# Patient Record
Sex: FEMALE | Race: OTHER | Employment: OTHER | URBAN - METROPOLITAN AREA
[De-identification: names, ages, dates, MRNs, and addresses within clinical notes are randomized per-mention and may not be internally consistent; named-entity substitution may affect disease eponyms.]

---

## 2017-01-22 ENCOUNTER — APPOINTMENT (OUTPATIENT)
Dept: GENERAL RADIOLOGY | Age: 62
DRG: 638 | End: 2017-01-22
Attending: EMERGENCY MEDICINE
Payer: MEDICARE

## 2017-01-22 ENCOUNTER — HOSPITAL ENCOUNTER (INPATIENT)
Facility: HOSPITAL | Age: 62
LOS: 2 days | Discharge: HOME | DRG: 638 | End: 2017-01-24
Attending: EMERGENCY MEDICINE | Admitting: INTERNAL MEDICINE
Payer: MEDICARE

## 2017-01-22 DIAGNOSIS — E08.10 DIABETIC KETOACIDOSIS WITHOUT COMA ASSOCIATED WITH DIABETES MELLITUS DUE TO UNDERLYING CONDITION (HCC): Primary | ICD-10-CM

## 2017-01-22 DIAGNOSIS — B34.9 VIRAL SYNDROME: ICD-10-CM

## 2017-01-22 PROBLEM — E87.20 METABOLIC ACIDOSIS: Status: ACTIVE | Noted: 2017-01-22

## 2017-01-22 PROBLEM — E87.1 HYPONATREMIA: Status: ACTIVE | Noted: 2017-01-22

## 2017-01-22 PROBLEM — Z91.81 AT RISK FOR FALLING: Status: ACTIVE | Noted: 2017-01-22

## 2017-01-22 PROBLEM — E87.2 METABOLIC ACIDOSIS: Status: ACTIVE | Noted: 2017-01-22

## 2017-01-22 PROBLEM — R73.9 HYPERGLYCEMIA: Status: ACTIVE | Noted: 2017-01-22

## 2017-01-22 LAB
ALBUMIN SERPL-MCNC: 3.4 G/DL (ref 3.5–4.8)
ALP LIVER SERPL-CCNC: 145 U/L (ref 50–130)
ALT SERPL-CCNC: 26 U/L (ref 14–54)
AST SERPL-CCNC: 25 U/L (ref 15–41)
BASOPHILS # BLD AUTO: 0.01 X10(3) UL (ref 0–0.1)
BASOPHILS NFR BLD AUTO: 0.2 %
BILIRUB SERPL-MCNC: 0.3 MG/DL (ref 0.1–2)
BUN BLD-MCNC: 13 MG/DL (ref 8–20)
BUN BLD-MCNC: 15 MG/DL (ref 8–20)
CALCIUM BLD-MCNC: 8.4 MG/DL (ref 8.3–10.3)
CALCIUM BLD-MCNC: 8.6 MG/DL (ref 8.3–10.3)
CHLORIDE: 101 MMOL/L (ref 101–111)
CHLORIDE: 107 MMOL/L (ref 101–111)
CO2: 20 MMOL/L (ref 22–32)
CO2: 27 MMOL/L (ref 22–32)
CREAT BLD-MCNC: 1.28 MG/DL (ref 0.55–1.02)
CREAT BLD-MCNC: 1.43 MG/DL (ref 0.55–1.02)
EOSINOPHIL # BLD AUTO: 0.07 X10(3) UL (ref 0–0.3)
EOSINOPHIL NFR BLD AUTO: 1.5 %
ERYTHROCYTE [DISTWIDTH] IN BLOOD BY AUTOMATED COUNT: 14 % (ref 11.5–16)
GLUCOSE BLD-MCNC: 168 MG/DL (ref 70–99)
GLUCOSE BLD-MCNC: 331 MG/DL (ref 65–99)
GLUCOSE BLD-MCNC: 432 MG/DL (ref 70–99)
GLUCOSE BLD-MCNC: 99 MG/DL (ref 65–99)
HCT VFR BLD AUTO: 35.3 % (ref 34–50)
HGB BLD-MCNC: 11.5 G/DL (ref 12–16)
IMMATURE GRANULOCYTE COUNT: 0.01 X10(3) UL (ref 0–1)
IMMATURE GRANULOCYTE RATIO %: 0.2 %
LYMPHOCYTES # BLD AUTO: 1.58 X10(3) UL (ref 0.9–4)
LYMPHOCYTES NFR BLD AUTO: 34.1 %
M PROTEIN MFR SERPL ELPH: 7.2 G/DL (ref 6.1–8.3)
MCH RBC QN AUTO: 25.4 PG (ref 27–33.2)
MCHC RBC AUTO-ENTMCNC: 32.6 G/DL (ref 31–37)
MCV RBC AUTO: 78.1 FL (ref 81–100)
MONOCYTES # BLD AUTO: 0.38 X10(3) UL (ref 0.1–0.6)
MONOCYTES NFR BLD AUTO: 8.2 %
NEUTROPHIL ABS PRELIM: 2.58 X10 (3) UL (ref 1.3–6.7)
NEUTROPHILS # BLD AUTO: 2.58 X10(3) UL (ref 1.3–6.7)
NEUTROPHILS NFR BLD AUTO: 55.8 %
PLATELET # BLD AUTO: 214 10(3)UL (ref 150–450)
POTASSIUM SERPL-SCNC: 3.9 MMOL/L (ref 3.6–5.1)
POTASSIUM SERPL-SCNC: 4.4 MMOL/L (ref 3.6–5.1)
RBC # BLD AUTO: 4.52 X10(6)UL (ref 3.8–5.1)
RED CELL DISTRIBUTION WIDTH-SD: 39.5 FL (ref 35.1–46.3)
SODIUM SERPL-SCNC: 134 MMOL/L (ref 136–144)
SODIUM SERPL-SCNC: 141 MMOL/L (ref 136–144)
WBC # BLD AUTO: 4.6 X10(3) UL (ref 4–13)

## 2017-01-22 PROCEDURE — 71010 XR CHEST AP PORTABLE  (CPT=71010): CPT

## 2017-01-22 PROCEDURE — 99223 1ST HOSP IP/OBS HIGH 75: CPT | Performed by: INTERNAL MEDICINE

## 2017-01-22 RX ORDER — ONDANSETRON 2 MG/ML
4 INJECTION INTRAMUSCULAR; INTRAVENOUS EVERY 6 HOURS PRN
Status: DISCONTINUED | OUTPATIENT
Start: 2017-01-22 | End: 2017-01-24

## 2017-01-22 RX ORDER — FENOFIBRATE 134 MG/1
134 CAPSULE ORAL
Status: DISCONTINUED | OUTPATIENT
Start: 2017-01-23 | End: 2017-01-24

## 2017-01-22 RX ORDER — ACETAMINOPHEN 325 MG/1
650 TABLET ORAL EVERY 6 HOURS PRN
Status: DISCONTINUED | OUTPATIENT
Start: 2017-01-22 | End: 2017-01-24

## 2017-01-22 RX ORDER — ATORVASTATIN CALCIUM 10 MG/1
10 TABLET, FILM COATED ORAL
Status: DISCONTINUED | OUTPATIENT
Start: 2017-01-23 | End: 2017-01-24

## 2017-01-22 RX ORDER — ZOLPIDEM TARTRATE 5 MG/1
5 TABLET ORAL NIGHTLY PRN
Status: DISCONTINUED | OUTPATIENT
Start: 2017-01-22 | End: 2017-01-24

## 2017-01-22 RX ORDER — RAMIPRIL 2.5 MG/1
5 CAPSULE ORAL
Status: DISCONTINUED | OUTPATIENT
Start: 2017-01-23 | End: 2017-01-24

## 2017-01-22 RX ORDER — SPIRONOLACT/HYDROCHLOROTHIAZID 25 MG-25MG
1 TABLET ORAL DAILY
Status: DISCONTINUED | OUTPATIENT
Start: 2017-01-23 | End: 2017-01-22 | Stop reason: RX

## 2017-01-22 RX ORDER — CODEINE PHOSPHATE AND GUAIFENESIN 10; 100 MG/5ML; MG/5ML
5 SOLUTION ORAL EVERY 4 HOURS PRN
Status: DISCONTINUED | OUTPATIENT
Start: 2017-01-22 | End: 2017-01-23

## 2017-01-22 RX ORDER — SODIUM CHLORIDE 9 MG/ML
INJECTION, SOLUTION INTRAVENOUS CONTINUOUS
Status: DISCONTINUED | OUTPATIENT
Start: 2017-01-22 | End: 2017-01-24

## 2017-01-22 RX ORDER — DEXTROSE MONOHYDRATE 25 G/50ML
50 INJECTION, SOLUTION INTRAVENOUS
Status: DISCONTINUED | OUTPATIENT
Start: 2017-01-22 | End: 2017-01-24

## 2017-01-22 RX ORDER — BUPROPION HYDROCHLORIDE 150 MG/1
150 TABLET, EXTENDED RELEASE ORAL
Status: DISCONTINUED | OUTPATIENT
Start: 2017-01-23 | End: 2017-01-24

## 2017-01-22 RX ORDER — GABAPENTIN 300 MG/1
600 CAPSULE ORAL NIGHTLY PRN
Status: DISCONTINUED | OUTPATIENT
Start: 2017-01-22 | End: 2017-01-24

## 2017-01-22 RX ORDER — POLYETHYLENE GLYCOL 3350 17 G/17G
17 POWDER, FOR SOLUTION ORAL DAILY PRN
Status: DISCONTINUED | OUTPATIENT
Start: 2017-01-22 | End: 2017-01-24

## 2017-01-22 RX ORDER — INSULIN ASPART 100 [IU]/ML
0.2 INJECTION, SOLUTION INTRAVENOUS; SUBCUTANEOUS ONCE
Status: COMPLETED | OUTPATIENT
Start: 2017-01-22 | End: 2017-01-22

## 2017-01-22 RX ORDER — SODIUM CHLORIDE 9 MG/ML
INJECTION, SOLUTION INTRAVENOUS CONTINUOUS
Status: ACTIVE | OUTPATIENT
Start: 2017-01-22 | End: 2017-01-22

## 2017-01-22 RX ORDER — PANTOPRAZOLE SODIUM 40 MG/1
40 TABLET, DELAYED RELEASE ORAL
Status: DISCONTINUED | OUTPATIENT
Start: 2017-01-23 | End: 2017-01-24

## 2017-01-22 RX ORDER — HEPARIN SODIUM 5000 [USP'U]/ML
5000 INJECTION, SOLUTION INTRAVENOUS; SUBCUTANEOUS EVERY 8 HOURS
Status: DISCONTINUED | OUTPATIENT
Start: 2017-01-22 | End: 2017-01-24

## 2017-01-22 NOTE — ED PROVIDER NOTES
Patient Seen in: THE [facility] Emergency Department In [place]    History   Patient presents with:  Cough/URI  Dyspnea AZALIA SOB (respiratory)    Stated Complaint: cough for one week, azalia, chest pain    HPI    22-year-old female presents to the ER complaining TABLET BY MOUTH EVERY DAY   METFORMIN HCL 1000 MG Oral Tab,  TAKE ONE AND ONE-HALF TABLETS BY MOUTH WITH BREAKFAST AND ONE TABLET IN THE AFTERNOON   LANTUS 100 UNIT/ML Subcutaneous Solution,  ADMINISTER 25 UNITS UNDER THE SKIN EVERY EVENING   DICLOFENAC SO Smokeless Status: Never Used                        Alcohol Use: No                Review of Systems    Positive for stated complaint: cough for one week, azalia, chest pain  Other systems are as noted in HPI. Constitutional and vital signs reviewed. Alkaline Phosphatase 145 (*)     Albumin 3.4 (*)     Sodium 134 (*)     CO2 20.0 (*)     All other components within normal limits   CBC W/ DIFFERENTIAL - Abnormal; Notable for the following:     HGB 11.5 (*)     MCV 78.1 (*)     MCH 25.4 (*)     All o to the hospitalist service. She was admitted for further care. She will be transferred by ambulance due to the need for continued IV fluids.         Disposition and Plan     Clinical Impression:  Diabetic ketoacidosis without coma associated with diabetes

## 2017-01-23 LAB
ATRIAL RATE: 65 BPM
BUN BLD-MCNC: 9 MG/DL (ref 8–20)
CALCIUM BLD-MCNC: 8.1 MG/DL (ref 8.3–10.3)
CHLORIDE: 113 MMOL/L (ref 101–111)
CO2: 23 MMOL/L (ref 22–32)
CREAT BLD-MCNC: 1.05 MG/DL (ref 0.55–1.02)
EST. AVERAGE GLUCOSE BLD GHB EST-MCNC: 306 MG/DL (ref 68–126)
GLUCOSE BLD-MCNC: 161 MG/DL (ref 65–99)
GLUCOSE BLD-MCNC: 162 MG/DL (ref 70–99)
GLUCOSE BLD-MCNC: 262 MG/DL (ref 65–99)
GLUCOSE BLD-MCNC: 287 MG/DL (ref 65–99)
GLUCOSE BLD-MCNC: 314 MG/DL (ref 65–99)
HAV IGM SER QL: 1.9 MG/DL (ref 1.7–3)
HBA1C MFR BLD HPLC: 12.3 % (ref ?–5.7)
P AXIS: 41 DEGREES
P-R INTERVAL: 156 MS
POTASSIUM SERPL-SCNC: 4 MMOL/L (ref 3.6–5.1)
Q-T INTERVAL: 496 MS
QRS DURATION: 150 MS
QTC CALCULATION (BEZET): 515 MS
R AXIS: 1 DEGREES
SODIUM SERPL-SCNC: 144 MMOL/L (ref 136–144)
T AXIS: 104 DEGREES
VENTRICULAR RATE: 65 BPM

## 2017-01-23 PROCEDURE — 99232 SBSQ HOSP IP/OBS MODERATE 35: CPT | Performed by: HOSPITALIST

## 2017-01-23 RX ORDER — AZITHROMYCIN 250 MG/1
500 TABLET, FILM COATED ORAL
Status: DISCONTINUED | OUTPATIENT
Start: 2017-01-23 | End: 2017-01-23

## 2017-01-23 RX ORDER — DOXYCYCLINE HYCLATE 100 MG/1
100 CAPSULE ORAL EVERY 12 HOURS SCHEDULED
Status: DISCONTINUED | OUTPATIENT
Start: 2017-01-23 | End: 2017-01-24

## 2017-01-23 RX ORDER — IPRATROPIUM BROMIDE AND ALBUTEROL SULFATE 2.5; .5 MG/3ML; MG/3ML
3 SOLUTION RESPIRATORY (INHALATION)
Status: DISCONTINUED | OUTPATIENT
Start: 2017-01-23 | End: 2017-01-24

## 2017-01-23 NOTE — H&P
[facility]  History and Physical     Gary Delay Patient Status:  Inpatient    1955 MRN [id number]   [facility] 5NW-A Attending Luz Martinez MD   Hosp Day # 0 PCP David Rios MD     Chief Complaint: cough    [facility]       Social History:  reports that she has never smoked. She has never used smokeless tobacco. She reports that she does not drink alcohol or use illicit drugs.     Family History:   Family History   Problem Relation Age of Onset   • Heart Attack 1   Fluticasone Propionate (FLONASE) 50 MCG/ACT Nasal Suspension 1 spray by Nasal route daily. (Patient taking differently: 1 spray by Nasal route daily as needed.) Disp: 16 g Rfl: 2   Coenzyme Q10 (CO Q-10) 300 MG Oral Cap Take 1 capsule by mouth daily.  D lesions. Psychiatric: Appropriate mood and affect.       Diagnostic Data:      Labs:  Recent Labs   Lab  01/22/17   1726   WBC  4.6   HGB  11.5*   MCV  78.1*   PLT  214.0       Recent Labs   Lab  01/22/17   1726  01/22/17   2046   GLU  432*  168*   BUN  1

## 2017-01-23 NOTE — PROGRESS NOTES
NURSING ADMISSION NOTE      Patient admitted via Ambulance  Oriented to room 529  Safety precautions initiated. Bed in low position. Call light in reach. Pt arrived to [facility] from [place] [facility].  Admission database completed with assistance from Sadia Louis

## 2017-01-23 NOTE — PROGRESS NOTES
01/22/17 2207   Provider Notification   Reason for Communication Review case  (Blood sugar, diet order)   Provider Name Dr. Garima Main   Method of Communication Page   Response Waiting for response; Phone call;See orders   Notification Time 2157   Pt blood s

## 2017-01-23 NOTE — PLAN OF CARE
Patient/Family Goals    • Patient/Family Short Term Goal Progressing        SAFETY ADULT - FALL    • Free from fall injury Progressing        Pt A/O x 3, primarily Mosotho speaking, understands english. Pt blood sugar 99 tonight, pt on diabetic diet.  Infor

## 2017-01-23 NOTE — PHYSICAL THERAPY NOTE
PHYSICAL THERAPY QUICK EVALUATION - INPATIENT    Room Number: 529/529-A  Evaluation Date: 1/23/2017  Presenting Problem: diabetic ketoacidosis   Physician Order: PT Eval and Treat    Problem List  Principal Problem:    Diabetic ketoacidosis without coma as with family but they do not help her with ADLs. SUBJECTIVE  \"I need my sugars fixed. \"    OBJECTIVE  Precautions: None  Fall Risk: Standard fall risk    WEIGHT BEARING RESTRICTION  Weight Bearing Restriction: None    PAIN ASSESSMENT  Ratin reported that she \"feels good\" when asked. Patient performed 5x sit to  19.5 seconds. Patient was left sitting up in chair with all needs met and call light in place.      Patient End of Session: Up in chair;Needs met;Call light within reach;RN aw

## 2017-01-23 NOTE — PROGRESS NOTES
01/22/17 2145   Provider Notification   Reason for Communication Review case  (Med rec)   Provider Name Dr. Shane Nevarez   Method of Communication Page   Response Waiting for response   Notification Time 2115   MD paged about med rec needing to be completed.

## 2017-01-23 NOTE — CM/SW NOTE
Patient was screened during rounds and no needs are identified at this time. Pt was evaluated by PT and deemed safe for home discharge. Pt requested written letter indicating current hospitalization as pt reports court date 1/24/17.  Letter completed and g

## 2017-01-23 NOTE — PLAN OF CARE
Diabetes/Glucose Control    • Glucose maintained within prescribed range Progressing        Patient/Family Goals    • Patient/Family Short Term Goal Progressing          PROBLEM: DKA     ASSESS: Pt a&ox4, VSS, afebrile, primarily Uruguayan speaking, maintain acidosis     Diabetic ketoacidosis without coma associated with diabetes mellitus due to underlying condition ([facility] [address]     Viral syndrome     At risk for falling       Active issue(s) requiring resolution prior to discharge: Control blood glucose     Anticipa

## 2017-01-23 NOTE — ED NOTES
[facility] to [facility]. EMS notified for transport, ETA 20-30 minutes.

## 2017-01-23 NOTE — PROGRESS NOTES
[facility]  Progress Note     Julio César Loya Patient Status:  Inpatient    1955 MRN [id number]   [facility] 5NW-A Attending Albertina Arnold MD   Hosp Day # 1 PCP Brenna Clark MD     Chief Complaint: cough chills weakness    S: P ER (SR)  150 mg Oral Daily   • fenofibrate micronized  134 mg Oral Daily with breakfast   • insulin detemir  25 Units Subcutaneous Nightly   • Pantoprazole Sodium  40 mg Oral BID AC   • ramipril  5 mg Oral Daily       ASSESSMENT / PLAN:     1.  Diabetes typ

## 2017-01-24 VITALS
RESPIRATION RATE: 20 BRPM | HEART RATE: 78 BPM | SYSTOLIC BLOOD PRESSURE: 132 MMHG | DIASTOLIC BLOOD PRESSURE: 59 MMHG | WEIGHT: 198.88 LBS | TEMPERATURE: 99 F | BODY MASS INDEX: 32 KG/M2 | OXYGEN SATURATION: 100 %

## 2017-01-24 LAB
GLUCOSE BLD-MCNC: 201 MG/DL (ref 65–99)
GLUCOSE BLD-MCNC: 284 MG/DL (ref 65–99)

## 2017-01-24 PROCEDURE — 99238 HOSP IP/OBS DSCHRG MGMT 30/<: CPT | Performed by: HOSPITALIST

## 2017-01-24 RX ORDER — DOXYCYCLINE HYCLATE 100 MG/1
100 CAPSULE ORAL 2 TIMES DAILY
Qty: 10 CAPSULE | Refills: 0 | Status: SHIPPED | OUTPATIENT
Start: 2017-01-24 | End: 2017-07-10

## 2017-01-24 NOTE — PROGRESS NOTES
SPO2% ON ROOM AIR AT REST 99%    SPO2% AMBULATION ON ROOM AIR 96%    SPO2% AMBULATION ON O2 96% ON 0 LITERS PER MINIUTE

## 2017-01-24 NOTE — PROGRESS NOTES
NURSING DISCHARGE NOTE    Discharged Home via Wheelchair. Accompanied by Family member  Belongings Taken by patient/family. Pt discharged to home. dicharge instruction given to pt,verbalized understanding. heplock discontinued. pts family take her home.

## 2017-01-24 NOTE — PLAN OF CARE
Problem: Patient/Family Goals  Goal: Patient/Family Short Term Goal  Patient’s Short Term Goal: Admission 1/22 PM: For blood sugar to go down  1/23 AM - control cough   1/23 (2100) to get blood sugars under control   1/24-get blood sugar under control  Int

## 2017-01-24 NOTE — DISCHARGE SUMMARY
[facility]  DISCHARGE SUMMARY     Keisha Maddox Patient Status:  Inpatient    1955 MRN [id number]   [facility] 5NW-A Attending Merrill Sanchez MD   Hosp Day # 2 PCP Ger Alcantara MD     Date of Admission: 2017  Date of Disc ONCE DAILY    Quantity:  100 each   Refills:  1       Lancets Misc        Use three times daily    Quantity:  200 each   Refills:  1       TRUETRACK BLOOD GLUCOSE W/DEVICE Kit        Use as directed    Quantity:  1 Kit   Refills:  0         ASK your doctor Refills:  0       Montelukast Sodium 10 MG Tabs   Commonly known as:  SINGULAIR        TAKE 1 TABLET BY MOUTH EVERY DAY    Quantity:  90 tablet   Refills:  0       Pantoprazole Sodium 40 MG Tbec   Last time this was given:  40 mg on 1/24/2017  6:16 AM

## 2017-01-24 NOTE — PROGRESS NOTES
Pt is a 63 y/o female admitted with Bronchitis. alert oriented,denies  SOB. no fever,N/V noted. she is able to do her ADLs. 02 walk done,pt didn't need any 02.sats 96% on room air.

## 2017-01-24 NOTE — PLAN OF CARE
Diabetes/Glucose Control    • Glucose maintained within prescribed range Progressing        Impaired Functional Mobility    • Achieve highest/safest level of mobility/gait Progressing        PAIN - ADULT    • Verbalizes/displays adequate comfort level or p

## 2017-01-24 NOTE — CM/SW NOTE
Met with patient, she is BPCI and willing to accept [facility]. Explained the program and discussed her discharge plan. Plan to return home with daughter when medically stable.

## 2017-01-26 ENCOUNTER — PATIENT OUTREACH (OUTPATIENT)
Dept: CASE MANAGEMENT | Age: 62
End: 2017-01-26

## 2017-01-26 DIAGNOSIS — E86.0 DEHYDRATION: Primary | ICD-10-CM

## 2017-01-26 DIAGNOSIS — E08.10 DIABETIC KETOACIDOSIS WITHOUT COMA ASSOCIATED WITH DIABETES MELLITUS DUE TO UNDERLYING CONDITION (HCC): ICD-10-CM

## 2017-01-26 NOTE — PROGRESS NOTES
Initial Post Discharge Follow Up   Discharge Date: 1/24/17  Contact Date: 1/26/2017    Consent Verification:  Assessment Completed With: Patient  Other: Hitesh Karimi, patient's daughter Permission received per patient?  written  HIPAA Verified?   Yes     Per p Cap Take 1 capsule (100 mg total) by mouth 2 (two) times daily.  Disp: 10 capsule Rfl: 0   BUPROPION HCL ER, XL, 150 MG Oral Tablet 24 Hr TAKE 1 TABLET BY MOUTH DAILY Disp: 30 tablet Rfl: 2   Atorvastatin Calcium 20 MG Oral Tab Take 1 tablet (20 mg total) b differently: 1 spray by Nasal route daily as needed.) Disp: 16 g Rfl: 2   Insulin Pen Needle (NOVOFINE) 32G X 6 MM Does not apply Misc Use twice daily Disp: 100 each Rfl: 12   Coenzyme Q10 (CO Q-10) 300 MG Oral Cap Take 1 capsule by mouth daily.  Disp:  Rfl

## 2017-01-27 NOTE — TELEPHONE ENCOUNTER
Requesting Bupropion  LOV: 5/9/16  RTC: 6 months  Last Labs: n/a  Filled: 12/28/16 #30 with 2 refills    Future Appointments  Date Time Provider Margoth Silveira   2/6/2017 1:30 PM Leslie Krause MD [facility] [address]       Patient has upcoming appointmen

## 2017-01-29 RX ORDER — BUPROPION HYDROCHLORIDE 150 MG/1
TABLET ORAL
Qty: 90 TABLET | Refills: 0 | Status: SHIPPED | OUTPATIENT
Start: 2017-01-29 | End: 2017-02-06

## 2017-02-06 ENCOUNTER — OFFICE VISIT (OUTPATIENT)
Dept: FAMILY MEDICINE CLINIC | Facility: CLINIC | Age: 62
End: 2017-02-06

## 2017-02-06 ENCOUNTER — LAB ENCOUNTER (OUTPATIENT)
Dept: LAB | Age: 62
End: 2017-02-06
Attending: PHYSICIAN ASSISTANT
Payer: MEDICARE

## 2017-02-06 VITALS
SYSTOLIC BLOOD PRESSURE: 120 MMHG | DIASTOLIC BLOOD PRESSURE: 78 MMHG | BODY MASS INDEX: 31.82 KG/M2 | HEIGHT: 66 IN | WEIGHT: 198 LBS | RESPIRATION RATE: 16 BRPM | HEART RATE: 64 BPM

## 2017-02-06 DIAGNOSIS — Z79.4 TYPE 2 DIABETES MELLITUS WITH DIABETIC NEUROPATHY, WITH LONG-TERM CURRENT USE OF INSULIN (HCC): ICD-10-CM

## 2017-02-06 DIAGNOSIS — N30.00 ACUTE CYSTITIS WITHOUT HEMATURIA: ICD-10-CM

## 2017-02-06 DIAGNOSIS — E11.40 TYPE 2 DIABETES MELLITUS WITH DIABETIC NEUROPATHY, WITH LONG-TERM CURRENT USE OF INSULIN (HCC): ICD-10-CM

## 2017-02-06 DIAGNOSIS — E11.40 TYPE 2 DIABETES MELLITUS WITH DIABETIC NEUROPATHY, WITH LONG-TERM CURRENT USE OF INSULIN (HCC): Primary | ICD-10-CM

## 2017-02-06 DIAGNOSIS — Z79.4 TYPE 2 DIABETES MELLITUS WITH DIABETIC NEUROPATHY, WITH LONG-TERM CURRENT USE OF INSULIN (HCC): Primary | ICD-10-CM

## 2017-02-06 PROBLEM — N39.0 UTI (URINARY TRACT INFECTION): Status: ACTIVE | Noted: 2017-02-06

## 2017-02-06 LAB
25-HYDROXYVITAMIN D (TOTAL): 23.3 NG/ML (ref 30–100)
BILIRUB UR QL STRIP.AUTO: NEGATIVE
BUN BLD-MCNC: 19 MG/DL (ref 8–20)
CALCIUM BLD-MCNC: 9.2 MG/DL (ref 8.3–10.3)
CHLORIDE: 106 MMOL/L (ref 101–111)
CLARITY UR REFRACT.AUTO: CLEAR
CO2: 26 MMOL/L (ref 22–32)
COLOR UR AUTO: YELLOW
CREAT BLD-MCNC: 1.19 MG/DL (ref 0.55–1.02)
EST. AVERAGE GLUCOSE BLD GHB EST-MCNC: 298 MG/DL (ref 68–126)
GLUCOSE BLD-MCNC: 226 MG/DL (ref 70–99)
GLUCOSE UR STRIP.AUTO-MCNC: >=500 MG/DL
HAV AB SERPL IA-ACNC: 380 PG/ML (ref 193–986)
HBA1C MFR BLD HPLC: 12 % (ref ?–5.7)
KETONES UR STRIP.AUTO-MCNC: NEGATIVE MG/DL
NITRITE UR QL STRIP.AUTO: NEGATIVE
PH UR STRIP.AUTO: 5 [PH] (ref 4.5–8)
POTASSIUM SERPL-SCNC: 4.3 MMOL/L (ref 3.6–5.1)
PROT UR STRIP.AUTO-MCNC: NEGATIVE MG/DL
RBC UR QL AUTO: NEGATIVE
SODIUM SERPL-SCNC: 139 MMOL/L (ref 136–144)
SP GR UR STRIP.AUTO: 1.01 (ref 1–1.03)
TSI SER-ACNC: 0.98 MIU/ML (ref 0.35–5.5)
UROBILINOGEN UR STRIP.AUTO-MCNC: <2 MG/DL

## 2017-02-06 PROCEDURE — 87147 CULTURE TYPE IMMUNOLOGIC: CPT

## 2017-02-06 PROCEDURE — 82306 VITAMIN D 25 HYDROXY: CPT

## 2017-02-06 PROCEDURE — 84443 ASSAY THYROID STIM HORMONE: CPT

## 2017-02-06 PROCEDURE — 99214 OFFICE O/P EST MOD 30 MIN: CPT | Performed by: INTERNAL MEDICINE

## 2017-02-06 PROCEDURE — 83036 HEMOGLOBIN GLYCOSYLATED A1C: CPT

## 2017-02-06 PROCEDURE — 80048 BASIC METABOLIC PNL TOTAL CA: CPT

## 2017-02-06 PROCEDURE — 87086 URINE CULTURE/COLONY COUNT: CPT

## 2017-02-06 PROCEDURE — 81001 URINALYSIS AUTO W/SCOPE: CPT

## 2017-02-06 PROCEDURE — 36415 COLL VENOUS BLD VENIPUNCTURE: CPT

## 2017-02-06 PROCEDURE — 82607 VITAMIN B-12: CPT

## 2017-02-06 RX ORDER — NITROFURANTOIN 25; 75 MG/1; MG/1
100 CAPSULE ORAL 2 TIMES DAILY
Qty: 20 CAPSULE | Refills: 0 | Status: SHIPPED | OUTPATIENT
Start: 2017-02-06 | End: 2017-07-10

## 2017-02-06 NOTE — PROGRESS NOTES
CC: Patient presents with:  Hospital F/U: high sugar       HPI:   1. DM, sugars in 200's. Taking meds. 2. Urinary freq, past 3 days, with associated burning, severity moderate in nature, drinking water not helping.        Current Outpatient Prescripti minutes before breakfast and before dinner, take on an empty stomach.  Disp: 60 tablet Rfl: 1   Glucose Blood (TRUETRACK TEST) In Vitro Strip TEST TWICE  DAILY Disp: 100 strip Rfl: 5   Lancets Does not apply Misc Use three times daily Disp: 200 each Rfl: 1 styloid tenosynovitis     Ganglion of tendon sheath     [id number] SX/ RLW/ GLOBAL EXP 01-20-15     Fungal toenail infection     Osteoarthrosis, unspecified whether generalized or localized, lower leg     Depression     Encounter for therapeutic drug nithya Macro (MACROBID) 100 MG Oral Cap 20 capsule 0      Sig: Take 1 capsule (100 mg total) by mouth 2 (two) times daily. Empagliflozin (JARDIANCE) 10 MG Oral Tab 30 tablet 5      Sig: Take 10 mg by mouth daily.           None     ASSESSMENT:   Type 2 diabet

## 2017-02-08 RX ORDER — INSULIN GLARGINE 100 [IU]/ML
INJECTION, SOLUTION SUBCUTANEOUS
Qty: 30 ML | Refills: 0 | Status: SHIPPED | OUTPATIENT
Start: 2017-02-08 | End: 2017-05-17

## 2017-02-08 NOTE — TELEPHONE ENCOUNTER
Requesting Lantus  LOV: 2/6/17  RTC: 4 wks  Last Labs: 2/6/17  Filled: 11/17/16 #30ml with 0 refills    Future Appointments  Date Time Provider Margoth Silveira   3/15/2017 5:40 PM Jil Castro MD [facility] [address]     RX approved

## 2017-02-20 RX ORDER — ERGOCALCIFEROL 1.25 MG/1
50000 CAPSULE ORAL WEEKLY
Qty: 4 CAPSULE | Refills: 5 | Status: SHIPPED | OUTPATIENT
Start: 2017-02-20 | End: 2017-06-14

## 2017-02-20 NOTE — PROGRESS NOTES
Quick Note:    Low vit D, start vit D 50,000 U once weekly for 6 months.  Low B12, uncontrolled DM, will discuss at next ov  ______

## 2017-03-15 ENCOUNTER — OFFICE VISIT (OUTPATIENT)
Dept: FAMILY MEDICINE CLINIC | Facility: CLINIC | Age: 62
End: 2017-03-15

## 2017-03-15 VITALS
HEIGHT: 66 IN | BODY MASS INDEX: 31.02 KG/M2 | HEART RATE: 64 BPM | WEIGHT: 193 LBS | DIASTOLIC BLOOD PRESSURE: 78 MMHG | RESPIRATION RATE: 16 BRPM | SYSTOLIC BLOOD PRESSURE: 122 MMHG

## 2017-03-15 DIAGNOSIS — E11.40 TYPE 2 DIABETES MELLITUS WITH DIABETIC NEUROPATHY, WITH LONG-TERM CURRENT USE OF INSULIN (HCC): ICD-10-CM

## 2017-03-15 DIAGNOSIS — Z79.4 TYPE 2 DIABETES MELLITUS WITH DIABETIC NEUROPATHY, WITH LONG-TERM CURRENT USE OF INSULIN (HCC): ICD-10-CM

## 2017-03-15 DIAGNOSIS — E53.8 VITAMIN B 12 DEFICIENCY: ICD-10-CM

## 2017-03-15 PROCEDURE — 96372 THER/PROPH/DIAG INJ SC/IM: CPT | Performed by: INTERNAL MEDICINE

## 2017-03-15 PROCEDURE — 99213 OFFICE O/P EST LOW 20 MIN: CPT | Performed by: INTERNAL MEDICINE

## 2017-03-15 RX ORDER — BUPROPION HYDROCHLORIDE 150 MG/1
TABLET ORAL
Refills: 1 | COMMUNITY
Start: 2017-03-07 | End: 2017-10-23

## 2017-03-15 RX ORDER — EMPAGLIFLOZIN 10 MG/1
TABLET, FILM COATED ORAL
COMMUNITY
Start: 2017-03-05 | End: 2017-07-10

## 2017-03-15 RX ORDER — CYANOCOBALAMIN 1000 UG/ML
1000 INJECTION INTRAMUSCULAR; SUBCUTANEOUS ONCE
Status: COMPLETED | OUTPATIENT
Start: 2017-03-15 | End: 2017-03-15

## 2017-03-15 RX ADMIN — CYANOCOBALAMIN 1000 MCG: 1000 INJECTION INTRAMUSCULAR; SUBCUTANEOUS at 18:38:00

## 2017-03-15 NOTE — TELEPHONE ENCOUNTER
Diabetic Medication Protocol Failed3/15 11:01 AM   Last HgBA1C < 7.5     Asthma & COPD Medication Protocol Failed3/15 11:01 AM   Asthma Action Score greater than or equal to 20    AAP/ACT given in last 12 months     Requesting Metformin and Montelukast   L

## 2017-03-15 NOTE — PROGRESS NOTES
CC: Patient presents with:  Lab Results       HPI:   1. DM2, fasting sugars in 100's, 2 hours after meals 180's, on jardiacne, metfomrin, and lantus.     2. Vitamin B 12 deficiency, some fatigue       Current Outpatient Prescriptions:  JARDIANCE 10 MG O Disp: 100 each Rfl: 5   gabapentin (NEURONTIN) 300 MG Oral Cap TAKE 2 CAPSULES BY MOUTH EVERY NIGHT (Patient taking differently: TAKE 2 CAPSULES BY MOUTH EVERY NIGHT AS NEEDED) Disp: 180 capsule Rfl: 1   Pantoprazole Sodium (PROTONIX) 40 MG Oral Tab EC Og Obesity     Thrombocytopenia ([facility] [address]     Essential hypertension     Hyperlipidemia     DM2 (diabetes mellitus, type 2) (HCC)     Fatigue     Numbness     Low testosterone level in female     Constipation     Abnormal EKG     Allergic rhinitis     Osteopenia daily.          None     ASSESSMENT:   Vitamin B 12 deficiency  Type 2 diabetes mellitus with diabetic neuropathy, with long-term current use of insulin (HCC)     PLAN:  1. DM2, fasting sugars in 100's, 2 hours after meals 180's, will increase jardiacne to

## 2017-03-16 RX ORDER — FENOFIBRATE 160 MG/1
TABLET ORAL
Qty: 90 TABLET | Refills: 0 | OUTPATIENT
Start: 2017-03-16

## 2017-03-22 ENCOUNTER — TELEPHONE (OUTPATIENT)
Dept: FAMILY MEDICINE CLINIC | Facility: CLINIC | Age: 62
End: 2017-03-22

## 2017-03-22 NOTE — TELEPHONE ENCOUNTER
Diabetic Medication Protocol Failed3/22 12:30 PM   Last HgBA1C < 7.5        Requesting Metformin HCL 1000 mg and Diclofenac Sodium (  LOV: 3/15/17  RTC: 3m  Last Labs: 2/6/17  Filled: Metformin 12/12/16 #225 with 0 refills   Diclofenac sodium 11/9/16 #60 w

## 2017-03-22 NOTE — TELEPHONE ENCOUNTER
Requesting Vitamin B 12 injections  LOV: 3/15/17  RTC: 3 months  Last Labs: 2/6/17 - 2.  Vitamin B 12 deficiency, will start monthly B12 is noted at last office visit      Future Appointments  Date Time Provider Margoth Silveira   7/10/2017 3:20 PM Kelsey Linn,

## 2017-03-23 RX ORDER — CALCIUM CARB/VITAMIN D3/VIT K1 500-100-40
TABLET,CHEWABLE ORAL
Qty: 100 EACH | Refills: 3 | Status: SHIPPED | OUTPATIENT
Start: 2017-03-23 | End: 2018-06-04

## 2017-03-23 RX ORDER — MONTELUKAST SODIUM 10 MG/1
TABLET ORAL
Qty: 90 TABLET | Refills: 1 | Status: SHIPPED | OUTPATIENT
Start: 2017-03-23 | End: 2017-10-23

## 2017-03-23 RX ORDER — DICLOFENAC SODIUM 75 MG/1
TABLET, DELAYED RELEASE ORAL
Qty: 60 TABLET | Refills: 0 | OUTPATIENT
Start: 2017-03-23

## 2017-03-23 RX ORDER — DIAPER,BRIEF,ADULT, DISPOSABLE
EACH MISCELLANEOUS
Qty: 100 STRIP | Refills: 3 | Status: SHIPPED | OUTPATIENT
Start: 2017-03-23 | End: 2017-07-25

## 2017-03-30 NOTE — TELEPHONE ENCOUNTER
Can we please f/u on this request; it looks like the metformin was addressed but not the diclofenac. Thank you.

## 2017-03-30 NOTE — TELEPHONE ENCOUNTER
Requesting: diclofenac for arthropathy of multiple sites  LOV: 3/15/17 for lab follow up  RTC: 3 months-June 2017  Last Labs: n/a  Filled: 11/9/16 #60 with 0 refills    Future Appointments  Date Time Provider Margoth Silveira   4/21/2017 3:00 PM [facility] 20 NU

## 2017-03-31 ENCOUNTER — TELEPHONE (OUTPATIENT)
Dept: FAMILY MEDICINE CLINIC | Facility: CLINIC | Age: 62
End: 2017-03-31

## 2017-03-31 RX ORDER — DICLOFENAC SODIUM 75 MG/1
TABLET, DELAYED RELEASE ORAL
Qty: 60 TABLET | Refills: 1 | Status: SHIPPED | OUTPATIENT
Start: 2017-03-31 | End: 2017-03-31

## 2017-03-31 NOTE — TELEPHONE ENCOUNTER
Called Pt. To reschedule B-12 injection appt.4/21/17 Finnish speaking only. Daughter will call back to reschedule.

## 2017-04-03 RX ORDER — DICLOFENAC SODIUM 75 MG/1
TABLET, DELAYED RELEASE ORAL
Qty: 180 TABLET | Refills: 1 | Status: ON HOLD | OUTPATIENT
Start: 2017-04-03 | End: 2017-11-16

## 2017-04-04 NOTE — TELEPHONE ENCOUNTER
Requesting bupropion   LOV: 3/15/17  RTC: 3 months  Last Labs :        Future Appointments  Date Time Provider Margoth Jordana   5/22/2017 3:30 PM [facility] 20 NURSE [facility] [address]   7/10/2017 3:20 PM Berlin Diez MD [facility] [address]

## 2017-04-05 RX ORDER — BUPROPION HYDROCHLORIDE 150 MG/1
TABLET ORAL
Qty: 90 TABLET | Refills: 0 | Status: SHIPPED | OUTPATIENT
Start: 2017-04-05 | End: 2017-07-03

## 2017-04-07 NOTE — TELEPHONE ENCOUNTER
Diabetic Medication Protocol Failed4/7 3:43 PM   Last HgBA1C < 7.5        Requesting Metformin HCL 1000mg  LOV: 3/15/17  RTC: 3 mo  Last Labs: 2/6/17  Filled: 3/23/17 #225 with 1 refills- refill too soon   Future Appointments  Date Time Provider Department

## 2017-04-12 ENCOUNTER — TELEPHONE (OUTPATIENT)
Dept: FAMILY MEDICINE CLINIC | Facility: CLINIC | Age: 62
End: 2017-04-12

## 2017-04-12 NOTE — TELEPHONE ENCOUNTER
Cholesterol Medication Protocol Failed4/12 1:13 PM   Lipid panel within past 6 months     Requesting fenofibrate   LOV: 3/15/16  RTC: 3m   Last Labs: 5/2016  Filled: 9/15/16 #90 with 1 refills    Future Appointments  Date Time Provider Margoth Silveira

## 2017-04-12 NOTE — TELEPHONE ENCOUNTER
Daughter sts the pharmacy told her they did not receive refill  On 3/23/17. Please advise.      METFORMIN HCL 1000 MG Oral Tab [Pharmacy Med Name: METFORMIN 1000MG TABLETS] 225 tablet 0 4/12/2017      Sig:  TAKE ONE AND ONE-HALF TABLETS BY MOUTH WITH BREAK

## 2017-04-12 NOTE — TELEPHONE ENCOUNTER
This was sent on 3/23/17 #225 with 1 refill to [facility] on [address] [facility]. There was a call they never received it even though receipt is confirmed. I spoke with pharmacy and did give verbal to refill.

## 2017-04-12 NOTE — TELEPHONE ENCOUNTER
Diabetic Medication Protocol Failed4/12 1:13 PM   Last HgBA1C < 7.5     Requesting Metformin   LOV: 3/15/17  RTC: 3m   Last Labs: 2/2017  Filled: 3/23/17 #225 with 1 refills    Future Appointments  Date Time Provider Margoth Silveira   4/25/2017 3:00 PM

## 2017-04-16 RX ORDER — FENOFIBRATE 160 MG/1
TABLET ORAL
Qty: 90 TABLET | Refills: 0 | Status: SHIPPED | OUTPATIENT
Start: 2017-04-16 | End: 2017-07-12

## 2017-04-25 ENCOUNTER — NURSE ONLY (OUTPATIENT)
Dept: FAMILY MEDICINE CLINIC | Facility: CLINIC | Age: 62
End: 2017-04-25

## 2017-04-25 PROCEDURE — 96372 THER/PROPH/DIAG INJ SC/IM: CPT | Performed by: INTERNAL MEDICINE

## 2017-04-25 RX ORDER — CYANOCOBALAMIN 1000 UG/ML
1000 INJECTION INTRAMUSCULAR; SUBCUTANEOUS
Status: SHIPPED | OUTPATIENT
Start: 2017-04-25 | End: 2017-09-22

## 2017-04-25 RX ADMIN — CYANOCOBALAMIN 1000 MCG: 1000 INJECTION INTRAMUSCULAR; SUBCUTANEOUS at 15:07:00

## 2017-04-25 NOTE — PROGRESS NOTES
Patient is here for vitamin B12 injection #2. Given IM to right deltoid without incident. She is scheduled for next shot in one month.

## 2017-04-25 NOTE — PROGRESS NOTES
Notes Recorded by Karlene Ríos MD on 2/19/2017 at 11:38 PM  Low vit D, start vit D 50,000 U once weekly for 6 months.  Low B12, uncontrolled DM, will discuss at next ov        Ref Range 2/6/17  2:26 PM       Vitamin B12              Injection #1 given 3/1

## 2017-05-17 NOTE — TELEPHONE ENCOUNTER
Requesting Lantus  LOV: 3/15/17  RTC: 3 months  Last Labs: 2/6/17  HgbA1C <5.7 % 12.0 (H         Micro done 5/2/16    Filled: 2/8/17 #30ml with 0 refills    Future Appointments  Date Time Provider Margoth Silveira   5/22/2017 3:30 PM [facility] 20 NURSE [facility] 20 E

## 2017-05-18 NOTE — TELEPHONE ENCOUNTER
Requesting gabapentin  LOV: 3/15/17   RTC: 3 months  Last Labs: 2/6/17  Filled: 3/7/17     Future Appointments  Date Time Provider Margoth Silveira   5/22/2017 3:30 PM [facility] 20 NURSE [facility] [address]   7/10/2017 3:20 PM Autumn Darling MD [facility] [address]

## 2017-05-21 RX ORDER — INSULIN GLARGINE 100 [IU]/ML
INJECTION, SOLUTION SUBCUTANEOUS
Qty: 30 ML | Refills: 0 | Status: SHIPPED | OUTPATIENT
Start: 2017-05-21 | End: 2017-08-11

## 2017-05-22 ENCOUNTER — NURSE ONLY (OUTPATIENT)
Dept: FAMILY MEDICINE CLINIC | Facility: CLINIC | Age: 62
End: 2017-05-22

## 2017-05-22 DIAGNOSIS — E53.8 VITAMIN B12 DEFICIENCY: Primary | ICD-10-CM

## 2017-05-22 PROCEDURE — 96372 THER/PROPH/DIAG INJ SC/IM: CPT | Performed by: INTERNAL MEDICINE

## 2017-05-22 RX ADMIN — CYANOCOBALAMIN 1000 MCG: 1000 INJECTION INTRAMUSCULAR; SUBCUTANEOUS at 15:32:00

## 2017-05-22 NOTE — PROGRESS NOTES
Notes Recorded by Maximo Kline MD on 2/19/2017 at 11:38 PM  Low vit D, start vit D 50,000 U once weekly for 6 months.  Low B12, uncontrolled DM, will discuss at next ov        Ref Range 2/6/17  2:26 PM       Vitamin B12 193-986 pg/mL 380            B12 in

## 2017-05-22 NOTE — TELEPHONE ENCOUNTER
Per daughter they have not been getting it filled by another provider. She states the patient takes it on and off so the prescription from 2015 has lasted until now.

## 2017-05-22 NOTE — TELEPHONE ENCOUNTER
I have the last time this was filled was 2015, please contact pt and see where they have been getting this medication in the mean time

## 2017-05-25 RX ORDER — GABAPENTIN 300 MG/1
CAPSULE ORAL
Qty: 180 CAPSULE | Refills: 1 | Status: SHIPPED | OUTPATIENT
Start: 2017-05-25 | End: 2017-10-23

## 2017-06-14 NOTE — TELEPHONE ENCOUNTER
Requesting: Atorvastatin 20mg  LOV: 3/15/17  RTC: 3 months  Last Labs: 5/2/16 - LIPIDS  Filled: 12/18/16 #90 with 1 refills    Future Appointments  Date Time Provider Margoth Silveira   6/19/2017 3:30 PM [facility] 20 NURSE [facility] [address]   7/10/2017 3:20 P

## 2017-06-15 RX ORDER — ERGOCALCIFEROL 1.25 MG/1
CAPSULE ORAL
Qty: 12 CAPSULE | Refills: 0 | Status: SHIPPED | OUTPATIENT
Start: 2017-06-15 | End: 2017-10-23 | Stop reason: ALTCHOICE

## 2017-06-15 RX ORDER — ATORVASTATIN CALCIUM 20 MG/1
TABLET, FILM COATED ORAL
Qty: 90 TABLET | Refills: 0 | Status: SHIPPED | OUTPATIENT
Start: 2017-06-15 | End: 2017-09-13

## 2017-06-19 ENCOUNTER — NURSE ONLY (OUTPATIENT)
Dept: FAMILY MEDICINE CLINIC | Facility: CLINIC | Age: 62
End: 2017-06-19

## 2017-06-19 DIAGNOSIS — E53.8 VITAMIN B12 DEFICIENCY: Primary | ICD-10-CM

## 2017-06-19 PROCEDURE — 96372 THER/PROPH/DIAG INJ SC/IM: CPT | Performed by: INTERNAL MEDICINE

## 2017-06-19 RX ADMIN — CYANOCOBALAMIN 1000 MCG: 1000 INJECTION INTRAMUSCULAR; SUBCUTANEOUS at 15:32:00

## 2017-06-19 NOTE — PROGRESS NOTES
Status: Signed : Dionicio Wilcox RN (Registered Nurse)     Expand All Collapse All    Notes Recorded by Jovana Dean MD on 2/19/2017 at 11:38 PM  Low vit D, start vit D 50,000 U once weekly for 6 months.  Low B12, uncontrolled DM, will discuss at

## 2017-07-01 ENCOUNTER — MED REC SCAN ONLY (OUTPATIENT)
Dept: FAMILY MEDICINE CLINIC | Facility: CLINIC | Age: 62
End: 2017-07-01

## 2017-07-04 LAB
ALBUMIN/GLOBULIN RATIO: 1.7 (CALC) (ref 1–2.5)
ALBUMIN: 4.5 G/DL (ref 3.6–5.1)
ALKALINE PHOSPHATASE: 81 U/L (ref 33–130)
ALT: 36 U/L (ref 6–29)
AST: 34 U/L (ref 10–35)
BILIRUBIN, TOTAL: 0.6 MG/DL (ref 0.2–1.2)
BUN/CREATININE RATIO: 13 (CALC) (ref 6–22)
BUN: 16 MG/DL (ref 7–25)
CALCIUM: 10.1 MG/DL (ref 8.6–10.4)
CARBON DIOXIDE: 25 MMOL/L (ref 20–31)
CHLORIDE: 103 MMOL/L (ref 98–110)
CHOL/HDLC RATIO: 4.2 (CALC)
CHOLESTEROL, TOTAL: 126 MG/DL (ref 125–200)
CREATININE, RANDOM URINE: 82 MG/DL (ref 20–320)
CREATININE: 1.21 MG/DL (ref 0.5–0.99)
EGFR IF AFRICN AM: 56 ML/MIN/1.73M2
EGFR IF NONAFRICN AM: 48 ML/MIN/1.73M2
GLOBULIN: 2.6 G/DL (CALC) (ref 1.9–3.7)
GLUCOSE: 196 MG/DL (ref 65–99)
HDL CHOLESTEROL: 30 MG/DL
HEMOGLOBIN A1C: 8.4 % OF TOTAL HGB
LDL-CHOLESTEROL: 41 MG/DL (CALC)
MICROALBUMIN/CREATININE RATIO, RANDOM URINE: 21 MCG/MG CREAT
MICROALBUMIN: 1.7 MG/DL
NON-HDL CHOLESTEROL: 96 MG/DL (CALC)
POTASSIUM: 4.8 MMOL/L (ref 3.5–5.3)
PROTEIN, TOTAL: 7.1 G/DL (ref 6.1–8.1)
SODIUM: 138 MMOL/L (ref 135–146)
TRIGLYCERIDES: 273 MG/DL

## 2017-07-06 RX ORDER — DICLOFENAC SODIUM 75 MG/1
TABLET, DELAYED RELEASE ORAL
Qty: 60 TABLET | Refills: 0 | OUTPATIENT
Start: 2017-07-06

## 2017-07-06 NOTE — TELEPHONE ENCOUNTER
Requesting diclofenac and bupropion   LOV: 3/15/17  RTC: 3 months  Last Labs: n/a  Filled: 4/3/17 #180 with 1 refills diclofenac  Receipt confirmed by pharmacy requesting refill - note sent back as such  Filled: 4/5/17 #90 with 0 refills  Bupropion E

## 2017-07-09 RX ORDER — BUPROPION HYDROCHLORIDE 150 MG/1
TABLET ORAL
Qty: 90 TABLET | Refills: 0 | Status: SHIPPED | OUTPATIENT
Start: 2017-07-09 | End: 2017-07-10

## 2017-07-10 ENCOUNTER — OFFICE VISIT (OUTPATIENT)
Dept: FAMILY MEDICINE CLINIC | Facility: CLINIC | Age: 62
End: 2017-07-10

## 2017-07-10 ENCOUNTER — TELEPHONE (OUTPATIENT)
Dept: FAMILY MEDICINE CLINIC | Facility: CLINIC | Age: 62
End: 2017-07-10

## 2017-07-10 VITALS
SYSTOLIC BLOOD PRESSURE: 122 MMHG | BODY MASS INDEX: 29.09 KG/M2 | DIASTOLIC BLOOD PRESSURE: 68 MMHG | WEIGHT: 181 LBS | HEIGHT: 66 IN | HEART RATE: 78 BPM | RESPIRATION RATE: 16 BRPM

## 2017-07-10 DIAGNOSIS — E53.8 LOW VITAMIN B12 LEVEL: ICD-10-CM

## 2017-07-10 DIAGNOSIS — Z79.4 TYPE 2 DIABETES MELLITUS WITH DIABETIC NEUROPATHY, WITH LONG-TERM CURRENT USE OF INSULIN (HCC): Primary | ICD-10-CM

## 2017-07-10 DIAGNOSIS — E11.40 TYPE 2 DIABETES MELLITUS WITH DIABETIC NEUROPATHY, WITH LONG-TERM CURRENT USE OF INSULIN (HCC): Primary | ICD-10-CM

## 2017-07-10 PROBLEM — R79.89 LOW VITAMIN B12 LEVEL: Status: ACTIVE | Noted: 2017-07-10

## 2017-07-10 PROCEDURE — 99213 OFFICE O/P EST LOW 20 MIN: CPT | Performed by: INTERNAL MEDICINE

## 2017-07-10 PROCEDURE — 96372 THER/PROPH/DIAG INJ SC/IM: CPT | Performed by: INTERNAL MEDICINE

## 2017-07-10 RX ORDER — CYANOCOBALAMIN 1000 UG/ML
1000 INJECTION INTRAMUSCULAR; SUBCUTANEOUS ONCE
Status: COMPLETED | OUTPATIENT
Start: 2017-07-10 | End: 2017-07-10

## 2017-07-10 RX ADMIN — CYANOCOBALAMIN 1000 MCG: 1000 INJECTION INTRAMUSCULAR; SUBCUTANEOUS at 16:55:00

## 2017-07-10 NOTE — PROGRESS NOTES
CC: Patient presents with:  Lab Results       HPI:   1. Type 2 diabetes mellitus with diabetic neuropathy, with long-term current use of insulin ([address] fasting glucose in 120's, 2 hour post meals  In 190's, on metformin, jardiance and lantus 25 U. breakfast and before dinner, take on an empty stomach.  Disp: 60 tablet Rfl: 1   Glucose Blood (TRUETRACK TEST) In Vitro Strip TEST TWICE  DAILY Disp: 100 strip Rfl: 5   Lancets Does not apply Misc Use three times daily Disp: 200 each Rfl: 1   Fluticasone P tenosynovitis     Ganglion of tendon sheath     34754/[id number] SX/ RLW/ GLOBAL EXP 01-20-15     Fungal toenail infection     Osteoarthrosis, unspecified whether generalized or localized, lower leg     Depression     Encounter for therapeutic drug monitoring issues and agrees to the plan. Return in about 3 months (around 10/10/2017).

## 2017-07-12 RX ORDER — FENOFIBRATE 160 MG/1
TABLET ORAL
Qty: 90 TABLET | Refills: 1 | Status: SHIPPED | OUTPATIENT
Start: 2017-07-12 | End: 2017-10-23

## 2017-07-12 NOTE — TELEPHONE ENCOUNTER
Requesting Fenofibrate  LOV: 7/10/17  RTC: 3 months  Last Labs: 7/3/17  Filled: 4/16/17 #90 with 0 refills    Future Appointments  Date Time Provider Margoth Silveira   8/11/2017 3:30 PM [facility] 20 NURSE [facility] 20 [facility] [address]   10/9/2017 4:40 PM Kush Velázquez

## 2017-07-28 RX ORDER — DIAPER,BRIEF,ADULT, DISPOSABLE
EACH MISCELLANEOUS
Qty: 300 STRIP | Refills: 1 | Status: SHIPPED | OUTPATIENT
Start: 2017-07-28 | End: 2018-01-12

## 2017-07-28 NOTE — TELEPHONE ENCOUNTER
Time started: 916    Time ended: 917    Total time spent on chart: 1 min    Refill protocol passed because the patient met the following protocol for    Requesting truetrack test strips   LOV: 7/10/17  RTC: 3 months  Last Labs: 7/3/17  Filled: 3/23/17 # 10

## 2017-08-01 ENCOUNTER — MED REC SCAN ONLY (OUTPATIENT)
Dept: FAMILY MEDICINE CLINIC | Facility: CLINIC | Age: 62
End: 2017-08-01

## 2017-08-04 NOTE — PROGRESS NOTES
Received DM eye exam from Dr. Irene Varela dated 08/03/17. Exam shows evidence Right & Left eye diabetic retinopathy, list both as stable.   Exam has been abstracted and placed in Dr. Shaila lindquist for signature. Diabetic flowsheet updated.

## 2017-08-11 ENCOUNTER — NURSE ONLY (OUTPATIENT)
Dept: FAMILY MEDICINE CLINIC | Facility: CLINIC | Age: 62
End: 2017-08-11

## 2017-08-11 DIAGNOSIS — E53.8 VITAMIN B12 DEFICIENCY: Primary | ICD-10-CM

## 2017-08-11 PROCEDURE — 96372 THER/PROPH/DIAG INJ SC/IM: CPT | Performed by: INTERNAL MEDICINE

## 2017-08-11 RX ADMIN — CYANOCOBALAMIN 1000 MCG: 1000 INJECTION INTRAMUSCULAR; SUBCUTANEOUS at 15:56:00

## 2017-08-11 NOTE — PROGRESS NOTES
Notes Recorded by Leonila Venegas MD on 2/19/2017 at 11:38 PM  Low vit D, start vit D 50,000 U once weekly for 6 months.  Low B12, uncontrolled DM, will discuss at next ov    Ref Range & Units 2/6/17  2:26 PM   Vitamin B12 193 - 986 pg/mL 380           LOV:

## 2017-08-15 RX ORDER — INSULIN GLARGINE 100 [IU]/ML
INJECTION, SOLUTION SUBCUTANEOUS
Qty: 30 ML | Refills: 1 | Status: SHIPPED | OUTPATIENT
Start: 2017-08-15 | End: 2017-10-23

## 2017-08-15 NOTE — TELEPHONE ENCOUNTER
Requesting Lantus  LOV: 7/10/17  RTC: 3 months  Last Labs: 7/3/17 = 8.4%  Filled: 5/21/17 #30ml with 0 refills    Future Appointments  Date Time Provider Margoth Silveira   8/23/2017 10:30 AM SKINNY Sutton [facility] P [facility] [address]   10/23/2017 3

## 2017-08-23 ENCOUNTER — OFFICE VISIT (OUTPATIENT)
Dept: OBGYN CLINIC | Facility: CLINIC | Age: 62
End: 2017-08-23

## 2017-08-23 VITALS
HEIGHT: 64.25 IN | BODY MASS INDEX: 29.53 KG/M2 | SYSTOLIC BLOOD PRESSURE: 112 MMHG | DIASTOLIC BLOOD PRESSURE: 74 MMHG | WEIGHT: 173 LBS | HEART RATE: 76 BPM

## 2017-08-23 DIAGNOSIS — R10.32 ABDOMINAL PAIN, LEFT LOWER QUADRANT: ICD-10-CM

## 2017-08-23 DIAGNOSIS — Z01.419 WELL WOMAN EXAM WITH ROUTINE GYNECOLOGICAL EXAM: Primary | ICD-10-CM

## 2017-08-23 DIAGNOSIS — N81.4 UTERINE PROLAPSE: ICD-10-CM

## 2017-08-23 DIAGNOSIS — Z12.4 CERVICAL CANCER SCREENING: ICD-10-CM

## 2017-08-23 DIAGNOSIS — R30.0 DYSURIA: ICD-10-CM

## 2017-08-23 DIAGNOSIS — Z12.39 BREAST CANCER SCREENING: ICD-10-CM

## 2017-08-23 DIAGNOSIS — Z13.820 ENCOUNTER FOR SCREENING FOR OSTEOPOROSIS: ICD-10-CM

## 2017-08-23 DIAGNOSIS — Z87.39 HISTORY OF OSTEOPENIA: ICD-10-CM

## 2017-08-23 LAB
APPEARANCE: CLEAR
MULTISTIX LOT#: NORMAL NUMERIC
PH, URINE: 5 (ref 4.5–8)
SPECIFIC GRAVITY: 1 (ref 1–1.03)
URINE-COLOR: YELLOW
UROBILINOGEN,SEMI-QN: 0.2 MG/DL (ref 0–1.9)

## 2017-08-23 PROCEDURE — 81002 URINALYSIS NONAUTO W/O SCOPE: CPT | Performed by: NURSE PRACTITIONER

## 2017-08-23 PROCEDURE — 87086 URINE CULTURE/COLONY COUNT: CPT | Performed by: NURSE PRACTITIONER

## 2017-08-23 PROCEDURE — 88175 CYTOPATH C/V AUTO FLUID REDO: CPT | Performed by: NURSE PRACTITIONER

## 2017-08-23 PROCEDURE — 87624 HPV HI-RISK TYP POOLED RSLT: CPT | Performed by: NURSE PRACTITIONER

## 2017-08-23 PROCEDURE — G0101 CA SCREEN;PELVIC/BREAST EXAM: HCPCS | Performed by: NURSE PRACTITIONER

## 2017-08-23 RX ORDER — ASPIRIN 81 MG/1
81 TABLET ORAL DAILY
Status: ON HOLD | COMMUNITY
Start: 2016-07-07 | End: 2020-07-17

## 2017-08-23 NOTE — PROGRESS NOTES
Here for new gynecology visit. 58year old G 3 P 2. No LMP recorded. Patient is postmenopausal.     Here for Annual Gynecologic Exam. Patient is Arabic speaking only and accompanied today by her daughter.  She complains of a bulge vaginally, occasional i Empagliflozin (JARDIANCE) 25 MG Oral Tab Take by mouth.  Disp:  Rfl:    Linagliptin (TRADJENTA) 5 MG Oral Tab Take 1 tab daily Disp: 30 tablet Rfl: 11   ATORVASTATIN 20 MG Oral Tab TAKE 1 TABLET(20 MG) BY MOUTH EVERY DAY Disp: 90 tablet Rfl: 0   ERGOCALCI SYRINGE MICROFINE) 28G X 1/2\" 1 ML Does not apply Misc Use daily Disp: 100 Each Rfl: 12     Current Facility-Administered Medications on File Prior to Visit:  cyanocobalamin (VITAMIN B12) 1000 MCG/ML injection 1,000 mcg 1,000 mcg Intramuscular Q30 Days Ja tinder bilaterally R > L.       IMP:    Well woman Exam  Uterine Prolapse  Abdominal pain/ bloating  Osteopenia. PLAN:    Thin Prep Done with HPV  Pelvic US  Dexa Scan  Discussed Pessary use vs. Surgical intervention.   See in 1 year for routine annual

## 2017-08-25 ENCOUNTER — OFFICE VISIT (OUTPATIENT)
Dept: OBGYN CLINIC | Facility: CLINIC | Age: 62
End: 2017-08-25

## 2017-08-25 ENCOUNTER — APPOINTMENT (OUTPATIENT)
Dept: OBGYN CLINIC | Facility: CLINIC | Age: 62
End: 2017-08-25

## 2017-08-25 DIAGNOSIS — N39.3 SUI (STRESS URINARY INCONTINENCE, FEMALE): ICD-10-CM

## 2017-08-25 DIAGNOSIS — N81.4 UTERINE PROLAPSE: Primary | ICD-10-CM

## 2017-08-25 DIAGNOSIS — N81.10 PROLAPSE OF ANTERIOR VAGINAL WALL: ICD-10-CM

## 2017-08-25 PROCEDURE — 99213 OFFICE O/P EST LOW 20 MIN: CPT | Performed by: OBSTETRICS & GYNECOLOGY

## 2017-08-25 NOTE — PATIENT INSTRUCTIONS
You will be called regarding your appointment for surgery. Please call the office in 2 weeks if you do not receive information about your appointment for surgery.

## 2017-08-25 NOTE — PROGRESS NOTES
[facility]  Obstetrics and Gynecology    CC: Patient presents with c/o pelvic organ prolapse     Subjective:     Bryant Cordon is a 58year old  female presents with c/o pelvic organ prolapse.  She reports worsening prolapse for over 1 ye UTERUS: uterus non-tender.  Limited exam 2/2 body habitus   ADNEXA: normal adnexa in size, nontender and no masses  Ext: non-tender, no edema    Pelvic US 08/25/17  Normal uterus measuring 6 x 5 x 7 cm, anteflexed  Endometrial thickness 1.1 mm with fluid

## 2017-08-26 PROBLEM — R79.89 LOW VITAMIN B12 LEVEL: Status: RESOLVED | Noted: 2017-07-10 | Resolved: 2017-08-26

## 2017-08-26 PROBLEM — N81.10 PROLAPSE OF ANTERIOR VAGINAL WALL: Status: ACTIVE | Noted: 2017-08-26

## 2017-08-26 PROBLEM — Z91.81 AT RISK FOR FALLING: Status: RESOLVED | Noted: 2017-01-22 | Resolved: 2017-08-26

## 2017-08-26 PROBLEM — R93.89 INCREASED ENDOMETRIAL STRIPE THICKNESS: Status: ACTIVE | Noted: 2017-08-26

## 2017-08-26 PROBLEM — B34.9 VIRAL SYNDROME: Status: RESOLVED | Noted: 2017-01-22 | Resolved: 2017-08-26

## 2017-08-26 PROBLEM — N95.0 PMB (POSTMENOPAUSAL BLEEDING): Status: ACTIVE | Noted: 2017-08-26

## 2017-08-26 PROBLEM — N81.4 UTERINE PROLAPSE: Status: ACTIVE | Noted: 2017-08-26

## 2017-08-26 PROBLEM — E87.2 METABOLIC ACIDOSIS: Status: RESOLVED | Noted: 2017-01-22 | Resolved: 2017-08-26

## 2017-08-26 PROBLEM — N39.3 SUI (STRESS URINARY INCONTINENCE, FEMALE): Status: ACTIVE | Noted: 2017-08-26

## 2017-08-26 PROBLEM — E53.8 LOW VITAMIN B12 LEVEL: Status: RESOLVED | Noted: 2017-07-10 | Resolved: 2017-08-26

## 2017-08-26 PROBLEM — N39.0 UTI (URINARY TRACT INFECTION): Status: RESOLVED | Noted: 2017-02-06 | Resolved: 2017-08-26

## 2017-08-26 PROBLEM — E87.20 METABOLIC ACIDOSIS: Status: RESOLVED | Noted: 2017-01-22 | Resolved: 2017-08-26

## 2017-08-26 PROBLEM — E87.1 HYPONATREMIA: Status: RESOLVED | Noted: 2017-01-22 | Resolved: 2017-08-26

## 2017-08-29 LAB
HPV I/H RISK 1 DNA SPEC QL NAA+PROBE: NEGATIVE
PAP HISTORY (OTHER THAN LAST PAP): NORMAL

## 2017-08-30 ENCOUNTER — TELEPHONE (OUTPATIENT)
Dept: OBGYN CLINIC | Facility: CLINIC | Age: 62
End: 2017-08-30

## 2017-08-30 NOTE — TELEPHONE ENCOUNTER
Patient scheduled for 09/25/17 @ 3489. Verbalized understanding. No further questions or concerns. Form faxed; added to calendar. No further questions or concerns.

## 2017-09-02 ENCOUNTER — APPOINTMENT (OUTPATIENT)
Dept: LAB | Facility: HOSPITAL | Age: 62
End: 2017-09-02
Payer: MEDICARE

## 2017-09-02 DIAGNOSIS — Z01.818 PREOP EXAMINATION: ICD-10-CM

## 2017-09-02 LAB
BUN BLD-MCNC: 19 MG/DL (ref 8–20)
CALCIUM BLD-MCNC: 9.4 MG/DL (ref 8.3–10.3)
CHLORIDE: 108 MMOL/L (ref 101–111)
CO2: 25 MMOL/L (ref 22–32)
CREAT BLD-MCNC: 1.06 MG/DL (ref 0.55–1.02)
GLUCOSE BLD-MCNC: 91 MG/DL (ref 70–99)
POTASSIUM SERPL-SCNC: 4.6 MMOL/L (ref 3.6–5.1)
SODIUM SERPL-SCNC: 141 MMOL/L (ref 136–144)

## 2017-09-02 PROCEDURE — 80048 BASIC METABOLIC PNL TOTAL CA: CPT

## 2017-09-02 PROCEDURE — 93005 ELECTROCARDIOGRAM TRACING: CPT

## 2017-09-02 PROCEDURE — 93010 ELECTROCARDIOGRAM REPORT: CPT | Performed by: INTERNAL MEDICINE

## 2017-09-02 PROCEDURE — 36415 COLL VENOUS BLD VENIPUNCTURE: CPT

## 2017-09-05 LAB
ATRIAL RATE: 66 BPM
P AXIS: 33 DEGREES
P-R INTERVAL: 158 MS
Q-T INTERVAL: 444 MS
QRS DURATION: 156 MS
QTC CALCULATION (BEZET): 465 MS
R AXIS: 0 DEGREES
T AXIS: 172 DEGREES
VENTRICULAR RATE: 66 BPM

## 2017-09-07 PROBLEM — Z01.810 PREOP CARDIOVASCULAR EXAM: Status: ACTIVE | Noted: 2017-09-07

## 2017-09-08 ENCOUNTER — OFFICE VISIT (OUTPATIENT)
Dept: UROLOGY | Facility: HOSPITAL | Age: 62
End: 2017-09-08
Attending: OBSTETRICS & GYNECOLOGY
Payer: MEDICARE

## 2017-09-08 VITALS
HEIGHT: 64 IN | WEIGHT: 176 LBS | DIASTOLIC BLOOD PRESSURE: 60 MMHG | SYSTOLIC BLOOD PRESSURE: 102 MMHG | BODY MASS INDEX: 30.05 KG/M2

## 2017-09-08 DIAGNOSIS — N39.3 FEMALE STRESS INCONTINENCE: ICD-10-CM

## 2017-09-08 DIAGNOSIS — N81.84 PELVIC MUSCLE WASTING: ICD-10-CM

## 2017-09-08 DIAGNOSIS — N95.2 POSTMENOPAUSAL ATROPHIC VAGINITIS: ICD-10-CM

## 2017-09-08 DIAGNOSIS — N81.2 UTEROVAGINAL PROLAPSE, INCOMPLETE: Primary | ICD-10-CM

## 2017-09-08 DIAGNOSIS — N90.4 VULVAR DYSTROPHY: ICD-10-CM

## 2017-09-08 LAB
BLOOD URINE: NEGATIVE
CONTROL RUN WITHIN 24 HOURS?: YES
NITRITE URINE: NEGATIVE

## 2017-09-08 PROCEDURE — 99201 HC OUTPT EVAL AND MGNT NEW PT LEVEL 1: CPT

## 2017-09-08 PROCEDURE — 87086 URINE CULTURE/COLONY COUNT: CPT | Performed by: OBSTETRICS & GYNECOLOGY

## 2017-09-08 PROCEDURE — 81002 URINALYSIS NONAUTO W/O SCOPE: CPT

## 2017-09-08 RX ORDER — CLOBETASOL PROPIONATE 0.5 MG/G
CREAM TOPICAL
Qty: 60 G | Refills: 3 | Status: SHIPPED | OUTPATIENT
Start: 2017-09-08 | End: 2019-06-17

## 2017-09-08 NOTE — PROGRESS NOTES
Susannah Hernadez, DO  9/8/2017     Referred by Dr. Idalia Jones    Patient presents with:  Prolapse: pt c/o bulge in vagina, also reports leaking with activity and on the way to the bathroom      HPI:  +JANICE  +UUI  +prolapse - worsening vaginal bulge  Does report Allergies:    Codeine                 Nausea only  Vicodin [Hydrocodon*    Nausea only    Comment:TABS    Medications:    Outpatient Medications Prior to Visit:  METOPROLOL SUCCINATE ER 25 MG Oral Tablet 24 Hr TAKE 1 TABLET BY MOUTH DAILY Disp: 90 tabl SYRINGE MICROFINE) 28G X 1/2\" 1 ML Does not apply Misc Use daily Disp: 100 Each Rfl: 12   Empagliflozin (JARDIANCE) 25 MG Oral Tab Take by mouth.  Disp:  Rfl:    MONTELUKAST SODIUM 10 MG Oral Tab TAKE 1 TABLET BY MOUTH EVERY DAY Disp: 90 tablet Rfl: 1   Pa 2  Post:  2  CST:  positive  UVJ: +hypermobile    Impression/Plan:  (N81.2) Uterovaginal prolapse, incomplete  (primary encounter diagnosis)  Plan: POCT URINALYSIS DIPSTICK, URINE CULTURE,         ROUTINE    (N95.2) Postmenopausal atrophic vaginitis  Plan: understanding of all above discussed information    Return for UDS, follow up.     [address] [facility], [address] [facility], [address] [place] [address]

## 2017-09-11 ENCOUNTER — TELEPHONE (OUTPATIENT)
Dept: OBGYN CLINIC | Facility: CLINIC | Age: 62
End: 2017-09-11

## 2017-09-11 DIAGNOSIS — R93.89 INCREASED ENDOMETRIAL STRIPE THICKNESS: ICD-10-CM

## 2017-09-11 DIAGNOSIS — N93.9 ABNORMAL UTERINE BLEEDING (AUB): Primary | ICD-10-CM

## 2017-09-11 RX ORDER — MISOPROSTOL 100 UG/1
100 TABLET ORAL EVERY 12 HOURS
Qty: 2 TABLET | Refills: 0 | Status: SHIPPED | OUTPATIENT
Start: 2017-09-11 | End: 2017-09-25

## 2017-09-11 NOTE — TELEPHONE ENCOUNTER
Patient informed of surgical date on 09/25/17. Recommendation made for cytotec prior to surgery. Patient and her daughter verbalized understanding and agreeable.      Ariadna Contreras MD   [facility] - OBGYN

## 2017-09-12 ENCOUNTER — TELEPHONE (OUTPATIENT)
Dept: UROLOGY | Facility: HOSPITAL | Age: 62
End: 2017-09-12

## 2017-09-13 RX ORDER — ATORVASTATIN CALCIUM 20 MG/1
TABLET, FILM COATED ORAL
Qty: 90 TABLET | Refills: 0 | Status: SHIPPED | OUTPATIENT
Start: 2017-09-13 | End: 2017-10-23

## 2017-09-13 NOTE — TELEPHONE ENCOUNTER
Cholesterol Medication Protocol Passed  Requesting Atorvastatin 20 mg  LOV: 7/10/17  RTC: 3 mos  Last Labs: 7/3/17  Filled: 6/15/17 #90with 0 refills    Future Appointments  Date Time Provider Margoth Silveira   10/9/2017 10:00 AM PROCEDURE PRADIP Campa

## 2017-09-15 NOTE — TELEPHONE ENCOUNTER
Dr. Renae Stovall, would you like to trial [facility] for this procedure? Please advise.

## 2017-09-18 NOTE — TELEPHONE ENCOUNTER
I would like to request [employer] ([address] [place] [address]) [employer] [place] for this case. The patient has cervical stenosis and prolapse. I would be more comfortable with this system. Thank you.

## 2017-09-22 ENCOUNTER — TELEPHONE (OUTPATIENT)
Dept: OBGYN CLINIC | Facility: CLINIC | Age: 62
End: 2017-09-22

## 2017-09-22 NOTE — TELEPHONE ENCOUNTER
Patient's daughter calling regarding procedure 09/25/17. She is wondering what to do about her mother's insulin. Stated she called her PCP and they advised to call us. Please advise.

## 2017-09-22 NOTE — TELEPHONE ENCOUNTER
Spoke with patient's daughter. Patient takes insulin daily at night. Advised to continue insulin regimen as prescribed. Patient will be NPO after midnight per patient's usual routine.  Daughter advised to avoid administering insulin or other oral agents for

## 2017-09-25 ENCOUNTER — ANESTHESIA (OUTPATIENT)
Dept: SURGERY | Facility: HOSPITAL | Age: 62
End: 2017-09-25
Payer: MEDICARE

## 2017-09-25 ENCOUNTER — ANESTHESIA EVENT (OUTPATIENT)
Dept: SURGERY | Facility: HOSPITAL | Age: 62
End: 2017-09-25
Payer: MEDICARE

## 2017-09-25 ENCOUNTER — HOSPITAL ENCOUNTER (OUTPATIENT)
Facility: HOSPITAL | Age: 62
Setting detail: HOSPITAL OUTPATIENT SURGERY
Discharge: HOME | End: 2017-09-25
Attending: OBSTETRICS & GYNECOLOGY | Admitting: OBSTETRICS & GYNECOLOGY
Payer: MEDICARE

## 2017-09-25 ENCOUNTER — SURGERY (OUTPATIENT)
Age: 62
End: 2017-09-25

## 2017-09-25 VITALS
WEIGHT: 171 LBS | SYSTOLIC BLOOD PRESSURE: 124 MMHG | RESPIRATION RATE: 20 BRPM | BODY MASS INDEX: 28.49 KG/M2 | OXYGEN SATURATION: 100 % | HEIGHT: 65 IN | TEMPERATURE: 97 F | HEART RATE: 71 BPM | DIASTOLIC BLOOD PRESSURE: 66 MMHG

## 2017-09-25 DIAGNOSIS — Z01.818 PREOP EXAMINATION: Primary | ICD-10-CM

## 2017-09-25 LAB
BASOPHILS # BLD AUTO: 0.03 X10(3) UL (ref 0–0.1)
BASOPHILS NFR BLD AUTO: 0.5 %
EOSINOPHIL # BLD AUTO: 0.09 X10(3) UL (ref 0–0.3)
EOSINOPHIL NFR BLD AUTO: 1.4 %
ERYTHROCYTE [DISTWIDTH] IN BLOOD BY AUTOMATED COUNT: 15.1 % (ref 11.5–16)
GLUCOSE BLD-MCNC: 77 MG/DL (ref 65–99)
GLUCOSE BLD-MCNC: 96 MG/DL (ref 65–99)
HCT VFR BLD AUTO: 38.7 % (ref 34–50)
HGB BLD-MCNC: 12.3 G/DL (ref 12–16)
IMMATURE GRANULOCYTE COUNT: 0.03 X10(3) UL (ref 0–1)
IMMATURE GRANULOCYTE RATIO %: 0.5 %
LYMPHOCYTES # BLD AUTO: 1.66 X10(3) UL (ref 0.9–4)
LYMPHOCYTES NFR BLD AUTO: 25.4 %
MCH RBC QN AUTO: 26.1 PG (ref 27–33.2)
MCHC RBC AUTO-ENTMCNC: 31.8 G/DL (ref 31–37)
MCV RBC AUTO: 82.2 FL (ref 81–100)
MONOCYTES # BLD AUTO: 0.63 X10(3) UL (ref 0.1–0.6)
MONOCYTES NFR BLD AUTO: 9.6 %
NEUTROPHIL ABS PRELIM: 4.09 X10 (3) UL (ref 1.3–6.7)
NEUTROPHILS # BLD AUTO: 4.09 X10(3) UL (ref 1.3–6.7)
NEUTROPHILS NFR BLD AUTO: 62.6 %
PLATELET # BLD AUTO: 205 10(3)UL (ref 150–450)
RBC # BLD AUTO: 4.71 X10(6)UL (ref 3.8–5.1)
RED CELL DISTRIBUTION WIDTH-SD: 45.1 FL (ref 35.1–46.3)
WBC # BLD AUTO: 6.5 X10(3) UL (ref 4–13)

## 2017-09-25 PROCEDURE — 0UDB8ZX EXTRACTION OF ENDOMETRIUM, VIA NATURAL OR ARTIFICIAL OPENING ENDOSCOPIC, DIAGNOSTIC: ICD-10-PCS | Performed by: OBSTETRICS & GYNECOLOGY

## 2017-09-25 PROCEDURE — 0UB98ZZ EXCISION OF UTERUS, VIA NATURAL OR ARTIFICIAL OPENING ENDOSCOPIC: ICD-10-PCS | Performed by: OBSTETRICS & GYNECOLOGY

## 2017-09-25 PROCEDURE — 58558 HYSTEROSCOPY BIOPSY: CPT | Performed by: OBSTETRICS & GYNECOLOGY

## 2017-09-25 RX ORDER — SODIUM CHLORIDE, SODIUM LACTATE, POTASSIUM CHLORIDE, CALCIUM CHLORIDE 600; 310; 30; 20 MG/100ML; MG/100ML; MG/100ML; MG/100ML
INJECTION, SOLUTION INTRAVENOUS CONTINUOUS
Status: DISCONTINUED | OUTPATIENT
Start: 2017-09-25 | End: 2017-09-25

## 2017-09-25 RX ORDER — NALOXONE HYDROCHLORIDE 0.4 MG/ML
80 INJECTION, SOLUTION INTRAMUSCULAR; INTRAVENOUS; SUBCUTANEOUS AS NEEDED
Status: DISCONTINUED | OUTPATIENT
Start: 2017-09-25 | End: 2017-09-25

## 2017-09-25 RX ORDER — MIDAZOLAM HYDROCHLORIDE 1 MG/ML
1 INJECTION INTRAMUSCULAR; INTRAVENOUS EVERY 5 MIN PRN
Status: DISCONTINUED | OUTPATIENT
Start: 2017-09-25 | End: 2017-09-25

## 2017-09-25 RX ORDER — IBUPROFEN 600 MG/1
600 TABLET ORAL EVERY 6 HOURS PRN
Qty: 30 TABLET | Refills: 0 | Status: SHIPPED | OUTPATIENT
Start: 2017-09-25 | End: 2017-10-10

## 2017-09-25 RX ORDER — HYDROMORPHONE HYDROCHLORIDE 1 MG/ML
0.4 INJECTION, SOLUTION INTRAMUSCULAR; INTRAVENOUS; SUBCUTANEOUS EVERY 5 MIN PRN
Status: DISCONTINUED | OUTPATIENT
Start: 2017-09-25 | End: 2017-09-25

## 2017-09-25 RX ORDER — DEXTROSE MONOHYDRATE 25 G/50ML
50 INJECTION, SOLUTION INTRAVENOUS
Status: DISCONTINUED | OUTPATIENT
Start: 2017-09-25 | End: 2017-09-25 | Stop reason: HOSPADM

## 2017-09-25 RX ORDER — DIPHENHYDRAMINE HYDROCHLORIDE 50 MG/ML
12.5 INJECTION INTRAMUSCULAR; INTRAVENOUS AS NEEDED
Status: DISCONTINUED | OUTPATIENT
Start: 2017-09-25 | End: 2017-09-25

## 2017-09-25 RX ORDER — TRAMADOL HYDROCHLORIDE 50 MG/1
50 TABLET ORAL ONCE AS NEEDED
Status: COMPLETED | OUTPATIENT
Start: 2017-09-25 | End: 2017-09-25

## 2017-09-25 RX ORDER — METOCLOPRAMIDE HYDROCHLORIDE 5 MG/ML
10 INJECTION INTRAMUSCULAR; INTRAVENOUS AS NEEDED
Status: DISCONTINUED | OUTPATIENT
Start: 2017-09-25 | End: 2017-09-25

## 2017-09-25 RX ORDER — DEXTROSE MONOHYDRATE 25 G/50ML
50 INJECTION, SOLUTION INTRAVENOUS
Status: DISCONTINUED | OUTPATIENT
Start: 2017-09-25 | End: 2017-09-25

## 2017-09-25 RX ORDER — MEPERIDINE HYDROCHLORIDE 25 MG/ML
12.5 INJECTION INTRAMUSCULAR; INTRAVENOUS; SUBCUTANEOUS AS NEEDED
Status: DISCONTINUED | OUTPATIENT
Start: 2017-09-25 | End: 2017-09-25

## 2017-09-25 RX ORDER — ONDANSETRON 2 MG/ML
4 INJECTION INTRAMUSCULAR; INTRAVENOUS AS NEEDED
Status: DISCONTINUED | OUTPATIENT
Start: 2017-09-25 | End: 2017-09-25

## 2017-09-25 NOTE — ANESTHESIA POSTPROCEDURE EVALUATION
[facility] Patient Status:  Hospital Outpatient Surgery   Age/Gender 58year old female MRN [id number]   [facility] SURGERY Attending Shanna Romberg, [address] [facility] Se Day # 0 PCP Phillip Hurtado MD       Anesthesia Post-op Note

## 2017-09-25 NOTE — H&P
[facility]  Obstetrics and Gynecology  History & Physical    Trice Yeung Patient Status:  Hospital Outpatient Surgery    1955 MRN [id number]   Location [address] [facility] Rey Newman, [facility] disease of native coronary artery without angina pectoris    • Constipation    • Essential (primary) hypertension    • Mixed hyperlipidemia    • Neuropathy (HCC)     maria eugenia feet   • Osteoarthrosis, unspecified whether generalized or localized, unspecified sit (JARDIANCE) 25 MG Oral Tab Take by mouth.  Disp:  Rfl:  Not Taking   Linagliptin (TRADJENTA) 5 MG Oral Tab Take 1 tab daily Disp: 30 tablet Rfl: 11 Taking   ERGOCALCIFEROL 56773 units Oral Cap TAKE 1 CAPSULE BY MOUTH 1 TIME A WEEK Disp: 12 capsule Rfl: 0 Ta DAY Disp: 90 tablet Rfl: 0    misoprostol 100 MCG Oral Tab Take 1 tablet (100 mcg total) by mouth Q12H.  Take one tablet in the morning and then one tablet in the evening the day prior to your surgery Disp: 2 tablet Rfl: 0    Clobetasol Propionate 0.05 % Ex testosterone level in female     Constipation     Abnormal EKG     Allergic rhinitis     Osteopenia     Porokeratosis     Ganglion cyst of wrist     Radial styloid tenosynovitis     Ganglion of tendon sheath     67077/14017 SX/ RLW/ GLOBAL EXP 01-20-15

## 2017-09-25 NOTE — ANESTHESIA PREPROCEDURE EVALUATION
PRE-OP EVALUATION    Patient Name: Mark Merida    Pre-op Diagnosis: POSTMENOPAUSAL, ENDOMETRIAL THICKNESS, AUB     Procedure(s):   HYSTEROSCOPY, HYSTEROSCOPIC DIRECTED ENDOMETRIAL SAMPLING     Surgeon(s) and Role:     Tonia Stanley MD - Primary Empagliflozin (JARDIANCE) 25 MG Oral Tab Take by mouth.  Disp:  Rfl:    Linagliptin (TRADJENTA) 5 MG Oral Tab Take 1 tab daily Disp: 30 tablet Rfl: 11   [DISCONTINUED] ATORVASTATIN 20 MG Oral Tab TAKE 1 TABLET(20 MG) BY MOUTH EVERY DAY Disp: 90 tablet Rfl daily Disp: 100 Each Rfl: 12       Allergies: Codeine; Vicodin [Hydrocodone-Acetaminophen]      Anesthesia Evaluation    Patient summary reviewed.     Anesthetic Complications  (-) history of anesthetic complications         GI/Hepatic/Renal distance: 4 - 6 cm  Neck ROM: full Cardiovascular      Rhythm: regular       Dental      Dental appliance(s): upper dentures and partials       Pulmonary      Breath sounds clear to auscultation bilaterally.                Other findings            ASA: 3

## 2017-09-25 NOTE — OPERATIVE REPORT
Operative Report:   DILATATION, HYSTEROSCOPY, HYSTEROSCOPIC DIRECTED ENDOMETRIAL BIOPSY, HYSTEROSCOPIC MYOMECTOMY AND CURETTAGE PROCEDURE NOTE    Pradeep Lomeli  17      INDICATIONS:   58year old  female who presents for dilation, hysterosco sounded to 9cm. Atrophic endometrium noted along fundus and bilateral cornua. Polyploid tissue noted along the lower uterine segment. Cervical fibroid noted in lower cervical canal. Specimens sent to pathology. Good hemostasis.      PROCEDURE: This procedu sharp curette was then gently advanced into the uterine cavity and curettage was performed until a gritty texture was noted. Minimal tissue was collected and sent to pathology as a separate sample.  The single tooth tenaculum was removed and good hemostasis

## 2017-09-26 ENCOUNTER — TELEPHONE (OUTPATIENT)
Dept: OBGYN CLINIC | Facility: CLINIC | Age: 62
End: 2017-09-26

## 2017-09-26 NOTE — TELEPHONE ENCOUNTER
Follow up phone call. Patient is s/p dilation, hysteroscopy, hysteroscopic endometrial sampling, hysteroscopic myomectomy and curettage on 09/25/17. Contacted patient. Spoke with daughter and patient. Patient reports she is doing well.  Pain well cont

## 2017-09-27 ENCOUNTER — TELEPHONE (OUTPATIENT)
Dept: OBGYN CLINIC | Facility: CLINIC | Age: 62
End: 2017-09-27

## 2017-09-27 NOTE — TELEPHONE ENCOUNTER
Spoke with Luzmaria from Dr. Laura Chacon office regarding surgery. Surgery scheduled for 11/16/17 @ 0900. Patient notified via Leila Angeline b/c she is only Marshallese speaking. Verbalized understanding. No further questions or concerns.

## 2017-09-28 NOTE — TELEPHONE ENCOUNTER
Dr. Balwinder Ahn can you please advise if patient is having abdominal or lap hysterectomy? Also, any other procedures to include on form? I completed form yesterday so I did not lose surgery time or day. Please advise.

## 2017-10-02 ENCOUNTER — OFFICE VISIT (OUTPATIENT)
Dept: OBGYN CLINIC | Facility: CLINIC | Age: 62
End: 2017-10-02

## 2017-10-02 VITALS
SYSTOLIC BLOOD PRESSURE: 100 MMHG | BODY MASS INDEX: 28.13 KG/M2 | DIASTOLIC BLOOD PRESSURE: 60 MMHG | HEIGHT: 66 IN | WEIGHT: 175 LBS

## 2017-10-02 DIAGNOSIS — Z09 POSTOPERATIVE EXAMINATION: Primary | ICD-10-CM

## 2017-10-02 DIAGNOSIS — N81.9 FEMALE GENITAL PROLAPSE, UNSPECIFIED TYPE: ICD-10-CM

## 2017-10-02 PROCEDURE — 99213 OFFICE O/P EST LOW 20 MIN: CPT | Performed by: OBSTETRICS & GYNECOLOGY

## 2017-10-02 NOTE — PROGRESS NOTES
[facility]  Obstetrics and Gynecology  Follow Up Progress Note    Subjective:     Helen Choe is a 58year old  female s/p dilation, hysteroscopic myomectomy, hysteroscopic directed endometrial biopsy and curettage on 17.  She rep Problem List:     Disturbance of skin sensation     Other B-complex deficiencies     Iron deficiency anemia     Dysthymic disorder     Insomnia     Vitamin D deficiency     Arthropathy, unspecified, site unspecified     Pain in joint, lower leg     Obesity and plan were discussed with the patient. She notes understanding and agrees with the plan of care. All questions were answered to the best of my ability at this time.     RTC in following surgery or sooner if needed     Loras Severance, MD   [facility] - OBGYN

## 2017-10-02 NOTE — PATIENT INSTRUCTIONS
You are scheduled for surgery on 11/16/17. You will be contacted before your surgery for further pre-operative instructions.

## 2017-10-05 NOTE — TELEPHONE ENCOUNTER
Requesting Metformin and bupropion  LOV: /10/17  RTC: 3 months  Last Labs: 7/3/17 = 8.4%  Filled: 7/9/17 #90 with 0 refills  Bupropion  Filled: 3/23/17 #225 with 1 refill  metformin  Non protocol and fails protocol - pended for approval  Time started: 409

## 2017-10-06 RX ORDER — BUPROPION HYDROCHLORIDE 150 MG/1
TABLET ORAL
Qty: 90 TABLET | Refills: 0 | Status: SHIPPED | OUTPATIENT
Start: 2017-10-06 | End: 2017-10-23

## 2017-10-09 ENCOUNTER — OFFICE VISIT (OUTPATIENT)
Dept: UROLOGY | Facility: HOSPITAL | Age: 62
End: 2017-10-09
Attending: OBSTETRICS & GYNECOLOGY
Payer: MEDICARE

## 2017-10-09 VITALS
BODY MASS INDEX: 28.13 KG/M2 | SYSTOLIC BLOOD PRESSURE: 110 MMHG | HEIGHT: 66 IN | WEIGHT: 175 LBS | DIASTOLIC BLOOD PRESSURE: 70 MMHG

## 2017-10-09 DIAGNOSIS — N81.2 UTEROVAGINAL PROLAPSE, INCOMPLETE: ICD-10-CM

## 2017-10-09 DIAGNOSIS — N39.3 SUI (STRESS URINARY INCONTINENCE, FEMALE): Primary | ICD-10-CM

## 2017-10-09 PROCEDURE — 51784 ANAL/URINARY MUSCLE STUDY: CPT

## 2017-10-09 PROCEDURE — 51729 CYSTOMETROGRAM W/VP&UP: CPT

## 2017-10-09 PROCEDURE — 51741 ELECTRO-UROFLOWMETRY FIRST: CPT

## 2017-10-09 PROCEDURE — 51797 INTRAABDOMINAL PRESSURE TEST: CPT

## 2017-10-09 PROCEDURE — 57160 INSERT PESSARY/OTHER DEVICE: CPT

## 2017-10-09 NOTE — PATIENT INSTRUCTIONS
[facility]  [address] [place] [address] 67, [address]  Office: [phone]      Urodynamic Testing Discharge Instructions: There are NO dietary or activity restrictions. You may resume your normal schedule.       You may hav home.    Where will I go to get the catheter removed? Your catheter will be removed in the office. Please call the office to schedule a voiding trial with the nurse. How will the catheter be removed?   A nurse will disconnect your catheter from the [address] [place] [address] Street have a fever over 100.5 for 4 hours or above 101.0 anytime. · You have nausea and/or vomiting. · Burning/pain with urination. Please feel free to call the nurse at [phone] with any questions 8am-4:30pm Monday-Friday.     If the office is closed, y take the fiber as directed (i.e. mixed with water or juice), you can mix the fiber with your cereal or with applesauce.     Don’t worry if you have to increase the amount---fiber is not addictive and will not cause diarrhea.      _____Milk of Magnesia    Be

## 2017-10-09 NOTE — PROCEDURES
.Patient here for urodynamic testing. Procedure explained and confirmed by patient. Daughter with patient - Patient speaks limited English - Prefers to have daughter be present for exam instead of using language line. See evaluation form for results.   Arian Gordon 5/16\" 1 ML Does not apply Misc USE ONCE DAILY Disp: 100 each Rfl: 3   BuPROPion HCl ER, XL, 150 MG Oral Tablet 24 Hr TK 1 T PO D Disp:  Rfl: 1   Pantoprazole Sodium (PROTONIX) 40 MG Oral Tab EC Take 1 tablet by mouth 2 (two) times daily before meals.  Take tdoc  Abd Catheter:     Fr 7 / tdoc   Infusion:  Water Rate 50 mL/min  Temp:  Room  Position:  [x]  Sit  []  Stand  []  Supine  First sensation:    18  mL  First desire to void:    148 mL  Strong desire to void:   280 mL  Maximum cystometric capacity:   35 Ultrasound/Sonohysterogram  []  Cystoscopy  []  Magnetic Resonance Imaging Scan    BLADDER AND BOWEL THERAPIES:  []  Timed voiding/Bladder re-training  []  Pelvic Floor Exercises  []  Pelvic Floor Physical Therapy  []  Bowel Regime  []  Bladder Diet     []

## 2017-10-10 ENCOUNTER — OFFICE VISIT (OUTPATIENT)
Dept: UROLOGY | Facility: HOSPITAL | Age: 62
End: 2017-10-10
Attending: OBSTETRICS & GYNECOLOGY
Payer: MEDICARE

## 2017-10-10 VITALS
HEIGHT: 66 IN | DIASTOLIC BLOOD PRESSURE: 60 MMHG | SYSTOLIC BLOOD PRESSURE: 102 MMHG | WEIGHT: 175 LBS | BODY MASS INDEX: 28.13 KG/M2

## 2017-10-10 DIAGNOSIS — N81.10 PROLAPSE OF ANTERIOR VAGINAL WALL: ICD-10-CM

## 2017-10-10 PROCEDURE — 99211 OFF/OP EST MAY X REQ PHY/QHP: CPT

## 2017-10-10 NOTE — PATIENT INSTRUCTIONS
[facility]     Post-Operative Guidelines      · AVOID CONSTIPATION - Take Miralax: one capful in water or juice each morning.  You can also take each evening if needed. · No lifting over 10 lbs. (1 gallon of milk is 8 lbs. )

## 2017-10-10 NOTE — PROGRESS NOTES
Pt presents w/ initial c/o bulge (PMB w/u negative)  Urodynamic testing undergone without complication.   Results reviewed with patient  146/20cc & 297+/15cc  No DO  +JANICE with reduction @ 100cc  penitentiary 350cc    Discussed with patient mgmt options for JANICE & POP

## 2017-10-18 NOTE — PROGRESS NOTES
Labs are mostly normal sugars and cholesterol way better than 3 months ago, nice job with dm control.

## 2017-10-23 ENCOUNTER — OFFICE VISIT (OUTPATIENT)
Dept: FAMILY MEDICINE CLINIC | Facility: CLINIC | Age: 62
End: 2017-10-23

## 2017-10-23 VITALS
RESPIRATION RATE: 16 BRPM | BODY MASS INDEX: 28.99 KG/M2 | DIASTOLIC BLOOD PRESSURE: 60 MMHG | SYSTOLIC BLOOD PRESSURE: 124 MMHG | WEIGHT: 174 LBS | HEIGHT: 65 IN | TEMPERATURE: 97 F | HEART RATE: 68 BPM

## 2017-10-23 DIAGNOSIS — H57.11 EYE PAIN, RIGHT: ICD-10-CM

## 2017-10-23 DIAGNOSIS — I10 ESSENTIAL HYPERTENSION: ICD-10-CM

## 2017-10-23 DIAGNOSIS — K86.2 PANCREATIC CYST: ICD-10-CM

## 2017-10-23 DIAGNOSIS — E78.2 MIXED HYPERLIPIDEMIA: ICD-10-CM

## 2017-10-23 DIAGNOSIS — Z79.4 TYPE 2 DIABETES MELLITUS WITH DIABETIC NEUROPATHY, WITH LONG-TERM CURRENT USE OF INSULIN (HCC): ICD-10-CM

## 2017-10-23 DIAGNOSIS — R19.7 DIARRHEA, UNSPECIFIED TYPE: Primary | ICD-10-CM

## 2017-10-23 DIAGNOSIS — R42 DIZZINESS: ICD-10-CM

## 2017-10-23 DIAGNOSIS — E11.40 TYPE 2 DIABETES MELLITUS WITH DIABETIC NEUROPATHY, WITH LONG-TERM CURRENT USE OF INSULIN (HCC): ICD-10-CM

## 2017-10-23 DIAGNOSIS — K59.04 CHRONIC IDIOPATHIC CONSTIPATION: ICD-10-CM

## 2017-10-23 DIAGNOSIS — R14.0 BLOATING: ICD-10-CM

## 2017-10-23 DIAGNOSIS — K44.9 HIATAL HERNIA: ICD-10-CM

## 2017-10-23 PROCEDURE — 99214 OFFICE O/P EST MOD 30 MIN: CPT | Performed by: INTERNAL MEDICINE

## 2017-10-23 RX ORDER — FENOFIBRATE 160 MG/1
TABLET ORAL
Qty: 90 TABLET | Refills: 3 | Status: ON HOLD | OUTPATIENT
Start: 2017-10-23 | End: 2017-11-16

## 2017-10-23 RX ORDER — MONTELUKAST SODIUM 10 MG/1
10 TABLET ORAL
Qty: 90 TABLET | Refills: 1 | Status: SHIPPED | OUTPATIENT
Start: 2017-10-23 | End: 2018-06-04

## 2017-10-23 RX ORDER — ATORVASTATIN CALCIUM 20 MG/1
TABLET, FILM COATED ORAL
Qty: 90 TABLET | Refills: 3 | Status: ON HOLD | OUTPATIENT
Start: 2017-10-23 | End: 2017-11-16

## 2017-10-23 RX ORDER — RAMIPRIL 2.5 MG/1
2.5 CAPSULE ORAL
Qty: 90 CAPSULE | Refills: 3 | Status: SHIPPED | OUTPATIENT
Start: 2017-10-23 | End: 2018-11-29

## 2017-10-23 RX ORDER — GABAPENTIN 300 MG/1
CAPSULE ORAL
Qty: 180 CAPSULE | Refills: 3 | Status: ON HOLD | OUTPATIENT
Start: 2017-10-23 | End: 2017-11-16

## 2017-10-23 RX ORDER — PANTOPRAZOLE SODIUM 40 MG/1
40 TABLET, DELAYED RELEASE ORAL
Qty: 60 TABLET | Refills: 6 | Status: SHIPPED | OUTPATIENT
Start: 2017-10-23 | End: 2017-12-26

## 2017-10-23 RX ORDER — BUPROPION HYDROCHLORIDE 150 MG/1
150 TABLET ORAL
Qty: 90 TABLET | Refills: 3 | Status: SHIPPED | OUTPATIENT
Start: 2017-10-23 | End: 2017-12-26

## 2017-10-23 RX ORDER — METOPROLOL SUCCINATE 25 MG/1
25 TABLET, EXTENDED RELEASE ORAL
Qty: 90 TABLET | Refills: 3 | Status: SHIPPED | OUTPATIENT
Start: 2017-10-23 | End: 2019-01-15

## 2017-10-23 NOTE — PROGRESS NOTES
HPI:    Patient ID: Kadi Pryor is a 58year old female. Patient presents with:  Diabetes  Dizziness  Eye Pain  Nausea  Diarrhea    HPI She presents with multiple complaints. Accompanied by daughter who is translating. C/o dizziness.  Started 3-4 w Number of children: N/A     Occupational History  None on file     Social History Main Topics   Smoking status: Never Smoker    Smokeless tobacco: Never Used    Alcohol use No    Drug use: No    Sexual activity: No     Other Topics Concern    Caffeine Conc Tab EC Take 1 tablet (5 mg total) by mouth 2 (two) times daily.  For constipation Disp: 60 tablet Rfl: 3   Clobetasol Propionate 0.05 % External Cream BID to area x one month, then daily to area x one month, then 2-3 times per week Disp: 60 g Rfl: 3   aspir hepatosplenomegaly. There is tenderness in the left lower quadrant. There is no guarding. Musculoskeletal: Normal range of motion. Lymphadenopathy:     She has no cervical adenopathy.    Neurological: She is alert and oriented to person, place, and time condition. Cpm.     (E78.2) Mixed hyperlipidemia  Plan: chronic stable condition. Cpm.     (E11.40,  Z79.4) Type 2 diabetes mellitus with diabetic neuropathy, with long-term current use of insulin (HCC)  Plan: chronic stable condition.  Most recent A1C 7.1 Pantoprazole Sodium 40 MG Oral Tab EC 60 tablet 6      Sig: Take 1 tablet (40 mg total) by mouth 2 (two) times daily before meals. Take 30-45 minutes before breakfast and before dinner, take on an empty stomach.       Insulin Pen Needle (NOVOFINE) 32G X 6 M

## 2017-10-23 NOTE — PATIENT INSTRUCTIONS
Thank you for choosing Roma Poe MD at [place] [address]  To Do: [facility] AT [facility]  1. Medication refilled to dariana- you said all of them so all of them are filled  2. Constipation- start dulcolax twice a day  3. See GI doc dr Leigha Delaney  4.  For Kayla Huff prescribed to you and be aware of all of the risks of treatment even beyond those discussed today.  All therapies have potential risk of harm or side effects or medication interactions.  It is your duty and for your safety to discuss with the pharmacist an

## 2017-10-25 ENCOUNTER — OFFICE VISIT (OUTPATIENT)
Dept: PHYSICAL THERAPY | Age: 62
End: 2017-10-25
Attending: INTERNAL MEDICINE
Payer: MEDICARE

## 2017-10-25 DIAGNOSIS — R42 DIZZINESS: ICD-10-CM

## 2017-10-25 PROCEDURE — 97530 THERAPEUTIC ACTIVITIES: CPT | Performed by: PHYSICAL THERAPIST

## 2017-10-25 PROCEDURE — 97162 PT EVAL MOD COMPLEX 30 MIN: CPT | Performed by: PHYSICAL THERAPIST

## 2017-10-25 NOTE — PROGRESS NOTES
PHYSICAL THERAPY EVALUATION:   Referring Physician: Dr. Juan Carlos Antonio  Diagnosis: Dizziness      Date of Onset: 3-4 wks ago Date of Service: 10/25/2017     PATIENT SUMMARY   Uriel Dumont is a 58year old y/o female who presents to therapy today with reports of problems        ASSESSMENT:      Saranya Bejarano presents today with c/o dizziness with quick head movements with recent exacerbation 3-4 wks ago. Pt presents with decreased cervical ROM in flex/ ext, headaches, a positive smooth pursuits and negative [employer]. rehabilitation, Vertical VOR exercises for home    Charges: PT Pj moderate complexity x1, there act X 2      Total Timed Treatment: 30 min     Total Treatment Time: 75 min          PLAN OF CARE:    Goals:    · Pt to be able to make quick head turns w/o s under this plan of treatment and while under my care.     X___________________________________________________ Date____________________    Certification From: 78/02/9049  To:1/23/2018

## 2017-10-26 ENCOUNTER — TELEPHONE (OUTPATIENT)
Dept: FAMILY MEDICINE CLINIC | Facility: CLINIC | Age: 62
End: 2017-10-26

## 2017-10-26 NOTE — TELEPHONE ENCOUNTER
Please contact pt to schedule Pre-op appt.        Future Appointments  Date Time Provider Margoth Silveira   11/2/2017 4:45 PM Theresa Fields, PT [facility]   12/5/2017 2:00 PM DO Tommy Grajeda [address]   12/19/2017 3:30

## 2017-11-01 ENCOUNTER — MED REC SCAN ONLY (OUTPATIENT)
Dept: FAMILY MEDICINE CLINIC | Facility: CLINIC | Age: 62
End: 2017-11-01

## 2017-11-02 ENCOUNTER — OFFICE VISIT (OUTPATIENT)
Dept: PHYSICAL THERAPY | Age: 62
End: 2017-11-02
Attending: INTERNAL MEDICINE
Payer: MEDICARE

## 2017-11-02 ENCOUNTER — LAB ENCOUNTER (OUTPATIENT)
Dept: LAB | Age: 62
End: 2017-11-02
Attending: FAMILY MEDICINE
Payer: MEDICARE

## 2017-11-02 ENCOUNTER — HOSPITAL ENCOUNTER (OUTPATIENT)
Dept: GENERAL RADIOLOGY | Age: 62
Discharge: HOME | End: 2017-11-02
Attending: FAMILY MEDICINE
Payer: MEDICARE

## 2017-11-02 ENCOUNTER — OFFICE VISIT (OUTPATIENT)
Dept: FAMILY MEDICINE CLINIC | Facility: CLINIC | Age: 62
End: 2017-11-02

## 2017-11-02 VITALS
WEIGHT: 167 LBS | HEART RATE: 80 BPM | RESPIRATION RATE: 16 BRPM | TEMPERATURE: 98 F | BODY MASS INDEX: 27.82 KG/M2 | HEIGHT: 65 IN | DIASTOLIC BLOOD PRESSURE: 60 MMHG | SYSTOLIC BLOOD PRESSURE: 118 MMHG

## 2017-11-02 DIAGNOSIS — Z01.818 PRE-OP EXAM: ICD-10-CM

## 2017-11-02 DIAGNOSIS — N81.4 UTEROVAGINAL PROLAPSE: Primary | ICD-10-CM

## 2017-11-02 DIAGNOSIS — M79.644 PAIN OF FINGER OF RIGHT HAND: ICD-10-CM

## 2017-11-02 DIAGNOSIS — R32 URINARY INCONTINENCE, UNSPECIFIED TYPE: ICD-10-CM
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
## 2017-11-02 DIAGNOSIS — I25.10 CORONARY ARTERY DISEASE INVOLVING NATIVE HEART WITHOUT ANGINA PECTORIS, UNSPECIFIED VESSEL OR LESION TYPE: ICD-10-CM

## 2017-11-02 PROCEDURE — 97530 THERAPEUTIC ACTIVITIES: CPT | Performed by: PHYSICAL THERAPIST

## 2017-11-02 PROCEDURE — 97112 NEUROMUSCULAR REEDUCATION: CPT | Performed by: PHYSICAL THERAPIST

## 2017-11-02 PROCEDURE — 99214 OFFICE O/P EST MOD 30 MIN: CPT | Performed by: FAMILY MEDICINE

## 2017-11-02 PROCEDURE — 73140 X-RAY EXAM OF FINGER(S): CPT | Performed by: FAMILY MEDICINE

## 2017-11-02 PROCEDURE — 93000 ELECTROCARDIOGRAM COMPLETE: CPT | Performed by: FAMILY MEDICINE

## 2017-11-02 PROCEDURE — 97110 THERAPEUTIC EXERCISES: CPT | Performed by: PHYSICAL THERAPIST

## 2017-11-02 PROCEDURE — 85025 COMPLETE CBC W/AUTO DIFF WBC: CPT

## 2017-11-02 PROCEDURE — 36415 COLL VENOUS BLD VENIPUNCTURE: CPT

## 2017-11-02 RX ORDER — PEN NEEDLE, DIABETIC 31 GX5/16"
NEEDLE, DISPOSABLE MISCELLANEOUS
COMMUNITY
Start: 2017-10-23 | End: 2018-04-28 | Stop reason: ALTCHOICE

## 2017-11-02 RX ORDER — INSULIN GLARGINE 100 [IU]/ML
INJECTION, SOLUTION SUBCUTANEOUS
COMMUNITY
Start: 2017-10-24 | End: 2017-11-02

## 2017-11-02 NOTE — PROGRESS NOTES
Patient presents with:  Pre-Op Exam: hysterectomy and bladder wall repair 11/16/17 Dr Lisa Gomez (Fax form to [phone])      HPI:  Ilah Koyanagi is a 58year old female here today for pre-operative examination.     Preoperative examination–pat ankle 2004 and               right ankle 2006    No date: TOTAL HIP REPLACEMENT      Comment: rt hip    Current Outpatient Prescriptions on File Prior to Visit:  BuPROPion HCl ER, XL, 150 MG Oral Tablet 24 Hr Take 1 tablet (150 mg total) by mouth once francisco 3   aspirin (ASPIR-LOW) 81 MG Oral Tab EC Take 81 mg by mouth daily.  Disp:  Rfl:    TRUETRACK TEST In Vitro Strip TEST BLOOD SUGAR THREE TIMES DAILY Disp: 300 strip Rfl: 1   DICLOFENAC SODIUM 75 MG Oral Tab EC TAKE 1 TABLET BY MOUTH TWICE DAILY AS NEEDED F submandibular, ant/post cervical lymphadenopathy. No thyromegaly or masses. PULMONARY:  Lungs clear to auscultation bilaterally. No wheezes, rales, or rhonchi. Normal respiratory effort. CARDIOVASCULAR:  Regular rate and rhythm.  No murmurs, gallops, or r

## 2017-11-02 NOTE — PROGRESS NOTES
Dx: Dizziness         Authorized # of Visits:           Next MD visit: none scheduled  Fall Risk: standard         Precautions: n/a             Subjective: Pt reports decrease in dizziness symptoms by 85%.  Pt states she has been doing her exercises 3 X a d

## 2017-11-02 NOTE — TELEPHONE ENCOUNTER
Moved from Dr Juan Carlos Antonio pre-op folder to Dr Catrachito Flores pre-op folder. EKG is in there, labs pending.

## 2017-11-08 PROBLEM — S62.616A CLOSED DISPLACED FRACTURE OF PROXIMAL PHALANX OF RIGHT LITTLE FINGER, INITIAL ENCOUNTER: Status: ACTIVE | Noted: 2017-11-08

## 2017-11-13 ENCOUNTER — TELEPHONE (OUTPATIENT)
Dept: FAMILY MEDICINE CLINIC | Facility: CLINIC | Age: 62
End: 2017-11-13

## 2017-11-13 PROBLEM — S62.616D CLOSED DISPLACED FRACTURE OF PROXIMAL PHALANX OF RIGHT LITTLE FINGER WITH ROUTINE HEALING, SUBSEQUENT ENCOUNTER: Status: ACTIVE | Noted: 2017-11-13

## 2017-11-13 NOTE — TELEPHONE ENCOUNTER
Lucero Pineda- Patient is scheduled for surgery on 11/16/2017 for a hysterectomy and she wants to to know if she should take her full dose of Lantus. Please advise.

## 2017-11-13 NOTE — TELEPHONE ENCOUNTER
Time started: 408  Time ended: 409  Total time spent on chart: one min    LM for patient to call back.

## 2017-11-14 NOTE — TELEPHONE ENCOUNTER
Time started: 1264    Time ended: 9916    Total time spent on chart: 5 min    Patient is scheduled for surgery 11/16/17 and will be NPO the night before. She takes her Lantus insulin in the evening before bed.   I  Advised the daughter  that she should hol

## 2017-11-14 NOTE — TELEPHONE ENCOUNTER
[id number] Claudia Garcia here's what we do preop    She will hold her metformin jardiance and linagliptin on day of surgery  The evening before surgery instead of injecting 25 units of insulin she will inject 12 units of her lantus.  She can resume normal insulin dose the night (JARDIANCE) 25 MG Oral Tab, Take 1 tablet by mouth daily. For diabetes, Disp: 30 tablet, Rfl: 6  •  bisacodyl (DULCOLAX) 5 MG Oral Tab EC, Take 1 tablet (5 mg total) by mouth 2 (two) times daily.  For constipation, Disp: 60 tablet, Rfl: 3  •  Clobetasol Pro

## 2017-11-14 NOTE — TELEPHONE ENCOUNTER
Deborah Marcano informed of this and will proceed with MD directions.     Time started: 401    Time ended: 403    Total time spent on chart: 2 min

## 2017-11-15 ENCOUNTER — APPOINTMENT (OUTPATIENT)
Dept: PHYSICAL THERAPY | Age: 62
End: 2017-11-15
Payer: MEDICARE

## 2017-11-16 ENCOUNTER — ANESTHESIA EVENT (OUTPATIENT)
Dept: SURGERY | Facility: HOSPITAL | Age: 62
End: 2017-11-16
Payer: MEDICARE

## 2017-11-16 ENCOUNTER — SURGERY (OUTPATIENT)
Age: 62
End: 2017-11-16

## 2017-11-16 ENCOUNTER — HOSPITAL ENCOUNTER (OUTPATIENT)
Facility: HOSPITAL | Age: 62
Discharge: HOME | End: 2017-11-17
Attending: OBSTETRICS & GYNECOLOGY | Admitting: OBSTETRICS & GYNECOLOGY
Payer: MEDICARE

## 2017-11-16 ENCOUNTER — ANESTHESIA (OUTPATIENT)
Dept: SURGERY | Facility: HOSPITAL | Age: 62
End: 2017-11-16
Payer: MEDICARE

## 2017-11-16 PROBLEM — N81.2 UTEROVAGINAL PROLAPSE, INCOMPLETE: Status: ACTIVE | Noted: 2017-11-16

## 2017-11-16 PROCEDURE — 0TSD0ZZ REPOSITION URETHRA, OPEN APPROACH: ICD-10-PCS | Performed by: OBSTETRICS & GYNECOLOGY

## 2017-11-16 PROCEDURE — 57288 REPAIR BLADDER DEFECT: CPT | Performed by: OBSTETRICS & GYNECOLOGY

## 2017-11-16 PROCEDURE — 0UT97ZZ RESECTION OF UTERUS, VIA NATURAL OR ARTIFICIAL OPENING: ICD-10-PCS | Performed by: OBSTETRICS & GYNECOLOGY

## 2017-11-16 PROCEDURE — 0UQF0ZZ REPAIR CUL-DE-SAC, OPEN APPROACH: ICD-10-PCS | Performed by: OBSTETRICS & GYNECOLOGY

## 2017-11-16 PROCEDURE — 0JQC0ZZ REPAIR PELVIC REGION SUBCUTANEOUS TISSUE AND FASCIA, OPEN APPROACH: ICD-10-PCS | Performed by: OBSTETRICS & GYNECOLOGY

## 2017-11-16 PROCEDURE — 0WQNXZZ REPAIR FEMALE PERINEUM, EXTERNAL APPROACH: ICD-10-PCS | Performed by: OBSTETRICS & GYNECOLOGY

## 2017-11-16 PROCEDURE — 0UT77ZZ RESECTION OF BILATERAL FALLOPIAN TUBES, VIA NATURAL OR ARTIFICIAL OPENING: ICD-10-PCS | Performed by: OBSTETRICS & GYNECOLOGY

## 2017-11-16 PROCEDURE — 58262 VAG HYST INCLUDING T/O: CPT | Performed by: OBSTETRICS & GYNECOLOGY

## 2017-11-16 PROCEDURE — 57260 CMBN ANT PST COLPRHY: CPT | Performed by: OBSTETRICS & GYNECOLOGY

## 2017-11-16 DEVICE — TRANSVAGINAL MID-URETHRAL SLING
Type: IMPLANTABLE DEVICE | Status: FUNCTIONAL
Brand: ADVANTAGE FIT™  SYSTEM

## 2017-11-16 RX ORDER — ONDANSETRON 2 MG/ML
4 INJECTION INTRAMUSCULAR; INTRAVENOUS AS NEEDED
Status: DISCONTINUED | OUTPATIENT
Start: 2017-11-16 | End: 2017-11-16 | Stop reason: HOSPADM

## 2017-11-16 RX ORDER — DEXTROSE MONOHYDRATE 25 G/50ML
50 INJECTION, SOLUTION INTRAVENOUS
Status: DISCONTINUED | OUTPATIENT
Start: 2017-11-16 | End: 2017-11-16 | Stop reason: HOSPADM

## 2017-11-16 RX ORDER — METOPROLOL SUCCINATE 25 MG/1
25 TABLET, EXTENDED RELEASE ORAL
Status: DISCONTINUED | OUTPATIENT
Start: 2017-11-16 | End: 2017-11-17

## 2017-11-16 RX ORDER — HYDROMORPHONE HYDROCHLORIDE 1 MG/ML
0.8 INJECTION, SOLUTION INTRAMUSCULAR; INTRAVENOUS; SUBCUTANEOUS EVERY 2 HOUR PRN
Status: DISCONTINUED | OUTPATIENT
Start: 2017-11-16 | End: 2017-11-17

## 2017-11-16 RX ORDER — MIDAZOLAM HYDROCHLORIDE 1 MG/ML
1 INJECTION INTRAMUSCULAR; INTRAVENOUS EVERY 5 MIN PRN
Status: DISCONTINUED | OUTPATIENT
Start: 2017-11-16 | End: 2017-11-16 | Stop reason: HOSPADM

## 2017-11-16 RX ORDER — HYDROCODONE BITARTRATE AND ACETAMINOPHEN 5; 325 MG/1; MG/1
2 TABLET ORAL EVERY 4 HOURS PRN
Status: DISCONTINUED | OUTPATIENT
Start: 2017-11-16 | End: 2017-11-17

## 2017-11-16 RX ORDER — NALOXONE HYDROCHLORIDE 0.4 MG/ML
80 INJECTION, SOLUTION INTRAMUSCULAR; INTRAVENOUS; SUBCUTANEOUS AS NEEDED
Status: DISCONTINUED | OUTPATIENT
Start: 2017-11-16 | End: 2017-11-16 | Stop reason: HOSPADM

## 2017-11-16 RX ORDER — METOCLOPRAMIDE HYDROCHLORIDE 5 MG/ML
10 INJECTION INTRAMUSCULAR; INTRAVENOUS EVERY 8 HOURS PRN
Status: DISCONTINUED | OUTPATIENT
Start: 2017-11-16 | End: 2017-11-17

## 2017-11-16 RX ORDER — ATORVASTATIN CALCIUM 20 MG/1
20 TABLET, FILM COATED ORAL NIGHTLY
COMMUNITY
End: 2017-12-26

## 2017-11-16 RX ORDER — CEFAZOLIN SODIUM 1 G/3ML
INJECTION, POWDER, FOR SOLUTION INTRAMUSCULAR; INTRAVENOUS
Status: DISCONTINUED | OUTPATIENT
Start: 2017-11-16 | End: 2017-11-16 | Stop reason: HOSPADM

## 2017-11-16 RX ORDER — HYDROMORPHONE HYDROCHLORIDE 1 MG/ML
0.4 INJECTION, SOLUTION INTRAMUSCULAR; INTRAVENOUS; SUBCUTANEOUS EVERY 2 HOUR PRN
Status: DISCONTINUED | OUTPATIENT
Start: 2017-11-16 | End: 2017-11-17

## 2017-11-16 RX ORDER — SODIUM CHLORIDE, SODIUM LACTATE, POTASSIUM CHLORIDE, CALCIUM CHLORIDE 600; 310; 30; 20 MG/100ML; MG/100ML; MG/100ML; MG/100ML
INJECTION, SOLUTION INTRAVENOUS CONTINUOUS
Status: DISCONTINUED | OUTPATIENT
Start: 2017-11-16 | End: 2017-11-16

## 2017-11-16 RX ORDER — DIPHENHYDRAMINE HYDROCHLORIDE 50 MG/ML
12.5 INJECTION INTRAMUSCULAR; INTRAVENOUS EVERY 4 HOURS PRN
Status: DISCONTINUED | OUTPATIENT
Start: 2017-11-16 | End: 2017-11-17

## 2017-11-16 RX ORDER — HYDROMORPHONE HYDROCHLORIDE 1 MG/ML
0.4 INJECTION, SOLUTION INTRAMUSCULAR; INTRAVENOUS; SUBCUTANEOUS EVERY 5 MIN PRN
Status: DISCONTINUED | OUTPATIENT
Start: 2017-11-16 | End: 2017-11-16 | Stop reason: HOSPADM

## 2017-11-16 RX ORDER — FENOFIBRATE 160 MG/1
160 TABLET ORAL DAILY
COMMUNITY
End: 2017-12-26

## 2017-11-16 RX ORDER — DICLOFENAC SODIUM 75 MG/1
75 TABLET, DELAYED RELEASE ORAL 2 TIMES DAILY PRN
COMMUNITY
End: 2018-09-05

## 2017-11-16 RX ORDER — HYDROMORPHONE HYDROCHLORIDE 1 MG/ML
0.2 INJECTION, SOLUTION INTRAMUSCULAR; INTRAVENOUS; SUBCUTANEOUS EVERY 2 HOUR PRN
Status: DISCONTINUED | OUTPATIENT
Start: 2017-11-16 | End: 2017-11-17

## 2017-11-16 RX ORDER — ONDANSETRON 2 MG/ML
4 INJECTION INTRAMUSCULAR; INTRAVENOUS EVERY 8 HOURS PRN
Status: DISCONTINUED | OUTPATIENT
Start: 2017-11-16 | End: 2017-11-17

## 2017-11-16 RX ORDER — BUPROPION HYDROCHLORIDE 150 MG/1
150 TABLET, EXTENDED RELEASE ORAL DAILY
Status: DISCONTINUED | OUTPATIENT
Start: 2017-11-16 | End: 2017-11-17

## 2017-11-16 RX ORDER — HYDROCODONE BITARTRATE AND ACETAMINOPHEN 5; 325 MG/1; MG/1
1 TABLET ORAL EVERY 4 HOURS PRN
Status: DISCONTINUED | OUTPATIENT
Start: 2017-11-16 | End: 2017-11-17

## 2017-11-16 RX ORDER — GABAPENTIN 300 MG/1
600 CAPSULE ORAL NIGHTLY
Status: DISCONTINUED | OUTPATIENT
Start: 2017-11-16 | End: 2017-11-17

## 2017-11-16 RX ORDER — KETOROLAC TROMETHAMINE 15 MG/ML
15 INJECTION, SOLUTION INTRAMUSCULAR; INTRAVENOUS EVERY 6 HOURS
Status: COMPLETED | OUTPATIENT
Start: 2017-11-16 | End: 2017-11-17

## 2017-11-16 RX ORDER — ACETAMINOPHEN 325 MG/1
650 TABLET ORAL EVERY 4 HOURS PRN
Status: DISCONTINUED | OUTPATIENT
Start: 2017-11-16 | End: 2017-11-17

## 2017-11-16 RX ORDER — ATORVASTATIN CALCIUM 20 MG/1
20 TABLET, FILM COATED ORAL NIGHTLY
Status: DISCONTINUED | OUTPATIENT
Start: 2017-11-16 | End: 2017-11-17

## 2017-11-16 RX ORDER — BUPIVACAINE HYDROCHLORIDE AND EPINEPHRINE 2.5; 5 MG/ML; UG/ML
INJECTION, SOLUTION EPIDURAL; INFILTRATION; INTRACAUDAL; PERINEURAL AS NEEDED
Status: DISCONTINUED | OUTPATIENT
Start: 2017-11-16 | End: 2017-11-16 | Stop reason: HOSPADM

## 2017-11-16 RX ORDER — FENOFIBRATE 67 MG/1
67 CAPSULE ORAL
Status: DISCONTINUED | OUTPATIENT
Start: 2017-11-17 | End: 2017-11-17

## 2017-11-16 RX ORDER — SCOLOPAMINE TRANSDERMAL SYSTEM 1 MG/1
1 PATCH, EXTENDED RELEASE TRANSDERMAL ONCE
Status: DISCONTINUED | OUTPATIENT
Start: 2017-11-16 | End: 2017-11-17

## 2017-11-16 RX ORDER — HYDROMORPHONE HYDROCHLORIDE 1 MG/ML
INJECTION, SOLUTION INTRAMUSCULAR; INTRAVENOUS; SUBCUTANEOUS
Status: COMPLETED
Start: 2017-11-16 | End: 2017-11-16

## 2017-11-16 RX ORDER — SODIUM CHLORIDE, SODIUM LACTATE, POTASSIUM CHLORIDE, CALCIUM CHLORIDE 600; 310; 30; 20 MG/100ML; MG/100ML; MG/100ML; MG/100ML
INJECTION, SOLUTION INTRAVENOUS CONTINUOUS
Status: DISCONTINUED | OUTPATIENT
Start: 2017-11-16 | End: 2017-11-17

## 2017-11-16 RX ORDER — ONDANSETRON 4 MG/1
4 TABLET, FILM COATED ORAL EVERY 8 HOURS PRN
Status: DISCONTINUED | OUTPATIENT
Start: 2017-11-16 | End: 2017-11-17

## 2017-11-16 RX ORDER — MONTELUKAST SODIUM 10 MG/1
10 TABLET ORAL NIGHTLY
Status: DISCONTINUED | OUTPATIENT
Start: 2017-11-16 | End: 2017-11-17

## 2017-11-16 RX ORDER — MEPERIDINE HYDROCHLORIDE 25 MG/ML
12.5 INJECTION INTRAMUSCULAR; INTRAVENOUS; SUBCUTANEOUS AS NEEDED
Status: DISCONTINUED | OUTPATIENT
Start: 2017-11-16 | End: 2017-11-16 | Stop reason: HOSPADM

## 2017-11-16 RX ORDER — RAMIPRIL 2.5 MG/1
2.5 CAPSULE ORAL DAILY
Status: DISCONTINUED | OUTPATIENT
Start: 2017-11-16 | End: 2017-11-17

## 2017-11-16 RX ORDER — DIPHENHYDRAMINE HYDROCHLORIDE 50 MG/ML
12.5 INJECTION INTRAMUSCULAR; INTRAVENOUS AS NEEDED
Status: DISCONTINUED | OUTPATIENT
Start: 2017-11-16 | End: 2017-11-16 | Stop reason: HOSPADM

## 2017-11-16 RX ORDER — GABAPENTIN 300 MG/1
600 CAPSULE ORAL NIGHTLY
COMMUNITY
End: 2018-10-27

## 2017-11-16 RX ORDER — TRAMADOL HYDROCHLORIDE 50 MG/1
50 TABLET ORAL EVERY 6 HOURS PRN
Status: DISCONTINUED | OUTPATIENT
Start: 2017-11-16 | End: 2017-11-17

## 2017-11-16 RX ORDER — PANTOPRAZOLE SODIUM 40 MG/1
40 TABLET, DELAYED RELEASE ORAL
Status: DISCONTINUED | OUTPATIENT
Start: 2017-11-16 | End: 2017-11-17

## 2017-11-16 NOTE — ANESTHESIA PREPROCEDURE EVALUATION
PRE-OP EVALUATION    Patient Name: Imelda eLe    Pre-op Diagnosis: UTERINE PROLAPSE, PROLAPSE OF ANTERIOR VAGINAL WALL, STRESS URINARY INCONTINENCE    Procedure(s):  TOTAL VAGINAL HYSTERECTOMY, POSSIBLE  BILATERAL  SALPINGO OOPHORECTOMY   (Willy Camarena)  UT daily Disp: 30 tablet Rfl: 11   Fenofibrate 160 MG Oral Tab TAKE 1 TABLET(160 MG) BY MOUTH EVERY DAY Disp: 90 tablet Rfl: 3   insulin glargine (LANTUS) 100 UNIT/ML Subcutaneous Solution Inject 25 Units into the skin nightly.  Disp: 8 pen Rfl: 3   gabapentin Glucose Monitoring Suppl (TRUETRACK BLOOD GLUCOSE) W/DEVICE Does not apply Kit Use as directed Disp: 1 Kit Rfl: 0       Allergies: Codeine; Vicodin [Hydrocodone-Acetaminophen]      Anesthesia Evaluation    Patient summary reviewed.     Anesthetic Complicati unchanged, left bundle branch on the previous study also.          Past Surgical History:  : ANGIOPLASTY (CORONARY)      Comment: coronary stent placed by Dr. Richard Solorio  8070,8078:   No date: CATARACT Bilateral  2009: ENDOVENOUS LASER VEIN RAMON Manzano

## 2017-11-16 NOTE — OPERATIVE REPORT
PROCEDURE: TRANSVAGINAL HYSTERECTOMY, BILATERAL SALPINGECTOMY     DATE OF PROCEDURE: 11/16/17    PATIENT: Uriel Dumont  MRN: [id number]    PRE-OP DIAGNOSIS:  Uterovaginal prolapse, JANICE    POST-OP DIAGNOSIS:  Same    SURGEON: Gracy Marroquin MD     ASSIST were clamped with Kathrine clamps, incised and ligated with 0 Vicryl suture. The anterior vaginal mucosa was then dissected bluntly and with whitmore scissors resulting in positioning the bladder cephalad. The anterior cul-de-sac was then entered sharply.  The sp

## 2017-11-16 NOTE — H&P
57 y/o female with pelvic organ prolapse and stress urinary incontinece for surgical mgmt  Pre operative clearance with Dr Pelon Ley, pt seen & examined without changes.   Thorough discussion of surgical risks, benefits, and alternatives including, but not li

## 2017-11-16 NOTE — ANESTHESIA POSTPROCEDURE EVALUATION
[facility] Patient Status:  Outpatient in a Bed   Age/Gender 58year old female MRN [id number]   [facility] SURGERY Attending Shanna Romberg, [address] [facility] Se Day # 0 PCP Geo Rosas MD       Anesthesia Post-op Note    Proce

## 2017-11-16 NOTE — OPERATIVE REPORT
PRE-OP DIAGNOSIS:  Uterovaginal prolapse; stress incontinence    POST-OP DIAGNOSIS:  Same    PROCEDURE:  Repair of enterocele; uterosacral ligament suspension; anterior colporrhaphy; posterior colporrhaphy; midurethral sling; cystourethroscopy    SURGEON: the vaginal epithelium suburethrally and a scalpel was used to make the midline incision after infiltration of .25% marcaine with epinepherine.  Sharp dissection with Metzenbaum scissors was performed to dissect the periurethral tissues towards the pubic ra correct.

## 2017-11-16 NOTE — H&P
[facility]  Obstetrics and Gynecology  History & Physical    Ilah Koyanagi Patient Status:  Outpatient in a Bed    1955 MRN [id number]   Location [address] [place] [address] Attending Ty Rain [place] Jermain Day 0 VEE [place] • Atherosclerotic heart disease of native coronary artery without angina pectoris    • Constipation    • Essential (primary) hypertension    • High blood pressure    • High cholesterol    • Mixed hyperlipidemia    • Neuropathy     maria eugenia feet   • Osteoarthros MetFORMIN HCl 1000 MG Oral Tab See Admin Instructions. Takes 1000mg in the morning and 1500mg in the evening.  Disp:  Rfl:  11/15/2017 at 1600   BD PEN NEEDLE SHORT U/F 31G X 8 MM Does not apply Misc  Disp:  Rfl:  Taking   BuPROPion HCl ER, XL, 150 MG Oral Lancets Does not apply Misc Use three times daily Disp: 200 each Rfl: 1 Taking   Fluticasone Propionate (FLONASE) 50 MCG/ACT Nasal Suspension 1 spray by Nasal route daily.  (Patient taking differently: 1 spray by Nasal route daily as needed.) Disp: 16 g Rfl Bibiana Jeffrey is a 58year old  female who presents for total vaginal hysterectomy, possible BSO, anterior/posterior repair, uterosacral ligament suspension, possible mid-uretheral sling and cystoscopy 2/2 pelvic organ prolapse and JANICE.       Conrado - VTE prophylaxis: SCDs   - Anesthesia to evaluate   - Consent obtained and placed in chart       Risks, benefits, alternatives and possible complications have been discussed in detail with the patient.   Pre-admission, admission, and post admission procedu

## 2017-11-16 NOTE — PLAN OF CARE
NURSING ADMISSION NOTE      Patient admitted via PACU  Oriented to room. Safety precautions initiated. Bed in low position. Call light in reach.     Daughter, Lula Carmona, at bedside and is comfortable and willing to help assist with english/Emirati tr

## 2017-11-16 NOTE — BRIEF OP NOTE
Pre-Operative Diagnosis: UTERINE PROLAPSE, PROLAPSE OF ANTERIOR VAGINAL WALL, STRESS URINARY INCONTINENCE     Post-Operative Diagnosis: UTERINE PROLAPSE, PROLAPSE OF ANTERIOR VAGINAL WALL, STRESS URINARY INCONTINENCE     Procedure Performed:   Procedure

## 2017-11-17 VITALS
HEART RATE: 57 BPM | BODY MASS INDEX: 28.65 KG/M2 | TEMPERATURE: 98 F | RESPIRATION RATE: 17 BRPM | SYSTOLIC BLOOD PRESSURE: 131 MMHG | OXYGEN SATURATION: 100 % | HEIGHT: 65 IN | WEIGHT: 171.94 LBS | DIASTOLIC BLOOD PRESSURE: 63 MMHG

## 2017-11-17 RX ORDER — TRAMADOL HYDROCHLORIDE 50 MG/1
50 TABLET ORAL EVERY 6 HOURS PRN
Qty: 30 TABLET | Refills: 0 | Status: SHIPPED | OUTPATIENT
Start: 2017-11-17 | End: 2018-03-29

## 2017-11-17 NOTE — PROGRESS NOTES
4 hour post membreno removal.  Patient has attempted to void but unable. Bladder scan shows 300-400 ml in bladder. Spoke to Dr. Karyna De León and plan to dc patient home with membreno. Patient should follow up in office in 1 week.   Updated patient and patient wan

## 2017-11-17 NOTE — PROGRESS NOTES
[facility]  Obstetrics and Gynecology    Genovevado Dumont Patient Status:  Outpatient in a Bed    1955 MRN [id number]   [facility] 3NW-A Attending Dillon Quiñones 15 Day 1 PCP Marie Kerns MD       Subjective:     Castillo Jung disorder     Insomnia     Vitamin D deficiency     Arthropathy, unspecified, site unspecified     Pain in joint, lower leg     Obesity     Essential hypertension     Mixed hyperlipidemia     DM2 (diabetes mellitus, type 2) (HCC)     Numbness     Low testos office, pt has appointment with Dr. Delia Ferguson on 12/05     Cv: HTN. Continue BP medication. -132/54-57. Endo: Dm. Will hold metformin till 11/18/17, may take Empagliflozin and Januvia. POCT glucose 101-115 on 11/16.   Neuro: initiate oral pain medic

## 2017-11-17 NOTE — PROGRESS NOTES
NURSING DISCHARGE NOTE    Discharged Home via Wheelchair. Accompanied by Support staff  Belongings Taken by patient/family. Instructions and script for tramadol given to patient and daughter.   Both verbalize understanding and all questions and concer

## 2017-11-17 NOTE — PLAN OF CARE
GENITOURINARY - ADULT    • Absence of urinary retention Progressing        PAIN - ADULT    • Verbalizes/displays adequate comfort level or patient's stated pain goal Progressing          Patient resting in bed. Alert and orientated.  Nicaraguan speaking, under

## 2017-11-21 ENCOUNTER — OFFICE VISIT (OUTPATIENT)
Dept: UROLOGY | Facility: HOSPITAL | Age: 62
End: 2017-11-21
Attending: OBSTETRICS & GYNECOLOGY
Payer: MEDICARE

## 2017-11-21 VITALS
HEIGHT: 65 IN | SYSTOLIC BLOOD PRESSURE: 112 MMHG | BODY MASS INDEX: 28.49 KG/M2 | WEIGHT: 171 LBS | TEMPERATURE: 99 F | DIASTOLIC BLOOD PRESSURE: 70 MMHG

## 2017-11-21 DIAGNOSIS — R33.9 RETENTION OF URINE: ICD-10-CM

## 2017-11-21 DIAGNOSIS — Z98.890 POST-OPERATIVE STATE: Primary | ICD-10-CM

## 2017-11-21 PROCEDURE — 99212 OFFICE O/P EST SF 10 MIN: CPT

## 2017-11-21 RX ORDER — ACETAMINOPHEN 500 MG
500 TABLET ORAL EVERY 6 HOURS PRN
COMMUNITY
End: 2018-10-27

## 2017-11-21 NOTE — PATIENT INSTRUCTIONS
[address] MEDICINE    HAVING A URINARY CATHETER AFTER SURGERY    What is a urinary catheter? A catheter is a soft tube used to drain urine from your bladder. Why do I need a catheter?   A urinary catheter is used to help with heali pressure or back pain. · You have a fever over 100.5 for 4 hours or above 101.0 anytime. · You have nausea and/or vomiting. Please feel free to call the nurse at [phone] with any questions 8am-4:30pm Monday-Friday.     If the office is closed, y

## 2017-11-21 NOTE — PROCEDURES
.Patient here for voiding trial.  Dash catheter drained and then using a 60 cc Hilda syringe, 300 ml sterile water was instilled per gravity, balloon deflated using 10 cc syringe, and catheter removed. Pt up to bathroom and voided 100 mL.  Using sterile

## 2017-11-23 NOTE — PROGRESS NOTES
Patient and her daughter Robby Jacobs) notified of pathology results   No evidence for malignancy in uterus, cervix and bilateral fallopian tubes   Patient reports overall doing well but has bilateral hip pain, denies numbness/tingling/weakness along BLE   P

## 2017-11-27 ENCOUNTER — OFFICE VISIT (OUTPATIENT)
Dept: UROLOGY | Facility: HOSPITAL | Age: 62
End: 2017-11-27
Attending: OBSTETRICS & GYNECOLOGY
Payer: MEDICARE

## 2017-11-27 DIAGNOSIS — R33.9 URINARY RETENTION: Primary | ICD-10-CM

## 2017-11-27 PROCEDURE — 99212 OFFICE O/P EST SF 10 MIN: CPT

## 2017-11-27 RX ORDER — IBUPROFEN 600 MG/1
600 TABLET ORAL EVERY 6 HOURS PRN
COMMUNITY
End: 2018-03-29

## 2017-11-27 NOTE — PATIENT INSTRUCTIONS
Voiding Trial Instructions  You have passed your voiding trial at 8:45 AM.  Please make sure you are drinking some water today. You can take your Motrin to help with any swelling from the catheter.   It is important to try and empty your bladder every two

## 2017-11-27 NOTE — PROCEDURES
.Patient here for voiding trial #2- . Dash catheter drained and then using a 60 cc Hilda syringe, 250 ml sterile water was instilled per gravity, balloon deflated using 10 cc syringe, and catheter removed.   Pt up to bathroom and voided 210 mL,  Patient

## 2017-11-28 ENCOUNTER — TELEPHONE (OUTPATIENT)
Dept: UROLOGY | Facility: HOSPITAL | Age: 62
End: 2017-11-28

## 2017-11-28 RX ORDER — SULFAMETHOXAZOLE AND TRIMETHOPRIM 800; 160 MG/1; MG/1
1 TABLET ORAL 2 TIMES DAILY
Qty: 14 TABLET | Refills: 0 | Status: SHIPPED | OUTPATIENT
Start: 2017-11-28 | End: 2017-12-26 | Stop reason: ALTCHOICE

## 2017-11-29 ENCOUNTER — TELEPHONE (OUTPATIENT)
Dept: UROLOGY | Facility: HOSPITAL | Age: 62
End: 2017-11-29

## 2017-11-29 NOTE — TELEPHONE ENCOUNTER
TC from pt's daughter via answering service  Nick Simmons is s/p successful voiding trial  Today she c/o urinary urgency, some dysuria, & suprapubic pressure  Reviewed UTI vs. Incomplete bladder emptying  Empiric bactrim DS bid x7d  Will follow  If unable to void

## 2017-11-29 NOTE — TELEPHONE ENCOUNTER
Called patient this AM to obtain condition update, patient answered, limited English, states is \"doing better\", did not go to the ER, hung up and called Interpretor line, #[phone] Nina attempted to call patient back to obtain more information in [facility]

## 2017-12-05 ENCOUNTER — OFFICE VISIT (OUTPATIENT)
Dept: UROLOGY | Facility: HOSPITAL | Age: 62
End: 2017-12-05
Attending: OBSTETRICS & GYNECOLOGY
Payer: MEDICARE

## 2017-12-05 VITALS
HEIGHT: 65 IN | SYSTOLIC BLOOD PRESSURE: 108 MMHG | WEIGHT: 171 LBS | DIASTOLIC BLOOD PRESSURE: 58 MMHG | BODY MASS INDEX: 28.49 KG/M2

## 2017-12-05 DIAGNOSIS — Z98.890 POST-OPERATIVE STATE: Primary | ICD-10-CM

## 2017-12-05 PROCEDURE — 99211 OFF/OP EST MAY X REQ PHY/QHP: CPT

## 2017-12-05 NOTE — PROGRESS NOTES
She is s/p Post-Op Summary  Procedure Date: 11/16/17  Procedure Name: Vaginal Hysterectomy;Bilateral Salpingectomy;Uterosacral Ligament Suspension; Anterior/Posterior/Enterocele Repair;Prolene Mesh Mid Urethral Sling;Cystoscopy  Post-Op Symptoms: Patient de

## 2017-12-05 NOTE — PATIENT INSTRUCTIONS
[address] [address] PELVIC MEDICINE    BOWEL REGIMEN    Constipation can have detrimental effects on bladder function and can worsen the symptoms of prolapse. It is important to avoid constipation.     The first step for treating constipation is to i

## 2017-12-11 ENCOUNTER — OFFICE VISIT (OUTPATIENT)
Dept: OBGYN CLINIC | Facility: CLINIC | Age: 62
End: 2017-12-11

## 2017-12-11 VITALS
SYSTOLIC BLOOD PRESSURE: 120 MMHG | WEIGHT: 166 LBS | BODY MASS INDEX: 27.66 KG/M2 | DIASTOLIC BLOOD PRESSURE: 72 MMHG | HEIGHT: 65 IN

## 2017-12-11 DIAGNOSIS — Z98.890 POST-OPERATIVE STATE: Primary | ICD-10-CM

## 2017-12-11 PROCEDURE — 99024 POSTOP FOLLOW-UP VISIT: CPT | Performed by: OBSTETRICS & GYNECOLOGY

## 2017-12-11 NOTE — PROGRESS NOTES
[facility]  Obstetrics and Gynecology  Follow Up Progress Note    Subjective:     Uriel Dumont is a 58year old  female who is s/p TVH, BS, USLS, anterior colporrhapy, posterior colporrhaphy and miduretheral sling 2/2 uterovaginal prola Uterus, cervix, bilateral fallopian tubes:   -Inactive endometrium. -Multiple intramural leiomyomata.   -Cervix with no pathologic changes.  -Bilateral unremarkable fallopian tubes.   -No evidence of malignancy.       Reviewed with patient and daughter encounter     Uterovaginal prolapse, incomplete        Plan:     Post operative visit   - s/p TVH, BS, USLS, anterior colporrhapy, posterior colporrhaphy and miduretheral sling 2/2 uterovaginal prolapse and JANICE on 11/16/17  - doing well, no complaints   -

## 2017-12-11 NOTE — PATIENT INSTRUCTIONS
Please keep your follow up appointment with Dr. Kraft Daily in 3 months     Please return in 6 months for your annual gyne visit or sooner if having abnormal vaginal bleeding or severe pelvic pain

## 2017-12-26 ENCOUNTER — OFFICE VISIT (OUTPATIENT)
Dept: FAMILY MEDICINE CLINIC | Facility: CLINIC | Age: 62
End: 2017-12-26

## 2017-12-26 VITALS
HEART RATE: 68 BPM | TEMPERATURE: 98 F | DIASTOLIC BLOOD PRESSURE: 68 MMHG | WEIGHT: 168 LBS | SYSTOLIC BLOOD PRESSURE: 112 MMHG | BODY MASS INDEX: 27.99 KG/M2 | HEIGHT: 65 IN | RESPIRATION RATE: 16 BRPM

## 2017-12-26 DIAGNOSIS — E11.40 TYPE 2 DIABETES MELLITUS WITH DIABETIC NEUROPATHY, WITH LONG-TERM CURRENT USE OF INSULIN (HCC): Primary | ICD-10-CM

## 2017-12-26 DIAGNOSIS — H69.82 DYSFUNCTION OF LEFT EUSTACHIAN TUBE: ICD-10-CM

## 2017-12-26 DIAGNOSIS — Z79.4 TYPE 2 DIABETES MELLITUS WITH DIABETIC NEUROPATHY, WITH LONG-TERM CURRENT USE OF INSULIN (HCC): Primary | ICD-10-CM

## 2017-12-26 DIAGNOSIS — I10 ESSENTIAL HYPERTENSION WITH GOAL BLOOD PRESSURE LESS THAN 140/90: ICD-10-CM

## 2017-12-26 PROCEDURE — 99214 OFFICE O/P EST MOD 30 MIN: CPT | Performed by: FAMILY MEDICINE

## 2017-12-26 RX ORDER — BIOTIN 1 MG
TABLET ORAL
Qty: 1 KIT | Refills: 0 | Status: SHIPPED | OUTPATIENT
Start: 2017-12-26 | End: 2020-04-29 | Stop reason: CLARIF

## 2017-12-26 RX ORDER — PANTOPRAZOLE SODIUM 40 MG/1
40 TABLET, DELAYED RELEASE ORAL
Qty: 90 TABLET | Refills: 3 | Status: SHIPPED | OUTPATIENT
Start: 2017-12-26 | End: 2018-06-27

## 2017-12-26 RX ORDER — FENOFIBRATE 160 MG/1
160 TABLET ORAL DAILY
Qty: 90 TABLET | Refills: 3 | Status: SHIPPED | OUTPATIENT
Start: 2017-12-26 | End: 2018-11-29

## 2017-12-26 RX ORDER — BUPROPION HYDROCHLORIDE 150 MG/1
150 TABLET ORAL
Qty: 90 TABLET | Refills: 3 | Status: SHIPPED | OUTPATIENT
Start: 2017-12-26 | End: 2018-11-29

## 2017-12-26 RX ORDER — ATORVASTATIN CALCIUM 20 MG/1
20 TABLET, FILM COATED ORAL NIGHTLY
Qty: 90 TABLET | Refills: 3 | Status: SHIPPED | OUTPATIENT
Start: 2017-12-26 | End: 2018-11-29

## 2017-12-26 RX ORDER — PANTOPRAZOLE SODIUM 40 MG/1
40 TABLET, DELAYED RELEASE ORAL
Qty: 90 TABLET | Refills: 3 | Status: SHIPPED | OUTPATIENT
Start: 2017-12-26 | End: 2017-12-26

## 2017-12-26 NOTE — PROGRESS NOTES
HPI:    Patient ID: Nikhil Agrawal is a 58year old female. HPI  Ms. Reese Stevens is a pleasant 57 y/o F is 2 of diabetes mellitus, insulin-dependent with diabetic neuropathy, hypertension, dyslipidemia, depression here today to establish care with me.   Keyonna Morales Monitoring Suppl (TRUETRACK BLOOD GLUCOSE) w/Device Does not apply Kit Use as directed Disp: 1 kit Rfl: 0   BuPROPion HCl ER, XL, 150 MG Oral Tablet 24 Hr Take 1 tablet (150 mg total) by mouth once daily.  Disp: 90 tablet Rfl: 3   Fenofibrate 160 MG Oral Ta BID to area x one month, then daily to area x one month, then 2-3 times per week Disp: 60 g Rfl: 3   aspirin (ASPIR-LOW) 81 MG Oral Tab EC Take 81 mg by mouth daily.  Disp:  Rfl:    TRUETRACK TEST In Vitro Strip TEST BLOOD SUGAR THREE TIMES DAILY Disp: 300 monofilament/sensation of both feet is diminished  Pulsation pedal pulse exam of both lower legs/feet is normal as well.          ASSESSMENT/PLAN:   Type 2 diabetes mellitus with diabetic neuropathy, with long-term current use of insulin (hcc)  (primary enc BQ#6625

## 2017-12-26 NOTE — PATIENT INSTRUCTIONS
Thank you for choosing Avery Venegas MD at [place] [address]  To Do: [facility] AT [facility]  1. Please take meds as directed. Take Lantus 20 units at night. Take metformin 1500 mg in the morning and 1000 mg at night.     2. Please have labs done in 3 months treatment even beyond those discussed today.  All therapies have potential risk of harm or side effects or medication interactions.  It is your duty and for your safety to discuss with the pharmacist and our office with questions, and to notify us and stop

## 2018-01-12 ENCOUNTER — TELEPHONE (OUTPATIENT)
Dept: FAMILY MEDICINE CLINIC | Facility: CLINIC | Age: 63
End: 2018-01-12

## 2018-01-12 RX ORDER — DIAPER,BRIEF,ADULT, DISPOSABLE
EACH MISCELLANEOUS
Qty: 300 STRIP | Refills: 1 | Status: SHIPPED | OUTPATIENT
Start: 2018-01-12 | End: 2019-09-18

## 2018-01-12 NOTE — TELEPHONE ENCOUNTER
Requesting Truetrack Test  LOV: 12/26/17  RTC: 3 months  Last Relevant Labs: 10/16/17  Filled: 7/28/17 #300 with 1 refills    Future Appointments  Date Time Provider Margoth Silveira   3/6/2018 9:00 AM Neelima Lowe DO [address]   3/16/201

## 2018-01-12 NOTE — TELEPHONE ENCOUNTER
Received incoming fax from [place] requesting refill    Karin Kunz 1213 TEST In Vitro Strip 300 strip     TEST BLOOD SUGAR [facility], Sowmya Grays - [address] [place] [address] AT [facility], [phone], [phone]

## 2018-02-02 RX ORDER — INSULIN GLARGINE 100 [IU]/ML
INJECTION, SOLUTION SUBCUTANEOUS
Qty: 30 ML | Refills: 0 | OUTPATIENT
Start: 2018-02-02

## 2018-02-02 NOTE — TELEPHONE ENCOUNTER
Requesting Lantus   LOV: 12/26/17  RTC: 3 months   Last Relevant Labs: 10/16/17  Filled: 10/23/17 #8 pen with 3 refills    Future Appointments  Date Time Provider Margoth Silveira   3/6/2018 9:00 AM Sussy Paez DO [address]   3/16/2018 3

## 2018-02-05 ENCOUNTER — MED REC SCAN ONLY (OUTPATIENT)
Dept: FAMILY MEDICINE CLINIC | Facility: CLINIC | Age: 63
End: 2018-02-05

## 2018-02-12 ENCOUNTER — TELEPHONE (OUTPATIENT)
Dept: FAMILY MEDICINE CLINIC | Facility: CLINIC | Age: 63
End: 2018-02-12

## 2018-03-16 PROBLEM — I87.2 VENOUS INSUFFICIENCY OF BOTH LOWER EXTREMITIES: Status: ACTIVE | Noted: 2018-03-16

## 2018-03-16 PROBLEM — R60.0 BILATERAL LEG EDEMA: Status: ACTIVE | Noted: 2018-03-16

## 2018-03-19 ENCOUNTER — TELEPHONE (OUTPATIENT)
Dept: FAMILY MEDICINE CLINIC | Facility: CLINIC | Age: 63
End: 2018-03-19

## 2018-03-19 DIAGNOSIS — E11.40 TYPE 2 DIABETES MELLITUS WITH DIABETIC NEUROPATHY, WITH LONG-TERM CURRENT USE OF INSULIN (HCC): Primary | ICD-10-CM

## 2018-03-19 DIAGNOSIS — Z79.4 TYPE 2 DIABETES MELLITUS WITH DIABETIC NEUROPATHY, WITH LONG-TERM CURRENT USE OF INSULIN (HCC): Primary | ICD-10-CM

## 2018-03-19 NOTE — TELEPHONE ENCOUNTER
Labs from Dr Gaviria Im placed 12/2017,  re-ordered to [facility]. Attempted to reach pt to see if they would like to have lab changed to [facility],

## 2018-03-19 NOTE — TELEPHONE ENCOUNTER
Per pts daughter this is a one time exception and they will continue to use THE [facility] lab in the future

## 2018-03-20 ENCOUNTER — OFFICE VISIT (OUTPATIENT)
Dept: UROLOGY | Facility: HOSPITAL | Age: 63
End: 2018-03-20
Attending: OBSTETRICS & GYNECOLOGY
Payer: MEDICARE

## 2018-03-20 VITALS
SYSTOLIC BLOOD PRESSURE: 120 MMHG | WEIGHT: 164 LBS | BODY MASS INDEX: 27.32 KG/M2 | DIASTOLIC BLOOD PRESSURE: 72 MMHG | HEIGHT: 65 IN

## 2018-03-20 DIAGNOSIS — N95.2 POSTMENOPAUSAL ATROPHIC VAGINITIS: ICD-10-CM

## 2018-03-20 DIAGNOSIS — N81.84 PELVIC MUSCLE WASTING: Primary | ICD-10-CM

## 2018-03-20 DIAGNOSIS — Z98.890 POST-OPERATIVE STATE: ICD-10-CM

## 2018-03-20 PROCEDURE — 99211 OFF/OP EST MAY X REQ PHY/QHP: CPT

## 2018-03-20 RX ORDER — ESTRADIOL 0.1 MG/G
CREAM VAGINAL
Qty: 1 TUBE | Refills: 3 | Status: SHIPPED | OUTPATIENT
Start: 2018-03-20 | End: 2018-12-04

## 2018-03-20 NOTE — PROGRESS NOTES
She is s/p Post-Op Summary  Procedure Date: 11/17/17  Procedure Name: Anterior/Posterior/Enterocele Repair;Cystoscopy;Prolene Mesh Mid Urethral Sling (USVVS)  Post-Op Symptoms: Patient denies pain, JANICE, UUI, prolapse symptoms, nausea/vomitting, fevers/chil

## 2018-03-20 NOTE — PATIENT INSTRUCTIONS
[facility]    PELVIC MUSCLE EXERCISES [facility])    The muscles that surround the vagina help to support the pelvic organs and maintain bladder and bowel control are called the “pelvic floor muscles”.   Exercises for these musc effort in an active squeeze in order to strengthen the muscles. It is better to maintain a strong active squeeze for a short period of time that to flick the muscle on and off for any period of time.   You will need to work up to a full 10-second squeeze g

## 2018-03-23 ENCOUNTER — TELEPHONE (OUTPATIENT)
Dept: UROLOGY | Facility: HOSPITAL | Age: 63
End: 2018-03-23

## 2018-03-27 NOTE — TELEPHONE ENCOUNTER
Requesting: Jardiance 10mg  LOV: 12/26/17  RTC: 3 months  Last Relevant Labs: 10/16/17 - A1C: 7.1 --> labs ordered  Filled: 10/23/17 #30 with 6 refills    Future Appointments  Date Time Provider Margoth Silveira   3/29/2018 2:30 PM Chiqui Marques MD [id number]

## 2018-03-29 PROBLEM — I87.1 COMPRESSION OF VEIN: Status: ACTIVE | Noted: 2018-03-29

## 2018-03-29 PROBLEM — I83.893 VARICOSE VEINS OF LOWER EXTREMITIES WITH COMPLICATIONS, BILATERAL: Status: ACTIVE | Noted: 2018-03-29

## 2018-03-29 PROBLEM — I83.813 VARICOSE VEINS OF BOTH LOWER EXTREMITIES WITH PAIN: Status: ACTIVE | Noted: 2018-03-29

## 2018-03-29 PROBLEM — R60.0 LOCALIZED EDEMA: Status: ACTIVE | Noted: 2018-03-29

## 2018-04-28 PROBLEM — R25.2 MUSCLE CRAMPS: Status: ACTIVE | Noted: 2018-04-28

## 2018-04-28 PROBLEM — R11.2 N&V (NAUSEA AND VOMITING): Status: ACTIVE | Noted: 2018-04-28

## 2018-05-05 PROCEDURE — 82607 VITAMIN B-12: CPT | Performed by: INTERNAL MEDICINE

## 2018-05-05 PROCEDURE — 82043 UR ALBUMIN QUANTITATIVE: CPT | Performed by: INTERNAL MEDICINE

## 2018-05-05 PROCEDURE — 81001 URINALYSIS AUTO W/SCOPE: CPT | Performed by: INTERNAL MEDICINE

## 2018-05-05 PROCEDURE — 82570 ASSAY OF URINE CREATININE: CPT | Performed by: INTERNAL MEDICINE

## 2018-06-08 ENCOUNTER — HOSPITAL ENCOUNTER (OUTPATIENT)
Dept: NUCLEAR MEDICINE | Facility: HOSPITAL | Age: 63
Discharge: HOME | End: 2018-06-08
Attending: INTERNAL MEDICINE
Payer: MEDICARE

## 2018-06-08 DIAGNOSIS — K86.2 PANCREAS CYST: ICD-10-CM

## 2018-06-08 PROCEDURE — 78264 GASTRIC EMPTYING IMG STUDY: CPT | Performed by: INTERNAL MEDICINE

## 2018-06-12 ENCOUNTER — OFFICE VISIT (OUTPATIENT)
Dept: OBGYN CLINIC | Facility: CLINIC | Age: 63
End: 2018-06-12

## 2018-06-12 VITALS
BODY MASS INDEX: 27.31 KG/M2 | SYSTOLIC BLOOD PRESSURE: 102 MMHG | HEIGHT: 64.25 IN | WEIGHT: 160 LBS | DIASTOLIC BLOOD PRESSURE: 66 MMHG

## 2018-06-12 DIAGNOSIS — Z01.419 WELL WOMAN EXAM WITH ROUTINE GYNECOLOGICAL EXAM: Primary | ICD-10-CM

## 2018-06-12 PROBLEM — N95.0 PMB (POSTMENOPAUSAL BLEEDING): Status: RESOLVED | Noted: 2017-08-26 | Resolved: 2018-06-12

## 2018-06-12 PROBLEM — N81.10 PROLAPSE OF ANTERIOR VAGINAL WALL: Status: RESOLVED | Noted: 2017-08-26 | Resolved: 2018-06-12

## 2018-06-12 PROBLEM — N81.4 UTERINE PROLAPSE: Status: RESOLVED | Noted: 2017-08-26 | Resolved: 2018-06-12

## 2018-06-12 PROBLEM — N81.2 UTEROVAGINAL PROLAPSE, INCOMPLETE: Status: RESOLVED | Noted: 2017-11-16 | Resolved: 2018-06-12

## 2018-06-12 PROBLEM — N39.3 SUI (STRESS URINARY INCONTINENCE, FEMALE): Status: RESOLVED | Noted: 2017-08-26 | Resolved: 2018-06-12

## 2018-06-12 PROBLEM — R93.89 INCREASED ENDOMETRIAL STRIPE THICKNESS: Status: RESOLVED | Noted: 2017-08-26 | Resolved: 2018-06-12

## 2018-06-12 PROCEDURE — G0101 CA SCREEN;PELVIC/BREAST EXAM: HCPCS | Performed by: OBSTETRICS & GYNECOLOGY

## 2018-06-12 NOTE — PROGRESS NOTES
[facility]  Obstetrics and Gynecology  History & Physical    CC: Patient is here for annual well woman exam     Subjective:     HPI: Filipe Diaz is a 61year old  female here for annual well women exam.  Patient reports RLQ abdominal p Progress Note - OB/GYN   Lucila Almodovar 28 y o  female MRN: [id number]  Unit/Bed#: -01 Encounter: [id number]    Assessment:  Post partum Day #1 s/p , stable, baby in room    Plan:  1) Continue routine post partum care   Encourage ambulation   Encourage breastfeeding   Anticipate discharge tomorrow     Subjective/Objective   Chief Complaint:     Post delivery  Patient is doing well  Lochia WNL  Pain well controlled  Subjective:     Pain: yes, cramping, improved with meds  Tolerating PO: yes  Voiding: yes  Flatus: yes  BM: no  Ambulating: yes  Breastfeeding:  yes  Chest pain: no  Shortness of breath: no  Leg pain: no  Lochia: minimal    Objective:     Vitals: /53   Pulse 74   Temp 98 1 °F (36 7 °C) (Oral)   Resp 18   Ht 5' 3" (1 6 m)   Wt 76 2 kg (168 lb)   LMP 2017   SpO2 97%   Breastfeeding? Yes   BMI 29 76 kg/m²       Intake/Output Summary (Last 24 hours) at 18 0615  Last data filed at 18 0900   Gross per 24 hour   Intake                0 ml   Output             1100 ml   Net            -1100 ml       Lab Results   Component Value Date    WBC 9 03 01/15/2018    HGB 10 5 (L) 01/15/2018    HCT 31 5 (L) 01/15/2018    MCV 86 01/15/2018     01/15/2018       Physical Exam:     Gen: AAOx3, NAD  CV: RRR  Lungs: CTA b/l  Abd: Soft, non-tender, non-distended, no rebound or guarding  Uterine fundus firm and non-tender, at the umbilicus     Ext: Non tender    Abby Kern MD  2018  6:15 AM Disp: 1 kit Rfl: 0   BuPROPion HCl ER, XL, 150 MG Oral Tablet 24 Hr Take 1 tablet (150 mg total) by mouth once daily. Disp: 90 tablet Rfl: 3   Fenofibrate 160 MG Oral Tab Take 1 tablet (160 mg total) by mouth daily.  Disp: 90 tablet Rfl: 3   Pantoprazole So complication     slow to arouse   • Anxiety    • Arthritis    • Atherosclerosis of coronary artery    • Atherosclerotic heart disease of native coronary artery without angina pectoris    • Back pain    • Bad breath    • Bilateral leg edema 3/16/2018   • Bl TOTAL HIP REPLACEMENT      Comment: rt hip  11/16/2017: VAGINAL HYSTERECTOMY N/A      Comment: Procedure: VAGINAL HYSTERECTOMY;  Surgeon:                Jessica Alicea MD;  Location: [address] [place] [address] MAIN OR    Social History:    Social History  Social History   Josesito Covarrubias c/w atrophy  CERVIX: absent  UTERUS: absent  ADNEXA: normal adnexa in size, nontender and no masses  Ext: non-tender, no edema    Assessment:     Helen Choe is a 61year old  female here for annual well women exam.     Patient Active Problem [employer] Cervical cancer screening  - discussion held with the patient about ASCCP guidelines  - no repeat pap smear indicated   Health maintenance  - encouraged to maintain weight at healthy BMI  - discussed importance of exercise and healthy eating  - PMB pre

## 2018-06-20 RX ORDER — PANTOPRAZOLE SODIUM 40 MG/1
TABLET, DELAYED RELEASE ORAL
Qty: 90 TABLET | Refills: 0 | OUTPATIENT
Start: 2018-06-20

## 2018-06-20 NOTE — TELEPHONE ENCOUNTER
Requesting Pantoprazole    Date Time Provider Margoth Silveira   8/29/2018 4:15 PM Donald Villanueva MD [id number] CARD [facility]   9/5/2018 12:00 PM John Napier MD OB [facility]   6/13/2019 6:00 PM Ty Rain MD [facility] OB[facility]GYN P [facility] [address]       No

## 2018-06-25 ENCOUNTER — TELEPHONE (OUTPATIENT)
Dept: UROLOGY | Facility: HOSPITAL | Age: 63
End: 2018-06-25

## 2018-06-25 RX ORDER — CLOBETASOL PROPIONATE 0.5 MG/G
CREAM TOPICAL
Qty: 1 TUBE | Refills: 3 | Status: SHIPPED | OUTPATIENT
Start: 2018-06-25 | End: 2018-10-27

## 2018-06-27 RX ORDER — PANTOPRAZOLE SODIUM 40 MG/1
TABLET, DELAYED RELEASE ORAL
Qty: 90 TABLET | Refills: 0 | OUTPATIENT
Start: 2018-06-27

## 2018-08-20 RX ORDER — DIAPER,BRIEF,ADULT, DISPOSABLE
EACH MISCELLANEOUS
Qty: 300 STRIP | Refills: 0 | OUTPATIENT
Start: 2018-08-20

## 2018-10-25 ENCOUNTER — PATIENT OUTREACH (OUTPATIENT)
Dept: CASE MANAGEMENT | Age: 63
End: 2018-10-25

## 2018-10-25 NOTE — PROGRESS NOTES
Outreached to patient in regards to CCM [place] enrollement.  [facility] [phone]

## 2018-10-27 ENCOUNTER — OFFICE VISIT (OUTPATIENT)
Dept: INTERNAL MEDICINE CLINIC | Facility: CLINIC | Age: 63
End: 2018-10-27
Payer: MEDICARE

## 2018-10-27 VITALS
DIASTOLIC BLOOD PRESSURE: 66 MMHG | HEART RATE: 66 BPM | RESPIRATION RATE: 16 BRPM | BODY MASS INDEX: 26.29 KG/M2 | TEMPERATURE: 98 F | WEIGHT: 154 LBS | SYSTOLIC BLOOD PRESSURE: 120 MMHG | HEIGHT: 64 IN

## 2018-10-27 DIAGNOSIS — E11.40 TYPE 2 DIABETES MELLITUS WITH DIABETIC NEUROPATHY, WITH LONG-TERM CURRENT USE OF INSULIN (HCC): Primary | ICD-10-CM

## 2018-10-27 DIAGNOSIS — Z79.4 TYPE 2 DIABETES MELLITUS WITH DIABETIC NEUROPATHY, WITH LONG-TERM CURRENT USE OF INSULIN (HCC): Primary | ICD-10-CM

## 2018-10-27 DIAGNOSIS — Z23 NEEDS FLU SHOT: ICD-10-CM

## 2018-10-27 PROCEDURE — 90686 IIV4 VACC NO PRSV 0.5 ML IM: CPT | Performed by: INTERNAL MEDICINE

## 2018-10-27 PROCEDURE — 99213 OFFICE O/P EST LOW 20 MIN: CPT | Performed by: INTERNAL MEDICINE

## 2018-10-27 PROCEDURE — G0008 ADMIN INFLUENZA VIRUS VAC: HCPCS | Performed by: INTERNAL MEDICINE

## 2018-10-27 NOTE — PROGRESS NOTES
Channing Lin  2/9/1955    No chief complaint on file. SUBJECTIVE   Channing Lin is a 61year old female who presents to establish care for diabetes.     The patient has a long-standing history of diabetes mellitus type 2, for which she is complia f/c/chest pain or sob. No cough. No abd pain/n/v/d. No ha or dizziness. No numbness, tingling, or weakness. No other complaints today.       Current Outpatient Medications:  MetFORMIN HCl  MG Oral Tablet 24 Hr Take 1 tablet (750 mg total) by mouth 2 ( daily. Disp: 90 capsule Rfl: 3   Metoprolol Succinate ER 25 MG Oral Tablet 24 Hr Take 1 tablet (25 mg total) by mouth once daily.  Disp: 90 tablet Rfl: 3   Clobetasol Propionate 0.05 % External Cream BID to area x one month, then daily to area x one month, Uncomfortable fullness after meals    • Unspecified essential hypertension    • Venous insufficiency of both lower extremities 3/16/2018   • Visual impairment     glasses   • Vomiting    • Wears glasses    • Weight loss       Patient Active Problem List: Procedure Laterality Date   • ANGIOPLASTY (CORONARY)      coronary stent placed by Dr. Patrick Brothers   • [address] N/A 2017    Performed by Tejal Boo DO at [facility] MAIN OR   •   [id number]   • CATARACT Bilateral    • ENDOVENOU soft.  Musculoskeletal: No edema  Neurological: Bilateral lower extremity sensation intact to sharp versus dull. Skin: No rashes. No lacerations or ulcerations of the bilateral feet. Psychiatric: Normal mood and affect.      ASSESSMENT AND PLAN:   Lorenzo Gee

## 2018-11-17 ENCOUNTER — LAB ENCOUNTER (OUTPATIENT)
Dept: LAB | Age: 63
End: 2018-11-17
Attending: INTERNAL MEDICINE
Payer: MEDICARE

## 2018-11-17 DIAGNOSIS — Z91.89 AT HIGH RISK FOR PNEUMONIA: ICD-10-CM

## 2018-11-17 DIAGNOSIS — E11.40 TYPE 2 DIABETES MELLITUS WITH DIABETIC NEUROPATHY, WITH LONG-TERM CURRENT USE OF INSULIN (HCC): ICD-10-CM

## 2018-11-17 DIAGNOSIS — Z23 NEED FOR 23-POLYVALENT PNEUMOCOCCAL POLYSACCHARIDE VACCINE: ICD-10-CM

## 2018-11-17 DIAGNOSIS — E78.2 MIXED HYPERLIPIDEMIA: ICD-10-CM

## 2018-11-17 DIAGNOSIS — E53.8 LOW VITAMIN B12 LEVEL: ICD-10-CM

## 2018-11-17 DIAGNOSIS — Z79.4 TYPE 2 DIABETES MELLITUS WITH DIABETIC NEUROPATHY, WITH LONG-TERM CURRENT USE OF INSULIN (HCC): ICD-10-CM

## 2018-11-17 PROCEDURE — 82570 ASSAY OF URINE CREATININE: CPT

## 2018-11-17 PROCEDURE — 36415 COLL VENOUS BLD VENIPUNCTURE: CPT

## 2018-11-17 PROCEDURE — 80061 LIPID PANEL: CPT

## 2018-11-17 PROCEDURE — 83036 HEMOGLOBIN GLYCOSYLATED A1C: CPT

## 2018-11-17 PROCEDURE — 82043 UR ALBUMIN QUANTITATIVE: CPT

## 2018-11-17 PROCEDURE — 80053 COMPREHEN METABOLIC PANEL: CPT

## 2018-11-29 ENCOUNTER — OFFICE VISIT (OUTPATIENT)
Dept: INTERNAL MEDICINE CLINIC | Facility: CLINIC | Age: 63
End: 2018-11-29
Payer: MEDICARE

## 2018-11-29 VITALS
TEMPERATURE: 98 F | BODY MASS INDEX: 26.46 KG/M2 | DIASTOLIC BLOOD PRESSURE: 60 MMHG | SYSTOLIC BLOOD PRESSURE: 100 MMHG | WEIGHT: 155 LBS | HEART RATE: 68 BPM | HEIGHT: 64 IN

## 2018-11-29 DIAGNOSIS — E78.5 DYSLIPIDEMIA: ICD-10-CM

## 2018-11-29 DIAGNOSIS — E11.40 TYPE 2 DIABETES MELLITUS WITH DIABETIC NEUROPATHY, WITH LONG-TERM CURRENT USE OF INSULIN (HCC): Primary | ICD-10-CM

## 2018-11-29 DIAGNOSIS — Z79.4 TYPE 2 DIABETES MELLITUS WITH DIABETIC NEUROPATHY, WITH LONG-TERM CURRENT USE OF INSULIN (HCC): Primary | ICD-10-CM

## 2018-11-29 PROCEDURE — 99213 OFFICE O/P EST LOW 20 MIN: CPT | Performed by: INTERNAL MEDICINE

## 2018-11-29 RX ORDER — FENOFIBRATE 160 MG/1
160 TABLET ORAL DAILY
Qty: 90 TABLET | Refills: 3 | Status: SHIPPED | OUTPATIENT
Start: 2018-11-29 | End: 2018-11-29

## 2018-11-29 RX ORDER — RAMIPRIL 2.5 MG/1
2.5 CAPSULE ORAL
Qty: 90 CAPSULE | Refills: 3 | Status: SHIPPED | OUTPATIENT
Start: 2018-11-29 | End: 2019-12-09

## 2018-11-29 RX ORDER — BUPROPION HYDROCHLORIDE 150 MG/1
150 TABLET ORAL
Qty: 90 TABLET | Refills: 3 | Status: SHIPPED | OUTPATIENT
Start: 2018-11-29 | End: 2019-09-18

## 2018-11-29 RX ORDER — ATORVASTATIN CALCIUM 20 MG/1
20 TABLET, FILM COATED ORAL NIGHTLY
Qty: 90 TABLET | Refills: 3 | Status: SHIPPED | OUTPATIENT
Start: 2018-11-29 | End: 2019-07-20

## 2018-11-29 RX ORDER — DIAPER,BRIEF,ADULT, DISPOSABLE
EACH MISCELLANEOUS
Qty: 300 STRIP | Refills: 6 | Status: SHIPPED | OUTPATIENT
Start: 2018-11-29 | End: 2020-03-30

## 2018-11-29 NOTE — PROGRESS NOTES
Bibiana Jeffrey  2/9/1955    Patient presents with:  Test Results: LG. Room 11. Review labs        SUBJECTIVE   Bibiana Jeffrey is a 61year old female who presents as a laboratory follow-up. No evidence of significant proteinuria.  Fasting lipid panel w 3   Blood Glucose Monitoring Suppl (TRUETRACK BLOOD GLUCOSE) w/Device Does not apply Kit Use as directed Disp: 1 kit Rfl: 0   BuPROPion HCl ER, XL, 150 MG Oral Tablet 24 Hr Take 1 tablet (150 mg total) by mouth once daily.  Disp: 90 tablet Rfl: 3   Fenofibr of skin    • Leg swelling    • Loss of appetite    • Mixed hyperlipidemia    • Nausea    • Neuropathy     maria eugenia feet   • Night sweats    • Osteoarthrosis, unspecified whether generalized or localized, unspecified site    • Osteoporosis    • Other and unspeci little finger, initial encounter     Closed displaced fracture of proximal phalanx of right little finger with routine healing, subsequent encounter     Post-operative state     Bilateral leg edema     Venous insufficiency of both lower extremities     Rachid Neck supple. Normal carotid pulses. No masses. Cardiovascular: Normal rate, regular rhythm and intact distal pulses. No murmur, rubs or gallops. Pulmonary/Chest: Effort normal and breath sounds normal. No respiratory distress.   Abdominal: Mild left low Follow-up on file. There are no Patient Instructions on file for this visit. All questions were answered and the patient agrees with the plan.      Thank you,  Dodie Frances MD

## 2019-02-11 ENCOUNTER — TELEPHONE (OUTPATIENT)
Dept: INTERNAL MEDICINE CLINIC | Facility: CLINIC | Age: 64
End: 2019-02-11

## 2019-02-11 NOTE — TELEPHONE ENCOUNTER
Dm eye exam received dated 2/9/19 completed by Lenny Berman MD at [facility].  Mild Dm retinopathy stable noted in report abstracted and placed in AD's bin to be reviewed ph: [phone] fax:[phone]

## 2019-02-21 ENCOUNTER — TELEPHONE (OUTPATIENT)
Dept: INTERNAL MEDICINE CLINIC | Facility: CLINIC | Age: 64
End: 2019-02-21

## 2019-02-21 DIAGNOSIS — E78.5 DYSLIPIDEMIA: ICD-10-CM

## 2019-02-21 DIAGNOSIS — Z13.29 THYROID DISORDER SCREENING: ICD-10-CM

## 2019-02-21 DIAGNOSIS — Z13.0 SCREENING FOR BLOOD DISEASE: ICD-10-CM

## 2019-02-21 DIAGNOSIS — Z13.228 SCREENING FOR METABOLIC DISORDER: ICD-10-CM

## 2019-02-21 DIAGNOSIS — E11.65 UNCONTROLLED TYPE 2 DIABETES MELLITUS WITH HYPERGLYCEMIA (HCC): ICD-10-CM

## 2019-02-21 DIAGNOSIS — Z00.00 LABORATORY EXAMINATION ORDERED AS PART OF A COMPLETE PHYSICAL EXAMINATION: Primary | ICD-10-CM

## 2019-02-21 NOTE — TELEPHONE ENCOUNTER
ADd-patient completed labs CBC-12/7/2018, Lipid, A1c, CMP on 11/17/2018, would you like patient to complete TSH? Please advise.

## 2019-02-21 NOTE — TELEPHONE ENCOUNTER
Patient went to [facility] to get labs done this morning but no orders were placed. Please place orders. Patient will be going back tomorrow morning.   AD

## 2019-02-22 ENCOUNTER — LAB ENCOUNTER (OUTPATIENT)
Dept: LAB | Age: 64
End: 2019-02-22
Attending: INTERNAL MEDICINE
Payer: MEDICARE

## 2019-02-22 DIAGNOSIS — Z13.0 SCREENING FOR BLOOD DISEASE: ICD-10-CM

## 2019-02-22 DIAGNOSIS — Z00.00 LABORATORY EXAMINATION ORDERED AS PART OF A COMPLETE PHYSICAL EXAMINATION: ICD-10-CM

## 2019-02-22 DIAGNOSIS — E11.65 UNCONTROLLED TYPE 2 DIABETES MELLITUS WITH HYPERGLYCEMIA (HCC): ICD-10-CM

## 2019-02-22 DIAGNOSIS — Z13.29 THYROID DISORDER SCREENING: ICD-10-CM

## 2019-02-22 DIAGNOSIS — Z13.228 SCREENING FOR METABOLIC DISORDER: ICD-10-CM

## 2019-02-22 DIAGNOSIS — E78.5 DYSLIPIDEMIA: ICD-10-CM

## 2019-02-22 LAB
ALBUMIN SERPL-MCNC: 3.6 G/DL (ref 3.4–5)
ALBUMIN/GLOB SERPL: 1.2 {RATIO} (ref 1–2)
ALP LIVER SERPL-CCNC: 106 U/L (ref 50–130)
ALT SERPL-CCNC: 18 U/L (ref 13–56)
ANION GAP SERPL CALC-SCNC: 8 MMOL/L (ref 0–18)
AST SERPL-CCNC: 17 U/L (ref 15–37)
BASOPHILS # BLD AUTO: 0.03 X10(3) UL (ref 0–0.2)
BASOPHILS NFR BLD AUTO: 0.6 %
BILIRUB SERPL-MCNC: 0.4 MG/DL (ref 0.1–2)
BUN BLD-MCNC: 18 MG/DL (ref 7–18)
BUN/CREAT SERPL: 22 (ref 10–20)
CALCIUM BLD-MCNC: 9.1 MG/DL (ref 8.5–10.1)
CHLORIDE SERPL-SCNC: 105 MMOL/L (ref 98–107)
CHOLEST SMN-MCNC: 128 MG/DL (ref ?–200)
CO2 SERPL-SCNC: 26 MMOL/L (ref 21–32)
CREAT BLD-MCNC: 0.82 MG/DL (ref 0.55–1.02)
CREAT UR-SCNC: 42.6 MG/DL
DEPRECATED RDW RBC AUTO: 44.9 FL (ref 35.1–46.3)
EOSINOPHIL # BLD AUTO: 0.12 X10(3) UL (ref 0–0.7)
EOSINOPHIL NFR BLD AUTO: 2.2 %
ERYTHROCYTE [DISTWIDTH] IN BLOOD BY AUTOMATED COUNT: 14.1 % (ref 11–15)
EST. AVERAGE GLUCOSE BLD GHB EST-MCNC: 169 MG/DL (ref 68–126)
GLOBULIN PLAS-MCNC: 3 G/DL (ref 2.8–4.4)
GLUCOSE BLD-MCNC: 93 MG/DL (ref 70–99)
HBA1C MFR BLD HPLC: 7.5 % (ref ?–5.7)
HCT VFR BLD AUTO: 38.6 % (ref 35–48)
HDLC SERPL-MCNC: 42 MG/DL (ref 40–59)
HGB BLD-MCNC: 11.9 G/DL (ref 12–16)
IMM GRANULOCYTES # BLD AUTO: 0.01 X10(3) UL (ref 0–1)
IMM GRANULOCYTES NFR BLD: 0.2 %
LDLC SERPL CALC-MCNC: 60 MG/DL (ref ?–100)
LYMPHOCYTES # BLD AUTO: 1.3 X10(3) UL (ref 1–4)
LYMPHOCYTES NFR BLD AUTO: 24.2 %
M PROTEIN MFR SERPL ELPH: 6.6 G/DL (ref 6.4–8.2)
MCH RBC QN AUTO: 26.7 PG (ref 26–34)
MCHC RBC AUTO-ENTMCNC: 30.8 G/DL (ref 31–37)
MCV RBC AUTO: 86.7 FL (ref 80–100)
MICROALBUMIN UR-MCNC: <0.5 MG/DL
MONOCYTES # BLD AUTO: 0.49 X10(3) UL (ref 0.1–1)
MONOCYTES NFR BLD AUTO: 9.1 %
NEUTROPHILS # BLD AUTO: 3.42 X10 (3) UL (ref 1.5–7.7)
NEUTROPHILS # BLD AUTO: 3.42 X10(3) UL (ref 1.5–7.7)
NEUTROPHILS NFR BLD AUTO: 63.7 %
NONHDLC SERPL-MCNC: 86 MG/DL (ref ?–130)
OSMOLALITY SERPL CALC.SUM OF ELEC: 290 MOSM/KG (ref 275–295)
PLATELET # BLD AUTO: 183 10(3)UL (ref 150–450)
POTASSIUM SERPL-SCNC: 4 MMOL/L (ref 3.5–5.1)
RBC # BLD AUTO: 4.45 X10(6)UL (ref 3.8–5.3)
SODIUM SERPL-SCNC: 139 MMOL/L (ref 136–145)
TRIGL SERPL-MCNC: 129 MG/DL (ref 30–149)
TSI SER-ACNC: 2.18 MIU/ML (ref 0.36–3.74)
VLDLC SERPL CALC-MCNC: 26 MG/DL (ref 0–30)
WBC # BLD AUTO: 5.4 X10(3) UL (ref 4–11)

## 2019-02-22 PROCEDURE — 83036 HEMOGLOBIN GLYCOSYLATED A1C: CPT

## 2019-02-22 PROCEDURE — 82043 UR ALBUMIN QUANTITATIVE: CPT

## 2019-02-22 PROCEDURE — 84443 ASSAY THYROID STIM HORMONE: CPT

## 2019-02-22 PROCEDURE — 80053 COMPREHEN METABOLIC PANEL: CPT

## 2019-02-22 PROCEDURE — 80061 LIPID PANEL: CPT

## 2019-02-22 PROCEDURE — 85025 COMPLETE CBC W/AUTO DIFF WBC: CPT

## 2019-02-22 PROCEDURE — 36415 COLL VENOUS BLD VENIPUNCTURE: CPT

## 2019-02-22 PROCEDURE — 82570 ASSAY OF URINE CREATININE: CPT

## 2019-02-25 DIAGNOSIS — D64.9 ANEMIA, UNSPECIFIED TYPE: Primary | ICD-10-CM

## 2019-02-25 DIAGNOSIS — Z13.29 THYROID DISORDER SCREENING: ICD-10-CM

## 2019-02-26 DIAGNOSIS — F32.A DEPRESSION, UNSPECIFIED DEPRESSION TYPE: ICD-10-CM

## 2019-02-26 DIAGNOSIS — D50.9 IRON DEFICIENCY ANEMIA, UNSPECIFIED IRON DEFICIENCY ANEMIA TYPE: Primary | ICD-10-CM

## 2019-02-26 DIAGNOSIS — K59.04 CHRONIC IDIOPATHIC CONSTIPATION: ICD-10-CM

## 2019-02-26 DIAGNOSIS — G47.00 INSOMNIA, UNSPECIFIED TYPE: ICD-10-CM

## 2019-03-14 ENCOUNTER — LAB ENCOUNTER (OUTPATIENT)
Dept: LAB | Age: 64
End: 2019-03-14
Attending: INTERNAL MEDICINE
Payer: MEDICARE

## 2019-03-14 DIAGNOSIS — D64.9 ANEMIA, UNSPECIFIED TYPE: ICD-10-CM

## 2019-03-14 DIAGNOSIS — G47.00 INSOMNIA, UNSPECIFIED TYPE: ICD-10-CM

## 2019-03-14 DIAGNOSIS — Z13.29 THYROID DISORDER SCREENING: ICD-10-CM

## 2019-03-14 DIAGNOSIS — D50.9 IRON DEFICIENCY ANEMIA, UNSPECIFIED IRON DEFICIENCY ANEMIA TYPE: ICD-10-CM

## 2019-03-14 DIAGNOSIS — F32.A DEPRESSION, UNSPECIFIED DEPRESSION TYPE: ICD-10-CM

## 2019-03-14 DIAGNOSIS — K59.04 CHRONIC IDIOPATHIC CONSTIPATION: ICD-10-CM

## 2019-03-14 LAB
BASOPHILS # BLD AUTO: 0.04 X10(3) UL (ref 0–0.2)
BASOPHILS NFR BLD AUTO: 0.6 %
DEPRECATED RDW RBC AUTO: 42.9 FL (ref 35.1–46.3)
EOSINOPHIL # BLD AUTO: 0.15 X10(3) UL (ref 0–0.7)
EOSINOPHIL NFR BLD AUTO: 2.1 %
ERYTHROCYTE [DISTWIDTH] IN BLOOD BY AUTOMATED COUNT: 14.1 % (ref 11–15)
HCT VFR BLD AUTO: 39.7 % (ref 35–48)
HGB BLD-MCNC: 12.7 G/DL (ref 12–16)
IMM GRANULOCYTES # BLD AUTO: 0.03 X10(3) UL (ref 0–1)
IMM GRANULOCYTES NFR BLD: 0.4 %
LYMPHOCYTES # BLD AUTO: 1.67 X10(3) UL (ref 1–4)
LYMPHOCYTES NFR BLD AUTO: 23.7 %
MCH RBC QN AUTO: 26.8 PG (ref 26–34)
MCHC RBC AUTO-ENTMCNC: 32 G/DL (ref 31–37)
MCV RBC AUTO: 83.8 FL (ref 80–100)
MONOCYTES # BLD AUTO: 0.64 X10(3) UL (ref 0.1–1)
MONOCYTES NFR BLD AUTO: 9.1 %
NEUTROPHILS # BLD AUTO: 4.52 X10 (3) UL (ref 1.5–7.7)
NEUTROPHILS # BLD AUTO: 4.52 X10(3) UL (ref 1.5–7.7)
NEUTROPHILS NFR BLD AUTO: 64.1 %
PLATELET # BLD AUTO: 186 10(3)UL (ref 150–450)
RBC # BLD AUTO: 4.74 X10(6)UL (ref 3.8–5.3)
T4 FREE SERPL-MCNC: 1.1 NG/DL (ref 0.8–1.7)
TSI SER-ACNC: 1.57 MIU/ML (ref 0.36–3.74)
WBC # BLD AUTO: 7.1 X10(3) UL (ref 4–11)

## 2019-03-14 PROCEDURE — 36415 COLL VENOUS BLD VENIPUNCTURE: CPT

## 2019-03-14 PROCEDURE — 85025 COMPLETE CBC W/AUTO DIFF WBC: CPT

## 2019-03-14 PROCEDURE — 84439 ASSAY OF FREE THYROXINE: CPT

## 2019-03-14 PROCEDURE — 84443 ASSAY THYROID STIM HORMONE: CPT

## 2019-03-23 ENCOUNTER — OFFICE VISIT (OUTPATIENT)
Dept: INTERNAL MEDICINE CLINIC | Facility: CLINIC | Age: 64
End: 2019-03-23
Payer: MEDICARE

## 2019-03-23 VITALS
SYSTOLIC BLOOD PRESSURE: 114 MMHG | DIASTOLIC BLOOD PRESSURE: 62 MMHG | HEART RATE: 64 BPM | HEIGHT: 64 IN | WEIGHT: 154.81 LBS | RESPIRATION RATE: 16 BRPM | BODY MASS INDEX: 26.43 KG/M2

## 2019-03-23 DIAGNOSIS — I25.10 CORONARY ARTERY DISEASE INVOLVING NATIVE CORONARY ARTERY OF NATIVE HEART WITHOUT ANGINA PECTORIS: ICD-10-CM

## 2019-03-23 DIAGNOSIS — I10 BENIGN ESSENTIAL HYPERTENSION: ICD-10-CM

## 2019-03-23 DIAGNOSIS — E78.5 DYSLIPIDEMIA: ICD-10-CM

## 2019-03-23 DIAGNOSIS — I44.7 LBBB (LEFT BUNDLE BRANCH BLOCK): ICD-10-CM

## 2019-03-23 DIAGNOSIS — E11.49 DIABETES MELLITUS TYPE 2 WITH NEUROLOGICAL MANIFESTATIONS (HCC): Primary | ICD-10-CM

## 2019-03-23 PROCEDURE — 99214 OFFICE O/P EST MOD 30 MIN: CPT | Performed by: INTERNAL MEDICINE

## 2019-03-23 RX ORDER — CYCLOBENZAPRINE HCL 10 MG
10 TABLET ORAL 2 TIMES DAILY PRN
Qty: 28 TABLET | Refills: 0 | Status: SHIPPED | OUTPATIENT
Start: 2019-03-23 | End: 2019-04-06

## 2019-03-23 NOTE — PROGRESS NOTES
Jonah Gastelum  2/9/1955    Patient presents with: Follow - Up: cn room 8: patient is here for a 3 month follow up       Ronaltagraciadonny Suzette3 is a 59year old female who presents as a follow-up.     The patient notes a 4-week history of muscle cra HCl ER, XL, 150 MG Oral Tablet 24 Hr Take 1 tablet (150 mg total) by mouth once daily. Disp: 90 tablet Rfl: 3   ramipril 2.5 MG Oral Cap Take 1 capsule (2.5 mg total) by mouth once daily.  Disp: 90 capsule Rfl: 3   Glucose Blood (TRUETRACK TEST) In [facility] breath    • Bilateral leg edema 3/16/2018   • Bloating    • Change in hair    • Constipation    • Diabetes mellitus (HCC)    • Diarrhea, unspecified    • Easy bruising    • Essential (primary) hypertension    • Fatigue    • Flatulence/gas pain/belching cholangitis or cholecystitis     Type 2 diabetes mellitus with diabetic neuropathy (HCC)     Left ankle tendonitis     Pancreatic cyst     Coronary artery disease involving native coronary artery of native heart without angina pectoris     S/P coronary art Used    Alcohol use: No    Drug use: No        OBJECTIVE:   /62 (BP Location: Left arm, Patient Position: Sitting, Cuff Size: adult)   Pulse 64   Resp 16   Ht 64\"   Wt 154 lb 12.8 oz   LMP 09/01/2000   BMI 26.57 kg/m²   Constitutional: Oriented to p 25 units in the morning, and p.o.  Tradjenta 5 mg daily.    p.o. ramipril 2.5 mg daily, p.o. aspirin 81 mg daily, and p.o. atorvastatin 20 mg nightly.       Influenza immunization: 10/2018  PPSV23: 9/2018     Monofilament examination today within normal sanchez

## 2019-04-05 RX ORDER — BLOOD SUGAR DIAGNOSTIC
1 STRIP MISCELLANEOUS 3 TIMES DAILY
Qty: 300 STRIP | Refills: 3 | Status: SHIPPED | OUTPATIENT
Start: 2019-04-05 | End: 2019-07-20 | Stop reason: ALTCHOICE

## 2019-04-15 ENCOUNTER — TELEPHONE (OUTPATIENT)
Dept: INTERNAL MEDICINE CLINIC | Facility: CLINIC | Age: 64
End: 2019-04-15

## 2019-04-17 NOTE — TELEPHONE ENCOUNTER
DM order form was signed by AD and faxed to [facility] pharmacy, confirmation was received.

## 2019-04-24 ENCOUNTER — TELEPHONE (OUTPATIENT)
Dept: INTERNAL MEDICINE CLINIC | Facility: CLINIC | Age: 64
End: 2019-04-24

## 2019-04-24 DIAGNOSIS — I10 BENIGN ESSENTIAL HYPERTENSION: ICD-10-CM

## 2019-04-24 DIAGNOSIS — Z00.00 ROUTINE GENERAL MEDICAL EXAMINATION AT A HEALTH CARE FACILITY: ICD-10-CM

## 2019-04-24 DIAGNOSIS — I25.10 CORONARY ARTERY DISEASE INVOLVING NATIVE CORONARY ARTERY OF NATIVE HEART WITHOUT ANGINA PECTORIS: ICD-10-CM

## 2019-04-24 DIAGNOSIS — E11.49 DIABETES MELLITUS TYPE 2 WITH NEUROLOGICAL MANIFESTATIONS (HCC): ICD-10-CM

## 2019-04-24 DIAGNOSIS — E78.5 DYSLIPIDEMIA: Primary | ICD-10-CM

## 2019-06-17 ENCOUNTER — OFFICE VISIT (OUTPATIENT)
Dept: OBGYN CLINIC | Facility: CLINIC | Age: 64
End: 2019-06-17
Payer: MEDICARE

## 2019-06-17 VITALS
BODY MASS INDEX: 27.72 KG/M2 | SYSTOLIC BLOOD PRESSURE: 108 MMHG | WEIGHT: 162.38 LBS | HEART RATE: 65 BPM | DIASTOLIC BLOOD PRESSURE: 60 MMHG | HEIGHT: 64.25 IN

## 2019-06-17 DIAGNOSIS — Z01.419 WELL WOMAN EXAM WITH ROUTINE GYNECOLOGICAL EXAM: Primary | ICD-10-CM

## 2019-06-17 DIAGNOSIS — Z12.39 BREAST CANCER SCREENING: ICD-10-CM

## 2019-06-17 PROCEDURE — G0101 CA SCREEN;PELVIC/BREAST EXAM: HCPCS | Performed by: OBSTETRICS & GYNECOLOGY

## 2019-06-17 NOTE — PROGRESS NOTES
[facility]  Obstetrics and Gynecology  History & Physical    CC: Patient is here for annual well woman exam     Subjective:     HPI: Junie Rogers is a 59year old  female here for annual well women exam.  Patient reports left buttock cy total) by mouth 2 (two) times daily. Disp: 180 tablet Rfl: 3   Insulin Syringe 31G X 5/16\" 1 ML Does not apply Misc USE ONCE DAILY Disp: 100 each Rfl: 3   Montelukast Sodium 10 MG Oral Tab Take 1 tablet (10 mg total) by mouth once daily.  (Patient taking d disturbance    • Stented coronary artery    • Type II or unspecified type diabetes mellitus without mention of complication, not stated as uncontrolled    • Uncomfortable fullness after meals    • Unspecified essential hypertension    • Venous insufficienc strain: Not on file      Food insecurity:        Worry: Not on file        Inability: Not on file      Transportation needs:        Medical: Not on file        Non-medical: Not on file    Tobacco Use      Smoking status: Never Smoker      Smokeless tobacco 05/2018  Colonoscopy (age 48 and q10yr): 4 years ago     Review of Systems:  General:  denies fevers, chills, fatigue and malaise  Breast:  denies rashes, skin changes, pain, lumps or discharge   Respiratory:  denies SOB, dyspnea, cough or wheezing  Cardio Radial styloid tenosynovitis     Ganglion of tendon sheath     72178/60624 SX/ RLW/ GLOBAL EXP 01-20-15     Osteoarthrosis, unspecified whether generalized or localized, lower leg     Depression     LBBB (left bundle branch block)     Calculus of bile du

## 2019-06-18 ENCOUNTER — TELEPHONE (OUTPATIENT)
Dept: OBGYN CLINIC | Facility: CLINIC | Age: 64
End: 2019-06-18

## 2019-06-18 DIAGNOSIS — Z12.31 OTHER SCREENING MAMMOGRAM: Primary | ICD-10-CM

## 2019-06-18 NOTE — TELEPHONE ENCOUNTER
Carly Wiggins from scheduling  Pt's mammogram does not pass medicare coding. DX code needs to be changed to a covered code. Can just reorder in system.

## 2019-06-18 NOTE — TELEPHONE ENCOUNTER
Order placed again with Dx Z12.31    Spoke with Brooks Chavira in central scheduling. Notified her of new order.

## 2019-07-03 NOTE — TELEPHONE ENCOUNTER
Patient recently had routine labs completed, would you like patient to repeat any labs prior to OV?  Please advise

## 2019-07-05 NOTE — TELEPHONE ENCOUNTER
Is her scheduled encounter a Medicare wellness visit? Or a follow-up? It appears to be scheduled as both.

## 2019-07-18 ENCOUNTER — LAB ENCOUNTER (OUTPATIENT)
Dept: LAB | Age: 64
End: 2019-07-18
Attending: INTERNAL MEDICINE
Payer: MEDICARE

## 2019-07-18 DIAGNOSIS — I25.10 CORONARY ATHEROSCLEROSIS OF NATIVE CORONARY ARTERY: Primary | ICD-10-CM

## 2019-07-18 DIAGNOSIS — Z13.29 SCREENING FOR THYROID DISORDER: ICD-10-CM

## 2019-07-18 DIAGNOSIS — Z00.00 ROUTINE GENERAL MEDICAL EXAMINATION AT A HEALTH CARE FACILITY: ICD-10-CM

## 2019-07-18 DIAGNOSIS — F32.A PRESENILE DEMENTIA WITH DEPRESSIVE FEATURES (HCC): ICD-10-CM

## 2019-07-18 DIAGNOSIS — I25.10 CORONARY ARTERY DISEASE INVOLVING NATIVE CORONARY ARTERY OF NATIVE HEART WITHOUT ANGINA PECTORIS: ICD-10-CM

## 2019-07-18 DIAGNOSIS — G47.00 PERSISTENT DISORDER OF INITIATING OR MAINTAINING SLEEP: ICD-10-CM

## 2019-07-18 DIAGNOSIS — R71.8 LOW MEAN CORPUSCULAR VOLUME (MCV): ICD-10-CM

## 2019-07-18 DIAGNOSIS — K59.04 CHRONIC IDIOPATHIC CONSTIPATION: ICD-10-CM

## 2019-07-18 DIAGNOSIS — E78.5 DYSLIPIDEMIA: ICD-10-CM

## 2019-07-18 DIAGNOSIS — I10 BENIGN ESSENTIAL HYPERTENSION: ICD-10-CM

## 2019-07-18 DIAGNOSIS — E11.49 DIABETES MELLITUS TYPE 2 WITH NEUROLOGICAL MANIFESTATIONS (HCC): ICD-10-CM

## 2019-07-18 DIAGNOSIS — F03.90 PRESENILE DEMENTIA WITH DEPRESSIVE FEATURES (HCC): ICD-10-CM

## 2019-07-18 DIAGNOSIS — R71.8 LOW MEAN CORPUSCULAR VOLUME (MCV): Primary | ICD-10-CM

## 2019-07-18 LAB
ALBUMIN SERPL-MCNC: 3.4 G/DL (ref 3.4–5)
ALBUMIN/GLOB SERPL: 0.8 {RATIO} (ref 1–2)
ALP LIVER SERPL-CCNC: 123 U/L (ref 50–130)
ALT SERPL-CCNC: 20 U/L (ref 13–56)
ANION GAP SERPL CALC-SCNC: 6 MMOL/L (ref 0–18)
AST SERPL-CCNC: 11 U/L (ref 15–37)
BASOPHILS # BLD AUTO: 0.04 X10(3) UL (ref 0–0.2)
BASOPHILS NFR BLD AUTO: 0.7 %
BILIRUB SERPL-MCNC: 0.4 MG/DL (ref 0.1–2)
BUN BLD-MCNC: 16 MG/DL (ref 7–18)
BUN/CREAT SERPL: 16.8 (ref 10–20)
CALCIUM BLD-MCNC: 9.4 MG/DL (ref 8.5–10.1)
CHLORIDE SERPL-SCNC: 106 MMOL/L (ref 98–112)
CHOLEST SMN-MCNC: 107 MG/DL (ref ?–200)
CO2 SERPL-SCNC: 26 MMOL/L (ref 21–32)
CREAT BLD-MCNC: 0.95 MG/DL (ref 0.55–1.02)
DEPRECATED HBV CORE AB SER IA-ACNC: 9.4 NG/ML (ref 18–340)
DEPRECATED RDW RBC AUTO: 45.7 FL (ref 35.1–46.3)
EOSINOPHIL # BLD AUTO: 0.17 X10(3) UL (ref 0–0.7)
EOSINOPHIL NFR BLD AUTO: 3.2 %
ERYTHROCYTE [DISTWIDTH] IN BLOOD BY AUTOMATED COUNT: 16.6 % (ref 11–15)
EST. AVERAGE GLUCOSE BLD GHB EST-MCNC: 212 MG/DL (ref 68–126)
GLOBULIN PLAS-MCNC: 4.2 G/DL (ref 2.8–4.4)
GLUCOSE BLD-MCNC: 170 MG/DL (ref 70–99)
HBA1C MFR BLD HPLC: 9 % (ref ?–5.7)
HCT VFR BLD AUTO: 40.3 % (ref 35–48)
HDLC SERPL-MCNC: 33 MG/DL (ref 40–59)
HGB BLD-MCNC: 12.3 G/DL (ref 12–16)
IMM GRANULOCYTES # BLD AUTO: 0.03 X10(3) UL (ref 0–1)
IMM GRANULOCYTES NFR BLD: 0.6 %
IRON SATURATION: 8 % (ref 15–50)
IRON SERPL-MCNC: 34 UG/DL (ref 50–170)
LDLC SERPL CALC-MCNC: 29 MG/DL (ref ?–100)
LYMPHOCYTES # BLD AUTO: 1.19 X10(3) UL (ref 1–4)
LYMPHOCYTES NFR BLD AUTO: 22.1 %
M PROTEIN MFR SERPL ELPH: 7.6 G/DL (ref 6.4–8.2)
MCH RBC QN AUTO: 23.7 PG (ref 26–34)
MCHC RBC AUTO-ENTMCNC: 30.5 G/DL (ref 31–37)
MCV RBC AUTO: 77.8 FL (ref 80–100)
MONOCYTES # BLD AUTO: 0.6 X10(3) UL (ref 0.1–1)
MONOCYTES NFR BLD AUTO: 11.1 %
NEUTROPHILS # BLD AUTO: 3.36 X10 (3) UL (ref 1.5–7.7)
NEUTROPHILS # BLD AUTO: 3.36 X10(3) UL (ref 1.5–7.7)
NEUTROPHILS NFR BLD AUTO: 62.3 %
NONHDLC SERPL-MCNC: 74 MG/DL (ref ?–130)
OSMOLALITY SERPL CALC.SUM OF ELEC: 291 MOSM/KG (ref 275–295)
PLATELET # BLD AUTO: 183 10(3)UL (ref 150–450)
POTASSIUM SERPL-SCNC: 5 MMOL/L (ref 3.5–5.1)
RBC # BLD AUTO: 5.18 X10(6)UL (ref 3.8–5.3)
SODIUM SERPL-SCNC: 138 MMOL/L (ref 136–145)
TOTAL IRON BINDING CAPACITY: 416 UG/DL (ref 240–450)
TRANSFERRIN SERPL-MCNC: 279 MG/DL (ref 200–360)
TRIGL SERPL-MCNC: 225 MG/DL (ref 30–149)
TSI SER-ACNC: 1.93 MIU/ML (ref 0.36–3.74)
VLDLC SERPL CALC-MCNC: 45 MG/DL (ref 0–30)
WBC # BLD AUTO: 5.4 X10(3) UL (ref 4–11)

## 2019-07-18 PROCEDURE — 36415 COLL VENOUS BLD VENIPUNCTURE: CPT

## 2019-07-18 PROCEDURE — 83036 HEMOGLOBIN GLYCOSYLATED A1C: CPT

## 2019-07-18 PROCEDURE — 85025 COMPLETE CBC W/AUTO DIFF WBC: CPT

## 2019-07-18 PROCEDURE — 84443 ASSAY THYROID STIM HORMONE: CPT

## 2019-07-18 PROCEDURE — 80061 LIPID PANEL: CPT

## 2019-07-18 PROCEDURE — 82728 ASSAY OF FERRITIN: CPT

## 2019-07-18 PROCEDURE — 80053 COMPREHEN METABOLIC PANEL: CPT

## 2019-07-18 PROCEDURE — 83540 ASSAY OF IRON: CPT

## 2019-07-18 PROCEDURE — 83550 IRON BINDING TEST: CPT

## 2019-07-20 ENCOUNTER — OFFICE VISIT (OUTPATIENT)
Dept: INTERNAL MEDICINE CLINIC | Facility: CLINIC | Age: 64
End: 2019-07-20
Payer: MEDICARE

## 2019-07-20 VITALS
HEART RATE: 65 BPM | SYSTOLIC BLOOD PRESSURE: 122 MMHG | RESPIRATION RATE: 16 BRPM | DIASTOLIC BLOOD PRESSURE: 68 MMHG | HEIGHT: 65.75 IN | WEIGHT: 159 LBS | BODY MASS INDEX: 25.86 KG/M2 | TEMPERATURE: 98 F

## 2019-07-20 DIAGNOSIS — I10 BENIGN ESSENTIAL HYPERTENSION: ICD-10-CM

## 2019-07-20 DIAGNOSIS — E11.40 TYPE 2 DIABETES MELLITUS WITH DIABETIC NEUROPATHY, WITH LONG-TERM CURRENT USE OF INSULIN (HCC): ICD-10-CM

## 2019-07-20 DIAGNOSIS — E11.49 DIABETES MELLITUS TYPE 2 WITH NEUROLOGICAL MANIFESTATIONS (HCC): ICD-10-CM

## 2019-07-20 DIAGNOSIS — E78.5 DYSLIPIDEMIA: ICD-10-CM

## 2019-07-20 DIAGNOSIS — Z79.4 TYPE 2 DIABETES MELLITUS WITH DIABETIC NEUROPATHY, WITH LONG-TERM CURRENT USE OF INSULIN (HCC): ICD-10-CM

## 2019-07-20 DIAGNOSIS — F41.9 ANXIETY: ICD-10-CM

## 2019-07-20 DIAGNOSIS — D50.9 IRON DEFICIENCY ANEMIA, UNSPECIFIED IRON DEFICIENCY ANEMIA TYPE: ICD-10-CM

## 2019-07-20 DIAGNOSIS — Z00.00 ENCOUNTER FOR ANNUAL HEALTH EXAMINATION: Primary | ICD-10-CM

## 2019-07-20 DIAGNOSIS — I25.10 CORONARY ARTERY DISEASE INVOLVING NATIVE CORONARY ARTERY OF NATIVE HEART WITHOUT ANGINA PECTORIS: ICD-10-CM

## 2019-07-20 DIAGNOSIS — I44.7 LBBB (LEFT BUNDLE BRANCH BLOCK): ICD-10-CM

## 2019-07-20 DIAGNOSIS — R45.89 DEPRESSED MOOD: ICD-10-CM

## 2019-07-20 PROCEDURE — G0438 PPPS, INITIAL VISIT: HCPCS | Performed by: INTERNAL MEDICINE

## 2019-07-20 RX ORDER — FERROUS SULFATE 325(65) MG
325 TABLET ORAL
Qty: 90 TABLET | Refills: 0 | Status: SHIPPED | OUTPATIENT
Start: 2019-07-20 | End: 2019-09-18

## 2019-07-20 RX ORDER — SERTRALINE HYDROCHLORIDE 25 MG/1
TABLET, FILM COATED ORAL
Qty: 49 TABLET | Refills: 0 | Status: SHIPPED | OUTPATIENT
Start: 2019-07-20 | End: 2019-08-14

## 2019-07-20 RX ORDER — ALPRAZOLAM 0.5 MG/1
0.5 TABLET ORAL DAILY PRN
Qty: 30 TABLET | Refills: 0 | Status: SHIPPED | OUTPATIENT
Start: 2019-07-20 | End: 2019-09-18

## 2019-07-20 RX ORDER — METFORMIN HYDROCHLORIDE 500 MG/1
1000 TABLET, EXTENDED RELEASE ORAL 2 TIMES DAILY WITH MEALS
Qty: 360 TABLET | Refills: 3 | Status: SHIPPED | OUTPATIENT
Start: 2019-07-20 | End: 2019-10-18

## 2019-07-20 RX ORDER — ATORVASTATIN CALCIUM 10 MG/1
10 TABLET, FILM COATED ORAL NIGHTLY
Qty: 90 TABLET | Refills: 3 | Status: SHIPPED | OUTPATIENT
Start: 2019-07-20 | End: 2020-07-06

## 2019-07-20 NOTE — PATIENT INSTRUCTIONS
Kaylin Bledsoe's SCREENING SCHEDULE   Tests on this list are recommended by your physician but may not be covered, or covered at this frequency, by your insurer. Please check with your insurance carrier before scheduling to verify coverage.    PREVENTATI Electrocardiogram date12/04/2018 Routine EKG is not a screening covered service except at the Welcome to Medicare Visit    Abdominal aortic aneurysm screening (once between ages 73-68) IPPE only No results found for this or any previous visit.  Limited to p high risk No results found for: CHLAMYDIA No flowsheet data found.     Screening Mammogram      Mammogram    Recommend Annually to at least age 76, and as needed after 76 Mammogram due on 05/19/2019 Please get this Mammogram regularly   Immunizations      I Directives. It also has the State forms available on it's website for anyone to review and print using their home computer and printer. (the forms are also available in [address])  [email or web address]  This link also has information from the [employer]

## 2019-07-20 NOTE — PROGRESS NOTES
HPI:   Chaz Romero is a 59year old female who presents for a Medicare Initial Annual Wellness visit (Once after 12 month Medicare anniversary) . The patient is accompanied by her daughter.   The patient describes a long-standing history of depress multiple medications?: 1-Yes  Does pain affect your day to day activities?: 1-Yes  Have you had any memory issues?: 1-Yes  Fall/Risk Scorin  Scoring Interpretation: 4+ At Risk      Cognitive Assessment   She had a completely normal cognitive assessment Feeling bad about yourself - or that you are a failure or have let yourself or your family down: More than half the days  7. Trouble concentrating on things, such as reading the newspaper or watching television: Nearly every day  8.  Moving or speaking so s testosterone level in female     Constipation     Abnormal EKG     Allergic rhinitis     Osteopenia     Porokeratosis     Ganglion cyst of wrist     Radial styloid tenosynovitis     Ganglion of tendon sheath     [id number] SX/ RLW/ GLOBAL EXP 01-20-15 .0 07/18/2019        ALLERGIES:   She is allergic to codeine and vicodin [hydrocodone-acetaminophen].     CURRENT MEDICATIONS:     Outpatient Medications Marked as Taking for the 7/20/19 encounter (Office Visit) with Ryder Moura MD:  metFORMIN H Lancets Does not apply Misc Use three times daily      MEDICAL INFORMATION:   She  has a past medical history of Abdominal hernia, Abdominal pain, Anemia, Anesthesia complication, Anxiety, Arthritis, Atherosclerosis of coronary artery, Atherosclerotic he Constitutional: negative  Eyes: negative  Ears, nose, mouth, throat, and face: negative  Respiratory: negative  Cardiovascular: positive for intermittent chest discomfort  Gastrointestinal: negative  Genitourinary:negative  Integument/breast: negative  H FLULAVAL 6 months & older 0.5 ml Prefilled syringe (67663) 10/27/2018   • Influenza 10/21/2013   • Kenalog Per 10mg Inj 05/13/2015   • Pneumovax 23 09/05/2018   • Vitamin B-12 Up To 1000mcg, IM/SC Inj 06/04/2018, 06/18/2018, 09/05/2018        ASSESSMENT AN other sign of blood loss  Patient has a gastroenterologist.  The last colonoscopy was approximately 3 years ago with normal findings and 10-year interval screening recommended.   However, in light of the iron deficiency with no known etiology, I have advise 07/18/2019    No flowsheet data found.     Fasting Blood Sugar (FSB)Annually Glucose (mg/dL)   Date Value   07/18/2019 170 (H)   08/31/2018 128 (H)     GLUCOSE (mg/dL)   Date Value   10/16/2017 115 (H)          Cardiovascular Disease Screening     LDL Annua get once after your 65th birthday    Hepatitis B for Moderate/High Risk No vaccine history found Medium/high risk factors:   End-stage renal disease   Hemophiliacs who received Factor VIII or IX concentrates   Clients of institutions for the mentally retar 07/18/2019 29     LDL-CHOLESTEROL (mg/dL (calc))   Date Value   10/16/2017 70     Calculated LDL (mg/dL)   Date Value   05/05/2018 59    No flowsheet data found.      Dilated Eye exam  Annually Data entered on: 2/9/2019   Last Dilated Eye Exam 2/9/2019

## 2019-07-22 RX ORDER — SERTRALINE HYDROCHLORIDE 25 MG/1
TABLET, FILM COATED ORAL
Qty: 157 TABLET | Refills: 0 | OUTPATIENT
Start: 2019-07-22

## 2019-07-22 NOTE — TELEPHONE ENCOUNTER
Last Ov: 7/20/19, AD, physical  Upcoming appt: 11/2/19  Last labs: CBC, CMP, Lipid, TSH w Ref T4, Ferritin, Iron & TIBC, A1c 7/18/19  Last Rx: sertraline 25mg, #49, 0R    Per Protocol, not on protocol. Rx pending for refill, please sign if appropriate.

## 2019-08-14 RX ORDER — SERTRALINE HYDROCHLORIDE 25 MG/1
TABLET, FILM COATED ORAL
Qty: 49 TABLET | Refills: 0 | OUTPATIENT
Start: 2019-08-14

## 2019-08-14 NOTE — TELEPHONE ENCOUNTER
Last Ov: 7/20/19, AD, physical  Upcoming appt: 11/2/19  Last labs: CBC, CMP, Lipid, TSH w Ref T4, Ferritin, Iron & TIBC, A1c 7/18/19  Last Rx: sertraline 25mg, #49, 0R 7/20/19    Per Protocol, not on protocol.  Rx pending

## 2019-08-27 ENCOUNTER — TELEPHONE (OUTPATIENT)
Dept: INTERNAL MEDICINE CLINIC | Facility: CLINIC | Age: 64
End: 2019-08-27

## 2019-08-27 NOTE — TELEPHONE ENCOUNTER
[facility]   Future Appointments   Date Time Provider Margoth Cani   11/2/2019 10:00 AM Sil Valentine MD [facility] [address] [facility]     Orders to [facility]  aware must fast no call back required

## 2019-10-05 ENCOUNTER — HOSPITAL ENCOUNTER (OUTPATIENT)
Dept: MAMMOGRAPHY | Age: 64
Discharge: HOME | End: 2019-10-05
Attending: OBSTETRICS & GYNECOLOGY
Payer: MEDICARE

## 2019-10-05 DIAGNOSIS — Z12.31 OTHER SCREENING MAMMOGRAM: ICD-10-CM

## 2019-10-05 PROCEDURE — 77063 BREAST TOMOSYNTHESIS BI: CPT | Performed by: OBSTETRICS & GYNECOLOGY

## 2019-10-05 PROCEDURE — 77067 SCR MAMMO BI INCL CAD: CPT | Performed by: OBSTETRICS & GYNECOLOGY

## 2019-10-11 PROBLEM — K21.9 GASTROESOPHAGEAL REFLUX DISEASE WITHOUT ESOPHAGITIS: Status: ACTIVE | Noted: 2019-10-11

## 2019-10-11 PROBLEM — D50.9 IRON DEFICIENCY ANEMIA, UNSPECIFIED: Status: ACTIVE | Noted: 2019-10-11

## 2019-10-11 PROBLEM — D12.2 BENIGN NEOPLASM OF ASCENDING COLON: Status: ACTIVE | Noted: 2019-10-11

## 2019-10-11 PROBLEM — D12.3 BENIGN NEOPLASM OF TRANSVERSE COLON: Status: ACTIVE | Noted: 2019-10-11

## 2019-10-11 PROBLEM — R13.10 DYSPHAGIA: Status: ACTIVE | Noted: 2019-10-11

## 2019-10-11 PROBLEM — R10.9 UNSPECIFIED ABDOMINAL PAIN: Status: ACTIVE | Noted: 2019-10-11

## 2019-10-26 ENCOUNTER — APPOINTMENT (OUTPATIENT)
Dept: LAB | Age: 64
End: 2019-10-26
Attending: INTERNAL MEDICINE
Payer: MEDICARE

## 2019-10-26 DIAGNOSIS — E11.49 DIABETES MELLITUS TYPE 2 WITH NEUROLOGICAL MANIFESTATIONS (HCC): ICD-10-CM

## 2019-10-26 DIAGNOSIS — E78.5 DYSLIPIDEMIA: ICD-10-CM

## 2019-10-26 DIAGNOSIS — Z00.00 ENCOUNTER FOR ANNUAL HEALTH EXAMINATION: ICD-10-CM

## 2019-10-26 PROCEDURE — 80061 LIPID PANEL: CPT

## 2019-10-26 PROCEDURE — 83036 HEMOGLOBIN GLYCOSYLATED A1C: CPT

## 2019-10-26 PROCEDURE — 36415 COLL VENOUS BLD VENIPUNCTURE: CPT

## 2019-11-02 ENCOUNTER — TELEPHONE (OUTPATIENT)
Dept: ENDOCRINOLOGY CLINIC | Facility: CLINIC | Age: 64
End: 2019-11-02

## 2019-11-02 ENCOUNTER — OFFICE VISIT (OUTPATIENT)
Dept: INTERNAL MEDICINE CLINIC | Facility: CLINIC | Age: 64
End: 2019-11-02
Payer: MEDICARE

## 2019-11-02 VITALS
DIASTOLIC BLOOD PRESSURE: 66 MMHG | RESPIRATION RATE: 16 BRPM | HEART RATE: 60 BPM | TEMPERATURE: 98 F | WEIGHT: 159.19 LBS | BODY MASS INDEX: 27.18 KG/M2 | HEIGHT: 64.25 IN | SYSTOLIC BLOOD PRESSURE: 120 MMHG

## 2019-11-02 DIAGNOSIS — Z79.4 CONTROLLED TYPE 2 DIABETES MELLITUS WITH DIABETIC NEUROPATHIC ARTHROPATHY, WITH LONG-TERM CURRENT USE OF INSULIN (HCC): Primary | ICD-10-CM

## 2019-11-02 DIAGNOSIS — E11.610 CONTROLLED TYPE 2 DIABETES MELLITUS WITH DIABETIC NEUROPATHIC ARTHROPATHY, WITH LONG-TERM CURRENT USE OF INSULIN (HCC): Primary | ICD-10-CM

## 2019-11-02 DIAGNOSIS — R45.89 DEPRESSED MOOD: ICD-10-CM

## 2019-11-02 DIAGNOSIS — I10 BENIGN ESSENTIAL HYPERTENSION: ICD-10-CM

## 2019-11-02 DIAGNOSIS — F41.9 ANXIETY: ICD-10-CM

## 2019-11-02 DIAGNOSIS — E78.5 DYSLIPIDEMIA: ICD-10-CM

## 2019-11-02 DIAGNOSIS — Z23 NEED FOR INFLUENZA VACCINATION: ICD-10-CM

## 2019-11-02 DIAGNOSIS — E11.40 TYPE 2 DIABETES MELLITUS WITH DIABETIC NEUROPATHY, WITH LONG-TERM CURRENT USE OF INSULIN (HCC): Primary | ICD-10-CM

## 2019-11-02 DIAGNOSIS — Z79.4 TYPE 2 DIABETES MELLITUS WITH DIABETIC NEUROPATHY, WITH LONG-TERM CURRENT USE OF INSULIN (HCC): Primary | ICD-10-CM

## 2019-11-02 PROCEDURE — 90686 IIV4 VACC NO PRSV 0.5 ML IM: CPT | Performed by: INTERNAL MEDICINE

## 2019-11-02 PROCEDURE — 99214 OFFICE O/P EST MOD 30 MIN: CPT | Performed by: INTERNAL MEDICINE

## 2019-11-02 PROCEDURE — G0008 ADMIN INFLUENZA VIRUS VAC: HCPCS | Performed by: INTERNAL MEDICINE

## 2019-11-02 RX ORDER — PEN NEEDLE, DIABETIC 31 GX5/16"
NEEDLE, DISPOSABLE MISCELLANEOUS
Qty: 360 EACH | Refills: 5 | Status: SHIPPED | OUTPATIENT
Start: 2019-11-02 | End: 2020-04-29 | Stop reason: CLARIF

## 2019-11-02 NOTE — PROGRESS NOTES
Joseph Muir  2/9/1955    Patient presents with: Follow - Up: cn room 6: 3 month follow up      Annie 1923 is a 59year old female who presents as a follow-up.     The patient has been in her usual state of health and denies any acute Oral Cap, Take 1 capsule (2.5 mg total) by mouth once daily. , Disp: 90 capsule, Rfl: 3  Glucose Blood (TRUETRACK TEST) In Vitro Strip, TEST BLOOD SUGAR THREE TIMES DAILY, Disp: 300 strip, Rfl: 6  Insulin Syringe 31G X 5/16\" 1 ML Does not apply Misc, USE O Osteoporosis    • Other and unspecified hyperlipidemia    • Pain in joints    • Peripheral neuropathy    • Problems with swallowing    • Sleep disturbance    • Stented coronary artery    • Stress    • Type II or unspecified type diabetes mellitus without m encounter     Post-operative state     Bilateral leg edema     Venous insufficiency of both lower extremities     Varicose veins of both lower extremities with pain     Varicose veins of lower extremities with complications, bilateral     Localized edema Constitutional: Oriented to person, place, and time. No distress. HEENT:  Normocephalic and atraumatic. Cardiovascular: Normal rate, regular rhythm and intact distal pulses. No murmur, rubs or gallops.    Pulmonary/Chest: Effort normal and breath soun Oral Tab 90 tablet 0     Sig: Take 1 tablet (50 mg total) by mouth daily. • insulin glargine (LANTUS SOLOSTAR) 100 UNIT/ML Subcutaneous Solution Pen-injector 9 pen 3     Sig: Inject 28 Units into the skin every morning.        Imaging & Consults:  KARRIE

## 2019-11-12 ENCOUNTER — NURSE ONLY (OUTPATIENT)
Dept: ENDOCRINOLOGY CLINIC | Facility: CLINIC | Age: 64
End: 2019-11-12
Payer: MEDICARE

## 2019-11-12 VITALS — BODY MASS INDEX: 25.55 KG/M2 | HEIGHT: 66 IN | WEIGHT: 159 LBS

## 2019-11-12 DIAGNOSIS — E11.610 CONTROLLED TYPE 2 DIABETES MELLITUS WITH DIABETIC NEUROPATHIC ARTHROPATHY, WITH LONG-TERM CURRENT USE OF INSULIN (HCC): Primary | ICD-10-CM

## 2019-11-12 DIAGNOSIS — Z79.4 CONTROLLED TYPE 2 DIABETES MELLITUS WITH DIABETIC NEUROPATHIC ARTHROPATHY, WITH LONG-TERM CURRENT USE OF INSULIN (HCC): Primary | ICD-10-CM

## 2019-11-12 PROCEDURE — G0108 DIAB MANAGE TRN  PER INDIV: HCPCS

## 2019-11-12 NOTE — PROGRESS NOTES
Jonah Gastelum  : 1955 attended Step 1 Diabetic Education:    Date: 2019  Referring Provider: Wendy Martin  Start time: 4pm End time: 5pm    Ht 66\"   Wt 159 lb (72.1 kg)   LMP 2017 (LMP Unknown)   BMI 25.66 kg/m²     HEMOGLOBIN A1c (% of tota

## 2019-11-27 RX ORDER — ATORVASTATIN CALCIUM 20 MG/1
TABLET, FILM COATED ORAL
Qty: 90 TABLET | Refills: 0 | Status: SHIPPED | OUTPATIENT
Start: 2019-11-27 | End: 2020-02-28

## 2019-12-09 RX ORDER — RAMIPRIL 2.5 MG/1
CAPSULE ORAL
Qty: 90 CAPSULE | Refills: 0 | Status: SHIPPED | OUTPATIENT
Start: 2019-12-09 | End: 2020-03-10

## 2019-12-12 ENCOUNTER — OFFICE VISIT (OUTPATIENT)
Dept: OBGYN CLINIC | Facility: CLINIC | Age: 64
End: 2019-12-12
Payer: MEDICARE

## 2019-12-12 VITALS
WEIGHT: 164.81 LBS | SYSTOLIC BLOOD PRESSURE: 118 MMHG | HEART RATE: 62 BPM | BODY MASS INDEX: 26.49 KG/M2 | HEIGHT: 66 IN | DIASTOLIC BLOOD PRESSURE: 64 MMHG

## 2019-12-12 DIAGNOSIS — L72.9 CYST OF BUTTOCKS: Primary | ICD-10-CM

## 2019-12-12 PROCEDURE — 88304 TISSUE EXAM BY PATHOLOGIST: CPT | Performed by: OBSTETRICS & GYNECOLOGY

## 2019-12-12 PROCEDURE — 11422 EXC H-F-NK-SP B9+MARG 1.1-2: CPT | Performed by: OBSTETRICS & GYNECOLOGY

## 2019-12-12 PROCEDURE — 88305 TISSUE EXAM BY PATHOLOGIST: CPT | Performed by: OBSTETRICS & GYNECOLOGY

## 2019-12-12 NOTE — PATIENT INSTRUCTIONS
POST BIOPSY INSTRUCTIONS    Biopsy with a stitch:  Keep the site of the biopsy clean and dry. Steristrips and sutures will be removed at follow-up appointment.     Biopsy without stitch:  Keep the site of the biopsy covered and dry during the first 24 h

## 2019-12-13 NOTE — PROCEDURES
Biopsy Procedure Note    Indications: 59year old y/o female with cyst on left buttock x 1 year. She reports cyst can increase then decrease in size. It is a/w pruritis at time. She reports irregular episode of irritation and inflammation.      Pre-procedur

## 2019-12-14 PROBLEM — L72.9 CYST OF BUTTOCKS: Status: ACTIVE | Noted: 2019-12-14

## 2019-12-19 NOTE — TELEPHONE ENCOUNTER
Last Office Visit: 11-2-19 with AD for follow up  Last Rx Filled: 10-8-19 90 tabs with no refills   Last Labs: 10-26-19 lipid/hga1c  Future Appointment: 2-8-20    Per protocol to provider

## 2019-12-21 ENCOUNTER — HOSPITAL ENCOUNTER (EMERGENCY)
Age: 64
Discharge: HOME | End: 2019-12-21
Attending: EMERGENCY MEDICINE
Payer: MEDICARE

## 2019-12-21 VITALS
RESPIRATION RATE: 18 BRPM | HEART RATE: 64 BPM | TEMPERATURE: 97 F | DIASTOLIC BLOOD PRESSURE: 58 MMHG | SYSTOLIC BLOOD PRESSURE: 120 MMHG | HEIGHT: 66 IN | WEIGHT: 162 LBS | BODY MASS INDEX: 26.03 KG/M2 | OXYGEN SATURATION: 100 %

## 2019-12-21 DIAGNOSIS — L72.3 SEBACEOUS CYST: Primary | ICD-10-CM

## 2019-12-21 PROCEDURE — 99283 EMERGENCY DEPT VISIT LOW MDM: CPT

## 2019-12-21 RX ORDER — CEPHALEXIN 500 MG/1
500 CAPSULE ORAL 3 TIMES DAILY
Qty: 30 CAPSULE | Refills: 0 | Status: SHIPPED | OUTPATIENT
Start: 2019-12-21 | End: 2019-12-31

## 2019-12-22 NOTE — ED PROVIDER NOTES
Patient Seen in: THE [facility] Emergency Department In [place]      History   Patient presents with:  Cellulitis    Stated Complaint: surgical site infection.  diabetic    HPI    Very pleasant 51-year-old primary Setswana-speaking patient presents to the emerg unspecified whether generalized or localized, unspecified site    • Osteoporosis    • Other and unspecified hyperlipidemia    • Pain in joints    • Peripheral neuropathy    • Problems with swallowing    • Sleep disturbance    • Stented coronary artery    • HPI.  Constitutional and vital signs reviewed. All other systems reviewed and negative except as noted above.     Physical Exam     ED Triage Vitals [12/21/19 2120]   /58   Pulse 64   Resp 18   Temp 97.4 °F (36.3 °C)   Temp src Temporal   SpO2 10 as there is an open wound there but I do not think that at this time, there is an active infection.         MDM                   Disposition and Plan     Clinical Impression:  Sebaceous cyst  (primary encounter diagnosis)    Disposition:  Discharge  12/21/

## 2019-12-22 NOTE — ED INITIAL ASSESSMENT (HPI)
Post-op complication: suspicious for infection at left buttocks where she had a cyst removed on December 12th. Daughter sts a foul smell with \"slimy\" discharge from site where the stitches appeared to have come undone. *Pt is diabetic.

## 2019-12-23 ENCOUNTER — OFFICE VISIT (OUTPATIENT)
Dept: OBGYN CLINIC | Facility: CLINIC | Age: 64
End: 2019-12-23
Payer: MEDICARE

## 2019-12-23 VITALS
SYSTOLIC BLOOD PRESSURE: 118 MMHG | WEIGHT: 169.63 LBS | HEART RATE: 74 BPM | DIASTOLIC BLOOD PRESSURE: 64 MMHG | BODY MASS INDEX: 27 KG/M2

## 2019-12-23 DIAGNOSIS — L72.9 CYST OF BUTTOCKS: Primary | ICD-10-CM

## 2019-12-23 PROCEDURE — 99213 OFFICE O/P EST LOW 20 MIN: CPT | Performed by: OBSTETRICS & GYNECOLOGY

## 2019-12-23 NOTE — PROGRESS NOTES
[facility]  Obstetrics and Gynecology  Follow Up Progress Note    Subjective:     Imelda Lee is a 59year old  female who was last seen in office 19 and presents for follow up after biopsy and removal of left buttock cyst. The p disorder     Insomnia     Vitamin D deficiency     Arthropathy, unspecified, site unspecified     Pain in joint, lower leg     Obesity     Benign essential hypertension     Mixed hyperlipidemia     Diabetes mellitus type 2 with neurological manifestations attempted excisional procedure   - pathology noted for benign findings with cyst wall  - d/w patient her pathology findings   - biopsy site examined and noted for open wound, healing by secondary intention   - wet to dry dressings BID advised, instructions

## 2019-12-23 NOTE — PATIENT INSTRUCTIONS
Please return in 06/2020 for your annual gyne visit or sooner if having abnormal vaginal bleeding or severe pelvic pain

## 2020-01-08 ENCOUNTER — OFFICE VISIT (OUTPATIENT)
Dept: OBGYN CLINIC | Facility: CLINIC | Age: 65
End: 2020-01-08
Payer: MEDICARE

## 2020-01-08 VITALS
HEART RATE: 72 BPM | HEIGHT: 66 IN | BODY MASS INDEX: 27.1 KG/M2 | SYSTOLIC BLOOD PRESSURE: 132 MMHG | WEIGHT: 168.63 LBS | DIASTOLIC BLOOD PRESSURE: 66 MMHG

## 2020-01-08 DIAGNOSIS — L72.9 CYST OF BUTTOCKS: Primary | ICD-10-CM

## 2020-01-08 PROCEDURE — 99213 OFFICE O/P EST LOW 20 MIN: CPT | Performed by: OBSTETRICS & GYNECOLOGY

## 2020-01-08 NOTE — PROGRESS NOTES
[facility]  Obstetrics and Gynecology  Follow Up Progress Note    Subjective:     Arron Sanz is a 59year old  female who was last seen in office 2020 and presents for follow up after biopsy and removal of left buttock cyst. The tenosynovitis     Ganglion of tendon sheath     [id number] SX/ RLW/ GLOBAL EXP 01-20-15     Osteoarthrosis, unspecified whether generalized or localized, lower leg     Depression     LBBB (left bundle branch block)     Calculus of bile duct with obstructi plan of care. All questions were answered to the best of my ability at this time.     RTC in 5 months for well woman exam or sooner if needed     Eve Stovall MD   [facility] - OBGYN

## 2020-01-23 ENCOUNTER — LAB ENCOUNTER (OUTPATIENT)
Dept: LAB | Age: 65
End: 2020-01-23
Attending: INTERNAL MEDICINE
Payer: MEDICARE

## 2020-01-23 DIAGNOSIS — E78.2 MIXED HYPERLIPIDEMIA: ICD-10-CM

## 2020-01-23 DIAGNOSIS — R06.02 SOB (SHORTNESS OF BREATH): ICD-10-CM

## 2020-01-23 DIAGNOSIS — I25.10 CORONARY ARTERY DISEASE INVOLVING NATIVE CORONARY ARTERY OF NATIVE HEART WITHOUT ANGINA PECTORIS: ICD-10-CM

## 2020-01-23 DIAGNOSIS — R07.2 PRECORDIAL PAIN: ICD-10-CM

## 2020-01-23 DIAGNOSIS — I10 ESSENTIAL HYPERTENSION: ICD-10-CM

## 2020-01-23 DIAGNOSIS — I44.7 LBBB (LEFT BUNDLE BRANCH BLOCK): ICD-10-CM

## 2020-01-23 LAB
ALBUMIN SERPL-MCNC: 3.8 G/DL (ref 3.4–5)
ALBUMIN/GLOB SERPL: 1 {RATIO} (ref 1–2)
ALP LIVER SERPL-CCNC: 139 U/L (ref 50–130)
ALT SERPL-CCNC: 22 U/L (ref 13–56)
ANION GAP SERPL CALC-SCNC: 7 MMOL/L (ref 0–18)
AST SERPL-CCNC: 13 U/L (ref 15–37)
BILIRUB SERPL-MCNC: 0.4 MG/DL (ref 0.1–2)
BUN BLD-MCNC: 20 MG/DL (ref 7–18)
BUN/CREAT SERPL: 22.2 (ref 10–20)
CALCIUM BLD-MCNC: 9.3 MG/DL (ref 8.5–10.1)
CHLORIDE SERPL-SCNC: 111 MMOL/L (ref 98–112)
CHOLEST SMN-MCNC: 151 MG/DL (ref ?–200)
CO2 SERPL-SCNC: 23 MMOL/L (ref 21–32)
CREAT BLD-MCNC: 0.9 MG/DL (ref 0.55–1.02)
GLOBULIN PLAS-MCNC: 3.9 G/DL (ref 2.8–4.4)
GLUCOSE BLD-MCNC: 136 MG/DL (ref 70–99)
HDLC SERPL-MCNC: 55 MG/DL (ref 40–59)
LDLC SERPL CALC-MCNC: 73 MG/DL (ref ?–100)
M PROTEIN MFR SERPL ELPH: 7.7 G/DL (ref 6.4–8.2)
NONHDLC SERPL-MCNC: 96 MG/DL (ref ?–130)
OSMOLALITY SERPL CALC.SUM OF ELEC: 297 MOSM/KG (ref 275–295)
PATIENT FASTING Y/N/NP: YES
PATIENT FASTING Y/N/NP: YES
POTASSIUM SERPL-SCNC: 4.3 MMOL/L (ref 3.5–5.1)
SODIUM SERPL-SCNC: 141 MMOL/L (ref 136–145)
TRIGL SERPL-MCNC: 114 MG/DL (ref 30–149)
VLDLC SERPL CALC-MCNC: 23 MG/DL (ref 0–30)

## 2020-01-23 PROCEDURE — 80053 COMPREHEN METABOLIC PANEL: CPT

## 2020-01-23 PROCEDURE — 36415 COLL VENOUS BLD VENIPUNCTURE: CPT

## 2020-01-23 PROCEDURE — 80061 LIPID PANEL: CPT

## 2020-01-29 ENCOUNTER — TELEPHONE (OUTPATIENT)
Dept: INTERNAL MEDICINE CLINIC | Facility: CLINIC | Age: 65
End: 2020-01-29

## 2020-01-29 DIAGNOSIS — E11.49 DIABETES MELLITUS TYPE 2 WITH NEUROLOGICAL MANIFESTATIONS (HCC): Primary | ICD-10-CM

## 2020-01-29 NOTE — TELEPHONE ENCOUNTER
Patient's daughter calling about upcoming appointment  .     Caroline Francois (daughter) is on hipaa would like to know if Dr Allison Arzate wanted labs done prior to appointment  Future Appointments   Date Time Provider Margoth Silveira   2/8/2020 10:00 AM Kiel Azevedo,

## 2020-01-30 NOTE — TELEPHONE ENCOUNTER
Called and spoke with pt's daughter, Tala Willson [facility] per HIPAA), to inform non-fasting DM labs ordered to Edw. Confirmed OV on 2/8. No further questions or concerns. Daughter verbalized understanding and agreed with POC.

## 2020-02-01 ENCOUNTER — LAB ENCOUNTER (OUTPATIENT)
Dept: LAB | Age: 65
End: 2020-02-01
Attending: INTERNAL MEDICINE
Payer: MEDICARE

## 2020-02-01 DIAGNOSIS — E11.49 DIABETES MELLITUS TYPE 2 WITH NEUROLOGICAL MANIFESTATIONS (HCC): ICD-10-CM

## 2020-02-01 LAB
BASOPHILS # BLD AUTO: 0.05 X10(3) UL (ref 0–0.2)
BASOPHILS NFR BLD AUTO: 0.8 %
CREAT UR-SCNC: 75.2 MG/DL
DEPRECATED RDW RBC AUTO: 46.5 FL (ref 35.1–46.3)
EOSINOPHIL # BLD AUTO: 0.16 X10(3) UL (ref 0–0.7)
EOSINOPHIL NFR BLD AUTO: 2.5 %
ERYTHROCYTE [DISTWIDTH] IN BLOOD BY AUTOMATED COUNT: 15.7 % (ref 11–15)
EST. AVERAGE GLUCOSE BLD GHB EST-MCNC: 177 MG/DL (ref 68–126)
HBA1C MFR BLD HPLC: 7.8 % (ref ?–5.7)
HCT VFR BLD AUTO: 41.2 % (ref 35–48)
HGB BLD-MCNC: 12.7 G/DL (ref 12–16)
IMM GRANULOCYTES # BLD AUTO: 0.02 X10(3) UL (ref 0–1)
IMM GRANULOCYTES NFR BLD: 0.3 %
LYMPHOCYTES # BLD AUTO: 1.31 X10(3) UL (ref 1–4)
LYMPHOCYTES NFR BLD AUTO: 20.8 %
MCH RBC QN AUTO: 25.4 PG (ref 26–34)
MCHC RBC AUTO-ENTMCNC: 30.8 G/DL (ref 31–37)
MCV RBC AUTO: 82.4 FL (ref 80–100)
MICROALBUMIN UR-MCNC: 0.68 MG/DL
MICROALBUMIN/CREAT 24H UR-RTO: 9 UG/MG (ref ?–30)
MONOCYTES # BLD AUTO: 0.57 X10(3) UL (ref 0.1–1)
MONOCYTES NFR BLD AUTO: 9 %
NEUTROPHILS # BLD AUTO: 4.2 X10 (3) UL (ref 1.5–7.7)
NEUTROPHILS # BLD AUTO: 4.2 X10(3) UL (ref 1.5–7.7)
NEUTROPHILS NFR BLD AUTO: 66.6 %
PLATELET # BLD AUTO: 167 10(3)UL (ref 150–450)
RBC # BLD AUTO: 5 X10(6)UL (ref 3.8–5.3)
WBC # BLD AUTO: 6.3 X10(3) UL (ref 4–11)

## 2020-02-01 PROCEDURE — 36415 COLL VENOUS BLD VENIPUNCTURE: CPT

## 2020-02-01 PROCEDURE — 85025 COMPLETE CBC W/AUTO DIFF WBC: CPT

## 2020-02-01 PROCEDURE — 82043 UR ALBUMIN QUANTITATIVE: CPT

## 2020-02-01 PROCEDURE — 83036 HEMOGLOBIN GLYCOSYLATED A1C: CPT

## 2020-02-01 PROCEDURE — 82570 ASSAY OF URINE CREATININE: CPT

## 2020-02-04 NOTE — TELEPHONE ENCOUNTER
Last Ov: 11/2/19, AD, DM f/u  Upcoming appt: 2/8/20, AD  Last labs: CBC, Microalb/creat, A1c 2/1/20  Last Rx: sertraline 50mg, #90, 0R 11/2/19    Per Protocol, not on protocol. Rx pending. Epic prompted to contact pt due to abnormal CSSR screening.  Per

## 2020-02-08 ENCOUNTER — OFFICE VISIT (OUTPATIENT)
Dept: INTERNAL MEDICINE CLINIC | Facility: CLINIC | Age: 65
End: 2020-02-08
Payer: MEDICARE

## 2020-02-08 VITALS
SYSTOLIC BLOOD PRESSURE: 112 MMHG | HEART RATE: 68 BPM | HEIGHT: 66 IN | DIASTOLIC BLOOD PRESSURE: 50 MMHG | RESPIRATION RATE: 16 BRPM | WEIGHT: 167 LBS | TEMPERATURE: 99 F | BODY MASS INDEX: 26.84 KG/M2

## 2020-02-08 DIAGNOSIS — M25.562 CHRONIC PAIN OF LEFT KNEE: Primary | ICD-10-CM

## 2020-02-08 DIAGNOSIS — I83.892 VARICOSE VEINS OF LEFT LOWER EXTREMITY WITH EDEMA: ICD-10-CM

## 2020-02-08 DIAGNOSIS — M17.11 PRIMARY OSTEOARTHRITIS OF RIGHT KNEE: ICD-10-CM

## 2020-02-08 DIAGNOSIS — E11.49 DIABETES MELLITUS TYPE 2 WITH NEUROLOGICAL MANIFESTATIONS (HCC): ICD-10-CM

## 2020-02-08 DIAGNOSIS — G89.29 CHRONIC PAIN OF LEFT KNEE: Primary | ICD-10-CM

## 2020-02-08 PROCEDURE — 99214 OFFICE O/P EST MOD 30 MIN: CPT | Performed by: INTERNAL MEDICINE

## 2020-02-08 NOTE — PROGRESS NOTES
Keith Gruber  2/9/1955    Patient presents with:  Medication Follow-Up  Joint Pain: x 2-3wks, multiple joints, using OTC ibuprofen w/ relief      SUBJECTIVE   Keith Gruber is a 59year old female who presents as a follow-up.     The patient has a  Pen-injector Inject 28 Units into the skin every morning.  9 pen 3   • Glucose Blood (TRUETRACK TEST) In Vitro Strip TEST BLOOD SUGAR THREE TIMES DAILY 300 strip 6   • Insulin Syringe 31G X 5/16\" 1 ML Does not apply Misc USE ONCE DAILY 100 each 3   • Rickey neuropathy    • Problems with swallowing    • Sleep disturbance    • Stented coronary artery    • Stress    • Type II or unspecified type diabetes mellitus without mention of complication, not stated as uncontrolled    • Uncomfortable fullness after meals extremities     Varicose veins of both lower extremities with pain     Varicose veins of lower extremities with complications, bilateral     Localized edema     Compression of vein     N&V (nausea and vomiting)     Muscle cramps     Dyslipidemia     Gastro Oriented to person, place, and time. No distress. HEENT:  Normocephalic and atraumatic. Cardiovascular: Normal rate, regular rhythm and intact distal pulses. No murmur, rubs or gallops. Left varicose veins that are nontender.    Pulmonary/Chest: Effort Refills:  Requested Prescriptions      No prescriptions requested or ordered in this encounter       Imaging & Consults:  XR KNEE (1 OR 2 VIEWS), LEFT (CPT=73560)  US VENOUS INSUFFICIENCY (REFLUX) LEFT LOWER EXT SH(CPT=93971)    No follow-ups on file.   The

## 2020-02-10 ENCOUNTER — HOSPITAL ENCOUNTER (OUTPATIENT)
Dept: GENERAL RADIOLOGY | Age: 65
Discharge: HOME | End: 2020-02-10
Attending: INTERNAL MEDICINE
Payer: MEDICARE

## 2020-02-10 DIAGNOSIS — G89.29 CHRONIC PAIN OF LEFT KNEE: ICD-10-CM

## 2020-02-10 DIAGNOSIS — M25.562 CHRONIC PAIN OF LEFT KNEE: ICD-10-CM

## 2020-02-10 PROCEDURE — 73560 X-RAY EXAM OF KNEE 1 OR 2: CPT | Performed by: INTERNAL MEDICINE

## 2020-02-11 RX ORDER — ALPRAZOLAM 0.5 MG/1
TABLET ORAL
Qty: 30 TABLET | Refills: 0 | Status: SHIPPED | OUTPATIENT
Start: 2020-02-11 | End: 2020-05-23

## 2020-02-11 NOTE — TELEPHONE ENCOUNTER
Last Ov: 2/8/20, AD, med F/U  Upcoming appt: 5/23/20, AD  Last labs: CBC, Microalb/creat, A1c 2/1/20  Last Rx: alprazolam 0.5mg, #30, 0R 7/20/19    Per Protocol, not on protocol. Rx pending.

## 2020-02-14 ENCOUNTER — TELEPHONE (OUTPATIENT)
Dept: INTERNAL MEDICINE CLINIC | Facility: CLINIC | Age: 65
End: 2020-02-14

## 2020-02-26 RX ORDER — INSULIN GLARGINE 100 [IU]/ML
INJECTION, SOLUTION SUBCUTANEOUS
Qty: 9 ML | Refills: 5 | Status: ON HOLD | OUTPATIENT
Start: 2020-02-26 | End: 2020-04-25

## 2020-02-26 NOTE — TELEPHONE ENCOUNTER
Last Ov: 2/8/20, AD, F/U  Upcoming appt: 5/23/20, AD  Last labs: CBC, Microalb/creat, A1c 2/1/20  Last Rx: Lantus Solostar 100iu/mL, 9 pen, 3R 11/2/19    Per Protocol, not on protocol. Rx pending.

## 2020-02-28 RX ORDER — ATORVASTATIN CALCIUM 20 MG/1
TABLET, FILM COATED ORAL
Qty: 90 TABLET | Refills: 1 | Status: ON HOLD | OUTPATIENT
Start: 2020-02-28 | End: 2020-04-25

## 2020-03-02 ENCOUNTER — TELEPHONE (OUTPATIENT)
Dept: INTERNAL MEDICINE CLINIC | Facility: CLINIC | Age: 65
End: 2020-03-02

## 2020-03-10 RX ORDER — RAMIPRIL 2.5 MG/1
CAPSULE ORAL
Qty: 90 CAPSULE | Refills: 1 | Status: ON HOLD | OUTPATIENT
Start: 2020-03-10 | End: 2020-07-17

## 2020-03-19 RX ORDER — EMPAGLIFLOZIN 25 MG/1
TABLET, FILM COATED ORAL
Qty: 90 TABLET | Refills: 0 | Status: SHIPPED | OUTPATIENT
Start: 2020-03-19 | End: 2020-06-16

## 2020-03-19 NOTE — TELEPHONE ENCOUNTER
Last VISIT 2.8.20 w/ AD    Last REFILL 12.19.19 Jardiance 25mg 90T 0R    Last LABS 2.1.20 CBC, Microalb, HgA1c    Future Appointments   Date Time Provider [facility]   5/23/2020 10:00 AM Kenyon Solorio MD [facility] [address] [facility]   6/9/2020 12:00 PM Neel Sanabria

## 2020-03-20 ENCOUNTER — TELEPHONE (OUTPATIENT)
Dept: INTERNAL MEDICINE CLINIC | Facility: CLINIC | Age: 65
End: 2020-03-20

## 2020-03-20 DIAGNOSIS — J30.2 SEASONAL ALLERGIC RHINITIS, UNSPECIFIED TRIGGER: Primary | ICD-10-CM

## 2020-03-20 DIAGNOSIS — R09.82 POSTNASAL DRIP: ICD-10-CM

## 2020-03-20 PROCEDURE — G2012 BRIEF CHECK IN BY MD/QHP: HCPCS | Performed by: INTERNAL MEDICINE

## 2020-03-20 RX ORDER — AMOXICILLIN AND CLAVULANATE POTASSIUM 875; 125 MG/1; MG/1
1 TABLET, FILM COATED ORAL 2 TIMES DAILY
Qty: 20 TABLET | Refills: 0 | Status: SHIPPED | OUTPATIENT
Start: 2020-03-20 | End: 2020-03-30

## 2020-03-20 RX ORDER — FLUTICASONE PROPIONATE 50 MCG
2 SPRAY, SUSPENSION (ML) NASAL DAILY
Qty: 1 BOTTLE | Refills: 1 | Status: SHIPPED | OUTPATIENT
Start: 2020-03-20 | End: 2020-03-20

## 2020-03-20 RX ORDER — FLUTICASONE PROPIONATE 50 MCG
SPRAY, SUSPENSION (ML) NASAL
Qty: 48 G | Refills: 3 | Status: SHIPPED | OUTPATIENT
Start: 2020-03-20 | End: 2020-05-19

## 2020-03-20 NOTE — TELEPHONE ENCOUNTER
Pts daughter called and stated that the pt has a sore throat and cough     Please advise     No travel. Pt doesn't speak english, she will need daughter to translate.  Please call daughter around 3pm

## 2020-03-20 NOTE — TELEPHONE ENCOUNTER
Virtual/Telephone Check-In    [employer] verbally consents to a [employer] on 03/20/20. Patient understands and accepts financial responsibility for any deductible, co-insurance and/or co-pays associated with this service.

## 2020-03-20 NOTE — TELEPHONE ENCOUNTER
Pharmacy  Is requesting 90 days     Please advise,    LOV:11/2/19 AD  FOV:5/23/20  LAST RX:3/20/20 50 mcg 2 sprays daily 1 bottle 1 refill  LAST LABS: 2/1/20a1c,microalb,cbc  PER PROTOCOL: to provider

## 2020-03-30 RX ORDER — BLOOD SUGAR DIAGNOSTIC
STRIP MISCELLANEOUS
Qty: 300 STRIP | Refills: 6 | Status: SHIPPED | OUTPATIENT
Start: 2020-03-30

## 2020-04-21 ENCOUNTER — APPOINTMENT (OUTPATIENT)
Dept: GENERAL RADIOLOGY | Age: 65
DRG: 872 | End: 2020-04-21
Attending: EMERGENCY MEDICINE
Payer: MEDICARE

## 2020-04-21 ENCOUNTER — APPOINTMENT (OUTPATIENT)
Dept: CT IMAGING | Age: 65
DRG: 872 | End: 2020-04-21
Attending: EMERGENCY MEDICINE
Payer: MEDICARE

## 2020-04-21 ENCOUNTER — HOSPITAL ENCOUNTER (INPATIENT)
Facility: HOSPITAL | Age: 65
LOS: 3 days | Discharge: HOME | DRG: 872 | End: 2020-04-25
Attending: EMERGENCY MEDICINE | Admitting: HOSPITALIST
Payer: MEDICARE

## 2020-04-21 ENCOUNTER — VIRTUAL PHONE E/M (OUTPATIENT)
Dept: INTERNAL MEDICINE CLINIC | Facility: CLINIC | Age: 65
End: 2020-04-21
Payer: MEDICARE

## 2020-04-21 DIAGNOSIS — N12 PYELONEPHRITIS: Primary | ICD-10-CM

## 2020-04-21 DIAGNOSIS — R11.14 BILIOUS VOMITING WITH NAUSEA: ICD-10-CM

## 2020-04-21 DIAGNOSIS — R10.9 LEFT FLANK PAIN: ICD-10-CM

## 2020-04-21 DIAGNOSIS — R50.9 FEVER AND CHILLS: Primary | ICD-10-CM

## 2020-04-21 DIAGNOSIS — R73.9 HYPERGLYCEMIA: ICD-10-CM

## 2020-04-21 DIAGNOSIS — R10.32 LLQ ABDOMINAL PAIN: ICD-10-CM

## 2020-04-21 PROBLEM — D69.6 THROMBOCYTOPENIA (HCC): Status: ACTIVE | Noted: 2020-04-21

## 2020-04-21 PROBLEM — N17.9 ACUTE KIDNEY INJURY (HCC): Status: ACTIVE | Noted: 2020-04-21

## 2020-04-21 PROBLEM — D72.829 LEUKOCYTOSIS: Status: ACTIVE | Noted: 2020-04-21

## 2020-04-21 PROBLEM — E87.1 HYPONATREMIA: Status: ACTIVE | Noted: 2020-04-21

## 2020-04-21 PROBLEM — N17.9 ACUTE KIDNEY INJURY: Status: ACTIVE | Noted: 2020-04-21

## 2020-04-21 PROBLEM — D69.6 THROMBOCYTOPENIA: Status: ACTIVE | Noted: 2020-04-21

## 2020-04-21 PROCEDURE — 74176 CT ABD & PELVIS W/O CONTRAST: CPT | Performed by: EMERGENCY MEDICINE

## 2020-04-21 PROCEDURE — 99223 1ST HOSP IP/OBS HIGH 75: CPT | Performed by: HOSPITALIST

## 2020-04-21 PROCEDURE — 71045 X-RAY EXAM CHEST 1 VIEW: CPT | Performed by: EMERGENCY MEDICINE

## 2020-04-21 PROCEDURE — G2012 BRIEF CHECK IN BY MD/QHP: HCPCS | Performed by: INTERNAL MEDICINE

## 2020-04-21 RX ORDER — METOCLOPRAMIDE HYDROCHLORIDE 5 MG/ML
5 INJECTION INTRAMUSCULAR; INTRAVENOUS EVERY 8 HOURS PRN
Status: DISCONTINUED | OUTPATIENT
Start: 2020-04-21 | End: 2020-04-25

## 2020-04-21 RX ORDER — IBUPROFEN 400 MG/1
400 TABLET ORAL EVERY 4 HOURS PRN
Status: DISCONTINUED | OUTPATIENT
Start: 2020-04-21 | End: 2020-04-25

## 2020-04-21 RX ORDER — IBUPROFEN 200 MG
200 TABLET ORAL EVERY 4 HOURS PRN
Status: DISCONTINUED | OUTPATIENT
Start: 2020-04-21 | End: 2020-04-25

## 2020-04-21 RX ORDER — ENOXAPARIN SODIUM 100 MG/ML
40 INJECTION SUBCUTANEOUS DAILY
Status: DISCONTINUED | OUTPATIENT
Start: 2020-04-22 | End: 2020-04-25

## 2020-04-21 RX ORDER — ATORVASTATIN CALCIUM 20 MG/1
20 TABLET, FILM COATED ORAL NIGHTLY
Status: DISCONTINUED | OUTPATIENT
Start: 2020-04-21 | End: 2020-04-21

## 2020-04-21 RX ORDER — ACETAMINOPHEN 325 MG/1
650 TABLET ORAL EVERY 6 HOURS PRN
Status: DISCONTINUED | OUTPATIENT
Start: 2020-04-21 | End: 2020-04-25

## 2020-04-21 RX ORDER — LEVOFLOXACIN 5 MG/ML
500 INJECTION, SOLUTION INTRAVENOUS ONCE
Status: COMPLETED | OUTPATIENT
Start: 2020-04-21 | End: 2020-04-21

## 2020-04-21 RX ORDER — METOCLOPRAMIDE HYDROCHLORIDE 5 MG/ML
10 INJECTION INTRAMUSCULAR; INTRAVENOUS EVERY 8 HOURS PRN
Status: DISCONTINUED | OUTPATIENT
Start: 2020-04-21 | End: 2020-04-21

## 2020-04-21 RX ORDER — ALPRAZOLAM 0.5 MG/1
0.5 TABLET ORAL NIGHTLY PRN
Status: DISCONTINUED | OUTPATIENT
Start: 2020-04-21 | End: 2020-04-25

## 2020-04-21 RX ORDER — ASPIRIN 81 MG/1
81 TABLET ORAL DAILY
Status: DISCONTINUED | OUTPATIENT
Start: 2020-04-22 | End: 2020-04-25

## 2020-04-21 RX ORDER — ONDANSETRON 2 MG/ML
4 INJECTION INTRAMUSCULAR; INTRAVENOUS EVERY 6 HOURS PRN
Status: DISCONTINUED | OUTPATIENT
Start: 2020-04-21 | End: 2020-04-25

## 2020-04-21 RX ORDER — IBUPROFEN 600 MG/1
600 TABLET ORAL EVERY 4 HOURS PRN
Status: DISCONTINUED | OUTPATIENT
Start: 2020-04-21 | End: 2020-04-25

## 2020-04-21 RX ORDER — SODIUM CHLORIDE 9 MG/ML
INJECTION, SOLUTION INTRAVENOUS CONTINUOUS
Status: DISCONTINUED | OUTPATIENT
Start: 2020-04-21 | End: 2020-04-25

## 2020-04-21 RX ORDER — DEXTROSE MONOHYDRATE 25 G/50ML
50 INJECTION, SOLUTION INTRAVENOUS
Status: DISCONTINUED | OUTPATIENT
Start: 2020-04-21 | End: 2020-04-25

## 2020-04-21 RX ORDER — INSULIN ASPART 100 [IU]/ML
0.15 INJECTION, SOLUTION INTRAVENOUS; SUBCUTANEOUS ONCE
Status: COMPLETED | OUTPATIENT
Start: 2020-04-21 | End: 2020-04-21

## 2020-04-21 NOTE — PLAN OF CARE
Patient A&O x4  Dx: Pylonephritis with UTI   Sepsis protocal  Carb controlled 1800 diet. NSR on tele. VSS. Accucheck 3 times daily with meals. IV Zosyn   Levaquin for UTI  Will continue to monitor.        Problem: Diabetes/Glucose Control  Goal: Glucose

## 2020-04-21 NOTE — H&P
[facility]  History and Physical     Stone Dodson Patient Status:  Observation    1955 MRN [id number]   [facility] 3NE-A Attending Constantin Logan MD   Hosp Day # 0 PCP Erma Stuart MD     Chief Complaint: Left flank pain Problems with swallowing    • Sleep disturbance    • Stented coronary artery    • Stress    • Type II or unspecified type diabetes mellitus without mention of complication, not stated as uncontrolled    • Uncomfortable fullness after meals    • Unspecified prior to encounter.    Glucose Blood (ONETOUCH VERIO) In Vitro Strip, TEST THREE TIMES DAILY, Disp: 300 strip, Rfl: 6  FLUTICASONE PROPIONATE 50 MCG/ACT Nasal Suspension, SHAKE LIQUID AND USE 2 SPRAYS IN EACH NOSTRIL DAILY, Disp: 48 g, Rfl: 3  JARDIANCE 25 Unknown)   SpO2 97%   BMI 26.15 kg/m²   General: No acute distress. Alert and oriented x 3. HEENT: Normocephalic atraumatic. Moist mucous membranes. Respiratory: Clear to auscultation bilaterally  Cardiovascular: S1, S2. Regular rate and rhythm.  No murm

## 2020-04-21 NOTE — ED PROVIDER NOTES
Patient Seen in: [facility] In [place]      History   Patient presents with:  Abdominal Pain  Fever    Stated Complaint: sent from PCP office. abd pain x 5 days, fever and chills, diarrhea.     HPI    Yessy Fagan is a 71-year-old female pres Flatulence/gas pain/belching    • Food intolerance    • Frequent use of laxatives    • Hearing loss    • High blood pressure    • High cholesterol    • History of depression    • History of eating disorder    • Indigestion    • Irregular bowel habits    • Left 10/22/2014    Performed by Shey Funk MD at [facility] MAIN OR                    Social History    Tobacco Use      Smoking status: Never Smoker      Smokeless tobacco: Never Used    Alcohol use: No      Frequency: Never      Binge frequency: Never (*)     Chloride 97 (*)     Creatinine 1.46 (*)     GFR, Non- 38 (*)     GFR, -American 43 (*)     Albumin 3.3 (*)     A/G Ratio 0.9 (*)     All other components within normal limits   URINALYSIS WITH CULTURE REFLEX - Abnormal; Notab Please view results for these tests on the individual orders.    SCAN SLIDE   LACTIC ACID 3 HR POST POSITIVE   RAINBOW DRAW BLUE   RAINBOW DRAW LAVENDER   RAINBOW DRAW LIGHT GREEN   RAINBOW DRAW GOLD   BLOOD CULTURE   BLOOD CULTURE   URINE CULTURE, ROUT pain and left-sided back pain. Fever and diarrhea. TECHNIQUE:  Unenhanced multislice CT scanning from above the kidneys to below the urinary bladder.   2D rendering are generated on the CT scanner workstation to localize potential stones in the cranio-cau (cpt=71045)    Result Date: 4/21/2020  PROCEDURE:  XR CHEST AP PORTABLE  (CPT=71045)  TECHNIQUE:  AP chest radiograph was obtained. COMPARISON:  [facility], XR, XR CHEST AP PORTABLE  (CPT=71010), 1/22/2017, 5:25 PM.  INDICATIONS:  sent from PCP office.  ab

## 2020-04-21 NOTE — PROGRESS NOTES
Keith Grubre  2/9/1955    No chief complaint on file. Rachele Rodriguez is a 72year old female who presents with multiple symptoms.     The patient was in her usual state of health until five days ago when she began experiencing the sudd Does not apply Misc Use four times daily 360 each 5   • Insulin Syringe 31G X 5/16\" 1 ML Does not apply Misc USE ONCE DAILY 100 each 3   • Montelukast Sodium 10 MG Oral Tab Take 1 tablet (10 mg total) by mouth once daily. (Patient taking differently:  Take or unspecified type diabetes mellitus without mention of complication, not stated as uncontrolled    • Uncomfortable fullness after meals    • Unspecified essential hypertension    • Venous insufficiency of both lower extremities 3/16/2018   • Visual impai complications, bilateral     Localized edema     Compression of vein     N&V (nausea and vomiting)     Muscle cramps     Dyslipidemia     Gastroesophageal reflux disease without esophagitis     Dysphagia     Unspecified abdominal pain     Iron deficiency a directed the patient to the ED for prompt evaluation and management. Differential includes pyelonephritis vs diverticulitis vs nephrolithiasis. The patient and daughter are agreeable. A sign out was given to the [facility].     The patient indicates unde

## 2020-04-21 NOTE — ED INITIAL ASSESSMENT (HPI)
C/o 5 day of intermittent l flank pain with radiation to l side abd--also c/o vomiting - no diarrhea-- foul odor with slight burning with urination last week-- frequency last week

## 2020-04-21 NOTE — CONSULTS
[facility] Pharmacy Note:  Renal Dose Adjustment for Metoclopramide (REGLAN)    Bibiana Jeffrey has been prescribed Metoclopramide (REGLAN) 10 mg every 8 hours as needed for nausea    Estimated Creatinine Clearance: 36 mL/min (A) (based on SCr of 1.46 mg/dL (H)).

## 2020-04-22 PROCEDURE — 99233 SBSQ HOSP IP/OBS HIGH 50: CPT | Performed by: INTERNAL MEDICINE

## 2020-04-22 NOTE — PLAN OF CARE
Pt A/O X4  Pt mostly Divehi speaking,  needed at times  Up voiding to bathroom  IVF 0.9 NS infusion changed to 100 ml/hour  Afebrile  Left flank pain, tylenol given with relief  Dr. Elton Gunn notified of positive blood cultures and ID consult

## 2020-04-22 NOTE — CONSULTS
INFECTIOUS DISEASE CONSULT NOTE    Keith Grubre Patient Status:  Observation    1955 MRN [id number]   [facility] 3NE-A Attending Bryant Wade MD   Hosp Day # 0 VEE Dyson Do Osteoporosis    • Other and unspecified hyperlipidemia    • Pain in joints    • Peripheral neuropathy    • Problems with swallowing    • Sleep disturbance    • Stented coronary artery    • Stress    • Type II or unspecified type diabetes mellitus without m Facility-Administered Medications:   •  Piperacillin Sod-Tazobactam So (ZOSYN) 3.375 g in dextrose 5 % 100 mL ADD-vantage, 3.375 g, Intravenous, Q8H  •  aspirin EC tab 81 mg, 81 mg, Oral, Daily  •  ALPRAZolam (XANAX) tab 0.5 mg, 0.5 mg, Oral, Nightly PRN pruritus. Hematologic/lymphatic: Negative for easy bruising, bleeding  Musculoskeletal: Negative for arthritis.   Neurological: Negative for dizziness, focal neuro deficits  Behavioral/Psych: Negative for anxiety or depression    Physical Exam:    General: Hospital Encounter on 04/21/20   1.  BLOOD CULTURE     Status: Abnormal (Preliminary result)    Collection Time: 04/21/20  1:00 PM   Result Value Ref Range    Blood Culture Result Pending (A) N/A    Blood Culture Smear Gram Negative Rods (A) N/A   2. URIN or atrophy. SPLEEN:  No enlargement or focal lesion. AORTA/VASCULAR:  No aneurysm. RETROPERITONEUM:  No mass or adenopathy. BOWEL/MESENTERY:  No visible mass, obstruction, or bowel wall thickening. ABDOMINAL WALL:  No mass or hernia.   BONES:  No bony able  · Recheck Bcx to doc clearance  · Follow wbc to doc resolution of leukocytosis, recheck in am  follow temps, keep in mind that is not uncommon for pts with pyelo to cont to have fevers for up to 72 hr after appropriate abx therapy has been instituted

## 2020-04-22 NOTE — PLAN OF CARE
PT A/O, MOSTLY Moroccan SPEAKING, 97% ON RA, SR/BBB, UP VOIDING IN BATHROOM, IVF INFUSING, AFEBRILE, C/O OF HEADACHE AND LT FLANK PAIN, GIVEN TYLENOL WITH RELIEF, INSTRUCTED PT ON POC, LABS IN AM  0715- NOTIFIED DR. FERREIRA OF POSITIVE BLOOD CULTURES.

## 2020-04-22 NOTE — PROGRESS NOTES
[facility]  Progress Note     Elio Cruz Patient Status:  Observation    1955 MRN [id number]   [facility] 3NE-A Attending Iesha Diez MD   Hosp Day # 0 PCP Ravi Garcia MD     Chief Complaint:  Left flank pain   S: antihypertensives  3. Follow-up cultures  4. LA lower < 2.0   5. WBC  18.3   No fevers   3. Hyponatremia  Na 144   Better   4. Acute kidney injury, suspect pre-renal  1. IVF  Reduce   2. Cr lower   3. Monitor UOP, creatinine and electrolytes  5.  CAD sp PCI

## 2020-04-23 PROCEDURE — 99232 SBSQ HOSP IP/OBS MODERATE 35: CPT | Performed by: INTERNAL MEDICINE

## 2020-04-23 NOTE — PROGRESS NOTES
[facility]  Progress Note     Junie Rogers Patient Status:  Observation    1955 MRN [id number]   [facility] 3NE-A Attending Hammad Christianson MD   Hosp Day # 1 PCP William Arenas MD     Chief Complaint:  Left flank pain   S: 6. WBC lower     2. Leukocytosis  1. Zosyn dc'ed and changed to merrem  per ID  2. Hold antihypertensives  3. Follow-up cultures  4. LA lower < 2.0   5. WBC  18.3   No fevers   3. Hyponatremia  Na 144   Better   4.  Acute kidney injury, suspect pre-renal

## 2020-04-23 NOTE — PROGRESS NOTES
INFECTIOUS DISEASE PROGRESS NOTE    Chaz Romero Patient Status:  Observation    1955 MRN [id number]   [facility] 3NE-A Attending Phoebe Aleman MD   Hosp Day # 1 VEE SHAVER and negatives above    Physical Exam:    General: No acute distress. Alert and oriented x 3. Vital signs: Blood pressure 136/56, pulse 62, temperature 97.9 °F (36.6 °C), temperature source Oral, resp.  rate 13, height 5' 6\" (1.676 m), weight 162 lb (73.5 04/22/20  7:27 AM   Result Value Ref Range    Blood Culture Result No Growth 1 Day N/A   2. URINE CULTURE, ROUTINE     Status: Abnormal    Collection Time: 04/21/20 12:09 PM   Result Value Ref Range    Urine Culture 50,000-99,000 CFU/ML Escherichia coli  E includes the Dose Index Registry. PATIENT STATED HISTORY: (As transcribed by Technologist)  Patient has pain to left back and lower abdomen, fever and diarrhea. FINDINGS:  KIDNEYS:  No evidence of renal stones or hydronephrosis.   There is mild nonspeci stents. FINDINGS:  The lungs are clear. Cardiomediastinal silhouette and vascularity are unremarkable. No significant osseous abnormalities. CONCLUSION:  Exam is within normal limits. Dictated by:  Ludmila Cabral DO on 4/21/2020 at 12:34 PM     Mohinder Harris

## 2020-04-23 NOTE — CM/SW NOTE
IM verbally given to patient by UR. Patient does not wish a copy, notification sent to HIM for scanning.

## 2020-04-23 NOTE — PLAN OF CARE
Pt A&Ox4. Surinamese speaking, but able to communicate some in [address] NSR. RA. IV zosyn switched to IV merrem. Urine culture from 4/21 showing +ESBL. Contact isolation initiated.  Updated daughter via facetime this am. Per daughter pt is telling her that she

## 2020-04-23 NOTE — CM/SW NOTE
MSW completed assessment with Language line # [id number]. Pt is home with spouse and dtr. She has no DME, no HHC. She can do all her ADL's except drive. No identified needs at this time, MSW will remain available should needs change.

## 2020-04-23 NOTE — PLAN OF CARE
Assumed care at [facility]. Pt is A&O x4, Comoran speaking but understands english. Used  at times. On Ra, . NSR on tele. VSS. No fevers noted throughout the night. Pt c/o flank pain, tylenol given Prn per MAR.  C/o minor burning with urinati

## 2020-04-24 ENCOUNTER — PATIENT OUTREACH (OUTPATIENT)
Dept: CASE MANAGEMENT | Age: 65
End: 2020-04-24

## 2020-04-24 PROCEDURE — 99232 SBSQ HOSP IP/OBS MODERATE 35: CPT | Performed by: INTERNAL MEDICINE

## 2020-04-24 RX ORDER — CIPROFLOXACIN 500 MG/1
500 TABLET, FILM COATED ORAL 2 TIMES DAILY
Qty: 24 TABLET | Refills: 0 | Status: SHIPPED | OUTPATIENT
Start: 2020-04-24 | End: 2020-05-06

## 2020-04-24 NOTE — PLAN OF CARE
Assumed care at 0730. Pt a/ox4, VSS, on RA, NSR with BBB on telemetry. Pt with pain to left flank, PRN medication per MAR. No c/o n/v/d, SOB, dizziness/lightheadedness. No difficulty/burning with urination at this time. Contact isolation maintained.  IV darryl

## 2020-04-24 NOTE — PROGRESS NOTES
INFECTIOUS DISEASE PROGRESS NOTE    Joseph Muir Patient Status:  Observation    1955 MRN [id number]   [facility] 3NE-A Attending Padilla Wilkerson MD   Hosp Day # 2 PCP Kiel SHAVER Intravenous, Q8H PRN    Review of Systems:  Completed. See pertinent positives and negatives above. Physical Exam:  General: No acute distress. Alert and oriented x 3.   Vital signs: Blood pressure 126/45, pulse 70, temperature 98.1 °F (36.7 °C), tempera on 04/21/20   1.  BLOOD CULTURE     Status: None (Preliminary result)    Collection Time: 04/22/20  7:27 AM   Result Value Ref Range    Blood Culture Result No Growth 2 Days N/A   2. URINE CULTURE, ROUTINE     Status: Abnormal    Collection Time: 04/21/20 1 leukocytosis--> will repeat CBC in am  · Continue to monitor right ab/flank pain  · Follow temps--> afebrile for >24hrs; however, it is not uncommon for pts with pyelo to cont to have fevers for up to 72 hr after appropriate abx therapy has been instituted

## 2020-04-24 NOTE — PROGRESS NOTES
[facility]  Progress Note     Eric Pierce Patient Status:  Observation    1955 MRN [id number]   [facility] 3NE-A Attending Merlene Conroy MD   Hosp Day # 2 PCP Jorge Alberto Stephens MD     Chief Complaint:  Left flank pain   S: dc'ed and changed to merrem  per ID  2. Hold antihypertensives  3. Follow-up cultures  4. LA lower < 2.0   5. WBC  18.3   No fevers   3. Hyponatremia  Na 144   Better   4. Acute kidney injury, suspect pre-renal  1. IVF  Reduce   5. CAD sp PCI  6.  Essential

## 2020-04-24 NOTE — PLAN OF CARE
A&Ox4. Room air. Georgian speaking. Understands some English. Up ad tila. Complains of mild left flank pain. PRN tylenol given with relief. QID accu checks. Insulin coverage per MAR. IV merrem Q8. PIV- IVF. Contact isolation for ESBL. No further needs.  Staff

## 2020-04-24 NOTE — PROGRESS NOTES
1st attempt [facility] apt request    [place]   [phone]. [address], [address]   ( parking available, M – F, 8 am – 6 pm)*    Apt made Wed. April 29th @8am

## 2020-04-25 VITALS
RESPIRATION RATE: 16 BRPM | HEART RATE: 67 BPM | TEMPERATURE: 98 F | BODY MASS INDEX: 26.03 KG/M2 | OXYGEN SATURATION: 97 % | SYSTOLIC BLOOD PRESSURE: 125 MMHG | HEIGHT: 66 IN | WEIGHT: 162 LBS | DIASTOLIC BLOOD PRESSURE: 55 MMHG

## 2020-04-25 PROCEDURE — 99239 HOSP IP/OBS DSCHRG MGMT >30: CPT | Performed by: INTERNAL MEDICINE

## 2020-04-25 NOTE — PLAN OF CARE
Assumed patient care at 1900  Patient A&O x 4  Kinyarwanda speaking   services needed at times  Mild complaints of (L) flank pain but declined pain medication  VSS  Tele-SR with BBB  Afebrile  Lovenox Sub Q  No complaints of pain/burning with urinat

## 2020-04-25 NOTE — PROGRESS NOTES
[facility]  Progress Note     Audrey Schneider Patient Status:  Observation    1955 MRN [id number]   [facility] 3NE-A Attending Merlene Ward MD   Hosp Day # 3 PCP Javon Irby MD     Chief Complaint:  Left flank pain     S bacteremia-  1. E coli in urine/ blood   2. E coli by PCR  Blood   3. Repeat cx  NGTD   4. ID following   5. continue merrem, transition to PO CIPRO on DC  6. WBC lower     2. Leukocytosis  1. Improved on abx  3. Hyponatremia  4.  Acute kidney injury, suspe

## 2020-04-25 NOTE — DISCHARGE SUMMARY
[facility]  DISCHARGE SUMMARY     [place] Patient Status:  Inpatient    1955 MRN [id number]   [facility] 3NE-A Attending Vinicius Avilze MD   Hosp Day # 3 PCP Negro Thayer MD     Date of Admission: 2020  Date of D as: LEVEMIR  Replaces:  Lantus SoloStar 100 UNIT/ML Sopn      Inject 32 Units into the skin daily.    Quantity:  1 vial  Refills:  1        CONTINUE taking these medications      Instructions Prescription details   ALPRAZolam 0.5 MG Tabs  Commonly known as [address], [phone], [address], [address] [id number]    Phone:  [phone]   · insulin detemir 100 UNIT/ML Sopn     Please  your prescriptions at the location directed by your doctor or nurse    Bring a paper p

## 2020-04-26 NOTE — PROGRESS NOTES
NURSING DISCHARGE NOTE    Discharged Home via [place]. Accompanied by Support staff  Belongings Taken by patient/family. Discharge paperwork and prescription provided and explained via . All questions and concerns addressed.  Pt escorted by

## 2020-04-27 ENCOUNTER — PATIENT OUTREACH (OUTPATIENT)
Dept: CASE MANAGEMENT | Age: 65
End: 2020-04-27

## 2020-04-27 DIAGNOSIS — Z79.4 TYPE 2 DIABETES MELLITUS WITH DIABETIC NEUROPATHY, WITH LONG-TERM CURRENT USE OF INSULIN (HCC): ICD-10-CM

## 2020-04-27 DIAGNOSIS — A41.9 SEPTICEMIA (HCC): ICD-10-CM

## 2020-04-27 DIAGNOSIS — I10 BENIGN ESSENTIAL HYPERTENSION: Primary | ICD-10-CM

## 2020-04-27 DIAGNOSIS — E11.40 TYPE 2 DIABETES MELLITUS WITH DIABETIC NEUROPATHY, WITH LONG-TERM CURRENT USE OF INSULIN (HCC): ICD-10-CM

## 2020-04-27 DIAGNOSIS — Z02.9 ENCOUNTERS FOR UNSPECIFIED ADMINISTRATIVE PURPOSE: ICD-10-CM

## 2020-04-27 DIAGNOSIS — E11.49 DIABETES MELLITUS TYPE 2 WITH NEUROLOGICAL MANIFESTATIONS (HCC): ICD-10-CM

## 2020-04-27 PROCEDURE — 1111F DSCHRG MED/CURRENT MED MERGE: CPT

## 2020-04-27 NOTE — PROGRESS NOTES
Initial Post Discharge Follow Up   Discharge Date: 4/25/20  Contact Date: 4/27/2020    Consent Verification:  Assessment Completed With: Patient; along with daughter, Jt Larsen Verified?   Yes    Discharge Dx:     Sepsis d/t acute pyelonephritis/ extreme drowsiness. The patient did also admit to slight dizziness since discharge but denies any light headedness, trouble with balance/ coordination, or an abnormal gait.  NCM stressed the importance to continue to monitor symptoms and to contact the radha In Vitro Strip TEST THREE TIMES DAILY 300 strip 6   • FLUTICASONE PROPIONATE 50 MCG/ACT Nasal Suspension SHAKE LIQUID AND USE 2 SPRAYS IN EACH NOSTRIL DAILY 48 g 3   • JARDIANCE 25 MG Oral Tab TAKE 1 TABLET BY MOUTH DAILY 90 tablet 0   • RAMIPRIL 2.5 MG Or above, have you scheduled these other services?    no       DME ordered at D/C? No        Needs post D/C:   Now that you are home, are there any needs or concerns you need addressed before your next visit with your PCP?  (DME, meds, disease concerns, Etc): Overall Rating:    How would you rate the care you received while in the hospital?  good     Interventions by NCM:  Reviewed all discharge instructions with the patient and the patient's daughter with a focus on fall precautions and DM/UTI/HTN managemen

## 2020-04-29 ENCOUNTER — VIRTUAL PHONE E/M (OUTPATIENT)
Dept: INTERNAL MEDICINE CLINIC | Facility: CLINIC | Age: 65
End: 2020-04-29
Payer: MEDICARE

## 2020-04-29 DIAGNOSIS — E11.40 TYPE 2 DIABETES MELLITUS WITH DIABETIC NEUROPATHY, WITH LONG-TERM CURRENT USE OF INSULIN (HCC): ICD-10-CM

## 2020-04-29 DIAGNOSIS — Z79.4 TYPE 2 DIABETES MELLITUS WITH DIABETIC NEUROPATHY, WITH LONG-TERM CURRENT USE OF INSULIN (HCC): ICD-10-CM

## 2020-04-29 DIAGNOSIS — K59.09 OTHER CONSTIPATION: ICD-10-CM

## 2020-04-29 DIAGNOSIS — N12 PYELONEPHRITIS: Primary | ICD-10-CM

## 2020-04-29 DIAGNOSIS — I25.10 CORONARY ARTERY DISEASE INVOLVING NATIVE CORONARY ARTERY OF NATIVE HEART WITHOUT ANGINA PECTORIS: ICD-10-CM

## 2020-04-29 DIAGNOSIS — I10 BENIGN ESSENTIAL HYPERTENSION: ICD-10-CM

## 2020-04-29 DIAGNOSIS — N17.9 ACUTE KIDNEY INJURY (HCC): ICD-10-CM

## 2020-04-29 DIAGNOSIS — E78.2 MIXED HYPERLIPIDEMIA: ICD-10-CM

## 2020-04-29 DIAGNOSIS — A41.9 SEPTICEMIA (HCC): ICD-10-CM

## 2020-04-29 DIAGNOSIS — D69.6 THROMBOCYTOPENIA (HCC): ICD-10-CM

## 2020-04-29 PROCEDURE — 99443 PHONE E/M BY PHYS 21-30 MIN: CPT | Performed by: CLINICAL NURSE SPECIALIST

## 2020-04-29 RX ORDER — MEDICAL SUPPLY, MISCELLANEOUS
EACH MISCELLANEOUS
Qty: 1 EACH | Refills: 0 | Status: SHIPPED | OUTPATIENT
Start: 2020-04-29 | End: 2021-08-17 | Stop reason: CLARIF

## 2020-04-29 RX ORDER — BISACODYL 10 MG
10 SUPPOSITORY, RECTAL RECTAL ONCE
Qty: 1 SUPPOSITORY | Refills: 0 | Status: SHIPPED | OUTPATIENT
Start: 2020-04-29 | End: 2020-04-29

## 2020-04-29 RX ORDER — MONTELUKAST SODIUM 10 MG/1
10 TABLET ORAL DAILY PRN
COMMUNITY
End: 2020-10-17

## 2020-04-29 NOTE — PROGRESS NOTES
Virtual/Telephone Check-In    [employer] verbally consents to a [employer] on 04/29/20. Patient understands and accepts financial responsibility for any deductible, co-insurance and/or co-pays associated with this service. your provider. Please be available at your phone so that your physician can contact you, and be prepared with any questions or concerns. You may be billed a copay at a later time, depending upon your insurance. As always, your health is our priority.

## 2020-04-29 NOTE — PROGRESS NOTES
TRANSITIONAL CARE CLINIC PHARMACIST MEDICATION RECONCILIATION        Kadi Pryor MRN [id number]    1955 PCP Kwadwo Flores MD       Comments: Medication history completed by the  [address] [place] [address] Pharmacist with the patient's daughter Kevon Renteria directed. Denies any upset stomach, but is complaining of constipation since starting the medication. Educated the patient to avoid any dairy or calcium products within 2 hours of the antibiotic. Patient reports only taking the medication with water.   A

## 2020-04-30 NOTE — TELEPHONE ENCOUNTER
Last Ov: 2/8/20, AD, med F/U  Upcoming appt: 5/4/20, AD  Last labs: BMP, CBC 4/25/20  Last Rx: sertraline 50mg, #90, 0R 2/4/20    Per Protocol, not on protocol. Rx pending.

## 2020-05-04 ENCOUNTER — VIRTUAL PHONE E/M (OUTPATIENT)
Dept: INTERNAL MEDICINE CLINIC | Facility: CLINIC | Age: 65
End: 2020-05-04
Payer: MEDICARE

## 2020-05-04 DIAGNOSIS — A41.9 SEPTICEMIA (HCC): ICD-10-CM

## 2020-05-04 DIAGNOSIS — M85.88 OSTEOPENIA OF LUMBAR SPINE: ICD-10-CM

## 2020-05-04 DIAGNOSIS — I25.10 CORONARY ARTERY DISEASE INVOLVING NATIVE CORONARY ARTERY OF NATIVE HEART WITHOUT ANGINA PECTORIS: ICD-10-CM

## 2020-05-04 DIAGNOSIS — N12 PYELONEPHRITIS: Primary | ICD-10-CM

## 2020-05-04 DIAGNOSIS — E11.40 TYPE 2 DIABETES MELLITUS WITH DIABETIC NEUROPATHY, WITH LONG-TERM CURRENT USE OF INSULIN (HCC): ICD-10-CM

## 2020-05-04 DIAGNOSIS — Z79.4 TYPE 2 DIABETES MELLITUS WITH DIABETIC NEUROPATHY, WITH LONG-TERM CURRENT USE OF INSULIN (HCC): ICD-10-CM

## 2020-05-04 DIAGNOSIS — N17.9 ACUTE KIDNEY INJURY (HCC): ICD-10-CM

## 2020-05-04 DIAGNOSIS — Z78.0 POSTMENOPAUSAL: ICD-10-CM

## 2020-05-04 PROCEDURE — 99442 PHONE E/M BY PHYS 11-20 MIN: CPT | Performed by: INTERNAL MEDICINE

## 2020-05-04 NOTE — PROGRESS NOTES
[facility]  2/9/1955    No chief complaint on file. Adilson Pierce is a 72year old female who presents as a follow-up. The patient was recently hospitalized for pyelonephritis and septicemia, and SANDRA.  She improved clinically and 500 MG Oral Tab Take 1 tablet (500 mg total) by mouth 2 (two) times daily for 12 days. 24 tablet 0   • metFORMIN HCl 500 MG Oral Tab Take 1,000 mg by mouth 2 (two) times daily with meals.      • Glucose Blood (ONETOUCH VERIO) In Vitro Strip TEST THREE TIMES unspecified whether generalized or localized, unspecified site    • Osteoporosis    • Other and unspecified hyperlipidemia    • Pain in joints    • Peripheral neuropathy    • Problems with swallowing    • Sleep disturbance    • Stented coronary artery    • phalanx of right little finger with routine healing, subsequent encounter     Post-operative state     Bilateral leg edema     Venous insufficiency of both lower extremities     Varicose veins of both lower extremities with pain     Varicose veins of lower use: No      Frequency: Never      Binge frequency: Never    Drug use: No        OBJECTIVE:   Vital sign and physical evaluation were not completed during this virtual encounter.      ASSESSMENT AND PLAN:   Kaya Petersen is a 72year old female who presen

## 2020-05-19 RX ORDER — FLUTICASONE PROPIONATE 50 MCG
SPRAY, SUSPENSION (ML) NASAL
Qty: 48 G | Refills: 0 | Status: SHIPPED | OUTPATIENT
Start: 2020-05-19 | End: 2020-06-26

## 2020-05-19 RX ORDER — FLUTICASONE PROPIONATE 50 MCG
SPRAY, SUSPENSION (ML) NASAL
Qty: 16 G | Refills: 0 | Status: SHIPPED | OUTPATIENT
Start: 2020-05-19 | End: 2020-05-19

## 2020-05-23 ENCOUNTER — VIRTUAL PHONE E/M (OUTPATIENT)
Dept: INTERNAL MEDICINE CLINIC | Facility: CLINIC | Age: 65
End: 2020-05-23

## 2020-05-23 DIAGNOSIS — E11.40 TYPE 2 DIABETES MELLITUS WITH DIABETIC NEUROPATHY, WITH LONG-TERM CURRENT USE OF INSULIN (HCC): Primary | ICD-10-CM

## 2020-05-23 DIAGNOSIS — F41.9 ANXIETY: ICD-10-CM

## 2020-05-23 DIAGNOSIS — Z79.4 TYPE 2 DIABETES MELLITUS WITH DIABETIC NEUROPATHY, WITH LONG-TERM CURRENT USE OF INSULIN (HCC): Primary | ICD-10-CM

## 2020-05-23 DIAGNOSIS — R45.89 DEPRESSED MOOD: ICD-10-CM

## 2020-05-23 DIAGNOSIS — I10 BENIGN ESSENTIAL HYPERTENSION: ICD-10-CM

## 2020-05-23 PROCEDURE — 99442 PHONE E/M BY PHYS 11-20 MIN: CPT | Performed by: INTERNAL MEDICINE

## 2020-05-23 NOTE — PROGRESS NOTES
Eric Pierce  2/9/1955    No chief complaint on file. Brionna Oliveros is a 72year old female who presents as a follow-up.     During the patient's prior recent encounter she was advised increasing Levemir taken every morning around 9 A mg by mouth daily. • metFORMIN HCl 500 MG Oral Tab Take 1,000 mg by mouth 2 (two) times daily with meals.      • Glucose Blood (ONETOUCH VERIO) In Vitro Strip TEST THREE TIMES DAILY 300 strip 6   • JARDIANCE 25 MG Oral Tab TAKE 1 TABLET BY MOUTH DAILY 9 diabetes mellitus without mention of complication, not stated as uncontrolled    • Uncomfortable fullness after meals    • Unspecified essential hypertension    • Venous insufficiency of both lower extremities 3/16/2018   • Visual impairment     glasses Localized edema     Compression of vein     N&V (nausea and vomiting)     Muscle cramps     Dyslipidemia     Gastroesophageal reflux disease without esophagitis     Dysphagia     Unspecified abdominal pain     Iron deficiency anemia, unspecified     Benig Uncontrolled  Worsening control attributed to changes in lifestyle management during time of public health crisis and subsequent 6 pound weight gain  Lifestyle management discussed in length  Further increase Levemir from 36 units to 40 units into unit i

## 2020-06-15 NOTE — TELEPHONE ENCOUNTER
Last Ov: 5/23/20, AD, Virtual visit  Last labs: BMP, CBC 4/25/20  Last Rx: Jardiance 25mg, #90, 0R 3/19/20    Future Appointments   Date Time Provider Margoth Silveira   8/25/2020 10:30 AM [facility]ady Rodrigues [id number]       Per Protocol -

## 2020-06-16 RX ORDER — EMPAGLIFLOZIN 25 MG/1
TABLET, FILM COATED ORAL
Qty: 90 TABLET | Refills: 0 | Status: ON HOLD | OUTPATIENT
Start: 2020-06-16 | End: 2020-07-17

## 2020-06-26 RX ORDER — FLUTICASONE PROPIONATE 50 MCG
SPRAY, SUSPENSION (ML) NASAL
Qty: 48 G | Refills: 0 | OUTPATIENT
Start: 2020-06-26

## 2020-06-26 RX ORDER — FLUTICASONE PROPIONATE 50 MCG
SPRAY, SUSPENSION (ML) NASAL
Qty: 16 G | Refills: 0 | Status: SHIPPED | OUTPATIENT
Start: 2020-06-26 | End: 2021-05-20

## 2020-06-29 ENCOUNTER — TELEPHONE (OUTPATIENT)
Dept: INTERNAL MEDICINE CLINIC | Facility: CLINIC | Age: 65
End: 2020-06-29

## 2020-06-29 NOTE — TELEPHONE ENCOUNTER
Received results from [facility] in regards to DM retinal exam.  Abstracted, MD reviewed.

## 2020-07-06 ENCOUNTER — TELEPHONE (OUTPATIENT)
Dept: INTERNAL MEDICINE CLINIC | Facility: CLINIC | Age: 65
End: 2020-07-06

## 2020-07-06 RX ORDER — METFORMIN HYDROCHLORIDE 500 MG/1
TABLET, EXTENDED RELEASE ORAL
Qty: 360 TABLET | Refills: 1 | OUTPATIENT
Start: 2020-07-06

## 2020-07-06 RX ORDER — ATORVASTATIN CALCIUM 10 MG/1
TABLET, FILM COATED ORAL
Qty: 90 TABLET | Refills: 1 | Status: SHIPPED | OUTPATIENT
Start: 2020-07-06 | End: 2020-07-10 | Stop reason: ALTCHOICE

## 2020-07-06 NOTE — TELEPHONE ENCOUNTER
Received fax from [place] with diabetic detailed order from, requires AD signature, placed in AD bin for review.

## 2020-07-06 NOTE — TELEPHONE ENCOUNTER
Med list reflects pt taking Metformin 500mg tablet, 1000mg BID  Rx denied, pt not taking Metformin ER    Other pass, refill sent.

## 2020-07-07 ENCOUNTER — TELEPHONE (OUTPATIENT)
Dept: INTERNAL MEDICINE CLINIC | Facility: CLINIC | Age: 65
End: 2020-07-07

## 2020-07-07 NOTE — TELEPHONE ENCOUNTER
Future Appointments   Date Time Provider Margoth Silveira   7/14/2020  5:00 PM Kiel Azevedo MD [facility]     Pt sched for pre op only 3 days prior to surgery-Dr. Aravind Larsen request pt have CBC/PT/PTT and CMP-if AD wants to add to this please enter

## 2020-07-07 NOTE — TELEPHONE ENCOUNTER
Future Appointments   Date Time Provider Margoth Silveira   7/14/2020  5:00 PM Ivonne Chin MD [facility]     Pt sched for surgery on 7/17 w/Dr. Anson Brock our form and put in blue folder

## 2020-07-08 ENCOUNTER — MED REC SCAN ONLY (OUTPATIENT)
Dept: INTERNAL MEDICINE CLINIC | Facility: CLINIC | Age: 65
End: 2020-07-08

## 2020-07-08 NOTE — TELEPHONE ENCOUNTER
Spoke with pt's daughter Toro Crespo (per HIPAA OK), notified of below message. She expressed understanding.

## 2020-07-09 NOTE — TELEPHONE ENCOUNTER
Prescription Refill Request - Patient advised can take 48-72 hours. Name of Medication (strength, dose, qty requested:    metFORMIN HCl 500 MG Oral Tab        Sig - Route:  Take 1,000 mg by mouth 2 (two) times daily with meals. - Oral          Which phar

## 2020-07-10 NOTE — TELEPHONE ENCOUNTER
Protocol failed due to Last HgBA1C < 7.5    Please advise,    LOV:2/8/20 AD  FOV:7/14/20  LAST RX:7/20/19 500 mg take 2 tabs by mouth twice a day 360 tabs 3 refills   LAST LABS:4/25/20 poct glucose,cbc,bmp  PER PROTOCOL: to provider

## 2020-07-10 NOTE — H&P
[address]    PATIENT'S NAME: Sonam Bain   ATTENDING PHYSICIAN: Chelsie Velez M.D.    PATIENT ACCOUNT#:   [de-identified]    LOCATION:    MEDICAL RECORD #:   [id number]       YOB: 1955  ADMISSION DATE:       07/17/2020    HISTORY AND P testosterone, constipation, abnormal EKG, allergic rhinitis, osteopenia, porokeratosis, wrist ganglion cyst, radial styloid tenosynovitis, ganglion tendon sheath, depression, left bundle branch block, gallstones, left ankle tendinitis, pancreatic cyst, cor hypertension. Paternal grandfather is . Sister has diabetes. REVIEW OF SYSTEMS:  Negative. PHYSICAL EXAMINATION:    GENERAL:  The patient stands 5 feet 6 inches and weighs 167 pounds for a BMI of 26.15. VITAL SIGNS:  Stable.   Temperature

## 2020-07-11 ENCOUNTER — LABORATORY ENCOUNTER (OUTPATIENT)
Dept: LAB | Age: 65
End: 2020-07-11
Payer: MEDICARE

## 2020-07-11 ENCOUNTER — LAB ENCOUNTER (OUTPATIENT)
Dept: LAB | Age: 65
End: 2020-07-11

## 2020-07-11 DIAGNOSIS — M17.11 PRIMARY OSTEOARTHRITIS OF RIGHT KNEE: ICD-10-CM

## 2020-07-11 DIAGNOSIS — E11.49 DIABETES MELLITUS TYPE 2 WITH NEUROLOGICAL MANIFESTATIONS (HCC): ICD-10-CM

## 2020-07-11 LAB
ALBUMIN SERPL-MCNC: 3.9 G/DL (ref 3.4–5)
ALBUMIN/GLOB SERPL: 1 {RATIO} (ref 1–2)
ALP LIVER SERPL-CCNC: 117 U/L (ref 50–130)
ALT SERPL-CCNC: 27 U/L (ref 13–56)
ANION GAP SERPL CALC-SCNC: 3 MMOL/L (ref 0–18)
ANTIBODY SCREEN: NEGATIVE
AST SERPL-CCNC: 23 U/L (ref 15–37)
BASOPHILS # BLD AUTO: 0.03 X10(3) UL (ref 0–0.2)
BASOPHILS NFR BLD AUTO: 0.5 %
BILIRUB SERPL-MCNC: 0.5 MG/DL (ref 0.1–2)
BUN BLD-MCNC: 17 MG/DL (ref 7–18)
BUN/CREAT SERPL: 18.7 (ref 10–20)
CALCIUM BLD-MCNC: 9.3 MG/DL (ref 8.5–10.1)
CHLORIDE SERPL-SCNC: 106 MMOL/L (ref 98–112)
CHOLEST SMN-MCNC: 248 MG/DL (ref ?–200)
CO2 SERPL-SCNC: 28 MMOL/L (ref 21–32)
CREAT BLD-MCNC: 0.91 MG/DL (ref 0.55–1.02)
DEPRECATED RDW RBC AUTO: 47.5 FL (ref 35.1–46.3)
EOSINOPHIL # BLD AUTO: 0.15 X10(3) UL (ref 0–0.7)
EOSINOPHIL NFR BLD AUTO: 2.7 %
ERYTHROCYTE [DISTWIDTH] IN BLOOD BY AUTOMATED COUNT: 15.2 % (ref 11–15)
EST. AVERAGE GLUCOSE BLD GHB EST-MCNC: 171 MG/DL (ref 68–126)
GLOBULIN PLAS-MCNC: 3.9 G/DL (ref 2.8–4.4)
GLUCOSE BLD-MCNC: 154 MG/DL (ref 70–99)
HBA1C MFR BLD HPLC: 7.6 % (ref ?–5.7)
HCT VFR BLD AUTO: 46.7 % (ref 35–48)
HDLC SERPL-MCNC: 48 MG/DL (ref 40–59)
HGB BLD-MCNC: 14.4 G/DL (ref 12–16)
IMM GRANULOCYTES # BLD AUTO: 0.02 X10(3) UL (ref 0–1)
IMM GRANULOCYTES NFR BLD: 0.4 %
LDLC SERPL CALC-MCNC: 150 MG/DL (ref ?–100)
LYMPHOCYTES # BLD AUTO: 1.08 X10(3) UL (ref 1–4)
LYMPHOCYTES NFR BLD AUTO: 19.6 %
M PROTEIN MFR SERPL ELPH: 7.8 G/DL (ref 6.4–8.2)
MCH RBC QN AUTO: 26.5 PG (ref 26–34)
MCHC RBC AUTO-ENTMCNC: 30.8 G/DL (ref 31–37)
MCV RBC AUTO: 86 FL (ref 80–100)
MONOCYTES # BLD AUTO: 0.5 X10(3) UL (ref 0.1–1)
MONOCYTES NFR BLD AUTO: 9.1 %
NEUTROPHILS # BLD AUTO: 3.73 X10 (3) UL (ref 1.5–7.7)
NEUTROPHILS # BLD AUTO: 3.73 X10(3) UL (ref 1.5–7.7)
NEUTROPHILS NFR BLD AUTO: 67.7 %
NONHDLC SERPL-MCNC: 200 MG/DL (ref ?–130)
OSMOLALITY SERPL CALC.SUM OF ELEC: 289 MOSM/KG (ref 275–295)
PATIENT FASTING Y/N/NP: YES
PATIENT FASTING Y/N/NP: YES
PLATELET # BLD AUTO: 172 10(3)UL (ref 150–450)
POTASSIUM SERPL-SCNC: 4.7 MMOL/L (ref 3.5–5.1)
RBC # BLD AUTO: 5.43 X10(6)UL (ref 3.8–5.3)
RH BLOOD TYPE: POSITIVE
SODIUM SERPL-SCNC: 137 MMOL/L (ref 136–145)
TRIGL SERPL-MCNC: 252 MG/DL (ref 30–149)
VLDLC SERPL CALC-MCNC: 50 MG/DL (ref 0–30)
WBC # BLD AUTO: 5.5 X10(3) UL (ref 4–11)

## 2020-07-11 PROCEDURE — 87081 CULTURE SCREEN ONLY: CPT

## 2020-07-11 PROCEDURE — 86850 RBC ANTIBODY SCREEN: CPT

## 2020-07-11 PROCEDURE — 83036 HEMOGLOBIN GLYCOSYLATED A1C: CPT

## 2020-07-11 PROCEDURE — 80061 LIPID PANEL: CPT

## 2020-07-11 PROCEDURE — 85025 COMPLETE CBC W/AUTO DIFF WBC: CPT

## 2020-07-11 PROCEDURE — 80053 COMPREHEN METABOLIC PANEL: CPT

## 2020-07-11 PROCEDURE — 86900 BLOOD TYPING SEROLOGIC ABO: CPT

## 2020-07-11 PROCEDURE — 36415 COLL VENOUS BLD VENIPUNCTURE: CPT

## 2020-07-11 PROCEDURE — 86901 BLOOD TYPING SEROLOGIC RH(D): CPT

## 2020-07-14 ENCOUNTER — OFFICE VISIT (OUTPATIENT)
Dept: INTERNAL MEDICINE CLINIC | Facility: CLINIC | Age: 65
End: 2020-07-14
Payer: MEDICARE

## 2020-07-14 VITALS
TEMPERATURE: 99 F | WEIGHT: 174.38 LBS | BODY MASS INDEX: 28.03 KG/M2 | DIASTOLIC BLOOD PRESSURE: 62 MMHG | SYSTOLIC BLOOD PRESSURE: 116 MMHG | HEIGHT: 66 IN | HEART RATE: 68 BPM

## 2020-07-14 DIAGNOSIS — Z01.818 PREOP EXAMINATION: Primary | ICD-10-CM

## 2020-07-14 DIAGNOSIS — I25.10 CORONARY ARTERY DISEASE INVOLVING NATIVE CORONARY ARTERY OF NATIVE HEART WITHOUT ANGINA PECTORIS: ICD-10-CM

## 2020-07-14 DIAGNOSIS — M17.12 PRIMARY OSTEOARTHRITIS OF LEFT KNEE: ICD-10-CM

## 2020-07-14 DIAGNOSIS — Z79.4 TYPE 2 DIABETES MELLITUS WITH DIABETIC NEUROPATHY, WITH LONG-TERM CURRENT USE OF INSULIN (HCC): ICD-10-CM

## 2020-07-14 DIAGNOSIS — E11.40 TYPE 2 DIABETES MELLITUS WITH DIABETIC NEUROPATHY, WITH LONG-TERM CURRENT USE OF INSULIN (HCC): ICD-10-CM

## 2020-07-14 DIAGNOSIS — I10 BENIGN ESSENTIAL HYPERTENSION: ICD-10-CM

## 2020-07-14 DIAGNOSIS — E78.2 MIXED HYPERLIPIDEMIA: ICD-10-CM

## 2020-07-14 PROCEDURE — 99214 OFFICE O/P EST MOD 30 MIN: CPT | Performed by: INTERNAL MEDICINE

## 2020-07-14 NOTE — PROGRESS NOTES
[facility]  PRE OP RISK ASSESSMENT    REASON FOR CONSULT: Pre-op risk assessment for surgical procedure: total left TKA planned for 7/17/2020 with general anesthesia.     REQUESTING PHYSICIAN: Mirna Cramer MD    CHIEF COMPLAINT: Patient presents with: Sleep disturbance    • Stented coronary artery    • Stress    • Type II or unspecified type diabetes mellitus without mention of complication, not stated as uncontrolled    • Uncomfortable fullness after meals    • Venous insufficiency of both lower extrem total) by mouth daily as needed for Anxiety or Sleep. 30 tablet 0   • SERTRALINE HCL 50 MG Oral Tab TAKE 1 TABLET(50 MG) BY MOUTH DAILY 90 tablet 0   • Montelukast Sodium 10 MG Oral Tab Take 10 mg by mouth daily as needed.        • Blood Pressure Monitoring file      Stress: Not on file    Relationships      Social connections:        Talks on phone: Not on file        Gets together: Not on file        Attends Islam service: Not on file        Active member of club or organization: Not on file        Atte OR DENTAL APPLIANCES: No  URI, COUGH, CP, FEVER: No  CERVICAL SPINE RESTRICTION: No known.  No history of RA.     PHYSICAL EXAM:  /62 (BP Location: Right arm, Patient Position: Sitting, Cuff Size: adult)   Pulse 68   Temp 98.5 °F (36.9 °C) (Oral)   Ht regimen    4. Benign essential hypertension  Stable/controlled  Continue current management    5. Mixed hyperlipidemia  Stable/controlled  Continue current management    6.  Coronary artery disease involving native coronary artery of native heart without an

## 2020-07-15 ENCOUNTER — LAB ENCOUNTER (OUTPATIENT)
Dept: LAB | Facility: HOSPITAL | Age: 65
DRG: 470 | End: 2020-07-15
Attending: ORTHOPAEDIC SURGERY
Payer: MEDICARE

## 2020-07-15 DIAGNOSIS — M17.11 PRIMARY OSTEOARTHRITIS OF RIGHT KNEE: ICD-10-CM

## 2020-07-16 LAB — SARS-COV-2 RNA RESP QL NAA+PROBE: NOT DETECTED

## 2020-07-17 ENCOUNTER — ANESTHESIA (OUTPATIENT)
Dept: SURGERY | Facility: HOSPITAL | Age: 65
DRG: 470 | End: 2020-07-17
Payer: MEDICARE

## 2020-07-17 ENCOUNTER — APPOINTMENT (OUTPATIENT)
Dept: GENERAL RADIOLOGY | Facility: HOSPITAL | Age: 65
DRG: 470 | End: 2020-07-17
Attending: ORTHOPAEDIC SURGERY
Payer: MEDICARE

## 2020-07-17 ENCOUNTER — ANESTHESIA EVENT (OUTPATIENT)
Dept: SURGERY | Facility: HOSPITAL | Age: 65
DRG: 470 | End: 2020-07-17
Payer: MEDICARE

## 2020-07-17 ENCOUNTER — HOSPITAL ENCOUNTER (INPATIENT)
Facility: HOSPITAL | Age: 65
LOS: 3 days | Discharge: HOME HEALTH | DRG: 470 | End: 2020-07-20
Attending: ORTHOPAEDIC SURGERY | Admitting: ORTHOPAEDIC SURGERY
Payer: MEDICARE

## 2020-07-17 DIAGNOSIS — M17.12 PRIMARY OSTEOARTHRITIS OF LEFT KNEE: ICD-10-CM

## 2020-07-17 DIAGNOSIS — M17.11 PRIMARY OSTEOARTHRITIS OF RIGHT KNEE: Primary | ICD-10-CM

## 2020-07-17 LAB
DEPRECATED RDW RBC AUTO: 44.3 FL (ref 35.1–46.3)
ERYTHROCYTE [DISTWIDTH] IN BLOOD BY AUTOMATED COUNT: 14.5 % (ref 11–15)
GLUCOSE BLD-MCNC: 112 MG/DL (ref 70–99)
GLUCOSE BLD-MCNC: 112 MG/DL (ref 70–99)
GLUCOSE BLD-MCNC: 167 MG/DL (ref 70–99)
GLUCOSE BLD-MCNC: 278 MG/DL (ref 70–99)
GLUCOSE BLD-MCNC: 308 MG/DL (ref 70–99)
HCT VFR BLD AUTO: 39.8 % (ref 35–48)
HGB BLD-MCNC: 12.7 G/DL (ref 12–16)
MCH RBC QN AUTO: 26.9 PG (ref 26–34)
MCHC RBC AUTO-ENTMCNC: 31.9 G/DL (ref 31–37)
MCV RBC AUTO: 84.3 FL (ref 80–100)
PLATELET # BLD AUTO: 164 10(3)UL (ref 150–450)
RBC # BLD AUTO: 4.72 X10(6)UL (ref 3.8–5.3)
WBC # BLD AUTO: 8.5 X10(3) UL (ref 4–11)

## 2020-07-17 PROCEDURE — 76942 ECHO GUIDE FOR BIOPSY: CPT | Performed by: ANESTHESIOLOGY

## 2020-07-17 PROCEDURE — 73560 X-RAY EXAM OF KNEE 1 OR 2: CPT | Performed by: ORTHOPAEDIC SURGERY

## 2020-07-17 PROCEDURE — 3E0T3BZ INTRODUCTION OF ANESTHETIC AGENT INTO PERIPHERAL NERVES AND PLEXI, PERCUTANEOUS APPROACH: ICD-10-PCS | Performed by: ANESTHESIOLOGY

## 2020-07-17 PROCEDURE — 99222 1ST HOSP IP/OBS MODERATE 55: CPT | Performed by: HOSPITALIST

## 2020-07-17 PROCEDURE — 0SRD0J9 REPLACEMENT OF LEFT KNEE JOINT WITH SYNTHETIC SUBSTITUTE, CEMENTED, OPEN APPROACH: ICD-10-PCS | Performed by: ORTHOPAEDIC SURGERY

## 2020-07-17 DEVICE — PSN ALL POLY PAT PLY 38MM: Type: IMPLANTABLE DEVICE | Site: KNEE | Status: FUNCTIONAL

## 2020-07-17 DEVICE — PSN TIB STM 5 DEG SZ F L: Type: IMPLANTABLE DEVICE | Site: KNEE | Status: FUNCTIONAL

## 2020-07-17 DEVICE — PERSONA CEM FEM/CEM TIB/STD: Type: IMPLANTABLE DEVICE | Status: FUNCTIONAL

## 2020-07-17 DEVICE — PSN FEM PS CMT CCR NRW SZ9 L: Type: IMPLANTABLE DEVICE | Site: KNEE | Status: FUNCTIONAL

## 2020-07-17 DEVICE — BIOMET BC R 1X40 US: Type: IMPLANTABLE DEVICE | Site: KNEE | Status: FUNCTIONAL

## 2020-07-17 DEVICE — PSN ASF PS 10MM PLY L 6-9EF: Type: IMPLANTABLE DEVICE | Site: KNEE | Status: FUNCTIONAL

## 2020-07-17 RX ORDER — METOCLOPRAMIDE HYDROCHLORIDE 5 MG/ML
10 INJECTION INTRAMUSCULAR; INTRAVENOUS EVERY 6 HOURS PRN
Status: ACTIVE | OUTPATIENT
Start: 2020-07-17 | End: 2020-07-19

## 2020-07-17 RX ORDER — BISACODYL 10 MG
10 SUPPOSITORY, RECTAL RECTAL
Status: DISCONTINUED | OUTPATIENT
Start: 2020-07-17 | End: 2020-07-17

## 2020-07-17 RX ORDER — HYDROMORPHONE HYDROCHLORIDE 1 MG/ML
0.2 INJECTION, SOLUTION INTRAMUSCULAR; INTRAVENOUS; SUBCUTANEOUS EVERY 2 HOUR PRN
Status: DISCONTINUED | OUTPATIENT
Start: 2020-07-17 | End: 2020-07-19

## 2020-07-17 RX ORDER — SODIUM CHLORIDE, SODIUM LACTATE, POTASSIUM CHLORIDE, CALCIUM CHLORIDE 600; 310; 30; 20 MG/100ML; MG/100ML; MG/100ML; MG/100ML
INJECTION, SOLUTION INTRAVENOUS CONTINUOUS
Status: DISCONTINUED | OUTPATIENT
Start: 2020-07-17 | End: 2020-07-20

## 2020-07-17 RX ORDER — DEXTROSE MONOHYDRATE 25 G/50ML
50 INJECTION, SOLUTION INTRAVENOUS
Status: DISCONTINUED | OUTPATIENT
Start: 2020-07-17 | End: 2020-07-17 | Stop reason: HOSPADM

## 2020-07-17 RX ORDER — DOCUSATE SODIUM 100 MG/1
100 CAPSULE, LIQUID FILLED ORAL 2 TIMES DAILY
Status: DISCONTINUED | OUTPATIENT
Start: 2020-07-17 | End: 2020-07-17

## 2020-07-17 RX ORDER — OXYCODONE HYDROCHLORIDE 15 MG/1
15 TABLET ORAL EVERY 4 HOURS PRN
Status: ACTIVE | OUTPATIENT
Start: 2020-07-17 | End: 2020-07-19

## 2020-07-17 RX ORDER — DEXTROSE MONOHYDRATE 25 G/50ML
50 INJECTION, SOLUTION INTRAVENOUS
Status: DISCONTINUED | OUTPATIENT
Start: 2020-07-17 | End: 2020-07-20

## 2020-07-17 RX ORDER — BISACODYL 10 MG
10 SUPPOSITORY, RECTAL RECTAL
Status: DISCONTINUED | OUTPATIENT
Start: 2020-07-17 | End: 2020-07-20

## 2020-07-17 RX ORDER — MELATONIN
325
Status: DISCONTINUED | OUTPATIENT
Start: 2020-07-18 | End: 2020-07-20

## 2020-07-17 RX ORDER — OXYCODONE HYDROCHLORIDE 10 MG/1
10 TABLET ORAL EVERY 4 HOURS PRN
Status: DISPENSED | OUTPATIENT
Start: 2020-07-17 | End: 2020-07-19

## 2020-07-17 RX ORDER — CEFAZOLIN SODIUM/WATER 2 G/20 ML
2 SYRINGE (ML) INTRAVENOUS EVERY 8 HOURS
Status: COMPLETED | OUTPATIENT
Start: 2020-07-17 | End: 2020-07-18

## 2020-07-17 RX ORDER — ALPRAZOLAM 0.5 MG/1
0.5 TABLET ORAL DAILY PRN
Status: DISCONTINUED | OUTPATIENT
Start: 2020-07-17 | End: 2020-07-19

## 2020-07-17 RX ORDER — METOCLOPRAMIDE HYDROCHLORIDE 5 MG/ML
10 INJECTION INTRAMUSCULAR; INTRAVENOUS EVERY 8 HOURS PRN
Status: DISCONTINUED | OUTPATIENT
Start: 2020-07-17 | End: 2020-07-17

## 2020-07-17 RX ORDER — ACETAMINOPHEN 325 MG/1
TABLET ORAL
Status: COMPLETED
Start: 2020-07-17 | End: 2020-07-17

## 2020-07-17 RX ORDER — SODIUM PHOSPHATE, DIBASIC AND SODIUM PHOSPHATE, MONOBASIC 7; 19 G/133ML; G/133ML
1 ENEMA RECTAL ONCE AS NEEDED
Status: DISCONTINUED | OUTPATIENT
Start: 2020-07-17 | End: 2020-07-20

## 2020-07-17 RX ORDER — ACETAMINOPHEN 500 MG
1000 TABLET ORAL ONCE
Status: DISCONTINUED | OUTPATIENT
Start: 2020-07-17 | End: 2020-07-17 | Stop reason: HOSPADM

## 2020-07-17 RX ORDER — HYDROMORPHONE HYDROCHLORIDE 1 MG/ML
0.4 INJECTION, SOLUTION INTRAMUSCULAR; INTRAVENOUS; SUBCUTANEOUS EVERY 5 MIN PRN
Status: DISCONTINUED | OUTPATIENT
Start: 2020-07-17 | End: 2020-07-17 | Stop reason: HOSPADM

## 2020-07-17 RX ORDER — ONDANSETRON 2 MG/ML
INJECTION INTRAMUSCULAR; INTRAVENOUS
Status: COMPLETED
Start: 2020-07-17 | End: 2020-07-17

## 2020-07-17 RX ORDER — PROCHLORPERAZINE EDISYLATE 5 MG/ML
10 INJECTION INTRAMUSCULAR; INTRAVENOUS EVERY 6 HOURS PRN
Status: ACTIVE | OUTPATIENT
Start: 2020-07-17 | End: 2020-07-19

## 2020-07-17 RX ORDER — ONDANSETRON 2 MG/ML
4 INJECTION INTRAMUSCULAR; INTRAVENOUS EVERY 6 HOURS PRN
Status: DISCONTINUED | OUTPATIENT
Start: 2020-07-17 | End: 2020-07-17

## 2020-07-17 RX ORDER — ONDANSETRON 2 MG/ML
4 INJECTION INTRAMUSCULAR; INTRAVENOUS EVERY 4 HOURS PRN
Status: DISPENSED | OUTPATIENT
Start: 2020-07-17 | End: 2020-07-19

## 2020-07-17 RX ORDER — DIPHENHYDRAMINE HCL 25 MG
25 CAPSULE ORAL EVERY 4 HOURS PRN
Status: DISCONTINUED | OUTPATIENT
Start: 2020-07-17 | End: 2020-07-20

## 2020-07-17 RX ORDER — NALOXONE HYDROCHLORIDE 0.4 MG/ML
80 INJECTION, SOLUTION INTRAMUSCULAR; INTRAVENOUS; SUBCUTANEOUS AS NEEDED
Status: DISCONTINUED | OUTPATIENT
Start: 2020-07-17 | End: 2020-07-17 | Stop reason: HOSPADM

## 2020-07-17 RX ORDER — TRAZODONE HYDROCHLORIDE 50 MG/1
50 TABLET ORAL NIGHTLY PRN
Status: DISCONTINUED | OUTPATIENT
Start: 2020-07-17 | End: 2020-07-20

## 2020-07-17 RX ORDER — ENOXAPARIN SODIUM 100 MG/ML
40 INJECTION SUBCUTANEOUS DAILY
Status: DISCONTINUED | OUTPATIENT
Start: 2020-07-17 | End: 2020-07-17

## 2020-07-17 RX ORDER — SENNOSIDES 8.6 MG
17.2 TABLET ORAL NIGHTLY
Status: DISCONTINUED | OUTPATIENT
Start: 2020-07-17 | End: 2020-07-20

## 2020-07-17 RX ORDER — HYDROMORPHONE HYDROCHLORIDE 1 MG/ML
0.4 INJECTION, SOLUTION INTRAMUSCULAR; INTRAVENOUS; SUBCUTANEOUS EVERY 2 HOUR PRN
Status: DISCONTINUED | OUTPATIENT
Start: 2020-07-17 | End: 2020-07-19

## 2020-07-17 RX ORDER — SODIUM PHOSPHATE, DIBASIC AND SODIUM PHOSPHATE, MONOBASIC 7; 19 G/133ML; G/133ML
1 ENEMA RECTAL ONCE AS NEEDED
Status: DISCONTINUED | OUTPATIENT
Start: 2020-07-17 | End: 2020-07-17

## 2020-07-17 RX ORDER — METOPROLOL SUCCINATE 25 MG/1
25 TABLET, EXTENDED RELEASE ORAL
Status: DISCONTINUED | OUTPATIENT
Start: 2020-07-17 | End: 2020-07-19

## 2020-07-17 RX ORDER — DIPHENHYDRAMINE HYDROCHLORIDE 50 MG/ML
12.5 INJECTION INTRAMUSCULAR; INTRAVENOUS EVERY 4 HOURS PRN
Status: DISCONTINUED | OUTPATIENT
Start: 2020-07-17 | End: 2020-07-20

## 2020-07-17 RX ORDER — ONDANSETRON 2 MG/ML
4 INJECTION INTRAMUSCULAR; INTRAVENOUS AS NEEDED
Status: DISCONTINUED | OUTPATIENT
Start: 2020-07-17 | End: 2020-07-17 | Stop reason: HOSPADM

## 2020-07-17 RX ORDER — METOCLOPRAMIDE HYDROCHLORIDE 5 MG/ML
INJECTION INTRAMUSCULAR; INTRAVENOUS
Status: DISCONTINUED
Start: 2020-07-17 | End: 2020-07-17 | Stop reason: WASHOUT

## 2020-07-17 RX ORDER — MIDAZOLAM HYDROCHLORIDE 1 MG/ML
INJECTION INTRAMUSCULAR; INTRAVENOUS AS NEEDED
Status: DISCONTINUED | OUTPATIENT
Start: 2020-07-17 | End: 2020-07-17 | Stop reason: SURG

## 2020-07-17 RX ORDER — METOCLOPRAMIDE HYDROCHLORIDE 5 MG/ML
10 INJECTION INTRAMUSCULAR; INTRAVENOUS AS NEEDED
Status: DISCONTINUED | OUTPATIENT
Start: 2020-07-17 | End: 2020-07-17 | Stop reason: HOSPADM

## 2020-07-17 RX ORDER — OXYCODONE HYDROCHLORIDE 5 MG/1
5 TABLET ORAL EVERY 4 HOURS PRN
Status: DISPENSED | OUTPATIENT
Start: 2020-07-17 | End: 2020-07-19

## 2020-07-17 RX ORDER — DIPHENHYDRAMINE HYDROCHLORIDE 50 MG/ML
25 INJECTION INTRAMUSCULAR; INTRAVENOUS ONCE AS NEEDED
Status: ACTIVE | OUTPATIENT
Start: 2020-07-17 | End: 2020-07-17

## 2020-07-17 RX ORDER — METOPROLOL SUCCINATE 25 MG/1
25 TABLET, EXTENDED RELEASE ORAL DAILY
Status: ON HOLD | COMMUNITY
End: 2020-07-20

## 2020-07-17 RX ORDER — HYDROCODONE BITARTRATE AND ACETAMINOPHEN 10; 325 MG/1; MG/1
1-2 TABLET ORAL EVERY 4 HOURS PRN
Qty: 60 TABLET | Refills: 0 | Status: SHIPPED | OUTPATIENT
Start: 2020-07-17 | End: 2020-08-25

## 2020-07-17 RX ORDER — TIZANIDINE 2 MG/1
2 TABLET ORAL 3 TIMES DAILY PRN
Status: DISCONTINUED | OUTPATIENT
Start: 2020-07-17 | End: 2020-07-19

## 2020-07-17 RX ORDER — HYDROCODONE BITARTRATE AND ACETAMINOPHEN 10; 325 MG/1; MG/1
1 TABLET ORAL AS NEEDED
Status: DISCONTINUED | OUTPATIENT
Start: 2020-07-17 | End: 2020-07-17 | Stop reason: HOSPADM

## 2020-07-17 RX ORDER — CEFAZOLIN SODIUM/WATER 2 G/20 ML
2 SYRINGE (ML) INTRAVENOUS ONCE
Status: COMPLETED | OUTPATIENT
Start: 2020-07-17 | End: 2020-07-17

## 2020-07-17 RX ORDER — POLYETHYLENE GLYCOL 3350 17 G/17G
17 POWDER, FOR SOLUTION ORAL DAILY PRN
Status: DISCONTINUED | OUTPATIENT
Start: 2020-07-17 | End: 2020-07-17

## 2020-07-17 RX ORDER — PHENYLEPHRINE HCL 10 MG/ML
VIAL (ML) INJECTION AS NEEDED
Status: DISCONTINUED | OUTPATIENT
Start: 2020-07-17 | End: 2020-07-17 | Stop reason: SURG

## 2020-07-17 RX ORDER — SCOLOPAMINE TRANSDERMAL SYSTEM 1 MG/1
1 PATCH, EXTENDED RELEASE TRANSDERMAL ONCE
Status: COMPLETED | OUTPATIENT
Start: 2020-07-17 | End: 2020-07-20

## 2020-07-17 RX ORDER — ACETAMINOPHEN 325 MG/1
650 TABLET ORAL EVERY 6 HOURS PRN
Status: DISCONTINUED | OUTPATIENT
Start: 2020-07-17 | End: 2020-07-17 | Stop reason: HOSPADM

## 2020-07-17 RX ORDER — POLYETHYLENE GLYCOL 3350 17 G/17G
17 POWDER, FOR SOLUTION ORAL DAILY PRN
Status: DISCONTINUED | OUTPATIENT
Start: 2020-07-17 | End: 2020-07-20

## 2020-07-17 RX ORDER — DEXAMETHASONE SODIUM PHOSPHATE 10 MG/ML
8 INJECTION, SOLUTION INTRAMUSCULAR; INTRAVENOUS ONCE
Status: COMPLETED | OUTPATIENT
Start: 2020-07-18 | End: 2020-07-18

## 2020-07-17 RX ORDER — KETOROLAC TROMETHAMINE 30 MG/ML
30 INJECTION, SOLUTION INTRAMUSCULAR; INTRAVENOUS EVERY 6 HOURS
Status: COMPLETED | OUTPATIENT
Start: 2020-07-17 | End: 2020-07-18

## 2020-07-17 RX ORDER — SODIUM CHLORIDE, SODIUM LACTATE, POTASSIUM CHLORIDE, CALCIUM CHLORIDE 600; 310; 30; 20 MG/100ML; MG/100ML; MG/100ML; MG/100ML
INJECTION, SOLUTION INTRAVENOUS CONTINUOUS
Status: DISCONTINUED | OUTPATIENT
Start: 2020-07-17 | End: 2020-07-17 | Stop reason: HOSPADM

## 2020-07-17 RX ORDER — DOCUSATE SODIUM 100 MG/1
100 CAPSULE, LIQUID FILLED ORAL 2 TIMES DAILY
Status: DISCONTINUED | OUTPATIENT
Start: 2020-07-17 | End: 2020-07-20

## 2020-07-17 RX ORDER — BUPIVACAINE HYDROCHLORIDE 7.5 MG/ML
INJECTION, SOLUTION INTRASPINAL AS NEEDED
Status: DISCONTINUED | OUTPATIENT
Start: 2020-07-17 | End: 2020-07-17 | Stop reason: SURG

## 2020-07-17 RX ORDER — HYDROCODONE BITARTRATE AND ACETAMINOPHEN 10; 325 MG/1; MG/1
2 TABLET ORAL AS NEEDED
Status: DISCONTINUED | OUTPATIENT
Start: 2020-07-17 | End: 2020-07-17 | Stop reason: HOSPADM

## 2020-07-17 RX ORDER — ZOLPIDEM TARTRATE 5 MG/1
5 TABLET ORAL NIGHTLY PRN
Status: DISCONTINUED | OUTPATIENT
Start: 2020-07-17 | End: 2020-07-19

## 2020-07-17 RX ORDER — HYDROMORPHONE HYDROCHLORIDE 1 MG/ML
0.8 INJECTION, SOLUTION INTRAMUSCULAR; INTRAVENOUS; SUBCUTANEOUS EVERY 2 HOUR PRN
Status: DISCONTINUED | OUTPATIENT
Start: 2020-07-17 | End: 2020-07-19

## 2020-07-17 RX ORDER — ACETAMINOPHEN 500 MG
1000 TABLET ORAL 4 TIMES DAILY
Status: DISPENSED | OUTPATIENT
Start: 2020-07-17 | End: 2020-07-19

## 2020-07-17 RX ORDER — RAMIPRIL 2.5 MG/1
2.5 CAPSULE ORAL DAILY
Status: ON HOLD | COMMUNITY
End: 2020-07-19

## 2020-07-17 RX ADMIN — MIDAZOLAM HYDROCHLORIDE 2 MG: 1 INJECTION INTRAMUSCULAR; INTRAVENOUS at 11:10:00

## 2020-07-17 RX ADMIN — PHENYLEPHRINE HCL 100 MCG: 10 MG/ML VIAL (ML) INJECTION at 11:42:00

## 2020-07-17 RX ADMIN — SODIUM CHLORIDE, SODIUM LACTATE, POTASSIUM CHLORIDE, CALCIUM CHLORIDE: 600; 310; 30; 20 INJECTION, SOLUTION INTRAVENOUS at 12:36:00

## 2020-07-17 RX ADMIN — BUPIVACAINE HYDROCHLORIDE 1.8 ML: 7.5 INJECTION, SOLUTION INTRASPINAL at 11:13:00

## 2020-07-17 RX ADMIN — PHENYLEPHRINE HCL 100 MCG: 10 MG/ML VIAL (ML) INJECTION at 11:34:00

## 2020-07-17 RX ADMIN — CEFAZOLIN SODIUM/WATER 2 G: 2 G/20 ML SYRINGE (ML) INTRAVENOUS at 11:30:00

## 2020-07-17 NOTE — INTERVAL H&P NOTE
Pre-op Diagnosis: Primary osteoarthritis of left knee [M17.12]    The above referenced H&P was reviewed by JESSICA Monzon on 7/17/2020, the patient was examined and no significant changes have occurred in the patient's condition since the H&P was perform

## 2020-07-17 NOTE — ANESTHESIA PROCEDURE NOTES
Regional Block  Performed by: Heydi Kidd MD  Authorized by: Heydi Kidd MD       General Information and Staff    Start Time:  7/17/2020 11:20 AM  End Time:  7/17/2020 11:22 AM  Anesthesiologist:  Heydi Kidd MD  Performed by:   Anesth

## 2020-07-17 NOTE — ANESTHESIA PROCEDURE NOTES
Regional Block  Performed by: Junie Wong MD  Authorized by: Junie Wong MD       General Information and Staff    Start Time:  7/17/2020 11:22 AM  End Time:  7/17/2020 11:24 AM  Anesthesiologist:  Junie Wong MD  Performed by:   Anesth

## 2020-07-17 NOTE — PROGRESS NOTES
NURSING ADMISSION NOTE      Patient admitted via bed from PACU s/p left TKR. Oriented to room. Safety precautions initiated. Bed in low position. Call light in reach. Denies pain. Dtr at bedside with pt.

## 2020-07-17 NOTE — PROGRESS NOTES
Guidebook in [address] given to patient. Daughter at bedside and patient requests she translate. Reviewed indications, side effects of pain medication/narcotics and constipation prevention.  Stressed importance of increased fluids/roughage in diet, continued

## 2020-07-17 NOTE — ANESTHESIA PROCEDURE NOTES
Spinal Block  Performed by: Mesha Garcia MD  Authorized by: Mesha Garcia MD       General Information and Staff    Start Time:  7/17/2020 11:10 AM  End Time:  7/17/2020 11:15 AM  Anesthesiologist:  Mesha Garcia MD  Performed by:   Anesthes

## 2020-07-17 NOTE — ANESTHESIA POSTPROCEDURE EVALUATION
[facility] Patient Status:  Inpatient   Age/Gender 72year old female MRN [id number]   [facility] SURGERY Attending Ivania Pollock MD   Hosp Day # 0 PCP Austin Jain MD       Anesthesia Post-op Note    Procedure(s):  L

## 2020-07-17 NOTE — BRIEF OP NOTE
Pre-Operative Diagnosis: Primary osteoarthritis of left knee [M17.12]     Post-Operative Diagnosis: Primary osteoarthritis of left knee [M17.12]      Procedure Performed:   Procedure(s):  LEFT TOTAL KNEE ARTHROPLASTY    Surgeon(s) and Role:     * Taylor Navas

## 2020-07-17 NOTE — ANESTHESIA PREPROCEDURE EVALUATION
PRE-OP EVALUATION    Patient Name: Valentin Zaman    Pre-op Diagnosis: Primary osteoarthritis of left knee [M17.12]    Procedure(s):  LEFT TOTAL KNEE ARTHROPLASTY    Surgeon(s) and Role:     Nelli Cantu MD - Primary    Pre-op vitals reviewed. EACH NOSTRIL DAILY, Disp: 16 g, Rfl: 0  ALPRAZolam 0.5 MG Oral Tab, Take 1 tablet (0.5 mg total) by mouth daily as needed for Anxiety or Sleep., Disp: 30 tablet, Rfl: 0  Montelukast Sodium 10 MG Oral Tab, Take 10 mg by mouth daily as needed.   , Disp: , Rfl by Kalpana Dowd MD at [facility] MAIN OR     Social History    Tobacco Use      Smoking status: Never Smoker      Smokeless tobacco: Never Used    Alcohol use: No      Frequency: Never      Binge frequency: Never      Drug use: No     Available pre-op labs rev

## 2020-07-18 LAB
ANION GAP SERPL CALC-SCNC: <0 MMOL/L (ref 0–18)
BUN BLD-MCNC: 22 MG/DL (ref 7–18)
BUN/CREAT SERPL: 20.6 (ref 10–20)
CALCIUM BLD-MCNC: 8.6 MG/DL (ref 8.5–10.1)
CHLORIDE SERPL-SCNC: 108 MMOL/L (ref 98–112)
CO2 SERPL-SCNC: 32 MMOL/L (ref 21–32)
CREAT BLD-MCNC: 1.07 MG/DL (ref 0.55–1.02)
DEPRECATED RDW RBC AUTO: 44.3 FL (ref 35.1–46.3)
ERYTHROCYTE [DISTWIDTH] IN BLOOD BY AUTOMATED COUNT: 14.5 % (ref 11–15)
GLUCOSE BLD-MCNC: 136 MG/DL (ref 70–99)
GLUCOSE BLD-MCNC: 141 MG/DL (ref 70–99)
GLUCOSE BLD-MCNC: 317 MG/DL (ref 70–99)
GLUCOSE BLD-MCNC: 324 MG/DL (ref 70–99)
HCT VFR BLD AUTO: 34.6 % (ref 35–48)
HGB BLD-MCNC: 11.1 G/DL (ref 12–16)
MCH RBC QN AUTO: 27.1 PG (ref 26–34)
MCHC RBC AUTO-ENTMCNC: 32.1 G/DL (ref 31–37)
MCV RBC AUTO: 84.6 FL (ref 80–100)
OSMOLALITY SERPL CALC.SUM OF ELEC: 293 MOSM/KG (ref 275–295)
PLATELET # BLD AUTO: 144 10(3)UL (ref 150–450)
POTASSIUM SERPL-SCNC: 5 MMOL/L (ref 3.5–5.1)
RBC # BLD AUTO: 4.09 X10(6)UL (ref 3.8–5.3)
SODIUM SERPL-SCNC: 139 MMOL/L (ref 136–145)
WBC # BLD AUTO: 6.9 X10(3) UL (ref 4–11)

## 2020-07-18 PROCEDURE — 99232 SBSQ HOSP IP/OBS MODERATE 35: CPT | Performed by: INTERNAL MEDICINE

## 2020-07-18 RX ORDER — HYDROCODONE BITARTRATE AND ACETAMINOPHEN 10; 325 MG/1; MG/1
1 TABLET ORAL EVERY 4 HOURS PRN
Status: DISCONTINUED | OUTPATIENT
Start: 2020-07-19 | End: 2020-07-20

## 2020-07-18 RX ORDER — HYDROCODONE BITARTRATE AND ACETAMINOPHEN 10; 325 MG/1; MG/1
2 TABLET ORAL EVERY 4 HOURS PRN
Status: DISCONTINUED | OUTPATIENT
Start: 2020-07-19 | End: 2020-07-20

## 2020-07-18 NOTE — CM/SW NOTE
SW order placed to assist with discharge planning. Chart reviewed and it was noted that pt had arranged [facility]  P:[phone]  F:[phone] prior to admission. PT/OT notes are pending.  SW to follow to assist with discharge plann

## 2020-07-18 NOTE — OPERATIVE REPORT
[address]    PATIENT'S NAME: Virgene Hashimoto   ATTENDING PHYSICIAN: Kristin Morrison M.D. OPERATING PHYSICIAN: Kristin Morrison M.D.    PATIENT ACCOUNT#:   [de-identified]    LOCATION:  [address] [place] [address]  MEDICAL RECORD #:   [id number]       DATE OF BIRTH:  [birth year] encountered in the anterior compartment. The medial and lateral menisci were removed. The suprapatellar synovium was removed. A drill hole was created in the center of the intercondylar groove just anterior to the intercondylar notch.   The varus/valgus intramedullary device and rotated externally to match the natural external rotation of the tibial tubercle and the tibial crest.  Depth gauge was used to measure the amount of bone to be resected which was 10 mm off the more prolific lateral tibial plateau A large drill was used to drill for a central stem of the tibial tray. Patient did not require a stem extension as her BMI was only 27. The bone was prepared with pulsatile lavage.   Biomet cement was mixed on the back table, and the final components wer

## 2020-07-18 NOTE — PROGRESS NOTES
[facility]  Progress Note    Joseph Muir Patient Status:  Inpatient    1955 MRN [id number]   [facility] 3SW-A Attending Enedina Romero MD   Hosp Day # 1 PCP Elena Infante MD     SUBJECTIVE:  INTERVAL HISTORY: S/P  1  Procedure( WBC 6.9   .0*      No results for input(s): PTP, INR in the last 168 hours. ASSESSMENT/PLAN:  1. Continue pain management  2. Continue DVT prophylaxis   3. Continue PT/OT  4.  Discharge planning: likely [address] pending PT recomme

## 2020-07-18 NOTE — CONSULTS
[address] Patient Status:  Inpatient    1955 MRN [id number]   [facility] [place] 3SW-A Attending Ivania Pollock MD   Hosp Day # 0 PCP Austin Jain MD     Reason for consult: Medical management    Request disturbance    • Stented coronary artery    • Stress    • Type II or unspecified type diabetes mellitus without mention of complication, not stated as uncontrolled    • Uncomfortable fullness after meals    • Venous insufficiency of both lower extremities EXTRA STRENGTH) 500 MG Oral Cap, Take 1,000 mg by mouth one time. , Disp: , Rfl:   Empagliflozin (JARDIANCE) 25 MG Oral Tab, Take 25 mg by mouth daily. , Disp: , Rfl:   Sertraline HCl 50 MG Oral Tab, Take 50 mg by mouth daily. , Disp: , Rfl:   ramipril 2.5 MG PERRLA. Anicteric. Neck: No lymphadenopathy. No JVD. No carotid bruits. Respiratory: Clear to auscultation bilaterally. No wheezes. No rhonchi. Cardiovascular: S1, S2. Regular rate and rhythm. No murmurs, rubs or gallops. Equal pulses.    Chest and Back: documentation in H+P. Based on patients current state of illness, I anticipate that, after discharge, patient will require TBD.

## 2020-07-18 NOTE — PROGRESS NOTES
[facility]  Progress Note     Maya Chris Patient Status:  Inpatient    1955 MRN [id number]   [facility] 3SW-A Attending Schuyler Ramos MD   Hosp Day # 1 PCP Dianna Bloch, MD     Chief Complaint: Medical managment    S: Mica Turcios last 168 hours. Imaging: Imaging data reviewed in Epic.     Medications:   • scopolamine  1 patch Transdermal Once   • Senna  17.2 mg Oral Nightly   • docusate sodium  100 mg Oral BID   • ferrous sulfate  325 mg Oral Daily with breakfast   • apixaba

## 2020-07-18 NOTE — PLAN OF CARE
Pt AOx4. R.A. C/o moderate pain to left knee, relieved by PO pain meds. Ace wrap dressing to LLE, CDI. Gel ice in place. VS remain stable, voiding freely. SCDs bilaterally, ankle pumps encouraged. IS encouraged.   Plan for home with residential home heal

## 2020-07-18 NOTE — HOME CARE LIAISON
Met with patient at the bedside. Patient had arranged [facility] prior to admission. Brochure provided.  used to answer all questions.

## 2020-07-18 NOTE — PLAN OF CARE
Alert and oriented,V/S stable,lt.knee dressing dry/intact,able to lift foot and do dorsiflexion and plantarflexion. Medicated for pain with moderate relief,tolerating diet,voiding well. IS reinforced,safety precautions reinforced,call light kept within reach

## 2020-07-18 NOTE — PHYSICAL THERAPY NOTE
PHYSICAL THERAPY KNEE EVALUATION - INPATIENT     Room Number: 368/368-A  Evaluation Date: 7/18/2020  Type of Evaluation: Initial  Physician Order: PT Eval and Treat    Presenting Problem: s/p L TKR  Reason for Therapy: Mobility Dysfunction and Discharge Pl Vomiting    • Weight loss        Past Surgical History  Past Surgical History:   Procedure Laterality Date   • ANGIOPLASTY (CORONARY)  2002    coronary stent placed by Dr. Yulisa Sanchez   • [address] N/A 11/16/2017    Performed by Mary Kay Johnson, knee pain  Management Techniques:  Activity promotion;Relaxation;Repositioning    COGNITION  · Overall Cognitive Status:  WFL - within functional limits    RANGE OF MOTION AND STRENGTH ASSESSMENT  Upper extremity ROM and strength are within functional limit PT session. PPE donned to include gown, gloves, and mask. Pt lying in bed and agreed to PT. Video  line used in [facility]. Pt educated in [facility]. Pt with CGA for bed mobility. Pt able to perform SLR in supine.  Pt instructed in proper hand placement Based on this evaluation, patient's clinical presentation is stable and overall the evaluation complexity is considered low.   These impairments and comorbidities manifest themselves as functional limitations in independent bed mobility, transfers, and gai

## 2020-07-18 NOTE — OCCUPATIONAL THERAPY NOTE
OCCUPATIONAL THERAPY EVALUATION - INPATIENT     Room Number: 368/368-A  Evaluation Date: 7/18/2020  Type of Evaluation: Initial  Presenting Problem: L TKR    Physician Order: IP Consult to Occupational Therapy  Reason for Therapy: ADL/IADL Dysfunction and • Venous insufficiency of both lower extremities 3/16/2018   • Visual impairment     glasses   • Vomiting    • Weight loss        Past Surgical History  Past Surgical History:   Procedure Laterality Date   • ANGIOPLASTY (CORONARY)  2002    coronary stent Cognitive Status:  WFL - within functional limits    VISION  Current Vision: no visual deficits    PERCEPTION  Overall Perception Status:   WFL - within functional limits    SENSATION  Light touch:  intact    Communication: Speaks limited [place].  Ofelia Randall transfer CGA. Hygiene care independent, clothing management CGA. Sink side hand hygiene, SBA. Pt is planning to purchase raised toilet seat for home. Discussed plan of care. Used video  toward the end of the session. Chair alarm on.   PPE worn Decision Making LOW - Analysis of occupational profile, problem-focused assessments, limited treatment options    Overall Complexity LOW     OT Discharge Recommendations: Home with home health PT/OT  OT Device Recommendations: Reacher;Raised toilet seat

## 2020-07-19 LAB
DEPRECATED RDW RBC AUTO: 44.3 FL (ref 35.1–46.3)
ERYTHROCYTE [DISTWIDTH] IN BLOOD BY AUTOMATED COUNT: 14.5 % (ref 11–15)
GLUCOSE BLD-MCNC: 171 MG/DL (ref 70–99)
GLUCOSE BLD-MCNC: 177 MG/DL (ref 70–99)
GLUCOSE BLD-MCNC: 216 MG/DL (ref 70–99)
GLUCOSE BLD-MCNC: 275 MG/DL (ref 70–99)
HCT VFR BLD AUTO: 33.3 % (ref 35–48)
HGB BLD-MCNC: 10.6 G/DL (ref 12–16)
MCH RBC QN AUTO: 26.8 PG (ref 26–34)
MCHC RBC AUTO-ENTMCNC: 31.8 G/DL (ref 31–37)
MCV RBC AUTO: 84.3 FL (ref 80–100)
PLATELET # BLD AUTO: 148 10(3)UL (ref 150–450)
RBC # BLD AUTO: 3.95 X10(6)UL (ref 3.8–5.3)
WBC # BLD AUTO: 8.6 X10(3) UL (ref 4–11)

## 2020-07-19 PROCEDURE — 99232 SBSQ HOSP IP/OBS MODERATE 35: CPT | Performed by: INTERNAL MEDICINE

## 2020-07-19 RX ORDER — ONDANSETRON 4 MG/1
4 TABLET, FILM COATED ORAL EVERY 8 HOURS PRN
Qty: 20 TABLET | Refills: 2 | Status: SHIPPED | OUTPATIENT
Start: 2020-07-19 | End: 2020-08-03

## 2020-07-19 RX ORDER — RAMIPRIL 2.5 MG/1
2.5 CAPSULE ORAL DAILY
Refills: 0 | Status: SHIPPED | COMMUNITY
Start: 2020-07-21 | End: 2020-07-20

## 2020-07-19 RX ORDER — MAGNESIUM OXIDE 400 MG (241.3 MG MAGNESIUM) TABLET
3 TABLET NIGHTLY
Status: DISCONTINUED | OUTPATIENT
Start: 2020-07-19 | End: 2020-07-20

## 2020-07-19 RX ORDER — SODIUM CHLORIDE 9 MG/ML
INJECTION, SOLUTION INTRAVENOUS CONTINUOUS
Status: DISCONTINUED | OUTPATIENT
Start: 2020-07-19 | End: 2020-07-20

## 2020-07-19 NOTE — PLAN OF CARE
Pod #2 L knee replacement by Dr Debra Aguilar. Vincentian speaking. A&Ox4. On room air. IS and ankle pumps encouraged. Incision to L knee with Aquacel drsg C/D/I. Gel ice packs on for comfort. SCDs to BLE. Contact isolation precautions maintained.  Pt tolerating diabe

## 2020-07-19 NOTE — PROGRESS NOTES
When attempting to work with therapy pt sitting /52. Standing BP 76/57 w/dizziness. Pt laid flat and 500cc bolus started. BP shortly after 116/53. IM made aware. Per MD, give 1L bolus and then begin maintainance fluids at 125ml/hr.      Addendum: b

## 2020-07-19 NOTE — PROGRESS NOTES
[facility]  Progress Note     Desmond Mays Patient Status:  Inpatient    1955 MRN [id number]   [facility] 3SW-A Attending Todd Logan MD   Hosp Day # 2 PCP Angy Stoddard MD     Chief Complaint: Medical managment    S: Roldan Musa 17.2 mg Oral Nightly   • docusate sodium  100 mg Oral BID   • ferrous sulfate  325 mg Oral Daily with breakfast   • apixaban  2.5 mg [email or web address]   • insulin detemir  20 Units Subcutaneous Daily   • Metoprolol Succinate ER  25 mg Oral Daily Beta Bloc

## 2020-07-19 NOTE — PHYSICAL THERAPY NOTE
PHYSICAL THERAPY KNEE TREATMENT NOTE - INPATIENT     Room Number: 368/368-A     Session: 1   Number of Visits to Meet Established Goals: 4    Presenting Problem: s/p L TKR    Problem List  Active Problems:    Primary osteoarthritis of right knee    Pain by Dr. Patrick Brothers   • [address] N/A 2017    Performed by Tejal Boo DO at [facility] MAIN OR   •   [id number]   • CATARACT Bilateral    • CATH DRUG ELUTING STENT     • COLONOSCOPY     • ENDOVENOUS LASER VEIN ADDON Left     Left G sitting on the side of the bed?: A Little   How much help from another person does the patient currently need. ..   -   Moving to and from a bed to a chair (including a wheelchair)?: A Little   -   Need to walk in hospital room?: [address] 3-5 gait today due to orthostatic hypotension. Anticipate pt to progress to home at mod I/supervision levels with further therapies once hypotension is resolved as pt is motivated and has family support.        DISCHARGE RECOMMENDATIONS  PT Discharge Recommend

## 2020-07-19 NOTE — PLAN OF CARE
Pt AOx4. R.A. C/o mild pain to left knee, relieved by PO pain meds. Aquacel dressing to LLE, CDI. Gel ice in place. VS remain stable, voiding freely. BM 7/16 milk of mag given. SCDs bilaterally, ankle pumps encouraged. IS encouraged.   Pt up standby with

## 2020-07-19 NOTE — OCCUPATIONAL THERAPY NOTE
OCCUPATIONAL THERAPY TREATMENT NOTE - INPATIENT     Room Number: 368/368-A  Session: 1/3  Number of Visits to Meet Established Goals: 3    Presenting Problem: L TKR    History related to current admission:    Pt was admitted from home on 7/17 for elective Vomiting    • Weight loss        Past Surgical History  Past Surgical History:   Procedure Laterality Date   • ANGIOPLASTY (CORONARY)  2002    coronary stent placed by Dr. Particia Fothergill   • [address] N/A 11/16/2017    Performed by Lopez Rosas or urinal? : A Little  -   Putting on and taking off regular upper body clothing?: None  -   Taking care of personal grooming such as brushing teeth?: None  -   Eating meals?: None    AM-PAC Score:  Score: 21  Approx Degree of Impairment: 32.79%  Standardi supervision MET 7/19  Patient will transfer to toilet:  with supervision     UE Exercise Program Goal  Patient will be independent with bilateral AROM HEP (home exercise program).

## 2020-07-19 NOTE — PROGRESS NOTES
[facility]  Progress Note    Arron Sanz Patient Status:  Inpatient    1955 MRN [id number]   [facility] 3SW-A Attending Raymond Lechuga MD   Hosp Day # 2 PCP Laurie Wells MD     SUBJECTIVE:  INTERVAL HISTORY: S/P  2  Procedure(

## 2020-07-20 VITALS
DIASTOLIC BLOOD PRESSURE: 40 MMHG | SYSTOLIC BLOOD PRESSURE: 122 MMHG | BODY MASS INDEX: 27.63 KG/M2 | OXYGEN SATURATION: 97 % | HEIGHT: 66 IN | RESPIRATION RATE: 18 BRPM | HEART RATE: 66 BPM | TEMPERATURE: 98 F | WEIGHT: 171.94 LBS

## 2020-07-20 PROBLEM — Z47.89 ORTHOPEDIC AFTERCARE: Status: ACTIVE | Noted: 2020-07-20

## 2020-07-20 LAB
ANION GAP SERPL CALC-SCNC: 1 MMOL/L (ref 0–18)
BASOPHILS # BLD AUTO: 0.02 X10(3) UL (ref 0–0.2)
BASOPHILS NFR BLD AUTO: 0.3 %
BUN BLD-MCNC: 22 MG/DL (ref 7–18)
BUN/CREAT SERPL: 24.4 (ref 10–20)
CALCIUM BLD-MCNC: 8.5 MG/DL (ref 8.5–10.1)
CHLORIDE SERPL-SCNC: 110 MMOL/L (ref 98–112)
CO2 SERPL-SCNC: 28 MMOL/L (ref 21–32)
CREAT BLD-MCNC: 0.9 MG/DL (ref 0.55–1.02)
DEPRECATED RDW RBC AUTO: 46.7 FL (ref 35.1–46.3)
EOSINOPHIL # BLD AUTO: 0.26 X10(3) UL (ref 0–0.7)
EOSINOPHIL NFR BLD AUTO: 3.8 %
ERYTHROCYTE [DISTWIDTH] IN BLOOD BY AUTOMATED COUNT: 14.7 % (ref 11–15)
GLUCOSE BLD-MCNC: 174 MG/DL (ref 70–99)
GLUCOSE BLD-MCNC: 205 MG/DL (ref 70–99)
GLUCOSE BLD-MCNC: 281 MG/DL (ref 70–99)
HCT VFR BLD AUTO: 32.8 % (ref 35–48)
HGB BLD-MCNC: 10.1 G/DL (ref 12–16)
IMM GRANULOCYTES # BLD AUTO: 0.02 X10(3) UL (ref 0–1)
IMM GRANULOCYTES NFR BLD: 0.3 %
LYMPHOCYTES # BLD AUTO: 1.09 X10(3) UL (ref 1–4)
LYMPHOCYTES NFR BLD AUTO: 15.8 %
MCH RBC QN AUTO: 26.7 PG (ref 26–34)
MCHC RBC AUTO-ENTMCNC: 30.8 G/DL (ref 31–37)
MCV RBC AUTO: 86.8 FL (ref 80–100)
MONOCYTES # BLD AUTO: 0.68 X10(3) UL (ref 0.1–1)
MONOCYTES NFR BLD AUTO: 9.8 %
NEUTROPHILS # BLD AUTO: 4.85 X10 (3) UL (ref 1.5–7.7)
NEUTROPHILS # BLD AUTO: 4.85 X10(3) UL (ref 1.5–7.7)
NEUTROPHILS NFR BLD AUTO: 70 %
OSMOLALITY SERPL CALC.SUM OF ELEC: 297 MOSM/KG (ref 275–295)
PLATELET # BLD AUTO: 148 10(3)UL (ref 150–450)
POTASSIUM SERPL-SCNC: 5.3 MMOL/L (ref 3.5–5.1)
RBC # BLD AUTO: 3.78 X10(6)UL (ref 3.8–5.3)
SODIUM SERPL-SCNC: 139 MMOL/L (ref 136–145)
WBC # BLD AUTO: 6.9 X10(3) UL (ref 4–11)

## 2020-07-20 PROCEDURE — 99232 SBSQ HOSP IP/OBS MODERATE 35: CPT | Performed by: INTERNAL MEDICINE

## 2020-07-20 RX ORDER — RAMIPRIL 2.5 MG/1
2.5 CAPSULE ORAL DAILY
Refills: 0 | Status: SHIPPED | COMMUNITY
Start: 2020-07-27 | End: 2020-08-25

## 2020-07-20 RX ORDER — MELATONIN
325
Qty: 30 TABLET | Refills: 0 | Status: SHIPPED | OUTPATIENT
Start: 2020-07-21 | End: 2020-08-25

## 2020-07-20 RX ORDER — METOPROLOL SUCCINATE 25 MG/1
25 TABLET, EXTENDED RELEASE ORAL DAILY
Refills: 0 | Status: SHIPPED | COMMUNITY
Start: 2020-07-27 | End: 2021-01-18

## 2020-07-20 NOTE — OCCUPATIONAL THERAPY NOTE
OCCUPATIONAL THERAPY TREATMENT NOTE - INPATIENT     Room Number: 368/368-A  Session:      Number of Visits to Meet Established Goals: 3    Presenting Problem: L TKR    History related to current admission:      Pt was admitted from home on 7/17 for electiv impairment     glasses   • Vomiting    • Weight loss        Past Surgical History  Past Surgical History:   Procedure Laterality Date   • ANGIOPLASTY (CORONARY)  2002    coronary stent placed by Dr. Yeimy Red   • [address] N/A 11/16/2017    Perf toilet, bedpan or urinal? : None  -   Putting on and taking off regular upper body clothing?: None  -   Taking care of personal grooming such as brushing teeth?: None  -   Eating meals?: None    AM-PAC Score:  Score: 22  Approx Degree of Impairment: 25.8% Recommendations: Home with home health PT/OT; Intermittent Supervision(assist during recovery)  OT Device Recommendations: Reacher;Raised toilet seat    PLAN  OT Treatment Plan: Balance activities; Energy conservation/work simplification techniques;ADL train

## 2020-07-20 NOTE — PLAN OF CARE
Pt AOX4. Primarily Danish speaking. Ipad  was used. Aquacel dressing C/D/I. Pain management plan explained. IVF kept infusing. No c/o dizziness/light headedness. Tolerated CPM last night. Plan is possible home today. Ankle pumps, IS encouraged.

## 2020-07-20 NOTE — PROGRESS NOTES
[facility] Patient Status:  Inpatient    1955 MRN [id number]   [facility] 3SW-A Attending Margot Calloway MD   Hosp Day # 3 PCP Negro Thayer MD     Nicol Perkinsr is a 72year old female patient.     Patient Acti little finger with routine healing, subsequent encounter         Date Noted: 11/13/2017      Closed displaced fracture of proximal phalanx of right little finger, initial encounter         Date Noted: 11/08/2017      Preop cardiovascular exam         Date Class: Chronic         Date Noted: 06/29/2012      Insomnia         Class: Chronic         Date Noted: 06/29/2012      Vitamin D deficiency         Class: Chronic         Date Noted: 06/29/2012      Arthropathy, unspecified, site unspecified         Clas Type II or unspecified type diabetes mellitus without mention of complication, not stated as uncontrolled    • Uncomfortable fullness after meals    • Venous insufficiency of both lower extremities 3/16/2018   • Visual impairment     glasses   • Vomiting

## 2020-07-20 NOTE — CM/SW NOTE
PT recommending HH. Paged [facility] care with referral.  Liaison will meet with the patient/familyto provide choice, explanation of services, and financial disclosure.        HOME SITUATION  Type of Home: House   Home Layout: One level  Stairs

## 2020-07-20 NOTE — PROGRESS NOTES
Patient said she already watched d/c video. DCI reviewed with pt and dtr. All questions answered. IV removed. Norco and Eliquis Rx gave to patient. Cleared for discharge home with Dmitry Kilgore

## 2020-07-20 NOTE — PHYSICAL THERAPY NOTE
PHYSICAL THERAPY KNEE TREATMENT NOTE - INPATIENT     Room Number: 368/368-A     Session: 2  Number of Visits to Meet Established Goals: 4    Presenting Problem: s/p L TKR    Problem List  Active Problems:    Primary osteoarthritis of right knee    Pain     coronary stent placed by Dr. Yan Solo   • [address] N/A 2017    Performed by Jostin Almonte DO at [address]   •   [id number]   • CATARACT Bilateral    • CATH DRUG ELUTING STENT     • COLONOSCOPY     • ENDOVENOUS LASER bedside commode, etc.): None   -   Moving from lying on back to sitting on the side of the bed?: A Little   How much help from another person does the patient currently need. ..   -   Moving to and from a bed to a chair (including a wheelchair)?: A Little toward functional goals and demonstrates functional mobility at supervision level. Pt will continue to benefit from skilled therapy to maximize functional independence.  The AM-PAC '6-Clicks' Inpatient Basic Mobility Short Form was completed and this patien

## 2020-07-21 ENCOUNTER — PATIENT OUTREACH (OUTPATIENT)
Dept: CASE MANAGEMENT | Age: 65
End: 2020-07-21

## 2020-07-21 ENCOUNTER — TELEPHONE (OUTPATIENT)
Dept: INTERNAL MEDICINE CLINIC | Facility: CLINIC | Age: 65
End: 2020-07-21

## 2020-07-21 DIAGNOSIS — IMO0002 OSTEOARTHROSIS INVOLVING LOWER LEG: ICD-10-CM

## 2020-07-21 DIAGNOSIS — Z02.9 ENCOUNTERS FOR UNSPECIFIED ADMINISTRATIVE PURPOSE: ICD-10-CM

## 2020-07-21 PROCEDURE — 1111F DSCHRG MED/CURRENT MED MERGE: CPT

## 2020-07-21 NOTE — DISCHARGE SUMMARY
Discharge Summary  Patient ID:  Mark Merida  [id number]  72year old  2/9/1955    Admit date: 7/17/2020    Discharge date and time: 7/20/20    Attending Physician: Cristina Finch.  Radha Back MD    Reason for admission: Primary osteoarthritis of left knee [M17.12]      D

## 2020-07-21 NOTE — PROGRESS NOTES
Spoke with daughter Lula Carmona to introduce CCM services. Lula Carmona relates she will talk to patient prior to making decision and will call me back at the end of this week.

## 2020-07-21 NOTE — CM/SW NOTE
Patient discharged on 7/20 as previously planned.        07/20/20 0900   Discharge disposition   Expected discharge disposition Home-Health   Name of Facillity/Home Care/Hospice Residential   Home services after discharge Skilled home care   Discharge trans

## 2020-07-21 NOTE — PROGRESS NOTES
Initial Post Discharge Follow Up   Discharge Date: 7/20/20  Contact Date: 7/21/2020    Consent Verification:  Assessment Completed With: Patient  HIPAA Verified?   Yes    Discharge Dx:     Status post left total knee arthroplasty   Diabetes type 2  Hyper hospital?   o On a scale of 1-5   1- Very Poor and 5- Very well   o Very well  • When you were leaving the hospital were your discharge instructions reviewed with you? yes  • How well were your discharge instructions explained to you?   o On a scale of 1-5 insulin glargine 100 UNIT/ML Subcutaneous Solution Inject 27 Units into the skin every morning. 35 mL 0   • metFORMIN HCl 500 MG Oral Tab Take 2 tablets (1,000 mg total) by mouth 2 (two) times daily with meals.  360 tablet 0   • FLUTICASONE PROPIONATE 50 MC Yes    If Yes: With Whom: [facility]   Except for [address] mentioned above, have you scheduled these other services?    no        DME ordered at D/C? Yes   What? Incentive Spirometer, Walker   Have you received your (DME)?    yes; [place] [facility] ([address], [place])        Aug 25, 2020 10:30 AM CDT Follow Up Visit with Daphne Hopkins MD [facility] CARDIOLOGY AT [facility] ([address])        Oct 17, 2020 10:00 AM CDT Follow Up Visit with Ivonne Chin use. The patient verbalized understanding and will contact the office with any further questions or concerns.        CCM referral placed:  No    BOOK BY DATE: 8/3/2020    [x]  Discharge Summary, Discharge medications reviewed/discussed/and reconciled agains

## 2020-07-22 NOTE — TELEPHONE ENCOUNTER
Last VISIT 07/14/20    Last REFILL Date not on file    Last LABS 07/20/20 CBC, BMP done     Future Appointments   Date Time Provider Margoth Silveira   7/27/2020  3:40 PM Jeannie Smith MD [facility]         Per PROTOCOL?  Not on protocol      Ple

## 2020-07-27 ENCOUNTER — PATIENT OUTREACH (OUTPATIENT)
Dept: CASE MANAGEMENT | Age: 65
End: 2020-07-27

## 2020-07-27 ENCOUNTER — OFFICE VISIT (OUTPATIENT)
Dept: INTERNAL MEDICINE CLINIC | Facility: CLINIC | Age: 65
End: 2020-07-27
Payer: MEDICARE

## 2020-07-27 VITALS
HEART RATE: 92 BPM | BODY MASS INDEX: 28.9 KG/M2 | WEIGHT: 179.81 LBS | TEMPERATURE: 98 F | DIASTOLIC BLOOD PRESSURE: 82 MMHG | HEIGHT: 66 IN | SYSTOLIC BLOOD PRESSURE: 126 MMHG | RESPIRATION RATE: 18 BRPM

## 2020-07-27 DIAGNOSIS — I10 BENIGN ESSENTIAL HYPERTENSION: ICD-10-CM

## 2020-07-27 DIAGNOSIS — E87.1 HYPONATREMIA: ICD-10-CM

## 2020-07-27 DIAGNOSIS — Z96.651 S/P TOTAL KNEE REPLACEMENT, RIGHT: ICD-10-CM

## 2020-07-27 DIAGNOSIS — M17.12 PRIMARY OSTEOARTHRITIS OF LEFT KNEE: Primary | ICD-10-CM

## 2020-07-27 PROCEDURE — 99495 TRANSJ CARE MGMT MOD F2F 14D: CPT | Performed by: INTERNAL MEDICINE

## 2020-07-27 PROCEDURE — 1111F DSCHRG MED/CURRENT MED MERGE: CPT | Performed by: INTERNAL MEDICINE

## 2020-07-27 RX ORDER — ASPIRIN 81 MG
100 TABLET, DELAYED RELEASE (ENTERIC COATED) ORAL 2 TIMES DAILY
Qty: 28 TABLET | Refills: 0 | Status: SHIPPED | OUTPATIENT
Start: 2020-07-27 | End: 2020-08-10

## 2020-07-27 NOTE — PROGRESS NOTES
Attempted contacting patients daughter on hipaa for follow up on intro to CCM services with no answer. Will continue contacting to discuss.

## 2020-07-27 NOTE — PROGRESS NOTES
HPI:    Chaz Romero is a 72year old female here today for hospital follow up.    She was discharged from Inpatient hospital, [facility] to Home   Admission Date: 7/17/20   Discharge Date: 7/20/20  Hospital Discharge Diagnoses (since 6/27/2020) secondary to constipation only taking norco intermittently. She has been using ice for swelling. No shortness of breath, cough, fevers, chills, dysuria, or diarrhea. Allergies:  She is allergic to codeine and vicodin [hydrocodone-acetaminophen]. reconciled and reviewed with patient  She  has a past medical history of Abdominal hernia, Abdominal pain, Anemia, Anesthesia complication, Anxiety, Arthritis, Atherosclerosis of coronary artery, Atherosclerotic heart disease of native coronary artery with energy stable, no sweating  SKIN: denies any unusual skin lesions  EYES: denies blurred vision or double vision  HEENT: denies nasal congestion, sinus pain or ST  LUNGS: denies shortness of breath with exertion  CARDIOVASCULAR: denies chest pain on exertio and intact; no sign of infection  No sign of other post-op infection  Encouraged norco use when needed; docusate prescribed  Continue daily dressing changes  Continue twice weekly home nurse evaluation and PT evaluation  Follow-up with ortho surgery servic

## 2020-07-30 ENCOUNTER — TELEPHONE (OUTPATIENT)
Dept: INTERNAL MEDICINE CLINIC | Facility: CLINIC | Age: 65
End: 2020-07-30

## 2020-07-30 NOTE — TELEPHONE ENCOUNTER
{ts daughter states that the pt needs a letter for immigration     She needs a letter in regards to her Diabetes.      The letter needs to state when she was diagnosed with diabetes    What medications is she taking for her DM     How often does she need to

## 2020-07-31 NOTE — TELEPHONE ENCOUNTER
Daughter states last 27 years has been diabetic. Letter needs to be printed and signed by AD. Patients daughter will pick letter up at  when ready.     AD please advise

## 2020-08-25 ENCOUNTER — HOSPITAL ENCOUNTER (OUTPATIENT)
Dept: MRI IMAGING | Age: 65
Discharge: HOME | End: 2020-08-25
Attending: INTERNAL MEDICINE
Payer: MEDICARE

## 2020-08-25 ENCOUNTER — HOSPITAL ENCOUNTER (OUTPATIENT)
Dept: BONE DENSITY | Age: 65
Discharge: HOME | End: 2020-08-25
Attending: INTERNAL MEDICINE
Payer: MEDICARE

## 2020-08-25 DIAGNOSIS — Z78.0 POSTMENOPAUSAL: ICD-10-CM

## 2020-08-25 DIAGNOSIS — K86.2 PANCREAS CYST: ICD-10-CM

## 2020-08-25 DIAGNOSIS — M85.88 OSTEOPENIA OF LUMBAR SPINE: ICD-10-CM

## 2020-08-25 LAB — CREAT BLD-MCNC: 0.7 MG/DL (ref 0.55–1.02)

## 2020-08-25 PROCEDURE — 77080 DXA BONE DENSITY AXIAL: CPT | Performed by: INTERNAL MEDICINE

## 2020-08-25 PROCEDURE — 74183 MRI ABD W/O CNTR FLWD CNTR: CPT | Performed by: INTERNAL MEDICINE

## 2020-08-25 PROCEDURE — 82565 ASSAY OF CREATININE: CPT

## 2020-08-25 PROCEDURE — A9575 INJ GADOTERATE MEGLUMI 0.1ML: HCPCS | Performed by: INTERNAL MEDICINE

## 2020-08-25 PROCEDURE — 76376 3D RENDER W/INTRP POSTPROCES: CPT | Performed by: INTERNAL MEDICINE

## 2020-08-27 NOTE — PROGRESS NOTES
Date: 2020    To: Nicki Hayes  : 1955    I hope this letter finds you doing well. I am writing to inform you of the following:        The results of your recent MRI: No significant change of small cystic foci involving the pancreatic tail w

## 2020-09-11 NOTE — TELEPHONE ENCOUNTER
Last OV 7.27.20 w/ AD (hospital f/up)   Last PE 7.20.19-Overdue   Last REFILL Jardiance 25mg (listed as external/pt reported)   Last LABS 7.11.20 HgA1c, CMP, Lipid    Future Appointments   Date Time Provider Margoth Silveira   9/17/2020  3:30 PM Cali Moss

## 2020-10-06 ENCOUNTER — HOSPITAL ENCOUNTER (OUTPATIENT)
Dept: ULTRASOUND IMAGING | Age: 65
Discharge: HOME | End: 2020-10-06
Attending: INTERNAL MEDICINE
Payer: MEDICARE

## 2020-10-06 DIAGNOSIS — I83.892 VARICOSE VEINS OF LEFT LOWER EXTREMITY WITH EDEMA: ICD-10-CM

## 2020-10-06 PROCEDURE — 93971 EXTREMITY STUDY: CPT | Performed by: INTERNAL MEDICINE

## 2020-10-06 NOTE — TELEPHONE ENCOUNTER
Last VISIT 07/27/20    Last REFILL 07/10/20 qty 360 w/0 refills    Last LABS 07/20/20 CBC, BMP done    Future Appointments   Date Time Provider Margoth Silveira   10/10/2020  7:00 AM [facility]   10/17/2020 10:00 AM Elysa Cooks, MD

## 2020-10-08 ENCOUNTER — TELEPHONE (OUTPATIENT)
Dept: INTERNAL MEDICINE CLINIC | Facility: CLINIC | Age: 65
End: 2020-10-08

## 2020-10-08 RX ORDER — METFORMIN HYDROCHLORIDE 500 MG/1
1000 TABLET, EXTENDED RELEASE ORAL 2 TIMES DAILY WITH MEALS
Qty: 360 TABLET | Refills: 1 | Status: SHIPPED | OUTPATIENT
Start: 2020-10-08 | End: 2021-04-09

## 2020-10-08 NOTE — TELEPHONE ENCOUNTER
Fwd to AD    Metformin HCl 500mg sent in for pt on 10/06/20, Pt states would prefer Metformin ER be sent instead.  Please review and approve or deny request.

## 2020-10-08 NOTE — TELEPHONE ENCOUNTER
Prescription Refill Request - Patient advised can take 48-72 hours.     Name of Medication (strength, dose, qty requested:   Metformin 500 ER    Which pharmacy[facility]    Have we prescribed before:Yes    (If yes, please ma

## 2020-10-13 ENCOUNTER — LAB ENCOUNTER (OUTPATIENT)
Dept: LAB | Age: 65
End: 2020-10-13
Attending: INTERNAL MEDICINE
Payer: MEDICARE

## 2020-10-13 DIAGNOSIS — I10 BENIGN ESSENTIAL HYPERTENSION: ICD-10-CM

## 2020-10-13 DIAGNOSIS — E78.2 MIXED HYPERLIPIDEMIA: ICD-10-CM

## 2020-10-13 DIAGNOSIS — E11.40 TYPE 2 DIABETES MELLITUS WITH DIABETIC NEUROPATHY, WITH LONG-TERM CURRENT USE OF INSULIN (HCC): ICD-10-CM

## 2020-10-13 DIAGNOSIS — Z79.4 TYPE 2 DIABETES MELLITUS WITH DIABETIC NEUROPATHY, WITH LONG-TERM CURRENT USE OF INSULIN (HCC): ICD-10-CM

## 2020-10-13 PROCEDURE — 83036 HEMOGLOBIN GLYCOSYLATED A1C: CPT

## 2020-10-13 PROCEDURE — 36415 COLL VENOUS BLD VENIPUNCTURE: CPT

## 2020-10-13 PROCEDURE — 80061 LIPID PANEL: CPT

## 2020-10-13 PROCEDURE — 80053 COMPREHEN METABOLIC PANEL: CPT

## 2020-10-17 ENCOUNTER — OFFICE VISIT (OUTPATIENT)
Dept: INTERNAL MEDICINE CLINIC | Facility: CLINIC | Age: 65
End: 2020-10-17
Payer: MEDICARE

## 2020-10-17 ENCOUNTER — TELEPHONE (OUTPATIENT)
Dept: INTERNAL MEDICINE CLINIC | Facility: CLINIC | Age: 65
End: 2020-10-17

## 2020-10-17 VITALS
HEART RATE: 68 BPM | TEMPERATURE: 97 F | BODY MASS INDEX: 27.97 KG/M2 | OXYGEN SATURATION: 97 % | SYSTOLIC BLOOD PRESSURE: 122 MMHG | HEIGHT: 66 IN | RESPIRATION RATE: 16 BRPM | DIASTOLIC BLOOD PRESSURE: 60 MMHG | WEIGHT: 174 LBS

## 2020-10-17 DIAGNOSIS — F41.9 ANXIETY: ICD-10-CM

## 2020-10-17 DIAGNOSIS — Z12.31 ENCOUNTER FOR SCREENING MAMMOGRAM FOR MALIGNANT NEOPLASM OF BREAST: Primary | ICD-10-CM

## 2020-10-17 DIAGNOSIS — D69.6 THROMBOCYTOPENIA (HCC): ICD-10-CM

## 2020-10-17 DIAGNOSIS — M17.12 PRIMARY OSTEOARTHRITIS OF LEFT KNEE: ICD-10-CM

## 2020-10-17 DIAGNOSIS — Z79.4 TYPE 2 DIABETES MELLITUS WITH DIABETIC NEUROPATHY, WITH LONG-TERM CURRENT USE OF INSULIN (HCC): ICD-10-CM

## 2020-10-17 DIAGNOSIS — I10 BENIGN ESSENTIAL HYPERTENSION: ICD-10-CM

## 2020-10-17 DIAGNOSIS — E11.40 TYPE 2 DIABETES MELLITUS WITH DIABETIC NEUROPATHY, WITH LONG-TERM CURRENT USE OF INSULIN (HCC): ICD-10-CM

## 2020-10-17 DIAGNOSIS — Z96.651 S/P TOTAL KNEE REPLACEMENT, RIGHT: ICD-10-CM

## 2020-10-17 DIAGNOSIS — D50.0 NORMOCYTIC ANEMIA DUE TO BLOOD LOSS: ICD-10-CM

## 2020-10-17 DIAGNOSIS — E78.2 MIXED HYPERLIPIDEMIA: ICD-10-CM

## 2020-10-17 PROCEDURE — 99214 OFFICE O/P EST MOD 30 MIN: CPT | Performed by: INTERNAL MEDICINE

## 2020-10-17 PROCEDURE — G0008 ADMIN INFLUENZA VIRUS VAC: HCPCS | Performed by: INTERNAL MEDICINE

## 2020-10-17 PROCEDURE — 90662 IIV NO PRSV INCREASED AG IM: CPT | Performed by: INTERNAL MEDICINE

## 2020-10-17 RX ORDER — MONTELUKAST SODIUM 10 MG/1
10 TABLET ORAL DAILY PRN
Qty: 90 TABLET | Refills: 0 | Status: SHIPPED | OUTPATIENT
Start: 2020-10-17 | End: 2021-01-13

## 2020-10-17 RX ORDER — ALPRAZOLAM 0.5 MG/1
0.5 TABLET ORAL DAILY PRN
Qty: 14 TABLET | Refills: 0 | Status: SHIPPED | OUTPATIENT
Start: 2020-10-17 | End: 2021-05-20

## 2020-10-17 RX ORDER — PEN NEEDLE, DIABETIC 31 GX5/16"
1 NEEDLE, DISPOSABLE MISCELLANEOUS ONCE
Qty: 100 EACH | Refills: 1 | Status: SHIPPED | OUTPATIENT
Start: 2020-10-17 | End: 2022-01-22

## 2020-10-17 NOTE — TELEPHONE ENCOUNTER
Wellness   Future Appointments   Date Time Provider Margoth Jordana   12/26/2020 10:00 AM Prabhu Chavez MD [facility]      Orders to Thien Pt just had labs done in Oct Please call her if she needs to do any labs prior to appt.

## 2020-10-17 NOTE — PROGRESS NOTES
Rachelle Collado  2/9/1955    Patient presents with: Follow - Up: RG rm 6  f/u 3 months      Ronaltagraciadonny 1923 is a 72year old female who presents as a follow-up. The patient has been monitoring her blood glucose levels regularly.   Recent he into the skin every morning. (Patient taking differently: Inject 27 Units into the skin every morning.  28 u q AM ) 35 mL 0   • FLUTICASONE PROPIONATE 50 MCG/ACT Nasal Suspension SHAKE LIQUID AND USE 2 SPRAYS IN EACH NOSTRIL DAILY 16 g 0   • ALPRAZolam 0.5 Loss of appetite    • Nausea    • Neuropathy     maria eugenia feet   • Night sweats    • Osteoarthrosis, unspecified whether generalized or localized, unspecified site    • Osteoporosis    • Other and unspecified hyperlipidemia    • Pain in joints    • Peripheral n Closed displaced fracture of proximal phalanx of right little finger with routine healing, subsequent encounter     Post-operative state     Bilateral leg edema     Venous insufficiency of both lower extremities     Varicose veins of both lower extremities surgery, Left ankle 2004 and right ankle 2006     • TOTAL HIP REPLACEMENT      rt hip   • VAGINAL HYSTERECTOMY N/A 11/16/2017    Performed by Javid Fierro MD at [address]   • [address] [place] [address]. [address] Left 10/22/2014    Performed by Jasmine Chatterjee reducing short-acting benzodiazepine dose; current use once weekly    Dyslipidemia:  Hypertriglyceridemia with LDL level at goal  Continue current medical management with recently resumed atorvastatin 10 mg daily  Dietary management discussed    Chronic le

## 2020-10-22 RX ORDER — INSULIN GLARGINE 100 [IU]/ML
28 INJECTION, SOLUTION SUBCUTANEOUS EVERY MORNING
Qty: 9 ML | Refills: 1 | Status: SHIPPED | OUTPATIENT
Start: 2020-10-22 | End: 2020-12-16

## 2020-10-22 NOTE — TELEPHONE ENCOUNTER
Last VISIT 10/17/20     Last REFILL 02/26/20 qty 9 ml W/5refills    Last LABS 10/13/20 CMP, Lipid, A1c done    Future Appointments   Date Time Provider Margoth Silveira   10/29/2020  4:00 PM Charlet Gowers, MD [facility] MEDICAL [facility]   12/26/2020 10:00 AM Dos

## 2020-11-19 ENCOUNTER — LAB ENCOUNTER (OUTPATIENT)
Dept: LAB | Age: 65
End: 2020-11-19
Attending: INTERNAL MEDICINE
Payer: MEDICARE

## 2020-11-19 ENCOUNTER — HOSPITAL ENCOUNTER (OUTPATIENT)
Dept: MAMMOGRAPHY | Age: 65
Discharge: HOME | End: 2020-11-19
Attending: INTERNAL MEDICINE
Payer: MEDICARE

## 2020-11-19 DIAGNOSIS — D69.6 THROMBOCYTOPENIA (HCC): ICD-10-CM

## 2020-11-19 DIAGNOSIS — D50.0 NORMOCYTIC ANEMIA DUE TO BLOOD LOSS: ICD-10-CM

## 2020-11-19 DIAGNOSIS — Z12.31 ENCOUNTER FOR SCREENING MAMMOGRAM FOR MALIGNANT NEOPLASM OF BREAST: ICD-10-CM

## 2020-11-19 PROCEDURE — 77067 SCR MAMMO BI INCL CAD: CPT | Performed by: INTERNAL MEDICINE

## 2020-11-19 PROCEDURE — 36415 COLL VENOUS BLD VENIPUNCTURE: CPT

## 2020-11-19 PROCEDURE — 85025 COMPLETE CBC W/AUTO DIFF WBC: CPT

## 2020-11-19 PROCEDURE — 77063 BREAST TOMOSYNTHESIS BI: CPT | Performed by: INTERNAL MEDICINE

## 2020-11-24 ENCOUNTER — LAB ENCOUNTER (OUTPATIENT)
Dept: LAB | Age: 65
End: 2020-11-24
Attending: INTERNAL MEDICINE
Payer: MEDICARE

## 2020-11-24 ENCOUNTER — TELEMEDICINE (OUTPATIENT)
Dept: INTERNAL MEDICINE CLINIC | Facility: CLINIC | Age: 65
End: 2020-11-24
Payer: MEDICARE

## 2020-11-24 DIAGNOSIS — M79.10 MYALGIA: ICD-10-CM

## 2020-11-24 DIAGNOSIS — J02.9 SORE THROAT: Primary | ICD-10-CM

## 2020-11-24 DIAGNOSIS — R09.81 NASAL SINUS CONGESTION: ICD-10-CM

## 2020-11-24 DIAGNOSIS — J02.9 SORE THROAT: ICD-10-CM

## 2020-11-24 PROCEDURE — 99213 OFFICE O/P EST LOW 20 MIN: CPT | Performed by: INTERNAL MEDICINE

## 2020-11-24 RX ORDER — AZITHROMYCIN 250 MG/1
TABLET, FILM COATED ORAL
Qty: 6 TABLET | Refills: 0 | Status: SHIPPED | OUTPATIENT
Start: 2020-11-24 | End: 2020-11-29

## 2020-11-24 NOTE — PROGRESS NOTES
Fam Pedro  2/9/1955    No chief complaint on file. Video encounter    SUBJECTIVE   Fam Pedro is a 72year old female who presents with cough and congestion.     The patient was in her usual state of health until 48 hours ago when she began expe 500 MG Oral Cap Take 1,000 mg by mouth one time. • insulin glargine 100 UNIT/ML Subcutaneous Solution Inject 27 Units into the skin every morning. (Patient taking differently: Inject 27 Units into the skin every morning.  28 u q AM ) 35 mL 0   • FLUTICA • Stress    • Type II or unspecified type diabetes mellitus without mention of complication, not stated as uncontrolled    • Uncomfortable fullness after meals    • Venous insufficiency of both lower extremities 3/16/2018   • Visual impairment     glass edema     Compression of vein     N&V (nausea and vomiting)     Muscle cramps     Dyslipidemia     Gastroesophageal reflux disease without esophagitis     Dysphagia     Unspecified abdominal pain     Iron deficiency anemia, unspecified     Benign neoplasm Smokeless tobacco: Never Used    Alcohol use: No      Frequency: Never      Binge frequency: Never    Drug use: No        OBJECTIVE:   Vital sign and physical evaluation not completed during this virtual encounter.      ASSESSMENT AND PLAN:   Eze Patel

## 2020-12-08 RX ORDER — EMPAGLIFLOZIN 25 MG/1
TABLET, FILM COATED ORAL
Qty: 90 TABLET | Refills: 0 | Status: SHIPPED | OUTPATIENT
Start: 2020-12-08 | End: 2021-03-09

## 2020-12-08 NOTE — TELEPHONE ENCOUNTER
Last VISIT 10/17/20    Last REFILL 09/11/20 qty 90 w/0 refills    Last LABS 11/24/20 Covid test done    Future Appointments   Date Time Provider Margoth Cani   12/26/2020 10:00 AM Lester Murillo MD [facility]     Per PROTOCOL?  Failed    Pleas

## 2020-12-15 ENCOUNTER — HOSPITAL ENCOUNTER (OUTPATIENT)
Dept: MAMMOGRAPHY | Age: 65
Discharge: HOME | End: 2020-12-15
Attending: INTERNAL MEDICINE
Payer: MEDICARE

## 2020-12-15 DIAGNOSIS — R92.2 INCONCLUSIVE MAMMOGRAM: ICD-10-CM

## 2020-12-15 PROCEDURE — 77065 DX MAMMO INCL CAD UNI: CPT | Performed by: INTERNAL MEDICINE

## 2020-12-15 PROCEDURE — 77061 BREAST TOMOSYNTHESIS UNI: CPT | Performed by: INTERNAL MEDICINE

## 2020-12-15 RX ORDER — LINAGLIPTIN 5 MG/1
5 TABLET, FILM COATED ORAL DAILY
Qty: 90 TABLET | Refills: 0 | Status: SHIPPED | OUTPATIENT
Start: 2020-12-15 | End: 2021-03-16

## 2020-12-15 NOTE — TELEPHONE ENCOUNTER
Last Ov:11/24/20 castillo phone  Upcoming appt:12/26/20  Last labs: 11/19/20 cbc  Last Rx:patient reported.     Per Protocol sent for review

## 2020-12-16 RX ORDER — INSULIN GLARGINE 100 [IU]/ML
28 INJECTION, SOLUTION SUBCUTANEOUS EVERY MORNING
Qty: 9 ML | Refills: 1 | Status: SHIPPED | OUTPATIENT
Start: 2020-12-16 | End: 2021-02-11

## 2020-12-16 NOTE — TELEPHONE ENCOUNTER
Last VISIT 10/17/20    Last REFILL 10/22/20 qty 9 mL w/1 refill    Last LABS 11/24/20 Covid test done    Future Appointments   Date Time Provider Margoth Silveira   12/26/2020 10:00 AM Deepthi Wade MD [facility]         Per PROTOCOL?  Not on pro

## 2020-12-26 ENCOUNTER — OFFICE VISIT (OUTPATIENT)
Dept: INTERNAL MEDICINE CLINIC | Facility: CLINIC | Age: 65
End: 2020-12-26
Payer: MEDICARE

## 2020-12-26 VITALS
HEIGHT: 65.75 IN | HEART RATE: 78 BPM | TEMPERATURE: 98 F | RESPIRATION RATE: 16 BRPM | OXYGEN SATURATION: 98 % | BODY MASS INDEX: 28.95 KG/M2 | WEIGHT: 178 LBS | SYSTOLIC BLOOD PRESSURE: 126 MMHG | DIASTOLIC BLOOD PRESSURE: 62 MMHG

## 2020-12-26 DIAGNOSIS — E11.40 TYPE 2 DIABETES MELLITUS WITH DIABETIC NEUROPATHY, WITH LONG-TERM CURRENT USE OF INSULIN (HCC): ICD-10-CM

## 2020-12-26 DIAGNOSIS — I10 ESSENTIAL HYPERTENSION: ICD-10-CM

## 2020-12-26 DIAGNOSIS — E78.2 MIXED HYPERLIPIDEMIA: ICD-10-CM

## 2020-12-26 DIAGNOSIS — F34.1 DYSTHYMIC DISORDER: ICD-10-CM

## 2020-12-26 DIAGNOSIS — Z79.4 TYPE 2 DIABETES MELLITUS WITH DIABETIC NEUROPATHY, WITH LONG-TERM CURRENT USE OF INSULIN (HCC): ICD-10-CM

## 2020-12-26 DIAGNOSIS — Z00.00 ENCOUNTER FOR ANNUAL HEALTH EXAMINATION: Primary | ICD-10-CM

## 2020-12-26 DIAGNOSIS — F41.9 ANXIETY: ICD-10-CM

## 2020-12-26 DIAGNOSIS — I25.10 CORONARY ARTERY DISEASE INVOLVING NATIVE CORONARY ARTERY OF NATIVE HEART WITHOUT ANGINA PECTORIS: ICD-10-CM

## 2020-12-26 DIAGNOSIS — I10 BENIGN ESSENTIAL HYPERTENSION: ICD-10-CM

## 2020-12-26 PROCEDURE — G0439 PPPS, SUBSEQ VISIT: HCPCS | Performed by: INTERNAL MEDICINE

## 2020-12-26 NOTE — PROGRESS NOTES
HPI:   Channing Lin is a 72year old female who presents for a Medicare Subsequent Annual Wellness visit (Pt already had Initial Annual Wellness). The patient has been in her usual state of health and denies any acute complaints.     The patient sym weeks)?: Not at all  PHQ-2 SCORE: 0      Advanced Directive:  She does NOT have a Living Will on file in [address] Not Discussed     She does NOT have a Power of  for [employer] on file in [facility] [address]    Not Discussed       She has never smoked tobacco.  Junaid Cesar and without cholangitis or cholecystitis     Type 2 diabetes mellitus with diabetic neuropathy (HCC)     Left ankle tendonitis     Pancreatic cyst     Coronary artery disease involving native coronary artery of native heart without angina pectoris     S/P ALB 3.6 10/13/2020    TSH 1.930 07/18/2019    CREATSERUM 0.86 10/13/2020     (H) 10/13/2020        CBC  (most recent labs)   Lab Results   Component Value Date    WBC 7.0 11/19/2020    HGB 13.5 11/19/2020    .0 11/19/2020        ALLERGIES: History of depression, History of eating disorder, Indigestion, Irregular bowel habits, Itch of skin, Leg swelling, Loss of appetite, Nausea, Neuropathy, Night sweats, Osteoarthrosis, unspecified whether generalized or localized, unspecified site, Osteopor BMI 28.95 kg/m²  Estimated body mass index is 28.95 kg/m² as calculated from the following:    Height as of this encounter: 5' 5.75\" (1.67 m). Weight as of this encounter: 178 lb (80.7 kg).     Medicare Hearing Assessment  (Required for AWV/SWV)    Drew FLUZONE 6 months and older PFS 0.5 ml (66952) 10/27/2018, 11/02/2019   • Influenza 10/21/2013   • Kenalog Per 10mg Inj 05/13/2015   • Pneumovax 23 09/05/2018   • Vitamin B-12 Up To 1000mcg, IM/SC Inj 06/04/2018, 06/18/2018, 09/05/2018   Deferred Date(s) Sahra Osorio exercise. Return in 3 months (on 3/26/2021).      Yakov Bowens MD, 12/26/2020     General Health     In the past six months, have you lost more than 10 pounds without trying?: 2 - No  Has your appetite been poor?: No  How does the patient maintain a good Pap and Pelvic      Pap: Every 3 yrs age 21-65 or Pap+HPV every 5 yrs age 33-67, age 72 and older at high risk Pap Smear,3 Years due on 08/23/2020 Update Health Maintenance if applicable    Chlamydia  Annually if high risk No results found for: CHLAMYDIA 10/16/2017 1.09 (H)     Creatinine (mg/dL)   Date Value   10/13/2020 0.86   08/31/2018 1.26 (H)    No flowsheet data found. Drug Serum Conc  Annually No results found for: DIGOXIN, DIG, VALP No flowsheet data found.        Diabetes      HgbA1C  Annuall

## 2020-12-26 NOTE — PATIENT INSTRUCTIONS
Kaylin Bledsoe's SCREENING SCHEDULE   Tests on this list are recommended by your physician but may not be covered, or covered at this frequency, by your insurer. Please check with your insurance carrier before scheduling to verify coverage.    PREVENTATI Electrocardiogram date04/21/2020 Routine EKG is not a screening covered service except at the Welcome to Medicare Visit    Abdominal aortic aneurysm screening (once between ages 73-68) IPPE only No results found for this or any previous visit.  Limited to p high risk No results found for: CHLAMYDIA No flowsheet data found.     Screening Mammogram      Mammogram    Recommend Annually to at least age 76, and as needed after 76 Mammogram due on 12/15/2021 Please get this Mammogram regularly   Immunizations      I http://www. [place]ph.state. [place]/public/books/advin.htm  A link to the [employer]. This site has a lot of good information including definitions of the different types of Advance Directives.  It also has the State forms available on it's webs

## 2020-12-31 ENCOUNTER — LAB ENCOUNTER (OUTPATIENT)
Dept: LAB | Age: 65
End: 2020-12-31
Attending: INTERNAL MEDICINE
Payer: MEDICARE

## 2020-12-31 DIAGNOSIS — Z00.00 ENCOUNTER FOR ANNUAL HEALTH EXAMINATION: ICD-10-CM

## 2020-12-31 DIAGNOSIS — E78.2 MIXED HYPERLIPIDEMIA: ICD-10-CM

## 2020-12-31 DIAGNOSIS — Z79.4 TYPE 2 DIABETES MELLITUS WITH DIABETIC NEUROPATHY, WITH LONG-TERM CURRENT USE OF INSULIN (HCC): ICD-10-CM

## 2020-12-31 DIAGNOSIS — I10 ESSENTIAL HYPERTENSION: ICD-10-CM

## 2020-12-31 DIAGNOSIS — E11.40 TYPE 2 DIABETES MELLITUS WITH DIABETIC NEUROPATHY, WITH LONG-TERM CURRENT USE OF INSULIN (HCC): ICD-10-CM

## 2020-12-31 LAB
ALBUMIN SERPL-MCNC: 3.8 G/DL (ref 3.4–5)
ALBUMIN/GLOB SERPL: 1.1 {RATIO} (ref 1–2)
ALP LIVER SERPL-CCNC: 131 U/L
ALT SERPL-CCNC: 18 U/L
ANION GAP SERPL CALC-SCNC: 4 MMOL/L (ref 0–18)
AST SERPL-CCNC: 12 U/L (ref 15–37)
BILIRUB SERPL-MCNC: 0.4 MG/DL (ref 0.1–2)
BUN BLD-MCNC: 17 MG/DL (ref 7–18)
BUN/CREAT SERPL: 14.5 (ref 10–20)
CALCIUM BLD-MCNC: 9.3 MG/DL (ref 8.5–10.1)
CHLORIDE SERPL-SCNC: 105 MMOL/L (ref 98–112)
CHOLEST SMN-MCNC: 173 MG/DL (ref ?–200)
CO2 SERPL-SCNC: 28 MMOL/L (ref 21–32)
CREAT BLD-MCNC: 1.17 MG/DL
CREAT UR-SCNC: 50.9 MG/DL
EST. AVERAGE GLUCOSE BLD GHB EST-MCNC: 160 MG/DL (ref 68–126)
GLOBULIN PLAS-MCNC: 3.6 G/DL (ref 2.8–4.4)
GLUCOSE BLD-MCNC: 144 MG/DL (ref 70–99)
HBA1C MFR BLD HPLC: 7.2 % (ref ?–5.7)
HDLC SERPL-MCNC: 47 MG/DL (ref 40–59)
LDLC SERPL CALC-MCNC: 86 MG/DL (ref ?–100)
M PROTEIN MFR SERPL ELPH: 7.4 G/DL (ref 6.4–8.2)
MICROALBUMIN UR-MCNC: 0.53 MG/DL
MICROALBUMIN/CREAT 24H UR-RTO: 10.4 UG/MG (ref ?–30)
NONHDLC SERPL-MCNC: 126 MG/DL (ref ?–130)
OSMOLALITY SERPL CALC.SUM OF ELEC: 288 MOSM/KG (ref 275–295)
PATIENT FASTING Y/N/NP: YES
PATIENT FASTING Y/N/NP: YES
POTASSIUM SERPL-SCNC: 4.4 MMOL/L (ref 3.5–5.1)
SODIUM SERPL-SCNC: 137 MMOL/L (ref 136–145)
TRIGL SERPL-MCNC: 200 MG/DL (ref 30–149)
TSI SER-ACNC: 2.01 MIU/ML (ref 0.36–3.74)
VLDLC SERPL CALC-MCNC: 40 MG/DL (ref 0–30)

## 2020-12-31 PROCEDURE — 80061 LIPID PANEL: CPT

## 2020-12-31 PROCEDURE — 82043 UR ALBUMIN QUANTITATIVE: CPT

## 2020-12-31 PROCEDURE — 36415 COLL VENOUS BLD VENIPUNCTURE: CPT

## 2020-12-31 PROCEDURE — 82570 ASSAY OF URINE CREATININE: CPT

## 2020-12-31 PROCEDURE — 80053 COMPREHEN METABOLIC PANEL: CPT

## 2020-12-31 PROCEDURE — 83036 HEMOGLOBIN GLYCOSYLATED A1C: CPT

## 2020-12-31 PROCEDURE — 84443 ASSAY THYROID STIM HORMONE: CPT

## 2021-01-01 DIAGNOSIS — R79.89 ELEVATED SERUM CREATININE: Primary | ICD-10-CM

## 2021-01-04 DIAGNOSIS — R79.89 ELEVATED SERUM CREATININE: Primary | ICD-10-CM

## 2021-01-13 RX ORDER — MONTELUKAST SODIUM 10 MG/1
TABLET ORAL
Qty: 90 TABLET | Refills: 0 | Status: SHIPPED | OUTPATIENT
Start: 2021-01-13 | End: 2021-04-09

## 2021-01-13 NOTE — TELEPHONE ENCOUNTER
Last VISIT 12/26/20     Last REFILL 10/17/20 both filled qty 90 w/0 refills    Last LABS 12/31/20 Multiple labs done    Future Appointments   Date Time Provider Margoth Silveira                 3/6/2021 10:00 AM Lev Escoto MD [facility]

## 2021-02-01 DIAGNOSIS — Z23 NEED FOR VACCINATION: ICD-10-CM

## 2021-02-06 ENCOUNTER — IMMUNIZATION (OUTPATIENT)
Dept: LAB | Age: 66
End: 2021-02-06
Attending: HOSPITALIST
Payer: MEDICARE

## 2021-02-06 DIAGNOSIS — Z23 NEED FOR VACCINATION: Primary | ICD-10-CM

## 2021-02-06 PROCEDURE — 0001A SARSCOV2 VAC 30MCG/0.3ML IM: CPT

## 2021-02-11 RX ORDER — INSULIN GLARGINE 100 [IU]/ML
28 INJECTION, SOLUTION SUBCUTANEOUS EVERY MORNING
Qty: 9 ML | Refills: 1 | Status: SHIPPED | OUTPATIENT
Start: 2021-02-11 | End: 2021-03-06

## 2021-02-11 NOTE — TELEPHONE ENCOUNTER
Last VISIT 12/26/20    Last REFILL 12/16/20 qty 9 mL w/1 refill    Last LABS 12/31/20 CBC, CMP, Lipid, TSH, A1c, Micralb done    Future Appointments   Date Time Provider Margoth Silveira                 3/6/2021 10:00 AM Kiel Azevedo MD [facility]

## 2021-02-27 ENCOUNTER — IMMUNIZATION (OUTPATIENT)
Dept: LAB | Age: 66
End: 2021-02-27
Attending: HOSPITALIST
Payer: MEDICARE

## 2021-02-27 DIAGNOSIS — Z23 NEED FOR VACCINATION: Primary | ICD-10-CM

## 2021-02-27 PROCEDURE — 0002A SARSCOV2 VAC 30MCG/0.3ML IM: CPT

## 2021-03-06 ENCOUNTER — OFFICE VISIT (OUTPATIENT)
Dept: INTERNAL MEDICINE CLINIC | Facility: CLINIC | Age: 66
End: 2021-03-06
Payer: MEDICARE

## 2021-03-06 VITALS
TEMPERATURE: 97 F | DIASTOLIC BLOOD PRESSURE: 64 MMHG | HEIGHT: 66.5 IN | HEART RATE: 74 BPM | RESPIRATION RATE: 16 BRPM | SYSTOLIC BLOOD PRESSURE: 118 MMHG | WEIGHT: 178 LBS | OXYGEN SATURATION: 97 % | BODY MASS INDEX: 28.27 KG/M2

## 2021-03-06 DIAGNOSIS — E11.40 TYPE 2 DIABETES MELLITUS WITH DIABETIC NEUROPATHY, WITH LONG-TERM CURRENT USE OF INSULIN (HCC): Primary | ICD-10-CM

## 2021-03-06 DIAGNOSIS — F41.8 DEPRESSION WITH ANXIETY: ICD-10-CM

## 2021-03-06 DIAGNOSIS — Z79.4 TYPE 2 DIABETES MELLITUS WITH DIABETIC NEUROPATHY, WITH LONG-TERM CURRENT USE OF INSULIN (HCC): Primary | ICD-10-CM

## 2021-03-06 DIAGNOSIS — I10 ESSENTIAL HYPERTENSION: ICD-10-CM

## 2021-03-06 PROCEDURE — 99214 OFFICE O/P EST MOD 30 MIN: CPT | Performed by: INTERNAL MEDICINE

## 2021-03-06 RX ORDER — INSULIN GLARGINE 100 [IU]/ML
30 INJECTION, SOLUTION SUBCUTANEOUS EVERY MORNING
Qty: 36 ML | Refills: 1 | Status: SHIPPED | OUTPATIENT
Start: 2021-03-06 | End: 2021-06-01

## 2021-03-06 NOTE — PROGRESS NOTES
Mark Merida  2/9/1955    Patient presents with: Follow - Up: RG rm 6 3 month f/u diabetes      SUBJECTIVE   Mark Merida is a 77year old female who presents as a follow-up.     The patient notes reduced mood and increased anxiety since last office total) by mouth daily. 90 capsule 3   • atorvastatin 10 MG Oral Tab Take 1 tablet (10 mg total) by mouth daily.  90 tablet 3   • FLUTICASONE PROPIONATE 50 MCG/ACT Nasal Suspension SHAKE LIQUID AND USE 2 SPRAYS IN EACH NOSTRIL DAILY 16 g 0   • Blood Pressure diabetes mellitus without mention of complication, not stated as uncontrolled    • Uncomfortable fullness after meals    • Venous insufficiency of both lower extremities 3/16/2018   • Visual impairment     glasses   • Vomiting    • Weight loss       Patien and vomiting)     Muscle cramps     Dyslipidemia     Gastroesophageal reflux disease without esophagitis     Dysphagia     Unspecified abdominal pain     Iron deficiency anemia, unspecified     Benign neoplasm of ascending colon     Benign neoplasm of persaud Vaping Use: Never used    Alcohol use: No    Drug use: No        OBJECTIVE:   /64   Pulse 74   Temp 97.3 °F (36.3 °C)   Resp 16   Ht 5' 6.5\" (1.689 m)   Wt 178 lb (80.7 kg)   LMP 09/01/2017 (LMP Unknown)   SpO2 97%   BMI 28.30 kg/m²   Constitutional encounter. Meds & Refills:  Requested Prescriptions      No prescriptions requested or ordered in this encounter       Imaging & Consults:  None    No follow-ups on file. There are no Patient Instructions on file for this visit.     All questions were

## 2021-03-09 RX ORDER — EMPAGLIFLOZIN 25 MG/1
TABLET, FILM COATED ORAL
Qty: 90 TABLET | Refills: 0 | Status: SHIPPED | OUTPATIENT
Start: 2021-03-09 | End: 2021-06-07

## 2021-03-16 RX ORDER — LINAGLIPTIN 5 MG/1
5 TABLET, FILM COATED ORAL DAILY
Qty: 90 TABLET | Refills: 0 | Status: SHIPPED | OUTPATIENT
Start: 2021-03-16 | End: 2021-05-20

## 2021-04-09 RX ORDER — METFORMIN HYDROCHLORIDE 500 MG/1
TABLET, EXTENDED RELEASE ORAL
Qty: 360 TABLET | Refills: 0 | Status: SHIPPED | OUTPATIENT
Start: 2021-04-09 | End: 2021-06-01

## 2021-04-09 RX ORDER — MONTELUKAST SODIUM 10 MG/1
10 TABLET ORAL
Qty: 90 TABLET | Refills: 3 | Status: SHIPPED | OUTPATIENT
Start: 2021-04-09 | End: 2022-04-06

## 2021-04-09 NOTE — TELEPHONE ENCOUNTER
Last VISIT 03/06/21    Last REFILL 01/13/21 qty 90 w/0 refills    Last LABS 12/31/20 CMP, Lipid, A1c, Microalb, TSH done    Future Appointments   Date Time Provider Margoth Silveira   6/1/2021  5:20 PM Vianca Cui MD [facility]         Per PROTOCOL? Not on protocol        Please Approve or Deny.

## 2021-04-09 NOTE — TELEPHONE ENCOUNTER
Last VISIT 03/06/21    Last REFILL 01/13/21 qty 90 w/0 refills    Last LABS 12/31/20 CMP, Lipid, A1c, Microalb, TSH done    Future Appointments   Date Time Provider Margoth Silveira   6/1/2021  5:20 PM Doris Ross MD [facility]         Per PROTOCOL? Failed    Please Approve or Deny.

## 2021-04-14 ENCOUNTER — TELEPHONE (OUTPATIENT)
Dept: INTERNAL MEDICINE CLINIC | Facility: CLINIC | Age: 66
End: 2021-04-14

## 2021-05-20 RX ORDER — LINAGLIPTIN 5 MG/1
5 TABLET, FILM COATED ORAL DAILY
Qty: 90 TABLET | Refills: 0 | Status: SHIPPED | OUTPATIENT
Start: 2021-05-20 | End: 2021-06-01

## 2021-05-20 RX ORDER — FLUTICASONE PROPIONATE 50 MCG
SPRAY, SUSPENSION (ML) NASAL
Qty: 48 G | Refills: 0 | Status: SHIPPED | OUTPATIENT
Start: 2021-05-20 | End: 2021-08-24

## 2021-05-20 RX ORDER — RAMIPRIL 2.5 MG/1
CAPSULE ORAL
Qty: 90 CAPSULE | Refills: 0 | Status: SHIPPED | OUTPATIENT
Start: 2021-05-20 | End: 2021-08-10 | Stop reason: ALTCHOICE

## 2021-05-20 RX ORDER — FLUTICASONE PROPIONATE 50 MCG
2 SPRAY, SUSPENSION (ML) NASAL DAILY
Qty: 16 G | Refills: 0 | Status: SHIPPED | OUTPATIENT
Start: 2021-05-20 | End: 2021-05-20

## 2021-05-20 RX ORDER — ALPRAZOLAM 0.5 MG/1
TABLET ORAL
Qty: 30 TABLET | Refills: 0 | Status: SHIPPED | OUTPATIENT
Start: 2021-05-20

## 2021-05-20 NOTE — TELEPHONE ENCOUNTER
Last VISIT 03/06/21    Last REFILL 04/09/21  Sertraline HCl 50 MG Oral Tab 90 tablet 3     10/17/20   ALPRAZolam 0.5 MG Oral Tab 14 tablet 0     03/16/21  linagliptin (TRADJENTA) 5 mg Oral Tab 90 tablet 0     06/26/20  FLUTICASONE PROPIONATE 50 MCG/ACT [place]

## 2021-05-29 ENCOUNTER — LAB ENCOUNTER (OUTPATIENT)
Dept: LAB | Age: 66
End: 2021-05-29
Attending: INTERNAL MEDICINE
Payer: MEDICARE

## 2021-05-29 DIAGNOSIS — E11.40 TYPE 2 DIABETES MELLITUS WITH DIABETIC NEUROPATHY, WITH LONG-TERM CURRENT USE OF INSULIN (HCC): ICD-10-CM

## 2021-05-29 DIAGNOSIS — Z79.4 TYPE 2 DIABETES MELLITUS WITH DIABETIC NEUROPATHY, WITH LONG-TERM CURRENT USE OF INSULIN (HCC): ICD-10-CM

## 2021-05-29 DIAGNOSIS — R79.89 ELEVATED SERUM CREATININE: ICD-10-CM

## 2021-05-29 PROCEDURE — 82570 ASSAY OF URINE CREATININE: CPT

## 2021-05-29 PROCEDURE — 80061 LIPID PANEL: CPT

## 2021-05-29 PROCEDURE — 80053 COMPREHEN METABOLIC PANEL: CPT

## 2021-05-29 PROCEDURE — 83036 HEMOGLOBIN GLYCOSYLATED A1C: CPT

## 2021-05-29 PROCEDURE — 82043 UR ALBUMIN QUANTITATIVE: CPT

## 2021-05-29 PROCEDURE — 36415 COLL VENOUS BLD VENIPUNCTURE: CPT

## 2021-06-01 ENCOUNTER — OFFICE VISIT (OUTPATIENT)
Dept: INTERNAL MEDICINE CLINIC | Facility: CLINIC | Age: 66
End: 2021-06-01
Payer: MEDICARE

## 2021-06-01 VITALS
HEART RATE: 70 BPM | WEIGHT: 182 LBS | DIASTOLIC BLOOD PRESSURE: 56 MMHG | SYSTOLIC BLOOD PRESSURE: 98 MMHG | HEIGHT: 66.5 IN | BODY MASS INDEX: 28.9 KG/M2 | TEMPERATURE: 97 F

## 2021-06-01 DIAGNOSIS — R80.9 MICROALBUMINURIA DUE TO TYPE 2 DIABETES MELLITUS (HCC): ICD-10-CM

## 2021-06-01 DIAGNOSIS — M79.672 LEFT FOOT PAIN: ICD-10-CM

## 2021-06-01 DIAGNOSIS — N30.01 ACUTE CYSTITIS WITH HEMATURIA: ICD-10-CM

## 2021-06-01 DIAGNOSIS — E11.40 TYPE 2 DIABETES MELLITUS WITH DIABETIC NEUROPATHY, WITH LONG-TERM CURRENT USE OF INSULIN (HCC): ICD-10-CM

## 2021-06-01 DIAGNOSIS — R30.0 DYSURIA: ICD-10-CM

## 2021-06-01 DIAGNOSIS — E11.29 MICROALBUMINURIA DUE TO TYPE 2 DIABETES MELLITUS (HCC): ICD-10-CM

## 2021-06-01 DIAGNOSIS — M25.572 ACUTE LEFT ANKLE PAIN: Primary | ICD-10-CM

## 2021-06-01 DIAGNOSIS — Z79.4 TYPE 2 DIABETES MELLITUS WITH DIABETIC NEUROPATHY, WITH LONG-TERM CURRENT USE OF INSULIN (HCC): ICD-10-CM

## 2021-06-01 PROCEDURE — 87077 CULTURE AEROBIC IDENTIFY: CPT | Performed by: INTERNAL MEDICINE

## 2021-06-01 PROCEDURE — 87086 URINE CULTURE/COLONY COUNT: CPT | Performed by: INTERNAL MEDICINE

## 2021-06-01 PROCEDURE — 99215 OFFICE O/P EST HI 40 MIN: CPT | Performed by: INTERNAL MEDICINE

## 2021-06-01 PROCEDURE — 87186 SC STD MICRODIL/AGAR DIL: CPT | Performed by: INTERNAL MEDICINE

## 2021-06-01 PROCEDURE — 81003 URINALYSIS AUTO W/O SCOPE: CPT | Performed by: INTERNAL MEDICINE

## 2021-06-01 RX ORDER — NITROFURANTOIN 25; 75 MG/1; MG/1
100 CAPSULE ORAL 2 TIMES DAILY
Qty: 14 CAPSULE | Refills: 0 | Status: SHIPPED | OUTPATIENT
Start: 2021-06-01 | End: 2021-06-08

## 2021-06-01 RX ORDER — LINAGLIPTIN 5 MG/1
5 TABLET, FILM COATED ORAL DAILY
Qty: 90 TABLET | Refills: 1 | Status: SHIPPED | OUTPATIENT
Start: 2021-06-01

## 2021-06-01 RX ORDER — METFORMIN HYDROCHLORIDE 500 MG/1
1000 TABLET, EXTENDED RELEASE ORAL 2 TIMES DAILY WITH MEALS
Qty: 360 TABLET | Refills: 3 | Status: SHIPPED | OUTPATIENT
Start: 2021-06-01 | End: 2021-08-30

## 2021-06-01 RX ORDER — GLIPIZIDE 5 MG/1
5 TABLET, FILM COATED, EXTENDED RELEASE ORAL DAILY
Qty: 30 TABLET | Refills: 0 | Status: SHIPPED | OUTPATIENT
Start: 2021-06-01 | End: 2021-06-02

## 2021-06-01 RX ORDER — INSULIN GLARGINE 100 [IU]/ML
30 INJECTION, SOLUTION SUBCUTANEOUS EVERY MORNING
Qty: 40 ML | Refills: 1 | Status: SHIPPED | OUTPATIENT
Start: 2021-06-01 | End: 2022-01-31

## 2021-06-01 NOTE — PROGRESS NOTES
Helen Choe  2/9/1955    Patient presents with: Follow - Up: AJ rm 6 f/u 3 month      SUBJECTIVE   Helen Choe is a 77year old female who presents as a follow-up.     The patient's HbA1c has increased from 7.2% to 8.9% since December of 2020, and Suspension SHAKE LIQUID AND USE 2 SPRAYS IN EACH NOSTRIL DAILY 48 g 0   • METFORMIN HCL  MG Oral Tablet 24 Hr TAKE 2 TABLETS(1000 MG) BY MOUTH TWICE DAILY WITH MEALS 360 tablet 0   • Montelukast Sodium 10 MG Oral Tab Take 1 tablet (10 mg total) by mo • Leg swelling    • Loss of appetite    • Nausea    • Neuropathy     maria eugenia feet   • Night sweats    • Osteoarthrosis, unspecified whether generalized or localized, unspecified site    • Osteoporosis    • Other and unspecified hyperlipidemia    • Pain in mateo initial encounter     Closed displaced fracture of proximal phalanx of right little finger with routine healing, subsequent encounter     Post-operative state     Bilateral leg edema     Venous insufficiency of both lower extremities     Varicose veins of use: No    Drug use: No        OBJECTIVE:   BP 98/56 (BP Location: Right arm, Patient Position: Sitting, Cuff Size: adult)   Pulse 70   Temp 97.1 °F (36.2 °C) (Temporal)   Ht 5' 6.5\" (1.689 m)   Wt 182 lb (82.6 kg)   LMP 09/01/2017 (LMP Unknown)   BMI 28. sent    The patient indicates understanding of these issues and agrees to the plan. TODAY'S ORDERS     No orders of the defined types were placed in this encounter.       Meds & Refills:  Requested Prescriptions      No prescriptions requested or ordered

## 2021-06-02 RX ORDER — GLIPIZIDE 5 MG/1
TABLET, FILM COATED, EXTENDED RELEASE ORAL
Qty: 90 TABLET | Refills: 0 | Status: SHIPPED | OUTPATIENT
Start: 2021-06-02 | End: 2021-08-30

## 2021-06-04 ENCOUNTER — HOSPITAL ENCOUNTER (OUTPATIENT)
Dept: GENERAL RADIOLOGY | Age: 66
Discharge: HOME | End: 2021-06-04
Attending: INTERNAL MEDICINE
Payer: MEDICARE

## 2021-06-04 DIAGNOSIS — M79.672 LEFT FOOT PAIN: ICD-10-CM

## 2021-06-04 DIAGNOSIS — M25.572 ACUTE LEFT ANKLE PAIN: ICD-10-CM

## 2021-06-04 PROCEDURE — 73610 X-RAY EXAM OF ANKLE: CPT | Performed by: INTERNAL MEDICINE

## 2021-06-07 RX ORDER — EMPAGLIFLOZIN 25 MG/1
TABLET, FILM COATED ORAL
Qty: 90 TABLET | Refills: 0 | Status: SHIPPED | OUTPATIENT
Start: 2021-06-07 | End: 2021-09-03

## 2021-06-07 NOTE — TELEPHONE ENCOUNTER
Last VISIT 06/01/21    Last REFILL 03/09/21 qty 90 w/0 refills    Last LABS 06/01/21 UA/C done    Future Appointments   Date Time Provider Margoth Silveira   9/14/2021  5:20 PM Elysa Cooks, MD [facility] [facility]         Per PROTOCOL?   Failed    Please

## 2021-07-06 ENCOUNTER — APPOINTMENT (OUTPATIENT)
Dept: GENERAL RADIOLOGY | Facility: HOSPITAL | Age: 66
DRG: 481 | End: 2021-07-06
Attending: EMERGENCY MEDICINE
Payer: MEDICARE

## 2021-07-06 ENCOUNTER — ANESTHESIA EVENT (OUTPATIENT)
Dept: EMERGENCY DEPT | Facility: HOSPITAL | Age: 66
DRG: 481 | End: 2021-07-06
Payer: MEDICARE

## 2021-07-06 ENCOUNTER — HOSPITAL ENCOUNTER (INPATIENT)
Facility: HOSPITAL | Age: 66
LOS: 4 days | Discharge: NURSING, REHAB OR LONG-TERM CARE | DRG: 481 | End: 2021-07-10
Attending: EMERGENCY MEDICINE | Admitting: STUDENT IN AN ORGANIZED HEALTH CARE EDUCATION/TRAINING PROGRAM
Payer: MEDICARE

## 2021-07-06 ENCOUNTER — TELEPHONE (OUTPATIENT)
Dept: INTERNAL MEDICINE CLINIC | Facility: CLINIC | Age: 66
End: 2021-07-06

## 2021-07-06 ENCOUNTER — ANESTHESIA (OUTPATIENT)
Dept: EMERGENCY DEPT | Facility: HOSPITAL | Age: 66
DRG: 481 | End: 2021-07-06
Payer: MEDICARE

## 2021-07-06 DIAGNOSIS — S72.22XS CLOSED DISPLACED SUBTROCHANTERIC FRACTURE OF LEFT FEMUR, SEQUELA: Primary | ICD-10-CM

## 2021-07-06 PROBLEM — S72.22XA CLOSED DISPLACED SUBTROCHANTERIC FRACTURE OF LEFT FEMUR (HCC): Status: ACTIVE | Noted: 2021-07-06

## 2021-07-06 LAB
ANION GAP SERPL CALC-SCNC: 7 MMOL/L (ref 0–18)
BASOPHILS # BLD AUTO: 0.03 X10(3) UL (ref 0–0.2)
BASOPHILS NFR BLD AUTO: 0.3 %
BUN BLD-MCNC: 19 MG/DL (ref 7–18)
BUN/CREAT SERPL: 18.8 (ref 10–20)
CALCIUM BLD-MCNC: 9 MG/DL (ref 8.5–10.1)
CHLORIDE SERPL-SCNC: 104 MMOL/L (ref 98–112)
CO2 SERPL-SCNC: 23 MMOL/L (ref 21–32)
CREAT BLD-MCNC: 1.01 MG/DL
DEPRECATED RDW RBC AUTO: 43.7 FL (ref 35.1–46.3)
EOSINOPHIL # BLD AUTO: 0.12 X10(3) UL (ref 0–0.7)
EOSINOPHIL NFR BLD AUTO: 1.3 %
ERYTHROCYTE [DISTWIDTH] IN BLOOD BY AUTOMATED COUNT: 14.5 % (ref 11–15)
GLUCOSE BLD-MCNC: 291 MG/DL (ref 70–99)
HCT VFR BLD AUTO: 36 %
HGB BLD-MCNC: 11.9 G/DL
IMM GRANULOCYTES # BLD AUTO: 0.05 X10(3) UL (ref 0–1)
IMM GRANULOCYTES NFR BLD: 0.5 %
LYMPHOCYTES # BLD AUTO: 0.98 X10(3) UL (ref 1–4)
LYMPHOCYTES NFR BLD AUTO: 10.6 %
MCH RBC QN AUTO: 28 PG (ref 26–34)
MCHC RBC AUTO-ENTMCNC: 33.1 G/DL (ref 31–37)
MCV RBC AUTO: 84.7 FL
MONOCYTES # BLD AUTO: 0.84 X10(3) UL (ref 0.1–1)
MONOCYTES NFR BLD AUTO: 9.1 %
MRSA NASAL: NEGATIVE
NEUTROPHILS # BLD AUTO: 7.2 X10 (3) UL (ref 1.5–7.7)
NEUTROPHILS # BLD AUTO: 7.2 X10(3) UL (ref 1.5–7.7)
NEUTROPHILS NFR BLD AUTO: 78.2 %
OSMOLALITY SERPL CALC.SUM OF ELEC: 291 MOSM/KG (ref 275–295)
PLATELET # BLD AUTO: 174 10(3)UL (ref 150–450)
POTASSIUM SERPL-SCNC: 4.3 MMOL/L (ref 3.5–5.1)
RBC # BLD AUTO: 4.25 X10(6)UL
SARS-COV-2 RNA RESP QL NAA+PROBE: NOT DETECTED
SODIUM SERPL-SCNC: 134 MMOL/L (ref 136–145)
STAPH A BY PCR: NEGATIVE
WBC # BLD AUTO: 9.2 X10(3) UL (ref 4–11)

## 2021-07-06 PROCEDURE — 73552 X-RAY EXAM OF FEMUR 2/>: CPT | Performed by: EMERGENCY MEDICINE

## 2021-07-06 PROCEDURE — 99223 1ST HOSP IP/OBS HIGH 75: CPT | Performed by: STUDENT IN AN ORGANIZED HEALTH CARE EDUCATION/TRAINING PROGRAM

## 2021-07-06 PROCEDURE — 73502 X-RAY EXAM HIP UNI 2-3 VIEWS: CPT | Performed by: EMERGENCY MEDICINE

## 2021-07-06 PROCEDURE — 73590 X-RAY EXAM OF LOWER LEG: CPT | Performed by: EMERGENCY MEDICINE

## 2021-07-06 PROCEDURE — 3E0T3BZ INTRODUCTION OF ANESTHETIC AGENT INTO PERIPHERAL NERVES AND PLEXI, PERCUTANEOUS APPROACH: ICD-10-PCS | Performed by: ANESTHESIOLOGY

## 2021-07-06 RX ORDER — CEFAZOLIN SODIUM/WATER 2 G/20 ML
2 SYRINGE (ML) INTRAVENOUS ONCE
Status: COMPLETED | OUTPATIENT
Start: 2021-07-07 | End: 2021-07-07

## 2021-07-06 RX ORDER — KETOROLAC TROMETHAMINE 30 MG/ML
15 INJECTION, SOLUTION INTRAMUSCULAR; INTRAVENOUS ONCE
Status: COMPLETED | OUTPATIENT
Start: 2021-07-06 | End: 2021-07-06

## 2021-07-06 RX ORDER — SODIUM CHLORIDE 9 MG/ML
10 INJECTION INTRAVENOUS AS NEEDED
Status: DISCONTINUED | OUTPATIENT
Start: 2021-07-06 | End: 2021-07-10

## 2021-07-06 RX ORDER — MORPHINE SULFATE 4 MG/ML
4 INJECTION, SOLUTION INTRAMUSCULAR; INTRAVENOUS ONCE
Status: COMPLETED | OUTPATIENT
Start: 2021-07-06 | End: 2021-07-06

## 2021-07-06 RX ORDER — METOPROLOL SUCCINATE 25 MG/1
25 TABLET, EXTENDED RELEASE ORAL
Status: DISCONTINUED | OUTPATIENT
Start: 2021-07-06 | End: 2021-07-10

## 2021-07-06 RX ORDER — ENOXAPARIN SODIUM 100 MG/ML
40 INJECTION SUBCUTANEOUS DAILY
Status: DISCONTINUED | OUTPATIENT
Start: 2021-07-06 | End: 2021-07-06

## 2021-07-06 RX ORDER — ONDANSETRON 2 MG/ML
4 INJECTION INTRAMUSCULAR; INTRAVENOUS EVERY 6 HOURS PRN
Status: DISCONTINUED | OUTPATIENT
Start: 2021-07-06 | End: 2021-07-10

## 2021-07-06 RX ORDER — DEXTROSE MONOHYDRATE 25 G/50ML
50 INJECTION, SOLUTION INTRAVENOUS
Status: DISCONTINUED | OUTPATIENT
Start: 2021-07-06 | End: 2021-07-10

## 2021-07-06 RX ORDER — ALPRAZOLAM 0.5 MG/1
0.5 TABLET ORAL DAILY PRN
Status: DISCONTINUED | OUTPATIENT
Start: 2021-07-06 | End: 2021-07-10

## 2021-07-06 RX ORDER — LIDOCAINE HYDROCHLORIDE 10 MG/ML
2 INJECTION, SOLUTION EPIDURAL; INFILTRATION; INTRACAUDAL; PERINEURAL AS NEEDED
Status: DISCONTINUED | OUTPATIENT
Start: 2021-07-06 | End: 2021-07-10

## 2021-07-06 RX ORDER — MORPHINE SULFATE 2 MG/ML
1 INJECTION, SOLUTION INTRAMUSCULAR; INTRAVENOUS EVERY 2 HOUR PRN
Status: DISCONTINUED | OUTPATIENT
Start: 2021-07-06 | End: 2021-07-10

## 2021-07-06 RX ORDER — ROPIVACAINE HYDROCHLORIDE 5 MG/ML
30 INJECTION, SOLUTION EPIDURAL; INFILTRATION; PERINEURAL AS NEEDED
Status: DISCONTINUED | OUTPATIENT
Start: 2021-07-06 | End: 2021-07-10

## 2021-07-06 RX ORDER — METOCLOPRAMIDE HYDROCHLORIDE 5 MG/ML
10 INJECTION INTRAMUSCULAR; INTRAVENOUS EVERY 8 HOURS PRN
Status: DISCONTINUED | OUTPATIENT
Start: 2021-07-06 | End: 2021-07-10

## 2021-07-06 RX ORDER — MORPHINE SULFATE 2 MG/ML
0.5 INJECTION, SOLUTION INTRAMUSCULAR; INTRAVENOUS EVERY 2 HOUR PRN
Status: DISCONTINUED | OUTPATIENT
Start: 2021-07-06 | End: 2021-07-10

## 2021-07-06 RX ORDER — ACETAMINOPHEN 325 MG/1
650 TABLET ORAL EVERY 6 HOURS PRN
Status: DISCONTINUED | OUTPATIENT
Start: 2021-07-06 | End: 2021-07-10

## 2021-07-06 RX ORDER — CELECOXIB 200 MG/1
200 CAPSULE ORAL 2 TIMES DAILY
COMMUNITY
End: 2021-08-17 | Stop reason: CLARIF

## 2021-07-06 RX ORDER — FLUTICASONE PROPIONATE 50 MCG
2 SPRAY, SUSPENSION (ML) NASAL DAILY
Status: DISCONTINUED | OUTPATIENT
Start: 2021-07-06 | End: 2021-07-10

## 2021-07-06 RX ORDER — DEXTROSE AND SODIUM CHLORIDE 5; .45 G/100ML; G/100ML
INJECTION, SOLUTION INTRAVENOUS CONTINUOUS
Status: ACTIVE | OUTPATIENT
Start: 2021-07-06 | End: 2021-07-07

## 2021-07-06 RX ORDER — ONDANSETRON 2 MG/ML
4 INJECTION INTRAMUSCULAR; INTRAVENOUS ONCE
Status: COMPLETED | OUTPATIENT
Start: 2021-07-06 | End: 2021-07-06

## 2021-07-06 RX ORDER — MORPHINE SULFATE 2 MG/ML
2 INJECTION, SOLUTION INTRAMUSCULAR; INTRAVENOUS EVERY 2 HOUR PRN
Status: DISCONTINUED | OUTPATIENT
Start: 2021-07-06 | End: 2021-07-10

## 2021-07-06 RX ORDER — DEXTROSE AND SODIUM CHLORIDE 5; .45 G/100ML; G/100ML
INJECTION, SOLUTION INTRAVENOUS CONTINUOUS
Status: DISCONTINUED | OUTPATIENT
Start: 2021-07-07 | End: 2021-07-10

## 2021-07-06 NOTE — ED PROVIDER NOTES
Patient Seen in: [facility] Emergency Department      History   Patient presents with:  Leg or Foot Injury    Stated Complaint: broken leg in [place], flew home today and came directly to ED    HPI/Subjective:   HPI      PCP: Dr. Lorna Chadwick Loss of appetite    • Nausea    • Neuropathy     maria eugenia feet   • Night sweats    • Osteoarthrosis, unspecified whether generalized or localized, unspecified site    • Osteoporosis    • Other and unspecified hyperlipidemia    • Pain in joints    • Peripheral n 96 %   O2 Device None (Room air)       Current:/69   Pulse 92   Temp 97.7 °F (36.5 °C) (Temporal)   Resp 19   Ht 165.1 cm (5' 5\")   Wt 80.7 kg   LMP 09/01/2017 (LMP Unknown)   SpO2 96%   BMI 29.62 kg/m²         Physical Exam    GENERAL: Awake, alert RAINBOW DRAW LAVENDER   RAINBOW DRAW LIGHT GREEN   RAINBOW DRAW GOLD   MRSA/SA SCRN BY PCR:[facility] ORTHO SURG ONLY     XR FEMUR MIN 2 VIEWS LEFT (CPT=73552)    Result Date: 7/6/2021  PROCEDURE:  XR FEMUR MIN 2 VIEWS LEFT (CPT=73552)  TECHNIQUE:  AP and lat is partially imaged. Knee arthroplasty changes. CONCLUSION:  1. Distal portion of an external fixation system. Two screws are seen in the proximal tibia, both passing through the distal cortex.  2. Partial imaging of a displaced distal femur f surgery. Last dose of Xarelto was 9 AM on 7/5/2021. She is to be n.p.o. after midnight. Case discussed with Dr. Chichi Rodriguez hospitalist.  Will admit. Did contact anesthesia who states they can do a femoral nerve block on the floor.   Patient will go

## 2021-07-06 NOTE — TELEPHONE ENCOUNTER
Fall reported 7/2/2021 with broken femur with procedure 7/3/2021 for \"chaya placement\". Daughter states patient is on flight coming back to [facility] [facility] today. Daughter will be picking up patient today at airport. Daughter unsure of pain status, fever status, symptom update. Daughter states paint was discharged from hospital 7/4/2021 as patient wanted to return to [facility] [facility] for treatment. Daughter will call with update when patient lands in [facility] - advised ER for worsening pain, swelling, fevers, etc.    AD updated on plan of care thus far, and advised awaiting more information from family.

## 2021-07-06 NOTE — ED INITIAL ASSESSMENT (HPI)
Pt fell on Friday in [place], per pt she states she fx her left femur. Was seen on Saturday and had pins placed. Pt just landed and came immediately to ER. Pt currently rating left leg pain 9/10. Pt denies cp, denies SOB.

## 2021-07-07 ENCOUNTER — ANESTHESIA EVENT (OUTPATIENT)
Dept: SURGERY | Facility: HOSPITAL | Age: 66
DRG: 481 | End: 2021-07-07
Payer: MEDICARE

## 2021-07-07 ENCOUNTER — APPOINTMENT (OUTPATIENT)
Dept: GENERAL RADIOLOGY | Facility: HOSPITAL | Age: 66
DRG: 481 | End: 2021-07-07
Attending: HOSPITALIST
Payer: MEDICARE

## 2021-07-07 ENCOUNTER — ANESTHESIA (OUTPATIENT)
Dept: SURGERY | Facility: HOSPITAL | Age: 66
DRG: 481 | End: 2021-07-07
Payer: MEDICARE

## 2021-07-07 LAB
ANION GAP SERPL CALC-SCNC: 3 MMOL/L (ref 0–18)
ANTIBODY SCREEN: NEGATIVE
BASOPHILS # BLD AUTO: 0.03 X10(3) UL (ref 0–0.2)
BASOPHILS NFR BLD AUTO: 0.4 %
BUN BLD-MCNC: 21 MG/DL (ref 7–18)
BUN/CREAT SERPL: 23.1 (ref 10–20)
CALCIUM BLD-MCNC: 8.6 MG/DL (ref 8.5–10.1)
CHLORIDE SERPL-SCNC: 108 MMOL/L (ref 98–112)
CO2 SERPL-SCNC: 28 MMOL/L (ref 21–32)
CREAT BLD-MCNC: 0.91 MG/DL
DEPRECATED RDW RBC AUTO: 44.4 FL (ref 35.1–46.3)
EOSINOPHIL # BLD AUTO: 0.44 X10(3) UL (ref 0–0.7)
EOSINOPHIL NFR BLD AUTO: 6.3 %
ERYTHROCYTE [DISTWIDTH] IN BLOOD BY AUTOMATED COUNT: 14.5 % (ref 11–15)
GLUCOSE BLD-MCNC: 139 MG/DL (ref 70–99)
GLUCOSE BLD-MCNC: 176 MG/DL (ref 70–99)
GLUCOSE BLD-MCNC: 196 MG/DL (ref 70–99)
GLUCOSE BLD-MCNC: 200 MG/DL (ref 70–99)
HCT VFR BLD AUTO: 32.8 %
HGB BLD-MCNC: 10.4 G/DL
IMM GRANULOCYTES # BLD AUTO: 0.05 X10(3) UL (ref 0–1)
IMM GRANULOCYTES NFR BLD: 0.7 %
LYMPHOCYTES # BLD AUTO: 1.07 X10(3) UL (ref 1–4)
LYMPHOCYTES NFR BLD AUTO: 15.3 %
MCH RBC QN AUTO: 27.3 PG (ref 26–34)
MCHC RBC AUTO-ENTMCNC: 31.7 G/DL (ref 31–37)
MCV RBC AUTO: 86.1 FL
MONOCYTES # BLD AUTO: 0.89 X10(3) UL (ref 0.1–1)
MONOCYTES NFR BLD AUTO: 12.7 %
NEUTROPHILS # BLD AUTO: 4.53 X10 (3) UL (ref 1.5–7.7)
NEUTROPHILS # BLD AUTO: 4.53 X10(3) UL (ref 1.5–7.7)
NEUTROPHILS NFR BLD AUTO: 64.6 %
OSMOLALITY SERPL CALC.SUM OF ELEC: 297 MOSM/KG (ref 275–295)
PLATELET # BLD AUTO: 172 10(3)UL (ref 150–450)
POTASSIUM SERPL-SCNC: 5.3 MMOL/L (ref 3.5–5.1)
RBC # BLD AUTO: 3.81 X10(6)UL
RH BLOOD TYPE: POSITIVE
SODIUM SERPL-SCNC: 139 MMOL/L (ref 136–145)
WBC # BLD AUTO: 7 X10(3) UL (ref 4–11)

## 2021-07-07 PROCEDURE — 36430 TRANSFUSION BLD/BLD COMPNT: CPT | Performed by: ANESTHESIOLOGY

## 2021-07-07 PROCEDURE — 0QS904Z REPOSITION LEFT FEMORAL SHAFT WITH INTERNAL FIXATION DEVICE, OPEN APPROACH: ICD-10-PCS | Performed by: ORTHOPAEDIC SURGERY

## 2021-07-07 PROCEDURE — 0QPCX5Z REMOVAL OF EXTERNAL FIXATION DEVICE FROM LEFT LOWER FEMUR, EXTERNAL APPROACH: ICD-10-PCS | Performed by: ORTHOPAEDIC SURGERY

## 2021-07-07 PROCEDURE — 99232 SBSQ HOSP IP/OBS MODERATE 35: CPT | Performed by: HOSPITALIST

## 2021-07-07 PROCEDURE — 30233N1 TRANSFUSION OF NONAUTOLOGOUS RED BLOOD CELLS INTO PERIPHERAL VEIN, PERCUTANEOUS APPROACH: ICD-10-PCS | Performed by: ANESTHESIOLOGY

## 2021-07-07 PROCEDURE — 76000 FLUOROSCOPY <1 HR PHYS/QHP: CPT | Performed by: HOSPITALIST

## 2021-07-07 DEVICE — NCB CANCELLOUS SCREW 5.0 32 L=65
Type: IMPLANTABLE DEVICE | Site: LEG | Status: FUNCTIONAL
Brand: NCB®

## 2021-07-07 DEVICE — NCB SCREW 5.0   L = 50
Type: IMPLANTABLE DEVICE | Site: LEG | Status: FUNCTIONAL
Brand: NCB®

## 2021-07-07 DEVICE — NCB SCREW 5.0   L = 60
Type: IMPLANTABLE DEVICE | Site: LEG | Status: FUNCTIONAL
Brand: NCB®

## 2021-07-07 DEVICE — NCB PLATE FOR FEMUR, LEFT, 9 HOLES
Type: IMPLANTABLE DEVICE | Site: LEG | Status: FUNCTIONAL
Brand: NCB®

## 2021-07-07 DEVICE — SCREW BN 5MM 10MM NCB: Type: IMPLANTABLE DEVICE | Site: LEG | Status: FUNCTIONAL

## 2021-07-07 DEVICE — NCB CANCELLOUS SCREW 5.0 32 L=70
Type: IMPLANTABLE DEVICE | Site: LEG | Status: FUNCTIONAL
Brand: NCB®

## 2021-07-07 DEVICE — NCB SCREW 5.0   L = 32
Type: IMPLANTABLE DEVICE | Site: LEG | Status: FUNCTIONAL
Brand: NCB®

## 2021-07-07 DEVICE — NCB SCREW 5.0   L = 34
Type: IMPLANTABLE DEVICE | Site: LEG | Status: FUNCTIONAL
Brand: NCB®

## 2021-07-07 DEVICE — NCB SCREW 5.0   L = 44
Type: IMPLANTABLE DEVICE | Site: LEG | Status: FUNCTIONAL
Brand: NCB®

## 2021-07-07 DEVICE — NCB SCREW 5.0   L = 65
Type: IMPLANTABLE DEVICE | Site: LEG | Status: FUNCTIONAL
Brand: NCB®

## 2021-07-07 RX ORDER — NEOSTIGMINE METHYLSULFATE 1 MG/ML
INJECTION INTRAVENOUS AS NEEDED
Status: DISCONTINUED | OUTPATIENT
Start: 2021-07-07 | End: 2021-07-08 | Stop reason: SURG

## 2021-07-07 RX ORDER — MIDAZOLAM HYDROCHLORIDE 1 MG/ML
1 INJECTION INTRAMUSCULAR; INTRAVENOUS EVERY 5 MIN PRN
Status: DISCONTINUED | OUTPATIENT
Start: 2021-07-07 | End: 2021-07-08 | Stop reason: HOSPADM

## 2021-07-07 RX ORDER — ONDANSETRON 2 MG/ML
INJECTION INTRAMUSCULAR; INTRAVENOUS AS NEEDED
Status: DISCONTINUED | OUTPATIENT
Start: 2021-07-07 | End: 2021-07-08 | Stop reason: SURG

## 2021-07-07 RX ORDER — MEPERIDINE HYDROCHLORIDE 25 MG/ML
12.5 INJECTION INTRAMUSCULAR; INTRAVENOUS; SUBCUTANEOUS AS NEEDED
Status: DISCONTINUED | OUTPATIENT
Start: 2021-07-07 | End: 2021-07-08 | Stop reason: HOSPADM

## 2021-07-07 RX ORDER — ONDANSETRON 2 MG/ML
4 INJECTION INTRAMUSCULAR; INTRAVENOUS AS NEEDED
Status: DISCONTINUED | OUTPATIENT
Start: 2021-07-07 | End: 2021-07-08 | Stop reason: HOSPADM

## 2021-07-07 RX ORDER — LABETALOL HYDROCHLORIDE 5 MG/ML
INJECTION, SOLUTION INTRAVENOUS AS NEEDED
Status: DISCONTINUED | OUTPATIENT
Start: 2021-07-07 | End: 2021-07-08 | Stop reason: SURG

## 2021-07-07 RX ORDER — INSULIN ASPART 100 [IU]/ML
INJECTION, SOLUTION INTRAVENOUS; SUBCUTANEOUS ONCE
Status: COMPLETED | OUTPATIENT
Start: 2021-07-07 | End: 2021-07-08

## 2021-07-07 RX ORDER — SODIUM CHLORIDE, SODIUM LACTATE, POTASSIUM CHLORIDE, CALCIUM CHLORIDE 600; 310; 30; 20 MG/100ML; MG/100ML; MG/100ML; MG/100ML
INJECTION, SOLUTION INTRAVENOUS CONTINUOUS
Status: DISCONTINUED | OUTPATIENT
Start: 2021-07-07 | End: 2021-07-08 | Stop reason: HOSPADM

## 2021-07-07 RX ORDER — NALOXONE HYDROCHLORIDE 0.4 MG/ML
80 INJECTION, SOLUTION INTRAMUSCULAR; INTRAVENOUS; SUBCUTANEOUS AS NEEDED
Status: DISCONTINUED | OUTPATIENT
Start: 2021-07-07 | End: 2021-07-08 | Stop reason: HOSPADM

## 2021-07-07 RX ORDER — DIPHENHYDRAMINE HYDROCHLORIDE 50 MG/ML
12.5 INJECTION INTRAMUSCULAR; INTRAVENOUS AS NEEDED
Status: DISCONTINUED | OUTPATIENT
Start: 2021-07-07 | End: 2021-07-08 | Stop reason: HOSPADM

## 2021-07-07 RX ORDER — ROCURONIUM BROMIDE 10 MG/ML
INJECTION, SOLUTION INTRAVENOUS AS NEEDED
Status: DISCONTINUED | OUTPATIENT
Start: 2021-07-07 | End: 2021-07-08 | Stop reason: SURG

## 2021-07-07 RX ORDER — LIDOCAINE HYDROCHLORIDE 10 MG/ML
INJECTION, SOLUTION EPIDURAL; INFILTRATION; INTRACAUDAL; PERINEURAL AS NEEDED
Status: DISCONTINUED | OUTPATIENT
Start: 2021-07-07 | End: 2021-07-08 | Stop reason: SURG

## 2021-07-07 RX ORDER — METOCLOPRAMIDE HYDROCHLORIDE 5 MG/ML
INJECTION INTRAMUSCULAR; INTRAVENOUS AS NEEDED
Status: DISCONTINUED | OUTPATIENT
Start: 2021-07-07 | End: 2021-07-08 | Stop reason: SURG

## 2021-07-07 RX ORDER — SODIUM CHLORIDE, SODIUM LACTATE, POTASSIUM CHLORIDE, CALCIUM CHLORIDE 600; 310; 30; 20 MG/100ML; MG/100ML; MG/100ML; MG/100ML
INJECTION, SOLUTION INTRAVENOUS CONTINUOUS PRN
Status: DISCONTINUED | OUTPATIENT
Start: 2021-07-07 | End: 2021-07-08 | Stop reason: SURG

## 2021-07-07 RX ORDER — DEXTROSE MONOHYDRATE 25 G/50ML
50 INJECTION, SOLUTION INTRAVENOUS
Status: DISCONTINUED | OUTPATIENT
Start: 2021-07-07 | End: 2021-07-07

## 2021-07-07 RX ORDER — TRANEXAMIC ACID 10 MG/ML
INJECTION, SOLUTION INTRAVENOUS AS NEEDED
Status: DISCONTINUED | OUTPATIENT
Start: 2021-07-07 | End: 2021-07-08 | Stop reason: SURG

## 2021-07-07 RX ORDER — GLYCOPYRROLATE 0.2 MG/ML
INJECTION, SOLUTION INTRAMUSCULAR; INTRAVENOUS AS NEEDED
Status: DISCONTINUED | OUTPATIENT
Start: 2021-07-07 | End: 2021-07-08 | Stop reason: SURG

## 2021-07-07 RX ORDER — HYDROMORPHONE HYDROCHLORIDE 1 MG/ML
0.4 INJECTION, SOLUTION INTRAMUSCULAR; INTRAVENOUS; SUBCUTANEOUS EVERY 5 MIN PRN
Status: DISCONTINUED | OUTPATIENT
Start: 2021-07-07 | End: 2021-07-08 | Stop reason: HOSPADM

## 2021-07-07 RX ORDER — BUPIVACAINE HYDROCHLORIDE 5 MG/ML
INJECTION, SOLUTION EPIDURAL; INTRACAUDAL AS NEEDED
Status: DISCONTINUED | OUTPATIENT
Start: 2021-07-07 | End: 2021-07-08 | Stop reason: HOSPADM

## 2021-07-07 RX ADMIN — METOCLOPRAMIDE HYDROCHLORIDE 10 MG: 5 INJECTION INTRAMUSCULAR; INTRAVENOUS at 23:19:00

## 2021-07-07 RX ADMIN — GLYCOPYRROLATE 0.4 MG: 0.2 INJECTION, SOLUTION INTRAMUSCULAR; INTRAVENOUS at 23:42:00

## 2021-07-07 RX ADMIN — LIDOCAINE HYDROCHLORIDE 50 MG: 10 INJECTION, SOLUTION EPIDURAL; INFILTRATION; INTRACAUDAL; PERINEURAL at 18:27:00

## 2021-07-07 RX ADMIN — TRANEXAMIC ACID 1000 MG: 10 INJECTION, SOLUTION INTRAVENOUS at 18:44:00

## 2021-07-07 RX ADMIN — NEOSTIGMINE METHYLSULFATE 4 MG: 1 INJECTION INTRAVENOUS at 23:42:00

## 2021-07-07 RX ADMIN — ROCURONIUM BROMIDE 50 MG: 10 INJECTION, SOLUTION INTRAVENOUS at 18:25:00

## 2021-07-07 RX ADMIN — CEFAZOLIN SODIUM/WATER 2 G: 2 G/20 ML SYRINGE (ML) INTRAVENOUS at 18:51:00

## 2021-07-07 RX ADMIN — ROCURONIUM BROMIDE 20 MG: 10 INJECTION, SOLUTION INTRAVENOUS at 20:05:00

## 2021-07-07 RX ADMIN — ROCURONIUM BROMIDE 20 MG: 10 INJECTION, SOLUTION INTRAVENOUS at 19:12:00

## 2021-07-07 RX ADMIN — LABETALOL HYDROCHLORIDE 10 MG: 5 INJECTION, SOLUTION INTRAVENOUS at 19:34:00

## 2021-07-07 RX ADMIN — ONDANSETRON 4 MG: 2 INJECTION INTRAMUSCULAR; INTRAVENOUS at 23:15:00

## 2021-07-07 RX ADMIN — ROCURONIUM BROMIDE 20 MG: 10 INJECTION, SOLUTION INTRAVENOUS at 20:37:00

## 2021-07-07 RX ADMIN — SODIUM CHLORIDE, SODIUM LACTATE, POTASSIUM CHLORIDE, CALCIUM CHLORIDE: 600; 310; 30; 20 INJECTION, SOLUTION INTRAVENOUS at 18:17:00

## 2021-07-07 RX ADMIN — CEFAZOLIN SODIUM/WATER 2 G: 2 G/20 ML SYRINGE (ML) INTRAVENOUS at 23:09:00

## 2021-07-07 NOTE — PHYSICAL THERAPY NOTE
PT orders received, chart reviewed. Patient scheduled for surgery later today, plan for L femur removal of external fixator, ORIF distal femur fracture. Will hold at this time and evaluate when appropriate following surgery.  Patient will require new orders

## 2021-07-07 NOTE — ANESTHESIA PROCEDURE NOTES
[facility]  Performed by: Max Yi DO  Authorized by: Max Yi DO       General Information and Staff    Start Time:  7/6/2021 9:00 PM  End Time:  7/6/2021 9:03 PM  Anesthesiologist:  Max Yi DO  Performed by:   Anesthe

## 2021-07-07 NOTE — ANESTHESIA PROCEDURE NOTES
Airway  Date/Time: 7/7/2021 6:26 PM  Urgency: elective      General Information and Staff    Patient location during procedure: OR  Anesthesiologist: Jori Galvez MD  Performed: anesthesiologist     Indications and Patient Condition  Indications for airwa

## 2021-07-07 NOTE — H&P
Irregular rhythm on exam. HR . Continue home apixaban 2.5 mg BID which was started on 7/10/17 by Cardiology at [facility]. Pt has Coreg CR 20 mg 24 hour capsule on home medication list.  Starting carvedilol 6.25 mg BID.  Monitor on telemetry.      [facility]  History and Physical     Millie Gregory Patient Status:  Inpatient    1955 MRN [id number]   [facility] 3SW-A Attending Emmanuel Bolivar MD   Hosp Day # 0 PCP Lincoln MD Milagro     Chief Complaint: Fall with femur frac • Uncomfortable fullness after meals    • Venous insufficiency of both lower extremities 3/16/2018   • Visual impairment     glasses   • Vomiting    • Weight loss         Past Surgical History:   Past Surgical History:   Procedure Laterality Date   • ANG Take 2 tablets (1,000 mg total) by mouth 2 (two) times daily with meals. , Disp: 360 tablet, Rfl: 3  linagliptin (TRADJENTA) 5 mg Oral Tab, Take 1 tablet (5 mg total) by mouth daily. , Disp: 90 tablet, Rfl: 1  insulin glargine (LANTUS SOLOSTAR) 100 UNIT/ML S distress. Alert and oriented x 3. HEENT: Normocephalic atraumatic. Moist mucous membranes. EOM-I. PERRLA. Anicteric. Respiratory: Clear to auscultation bilaterally. No wheezes. No rhonchi. Cardiovascular: S1, S2. Regular rate and rhythm.  No murmurs, rub isolation: yes      Plan of care discussed with RN, Pt    Yahir Díaz MD  5/8/3087          **Certification      PHYSICIAN Certification of Need for Inpatient Hospitalization - Initial Certification    Patient will require inpatient services that will re

## 2021-07-07 NOTE — PLAN OF CARE
Patient a/ox4. External fixator in place and stable. Dressing changed 7/6/21, remains CDI this shift. Plan to OR this evening for ORIF of distal femur and removal of hardware. NPO this shift. QID blood sugars with insulin coverage per orders.    IVP morph

## 2021-07-07 NOTE — ED QUICK NOTES
Pt comes with external fixation in place on left lower extremity covered in ace bandage.  Pt also has membreno in place from [address].

## 2021-07-07 NOTE — PROGRESS NOTES
NURSING ADMISSION NOTE      Patient admitted via stretcher. Daughter at bedside  Oriented to room. Safety precautions initiated. Bed in low position. Call light in reach. Pt alert and oriented x4 primarily Omani speaking. RA/.  SCD to RLE/a

## 2021-07-07 NOTE — CONSULTS
[facility] Consult Note    CC: Left femur periprosthetic fracture status post fall    HPI: This 77year old female traveled back from [facility] yesterday after sustaining an injury from a fall on on 7/2/2021.   She was provisionally stabilized with an external • Peripheral neuropathy    • Problems with swallowing    • Sleep disturbance    • Stented coronary artery    • Stress    • Type II or unspecified type diabetes mellitus without mention of complication, not stated as uncontrolled    • Uncomfortable fulln 178 lb (80.7 kg)   LMP 09/01/2017 (LMP Unknown)   SpO2 95%   BMI 29.62 kg/m²   Evaluation left lower extremity reveals that she has minimal hip pain on passive hip flexion and rotation.   Adequate active plantar and dorsiflexion is noted about the ankle wit Lymphocyte Absolute 0.98 (L) 1.00 - 4.00 x10(3) uL    Monocyte Absolute 0.84 0.10 - 1.00 x10(3) uL    Eosinophil Absolute 0.12 0.00 - 0.70 x10(3) uL    Basophil Absolute 0.03 0.00 - 0.20 x10(3) uL    Immature Granulocyte Absolute 0.05 0.00 - 1.00 x10(3) uL 0. 44 0.00 - 0.70 x10(3) uL    Basophil Absolute 0.03 0.00 - 0.20 x10(3) uL    Immature Granulocyte Absolute 0.05 0.00 - 1.00 x10(3) uL    Neutrophil % 64.6 %    Lymphocyte % 15.3 %    Monocyte % 12.7 %    Eosinophil % 6.3 %    Basophil % 0.4 %    Immature expressed understanding and informed consent is obtained. Patient has been off of Xarelto for greater than 48 hours with the last dose reportedly at 9 AM 7/5/21. We will proceed today as soon as the OR becomes available.       MD Chidi Maxwell

## 2021-07-07 NOTE — PROGRESS NOTES
Spoke with  on the phone in pt room with daughter at bedside.  Telephone orders with read back given for remove current dressing and cleanse pin sites with betadine and redress, bedrest, NPO at midnight, SCD on RLE, 2g Ancef on [facility] to [facility]

## 2021-07-07 NOTE — PROGRESS NOTES
[facility]  Progress Note     Eric Pierce Patient Status:  Inpatient    1955 MRN [id number]   [facility] 3SW-A Attending Alexander Anderson MD   Hosp Day # 1 PCP Jorge Alberto Stephens MD     Chief Complaint: fall    S: Patient has no com for input(s): TROP, PBNP in the last 168 hours. Creatinine Kinase  No results for input(s): CK in the last 168 hours. Inflammatory Markers  No results for input(s): CRP, PRABHJOT, LDH, DDIMER in the last 168 hours.     Imaging: Imaging data reviewed in Epi

## 2021-07-07 NOTE — OCCUPATIONAL THERAPY NOTE
OT orders received, chart reviewed. Patient scheduled for surgery later today, plan for L femur removal of external fixator, ORIF distal femur fracture. Will hold at this time and evaluate when appropriate following surgery.  Patient will require new orders

## 2021-07-08 LAB
ALBUMIN SERPL-MCNC: 2.3 G/DL (ref 3.4–5)
ALBUMIN/GLOB SERPL: 0.7 {RATIO} (ref 1–2)
ALP LIVER SERPL-CCNC: 89 U/L
ALT SERPL-CCNC: 16 U/L
ANION GAP SERPL CALC-SCNC: 7 MMOL/L (ref 0–18)
AST SERPL-CCNC: 20 U/L (ref 15–37)
BILIRUB SERPL-MCNC: 0.7 MG/DL (ref 0.1–2)
BUN BLD-MCNC: 14 MG/DL (ref 7–18)
BUN/CREAT SERPL: 22.2 (ref 10–20)
CALCIUM BLD-MCNC: 7.9 MG/DL (ref 8.5–10.1)
CHLORIDE SERPL-SCNC: 107 MMOL/L (ref 98–112)
CO2 SERPL-SCNC: 23 MMOL/L (ref 21–32)
CREAT BLD-MCNC: 0.63 MG/DL
GLOBULIN PLAS-MCNC: 3.1 G/DL (ref 2.8–4.4)
GLUCOSE BLD-MCNC: 157 MG/DL (ref 70–99)
GLUCOSE BLD-MCNC: 162 MG/DL (ref 70–99)
GLUCOSE BLD-MCNC: 169 MG/DL (ref 70–99)
GLUCOSE BLD-MCNC: 178 MG/DL (ref 70–99)
GLUCOSE BLD-MCNC: 192 MG/DL (ref 70–99)
GLUCOSE BLD-MCNC: 201 MG/DL (ref 70–99)
GLUCOSE BLD-MCNC: 214 MG/DL (ref 70–99)
GLUCOSE BLD-MCNC: 222 MG/DL (ref 70–99)
GLUCOSE BLD-MCNC: 258 MG/DL (ref 70–99)
HGB BLD-MCNC: 10.3 G/DL
ISTAT BLOOD GAS BASE EXCESS: -2 MMOL/L
ISTAT BLOOD GAS BASE EXCESS: -4 MMOL/L (ref ?–30)
ISTAT BLOOD GAS HCO3: 22.1 MEQ/L (ref 22–26)
ISTAT BLOOD GAS HCO3: 23.3 MEQ/L
ISTAT BLOOD GAS O2 SATURATION: 62 % (ref 60–85)
ISTAT BLOOD GAS O2 SATURATION: 87 %
ISTAT BLOOD GAS PCO2: 38.5 MMHG
ISTAT BLOOD GAS PCO2: 41.4 MMHG (ref 38–50)
ISTAT BLOOD GAS PH: 7.34 (ref 7.32–7.43)
ISTAT BLOOD GAS PH: 7.39
ISTAT BLOOD GAS PO2: 54 MMHG
ISTAT BLOOD GAS PO2: <70 MMHG (ref 35–40)
ISTAT BLOOD GAS TCO2: 23 MMOL/L (ref 22–32)
ISTAT BLOOD GAS TCO2: 24 MMOL/L (ref 22–32)
ISTAT HEMATOCRIT: 29 %
ISTAT HEMATOCRIT: 29 %
ISTAT IONIZED CALCIUM: 1.16 MMOL/L (ref 1.12–1.32)
ISTAT IONIZED CALCIUM: 1.19 MMOL/L (ref 1.12–1.32)
ISTAT POTASSIUM: 4.4 MMOL/L (ref 3.6–5.1)
ISTAT POTASSIUM: 4.5 MMOL/L (ref 3.6–5.1)
ISTAT SODIUM: 138 MMOL/L (ref 136–145)
ISTAT SODIUM: 138 MMOL/L (ref 136–145)
M PROTEIN MFR SERPL ELPH: 5.4 G/DL (ref 6.4–8.2)
OSMOLALITY SERPL CALC.SUM OF ELEC: 288 MOSM/KG (ref 275–295)
POTASSIUM SERPL-SCNC: 4.4 MMOL/L (ref 3.5–5.1)
SODIUM SERPL-SCNC: 137 MMOL/L (ref 136–145)

## 2021-07-08 PROCEDURE — 76942 ECHO GUIDE FOR BIOPSY: CPT | Performed by: ANESTHESIOLOGY

## 2021-07-08 PROCEDURE — 99232 SBSQ HOSP IP/OBS MODERATE 35: CPT | Performed by: HOSPITALIST

## 2021-07-08 RX ORDER — DOXEPIN HYDROCHLORIDE 50 MG/1
1 CAPSULE ORAL DAILY
Status: DISCONTINUED | OUTPATIENT
Start: 2021-07-09 | End: 2021-07-10

## 2021-07-08 RX ORDER — HYDROMORPHONE HYDROCHLORIDE 1 MG/ML
0.2 INJECTION, SOLUTION INTRAMUSCULAR; INTRAVENOUS; SUBCUTANEOUS EVERY 2 HOUR PRN
Status: DISCONTINUED | OUTPATIENT
Start: 2021-07-08 | End: 2021-07-10

## 2021-07-08 RX ORDER — HYDROMORPHONE HYDROCHLORIDE 1 MG/ML
INJECTION, SOLUTION INTRAMUSCULAR; INTRAVENOUS; SUBCUTANEOUS
Status: COMPLETED
Start: 2021-07-08 | End: 2021-07-08

## 2021-07-08 RX ORDER — OXYCODONE HYDROCHLORIDE 5 MG/1
5 TABLET ORAL EVERY 4 HOURS PRN
Status: DISCONTINUED | OUTPATIENT
Start: 2021-07-08 | End: 2021-07-09

## 2021-07-08 RX ORDER — BISACODYL 10 MG
10 SUPPOSITORY, RECTAL RECTAL
Status: DISCONTINUED | OUTPATIENT
Start: 2021-07-08 | End: 2021-07-10

## 2021-07-08 RX ORDER — CEFAZOLIN SODIUM/WATER 2 G/20 ML
2 SYRINGE (ML) INTRAVENOUS EVERY 8 HOURS
Status: DISCONTINUED | OUTPATIENT
Start: 2021-07-08 | End: 2021-07-08

## 2021-07-08 RX ORDER — INSULIN ASPART 100 [IU]/ML
INJECTION, SOLUTION INTRAVENOUS; SUBCUTANEOUS
Status: COMPLETED
Start: 2021-07-08 | End: 2021-07-08

## 2021-07-08 RX ORDER — DOCUSATE SODIUM 100 MG/1
100 CAPSULE, LIQUID FILLED ORAL 2 TIMES DAILY
Status: DISCONTINUED | OUTPATIENT
Start: 2021-07-08 | End: 2021-07-10

## 2021-07-08 RX ORDER — OXYCODONE HYDROCHLORIDE 5 MG/1
2.5 TABLET ORAL EVERY 4 HOURS PRN
Status: DISCONTINUED | OUTPATIENT
Start: 2021-07-08 | End: 2021-07-09

## 2021-07-08 RX ORDER — PROCHLORPERAZINE EDISYLATE 5 MG/ML
10 INJECTION INTRAMUSCULAR; INTRAVENOUS EVERY 6 HOURS PRN
Status: ACTIVE | OUTPATIENT
Start: 2021-07-08 | End: 2021-07-10

## 2021-07-08 RX ORDER — POLYETHYLENE GLYCOL 3350 17 G/17G
17 POWDER, FOR SOLUTION ORAL DAILY PRN
Status: DISCONTINUED | OUTPATIENT
Start: 2021-07-08 | End: 2021-07-10

## 2021-07-08 RX ORDER — CEFAZOLIN SODIUM/WATER 2 G/20 ML
2 SYRINGE (ML) INTRAVENOUS EVERY 8 HOURS
Status: COMPLETED | OUTPATIENT
Start: 2021-07-08 | End: 2021-07-09

## 2021-07-08 RX ORDER — ONDANSETRON 2 MG/ML
4 INJECTION INTRAMUSCULAR; INTRAVENOUS EVERY 4 HOURS PRN
Status: ACTIVE | OUTPATIENT
Start: 2021-07-08 | End: 2021-07-10

## 2021-07-08 RX ORDER — HYDROMORPHONE HYDROCHLORIDE 1 MG/ML
0.4 INJECTION, SOLUTION INTRAMUSCULAR; INTRAVENOUS; SUBCUTANEOUS EVERY 2 HOUR PRN
Status: DISCONTINUED | OUTPATIENT
Start: 2021-07-08 | End: 2021-07-10

## 2021-07-08 RX ORDER — SODIUM PHOSPHATE, DIBASIC AND SODIUM PHOSPHATE, MONOBASIC 7; 19 G/133ML; G/133ML
1 ENEMA RECTAL ONCE AS NEEDED
Status: DISCONTINUED | OUTPATIENT
Start: 2021-07-08 | End: 2021-07-10

## 2021-07-08 NOTE — ANESTHESIA POSTPROCEDURE EVALUATION
[facility] Patient Status:  Inpatient   Age/Gender 77year old female MRN [id number]   [facility] SURGERY Attending ELIO Barrera 21 Day # 2 PCP Negro Thayer MD       Anesthesia Post-op Note    REMOVAL O

## 2021-07-08 NOTE — BRIEF OP NOTE
Pre-Operative Diagnosis:  Left femur periprosthetic femoral shaft fracture  Post-Operative Diagnosis: Left femur periprosthetic femoral shaft fracture with severe comminution     Procedure Performed:   REMOVAL OF EXTERNAL FIXATURE LEFT LEG, OPEN REDUCATION

## 2021-07-08 NOTE — CM/SW NOTE
07/08/21 1400   CM/SW Referral Data   Referral Source    Reason for Referral Discharge planning   Informant Children;Patient  (daughter)   Patient Info   Patient's Mental Status Alert;Oriented   Patient's [address] [facility]   Number of L

## 2021-07-08 NOTE — PLAN OF CARE
Pain moderately controlled on PO oxycodone with occasional Dilaudid for relief. Left lower extremity ace wrap clean and dry, immobilizer in place at all times. Worked with therapy who recommended SIN. Denied to get up to chair this afternoon due to pain.  Baby Hadiego

## 2021-07-08 NOTE — PROGRESS NOTES
[facility] ORTHOPA[facility] PROGRESS NOTE POD #1    Subjective: Patient is comfortable with mild left knee pain. No CP/SOB/numbness/tinging.      Objective:    Temp Readings from Last 3 Encounters:  07/08/21 : 99.3 °F (37.4 °C) (Oral)  06/01/21 : 97.1 °F (36.2 °C) (Tem

## 2021-07-08 NOTE — PROGRESS NOTES
Pt arrived back to unit daughter at bedside. Pt alert and oriented x4.2L PRN per nc. VSS. Pt c/o of 7/10 pain. Pt able to wiggle toes. Unable to dorsi flex and plantar flex. +2 pedal pulses noted to BLE. Pt denies n/t. LLE immobilizer on at all times. old

## 2021-07-08 NOTE — ADDENDUM NOTE
Addendum  created 07/08/21 0114 by Giselle Sims MD    Child order released for a procedure order, Clinical Note Signed, Intraprocedure Blocks edited

## 2021-07-08 NOTE — PHYSICAL THERAPY NOTE
PHYSICAL THERAPY EVALUATION - INPATIENT     Room Number: 355/355-A  Evaluation Date: 7/8/2021  Type of Evaluation: Initial  Physician Order: PT Eval and Treat    Presenting Problem: s/p removal of L fixture L LE,, ORIF comminuted and displaced peripr • Flatulence/gas pain/belching    • Food intolerance    • Frequent use of laxatives    • Hearing loss    • High blood pressure    • High cholesterol    • History of depression    • History of eating disorder    • Indigestion    • Irregular bowel habits son-in-law, (both whom both work outside the home) and 6 y/o granddaughter.   Per daughter report, pt amb with cane occasionally, but usually without assistive device with independently and was I with ADL's.    SUBJECTIVE  \"My pain is more than 10\" regard bed?: A Lot   How much help from another person does the patient currently need. ..   -   Moving to and from a bed to a chair (including a wheelchair)?: Total   -   Need to walk in hospital room?: Total   -   Climbing 3-5 steps with a railing?: Total provided; All patient questions and concerns addressed;SCDs in place; Alarm set; Discussed recommendations with /    ASSESSMENT   Patient is a 77year old female admitted on 7/6/2021 for closed displaced subtrochanteric fx of L femur maximum assistance x 2     Goal #3 Pt able to sit at EOB x 5 mins with CGA for static balance and assist of one for support for L LE.      Goal #4    Goal #5    Goal #6    Goal Comments: Goals established on 7/8/2021

## 2021-07-08 NOTE — ANESTHESIA PROCEDURE NOTES
[facility]  Performed by: Chandra Haynes MD  Authorized by: Chandra Haynes MD       General Information and Staff    Start Time:  7/8/2021 12:26 AM  End Time:  7/8/2021 12:31 AM  Anesthesiologist:  Chandra Haynes MD  Performed by:   Anesthesiologist  Conrado

## 2021-07-08 NOTE — PROGRESS NOTES
[facility]  Progress Note     Maya Chris Patient Status:  Inpatient    1955 MRN [id number]   [facility] 3SW-A Attending Blake Sharma, [address] Day # 2 PCP Dianna Bloch, MD     Chief Complaint: Left leg pain    S: P mg/dL). No results for input(s): PTP, INR in the last 168 hours.          COVID-19 Lab Results    COVID-19  Lab Results   Component Value Date    COVID19 Not Detected 07/06/2021    COVID19 Detected (A) 11/24/2020    COVID19 Not Detected 07/15/2020

## 2021-07-08 NOTE — OPERATIVE REPORT
[facility]    PATIENT'S NAME: Anders Mendoza   ATTENDING PHYSICIAN: Yong Bahena D.O.   OPERATING PHYSICIAN: Marko Olivas M.D.    PATIENT ACCOUNT#:   [de-identified]    LOCATION:  [address] [place] [address]  MEDICAL RECORD #:   [id number]       DATE OF BIRTH: was unacceptable and definitive management would be required.   Risks, benefits, and alternatives to surgical intervention were discussed including possible infection, bleeding, neurovascular injury, as well as postop stiffness, continued pain, delayed unio to stabilize the extremity once the external fixator was removed. It was then carefully prepped and draped in the usual sterile fashion to include the entire left lower extremity to the groin.     After reconfirming  consent, side, and prophylactic antibio extended the incision proximally. I estimate the incision length was about 14 inches. Once it was extended, it became clear that the proximal diaphyseal segment of the fracture was comminuted into 3 pieces.   There was an anterior cortical shell which tamika reapproximate the butterfly fragments at the lateral distal segment. This was given that the only stable portion of the proximal shaft was the lateral cortex. The only stable portion of the distal segment was the medial cortex.   In between were zones of cortices were achievable, 2 bicortical plus 2 unicorticals. Once adequate alignment and fixation was felt to be achieved, I was able to place 2 additional bicortical lag screws through the zone of comminution.   All screws were locked with the provided end was awoken without complication after application of a sterile nonadhesive dressing. This included Xeroform, fluffs, ABDs, and Microfoam tape. She was placed in a knee immobilizer.   She was awoken and taken to the postanesthesia recovery room in stable c

## 2021-07-08 NOTE — ANESTHESIA PREPROCEDURE EVALUATION
PRE-OP EVALUATION    Patient Name: Nicki Hayes    Admit Diagnosis: Closed displaced subtrochanteric fracture of left femur, sequela [S72.22XS]    Pre-op Diagnosis: [facility] [C40.20]    REMOVAL OF EXTERNAL FIXATURE LEFT LEG, OPEN REDUCAT Daily  [MAR Hold] metoprolol succinate (Toprol XL) 24 hr tab 25 mg, 25 mg, Oral, Daily Beta Blocker  [MAR Hold] Sertraline HCl (ZOLOFT) tab 50 mg, 50 mg, Oral, Daily  [MAR Hold] morphINE sulfate (PF) 2 MG/ML injection 0.5 mg, 0.5 mg, Intravenous, Q2H PRN Tab, Take 1 tablet (5 mg total) by mouth daily. , Disp: 90 tablet, Rfl: 1, 7/5/2021 at Unknown time  insulin glargine (LANTUS SOLOSTAR) 100 UNIT/ML Subcutaneous Solution Pen-injector, Inject 30 Units into the skin every morning., Disp: 40 mL, Rfl: 1, 7/5/20 PONV       GI/Hepatic/Renal      (+) GERD                           Cardiovascular                (+) obesity  (+) hypertension     (+) CAD    (+) CABG/stent                            Endo/Other      (+) diabetes and poorly controlled, type 2, not using i normal.                 Other findings            ASA: 3   Plan: general  NPO status verified and patient meets guidelines. Patient has taken beta blockers in last 24 hours.         Plan/risks discussed with: patient and child/children                Prese

## 2021-07-08 NOTE — OCCUPATIONAL THERAPY NOTE
OCCUPATIONAL THERAPY EVALUATION - INPATIENT     Room Number: 355/355-A  Evaluation Date: 7/8/2021  Type of Evaluation: Initial  Presenting Problem: s/p removal external fixator, ORIF L distal femur periprosthetic fracture 7/7/21    Physician Order: Irene Alpers Constipation    • Diarrhea, unspecified    • Dizziness    • Easy bruising    • Fatigue    • Feeling lonely    • Flatulence/gas pain/belching    • Food intolerance    • Frequent use of laxatives    • Hearing loss    • High blood pressure    • High cholester Equipment: Standard height toilet  Shower/Tub and Equipment: Walk-in shower  Other Equipment: None    Occupation/Status: retired     Drives: No       Prior Level of Function: Patient reports independent in all I/ADL and functional mobility without device p taking off regular upper body clothing?: A Lot  -   Taking care of personal grooming such as brushing teeth?: A Little  -   Eating meals?: A Little    AM-PAC Score:  Score: 12  Approx Degree of Impairment: 66.57%  Standardized Score (AM-PAC Scale): 30.6  C mobility, functional mobility, instrumental activities of daily living, rest and sleep, leisure and social participation.  Results of the AM-PAC \"6 clicks\" Inpatient Activities of Daily Living Short Form for the patient is 66.57% degree of basic ADL impai will perform sit to stand with with max assist x2    ADDITIONAL GOALS  Patient will perform BUE HEP with good return demo  Patient will maintain LLE NWB with mod assist

## 2021-07-09 ENCOUNTER — APPOINTMENT (OUTPATIENT)
Dept: GENERAL RADIOLOGY | Facility: HOSPITAL | Age: 66
DRG: 481 | End: 2021-07-09
Attending: ORTHOPAEDIC SURGERY
Payer: MEDICARE

## 2021-07-09 LAB
ALBUMIN SERPL-MCNC: 2.2 G/DL (ref 3.4–5)
ALBUMIN/GLOB SERPL: 0.5 {RATIO} (ref 1–2)
ALP LIVER SERPL-CCNC: 116 U/L
ALT SERPL-CCNC: 11 U/L
ANION GAP SERPL CALC-SCNC: 7 MMOL/L (ref 0–18)
AST SERPL-CCNC: 26 U/L (ref 15–37)
BILIRUB SERPL-MCNC: 0.7 MG/DL (ref 0.1–2)
BUN BLD-MCNC: 15 MG/DL (ref 7–18)
BUN/CREAT SERPL: 25 (ref 10–20)
CALCIUM BLD-MCNC: 8.8 MG/DL (ref 8.5–10.1)
CHLORIDE SERPL-SCNC: 106 MMOL/L (ref 98–112)
CO2 SERPL-SCNC: 24 MMOL/L (ref 21–32)
CREAT BLD-MCNC: 0.6 MG/DL
GLOBULIN PLAS-MCNC: 4.2 G/DL (ref 2.8–4.4)
GLUCOSE BLD-MCNC: 140 MG/DL (ref 70–99)
GLUCOSE BLD-MCNC: 148 MG/DL (ref 70–99)
GLUCOSE BLD-MCNC: 216 MG/DL (ref 70–99)
GLUCOSE BLD-MCNC: 300 MG/DL (ref 70–99)
GLUCOSE BLD-MCNC: 322 MG/DL (ref 70–99)
M PROTEIN MFR SERPL ELPH: 6.4 G/DL (ref 6.4–8.2)
OSMOLALITY SERPL CALC.SUM OF ELEC: 288 MOSM/KG (ref 275–295)
POTASSIUM SERPL-SCNC: 4.4 MMOL/L (ref 3.5–5.1)
SODIUM SERPL-SCNC: 137 MMOL/L (ref 136–145)

## 2021-07-09 PROCEDURE — 73552 X-RAY EXAM OF FEMUR 2/>: CPT | Performed by: ORTHOPAEDIC SURGERY

## 2021-07-09 PROCEDURE — 99232 SBSQ HOSP IP/OBS MODERATE 35: CPT | Performed by: HOSPITALIST

## 2021-07-09 RX ORDER — NALOXONE HYDROCHLORIDE 4 MG/.1ML
4 SPRAY, METERED NASAL AS NEEDED
Qty: 1 KIT | Refills: 0 | Status: SHIPPED | OUTPATIENT
Start: 2021-07-09 | End: 2021-08-17 | Stop reason: CLARIF

## 2021-07-09 RX ORDER — OXYCODONE HYDROCHLORIDE 5 MG/1
2.5 TABLET ORAL EVERY 4 HOURS PRN
Status: DISCONTINUED | OUTPATIENT
Start: 2021-07-09 | End: 2021-07-10

## 2021-07-09 RX ORDER — OXYCODONE HYDROCHLORIDE 10 MG/1
10 TABLET ORAL EVERY 4 HOURS PRN
Qty: 30 TABLET | Refills: 0 | Status: ON HOLD | OUTPATIENT
Start: 2021-07-09 | End: 2021-07-12

## 2021-07-09 RX ORDER — OXYCODONE HYDROCHLORIDE 10 MG/1
10 TABLET ORAL EVERY 4 HOURS PRN
Status: DISCONTINUED | OUTPATIENT
Start: 2021-07-09 | End: 2021-07-10

## 2021-07-09 NOTE — OCCUPATIONAL THERAPY NOTE
OCCUPATIONAL THERAPY TREATMENT NOTE - INPATIENT     Room Number: 355/355-A  Session: 1   Number of Visits to Meet Established Goals: 5    Presenting Problem: s/p removal external fixator, ORIF L distal femur periprosthetic fracture 7/7/21   History related lift   Toileting location: bed level    OT Discharge Recommendations: Sub-acute rehabilitation  OT Device Recommendations: TBD    SUBJECTIVE  Pt reports she gets full body cramping at baseline.     PAIN ASSESSMENT  Rating: Other (Comment) (\"more than 10\") training;Functional transfer training;UE strengthening/ROM; Endurance training;Patient/Family education;Patient/Family training; Compensatory technique education  Rehab Potential : Fair  Frequency (Obs): 3x/week    OT Goals:  ADL GOALS   Patient will tolerat

## 2021-07-09 NOTE — PHYSICAL THERAPY NOTE
PHYSICAL THERAPY TREATMENT NOTE - INPATIENT    Room Number: 355/355-A     Session: 1   Number of Visits to Meet Established Goals: 6    Presenting Problem: s/p removal of L fixture L LE,, ORIF comminuted and displaced periprosthetic femur fx 7/8/21 by  complication, not stated as uncontrolled    • Uncomfortable fullness after meals    • Venous insufficiency of both lower extremities 3/16/2018   • Visual impairment     glasses   • Vomiting    • Weight loss        Past Surgical History  Past Surgical Histo standing up from a chair with arms (e.g., wheelchair, bedside commode, etc.): Unable   -   Moving from lying on back to sitting on the side of the bed?: A Lot   How much help from another person does the patient currently need. ..   -   Moving to and from a overall functional mobility. Due to above deficits, Pt will benefit from continued IP PT, so that patient may achieve highest functional independence/return to baseline.        Transfers with nursing staff: amarilys degroot     DISCHARGE RECOMMENDATIONS  PT Disc

## 2021-07-09 NOTE — PLAN OF CARE
A&O x4. VSS. Room air, . L leg pain moderate-severe overnight, moderate relief with PO and IV pain meds. NWB to LLE. Voids freely, purewick in place. R leg SCD. AW dressing C/D/I, knee immobilizer on at all times.  Milk of mag given in AM. Reviewed POC,

## 2021-07-09 NOTE — DIETARY NOTE
[facility]     Admitting diagnosis:  Closed displaced subtrochanteric fracture of left femur, sequela [S72.22XS]    Ht: 165.1 cm (5' 5\")  Wt: 80.7 kg (178 lb). Body mass index is 29.62 kg/m².   IBW: 56.8 kg

## 2021-07-09 NOTE — CM/SW NOTE
Spoke to patient and daughter re: dc plan. They choose [facility], since they are familiar with the facility. Updates will be sent to [facility] as they come available.     will follow the POC and remain available for continued care coordina

## 2021-07-09 NOTE — PROGRESS NOTES
[facility]  Progress Note     Rosa Isela Moy Patient Status:  Inpatient    1955 MRN [id number]   [facility] 3SW-A Attending Mino Ramirez, [address] Day # 3 PCP Yakov Bowens MD     Chief Complaint: Left leg pain    S: P Lab Results    COVID-19  Lab Results   Component Value Date    COVID19 Not Detected 07/06/2021    COVID19 Detected (A) 11/24/2020    COVID19 Not Detected 07/15/2020       Pro-Calcitonin  No results for input(s): PCT in the last 168 hours.     Cardiac  No re

## 2021-07-09 NOTE — PLAN OF CARE
Patient alert, O x 4. Left leg dressing dry and intact, pedal pulses strong bilaterally. Pain managed with oral pain medications. Plan for SIN upon dc.

## 2021-07-09 NOTE — PROGRESS NOTES
[facility]    Progress Note    Ilah Koyanagi Patient Status:  Inpatient    1955 MRN [id number]   [facility] 3SW-A Attending Gaston Navas,  [address] Day # 3 PCP Pascual Jackson MD     [facility] ORTHOPAEDIC PROGRESS NOTE POD #2 left femur periprosthetic fracture ORIF    -IV/PO analgesics PRN, up to Oxycodone 10   -Prophylactic ABX   -DVT prophylaxis with SCDs and Xarelto   -PT/OT: NWB, ok for gentle knee ROM but immobilizer  with transfers and emphasized stabilizing above and  be

## 2021-07-09 NOTE — CM/SW NOTE
RN states patients pain is uncontrolled at this time. CM changed ambulance to will call. ED CM aware.

## 2021-07-10 ENCOUNTER — HOSPITAL ENCOUNTER (OUTPATIENT)
Facility: HOSPITAL | Age: 66
Setting detail: OBSERVATION
Discharge: NURSING, REHAB OR LONG-TERM CARE | End: 2021-07-12
Attending: EMERGENCY MEDICINE | Admitting: INTERNAL MEDICINE
Payer: MEDICARE

## 2021-07-10 VITALS
BODY MASS INDEX: 29.66 KG/M2 | SYSTOLIC BLOOD PRESSURE: 121 MMHG | DIASTOLIC BLOOD PRESSURE: 51 MMHG | HEIGHT: 65 IN | HEART RATE: 84 BPM | RESPIRATION RATE: 16 BRPM | OXYGEN SATURATION: 96 % | TEMPERATURE: 98 F | WEIGHT: 178 LBS

## 2021-07-10 DIAGNOSIS — R73.9 HYPERGLYCEMIA: Primary | ICD-10-CM

## 2021-07-10 LAB
ALBUMIN SERPL-MCNC: 2 G/DL (ref 3.4–5)
ALBUMIN/GLOB SERPL: 0.5 {RATIO} (ref 1–2)
ALP LIVER SERPL-CCNC: 95 U/L
ALT SERPL-CCNC: 10 U/L
ANION GAP SERPL CALC-SCNC: 6 MMOL/L (ref 0–18)
AST SERPL-CCNC: 16 U/L (ref 15–37)
BILIRUB SERPL-MCNC: 0.7 MG/DL (ref 0.1–2)
BUN BLD-MCNC: 17 MG/DL (ref 7–18)
BUN/CREAT SERPL: 30.9 (ref 10–20)
CALCIUM BLD-MCNC: 8.6 MG/DL (ref 8.5–10.1)
CHLORIDE SERPL-SCNC: 107 MMOL/L (ref 98–112)
CO2 SERPL-SCNC: 25 MMOL/L (ref 21–32)
CREAT BLD-MCNC: 0.55 MG/DL
GLOBULIN PLAS-MCNC: 3.9 G/DL (ref 2.8–4.4)
GLUCOSE BLD-MCNC: 156 MG/DL (ref 70–99)
GLUCOSE BLD-MCNC: 182 MG/DL (ref 70–99)
GLUCOSE BLD-MCNC: 295 MG/DL (ref 70–99)
M PROTEIN MFR SERPL ELPH: 5.9 G/DL (ref 6.4–8.2)
OSMOLALITY SERPL CALC.SUM OF ELEC: 291 MOSM/KG (ref 275–295)
POTASSIUM SERPL-SCNC: 4 MMOL/L (ref 3.5–5.1)
SODIUM SERPL-SCNC: 138 MMOL/L (ref 136–145)

## 2021-07-10 PROCEDURE — 99239 HOSP IP/OBS DSCHRG MGMT >30: CPT | Performed by: HOSPITALIST

## 2021-07-10 NOTE — PROGRESS NOTES
[facility]    Progress Note    Trice Anjana Patient Status:  Inpatient    1955 MRN [id number]   [facility] 3SW-A Attending Shay VillasenorÓSCAR [facility] Day # 4 PCP Jairo Collier MD       EMG ORTHOPAEDIC PROGRESS NOTE POD #3 -Prophylactic ABX               -DVT prophylaxis with SCDs and Xarelto               -PT/OT: NWB, ok for gentle knee ROM but immobilizer    with transfers and emphasized stabilizing above and     below the knee for transfers due to fracture and soft

## 2021-07-10 NOTE — DISCHARGE SUMMARY
[facility]  DISCHARGE SUMMARY     Ashley Beard Patient Status:  Inpatient    1955 MRN [id number]   [facility] 3SW-A Attending Jersey Garcia\[facility] [place] [facility]\""[facility] Day # 4 PCP Zaida Patterson MD     Date of Admission: 2021  Date descriptions):  • None    Lab/Test results pending at Discharge:   · [facility]    Consultants:  • Orthopedic surgery-Dr. Sofia Elias    Discharge Medication List:     Discharge Medications      START taking these medications      Instructions Prescription detai known as: GLIPIZIDE XL      TAKE 1 TABLET(5 MG) BY MOUTH DAILY   Quantity: 90 tablet  Refills: 0     Jardiance 25 MG Tabs  Generic drug: Empagliflozin      TAKE 1 TABLET BY MOUTH DAILY   Quantity: 90 tablet  Refills: 0     Lantus SoloStar 100 UNIT/ML Sopn prescription for each of these medications  · OxyCODONE HCl IR 10 MG Tabs  · rivaroxaban 10 MG Tabs         [place]    Follow-up appointment:   Jayy Frazier, [address]    In 1 week      Rand Dash,

## 2021-07-10 NOTE — PLAN OF CARE
Patient rating pain 5 to 6 on oxy 10, will try to give Q 4 hrs and hold dilaudid and possibly dc tomorrow.

## 2021-07-10 NOTE — PLAN OF CARE
Patient alert and oriented. Pain controlled with PO oxycodone. Script on chart for rehab facility. NWB to LLE. Up with max assist or total lift for transfers. Blood sugar control with subcutaneous insulin. Plan to [facility] this afternoon.

## 2021-07-10 NOTE — PROGRESS NOTES
Attempted to call report to [facility], receiving RN unavailable at this time. RN aware that patient may arrive before report given. Will callback in 5-10 minutes.

## 2021-07-10 NOTE — CM/SW NOTE
07/10/21 1100   Discharge disposition   Expected discharge disposition Skilled Nurs   Name of Gabe Jiang   Discharge transportation [facility]   pt cleared for discharge today.   Room 124 at [address] [place] [address]

## 2021-07-10 NOTE — PROGRESS NOTES
Report called to [facility]. Daughter collected all belongings and took with patient at time of discharge. Scripts for xarelto and oxycodone sent with patient at time of transfer. Patient picked up at 1430 by [facility] ambulance for transport.

## 2021-07-10 NOTE — PLAN OF CARE
Pain well controlled on oral medication. Declined need for prn IV Dilaudid. Dsg to lt leg cdi. NP edema present, PP palpable, leg kept elevated. Able to dorsi/plantar flex ankle weakly, denies any n/t.  Pt's main language is Amharic, able to speak and under

## 2021-07-10 NOTE — PROGRESS NOTES
[facility]  Progress Note     Rosa Isela Moy Patient Status:  Inpatient    1955 MRN [id number]   [facility] 3SW-A Attending Mino Ramirez, [address] Day # 4 PCP Yakov Bowens MD     Chief Complaint: Left leg pain    S: P Results    COVID-19  Lab Results   Component Value Date    COVID19 Not Detected 07/06/2021    COVID19 Detected (A) 11/24/2020    COVID19 Not Detected 07/15/2020       Pro-Calcitonin  No results for input(s): PCT in the last 168 hours.     Cardiac  No result

## 2021-07-11 PROBLEM — R79.89 AZOTEMIA: Status: ACTIVE | Noted: 2021-07-11

## 2021-07-11 PROBLEM — S72.92XA FEMUR FRACTURE, LEFT (HCC): Status: ACTIVE | Noted: 2021-07-11

## 2021-07-11 PROBLEM — D64.9 ANEMIA: Status: ACTIVE | Noted: 2021-07-11

## 2021-07-11 PROBLEM — S72.92XD CLOSED FRACTURE OF LEFT FEMUR WITH ROUTINE HEALING: Status: ACTIVE | Noted: 2021-07-06

## 2021-07-11 LAB
ALBUMIN SERPL-MCNC: 1.9 G/DL (ref 3.4–5)
ALBUMIN/GLOB SERPL: 0.4 {RATIO} (ref 1–2)
ALP LIVER SERPL-CCNC: 113 U/L
ALT SERPL-CCNC: 12 U/L
ANION GAP SERPL CALC-SCNC: 4 MMOL/L (ref 0–18)
AST SERPL-CCNC: 26 U/L (ref 15–37)
BASOPHILS # BLD AUTO: 0.03 X10(3) UL (ref 0–0.2)
BASOPHILS NFR BLD AUTO: 0.4 %
BILIRUB SERPL-MCNC: 0.5 MG/DL (ref 0.1–2)
BLOOD TYPE BARCODE: 7300
BUN BLD-MCNC: 17 MG/DL (ref 7–18)
BUN/CREAT SERPL: 27 (ref 10–20)
CALCIUM BLD-MCNC: 8.2 MG/DL (ref 8.5–10.1)
CHLORIDE SERPL-SCNC: 106 MMOL/L (ref 98–112)
CO2 SERPL-SCNC: 27 MMOL/L (ref 21–32)
CREAT BLD-MCNC: 0.63 MG/DL
DEPRECATED RDW RBC AUTO: 45.3 FL (ref 35.1–46.3)
EOSINOPHIL # BLD AUTO: 0.42 X10(3) UL (ref 0–0.7)
EOSINOPHIL NFR BLD AUTO: 5.8 %
ERYTHROCYTE [DISTWIDTH] IN BLOOD BY AUTOMATED COUNT: 14.5 % (ref 11–15)
GLOBULIN PLAS-MCNC: 4.3 G/DL (ref 2.8–4.4)
GLUCOSE BLD-MCNC: 224 MG/DL (ref 70–99)
GLUCOSE BLD-MCNC: 230 MG/DL (ref 70–99)
GLUCOSE BLD-MCNC: 251 MG/DL (ref 70–99)
GLUCOSE BLD-MCNC: 281 MG/DL (ref 70–99)
GLUCOSE BLD-MCNC: 290 MG/DL (ref 70–99)
HCT VFR BLD AUTO: 28.7 %
HGB BLD-MCNC: 9.5 G/DL
IMM GRANULOCYTES # BLD AUTO: 0.05 X10(3) UL (ref 0–1)
IMM GRANULOCYTES NFR BLD: 0.7 %
LYMPHOCYTES # BLD AUTO: 0.98 X10(3) UL (ref 1–4)
LYMPHOCYTES NFR BLD AUTO: 13.6 %
M PROTEIN MFR SERPL ELPH: 6.2 G/DL (ref 6.4–8.2)
MCH RBC QN AUTO: 28.7 PG (ref 26–34)
MCHC RBC AUTO-ENTMCNC: 33.1 G/DL (ref 31–37)
MCV RBC AUTO: 86.7 FL
MONOCYTES # BLD AUTO: 0.72 X10(3) UL (ref 0.1–1)
MONOCYTES NFR BLD AUTO: 10 %
NEUTROPHILS # BLD AUTO: 5 X10 (3) UL (ref 1.5–7.7)
NEUTROPHILS # BLD AUTO: 5 X10(3) UL (ref 1.5–7.7)
NEUTROPHILS NFR BLD AUTO: 69.5 %
OSMOLALITY SERPL CALC.SUM OF ELEC: 293 MOSM/KG (ref 275–295)
PLATELET # BLD AUTO: 273 10(3)UL (ref 150–450)
POTASSIUM SERPL-SCNC: 4.1 MMOL/L (ref 3.5–5.1)
RBC # BLD AUTO: 3.31 X10(6)UL
SODIUM SERPL-SCNC: 137 MMOL/L (ref 136–145)
WBC # BLD AUTO: 7.2 X10(3) UL (ref 4–11)

## 2021-07-11 PROCEDURE — 99220 INITIAL OBSERVATION CARE,LEVL III: CPT | Performed by: INTERNAL MEDICINE

## 2021-07-11 RX ORDER — ACETAMINOPHEN 325 MG/1
325 TABLET ORAL EVERY 8 HOURS PRN
Status: DISCONTINUED | OUTPATIENT
Start: 2021-07-11 | End: 2021-07-12

## 2021-07-11 RX ORDER — ONDANSETRON 2 MG/ML
4 INJECTION INTRAMUSCULAR; INTRAVENOUS ONCE
Status: DISCONTINUED | OUTPATIENT
Start: 2021-07-11 | End: 2021-07-12

## 2021-07-11 RX ORDER — FLUTICASONE PROPIONATE 50 MCG
2 SPRAY, SUSPENSION (ML) NASAL DAILY
Status: DISCONTINUED | OUTPATIENT
Start: 2021-07-11 | End: 2021-07-12

## 2021-07-11 RX ORDER — ONDANSETRON 2 MG/ML
4 INJECTION INTRAMUSCULAR; INTRAVENOUS EVERY 4 HOURS PRN
Status: DISCONTINUED | OUTPATIENT
Start: 2021-07-11 | End: 2021-07-11

## 2021-07-11 RX ORDER — ATORVASTATIN CALCIUM 10 MG/1
10 TABLET, FILM COATED ORAL DAILY
Status: DISCONTINUED | OUTPATIENT
Start: 2021-07-11 | End: 2021-07-12

## 2021-07-11 RX ORDER — METOPROLOL SUCCINATE 25 MG/1
25 TABLET, EXTENDED RELEASE ORAL DAILY
Status: DISCONTINUED | OUTPATIENT
Start: 2021-07-11 | End: 2021-07-12

## 2021-07-11 RX ORDER — OXYCODONE HYDROCHLORIDE 10 MG/1
10 TABLET ORAL EVERY 4 HOURS PRN
Status: DISCONTINUED | OUTPATIENT
Start: 2021-07-11 | End: 2021-07-12

## 2021-07-11 RX ORDER — HYDROMORPHONE HYDROCHLORIDE 1 MG/ML
0.5 INJECTION, SOLUTION INTRAMUSCULAR; INTRAVENOUS; SUBCUTANEOUS ONCE
Status: COMPLETED | OUTPATIENT
Start: 2021-07-11 | End: 2021-07-11

## 2021-07-11 RX ORDER — ALPRAZOLAM 0.5 MG/1
0.5 TABLET ORAL DAILY PRN
Status: DISCONTINUED | OUTPATIENT
Start: 2021-07-11 | End: 2021-07-12

## 2021-07-11 RX ORDER — METOCLOPRAMIDE HYDROCHLORIDE 5 MG/ML
10 INJECTION INTRAMUSCULAR; INTRAVENOUS EVERY 8 HOURS PRN
Status: DISCONTINUED | OUTPATIENT
Start: 2021-07-11 | End: 2021-07-12

## 2021-07-11 RX ORDER — DEXTROSE MONOHYDRATE 25 G/50ML
50 INJECTION, SOLUTION INTRAVENOUS
Status: DISCONTINUED | OUTPATIENT
Start: 2021-07-11 | End: 2021-07-12

## 2021-07-11 RX ORDER — ACETAMINOPHEN 325 MG/1
650 TABLET ORAL EVERY 6 HOURS PRN
Status: DISCONTINUED | OUTPATIENT
Start: 2021-07-11 | End: 2021-07-12

## 2021-07-11 RX ORDER — TRAMADOL HYDROCHLORIDE 50 MG/1
50 TABLET ORAL EVERY 8 HOURS PRN
Status: DISCONTINUED | OUTPATIENT
Start: 2021-07-11 | End: 2021-07-12

## 2021-07-11 RX ORDER — RAMIPRIL 2.5 MG/1
2.5 CAPSULE ORAL DAILY
Status: DISCONTINUED | OUTPATIENT
Start: 2021-07-11 | End: 2021-07-12

## 2021-07-11 RX ORDER — ONDANSETRON 2 MG/ML
4 INJECTION INTRAMUSCULAR; INTRAVENOUS EVERY 6 HOURS PRN
Status: DISCONTINUED | OUTPATIENT
Start: 2021-07-11 | End: 2021-07-12

## 2021-07-11 RX ORDER — SODIUM CHLORIDE 9 MG/ML
INJECTION, SOLUTION INTRAVENOUS CONTINUOUS
Status: ACTIVE | OUTPATIENT
Start: 2021-07-11 | End: 2021-07-11

## 2021-07-11 NOTE — PHYSICAL THERAPY NOTE
PHYSICAL THERAPY EVALUATION - INPATIENT     Room Number: 351/351-A  Evaluation Date: 7/11/2021  Type of Evaluation: Initial  Physician Order: PT Eval and Treat    Presenting Problem: poor care from [facility], patient is S/P removal of L LE fixture secondar pain/belching    • Food intolerance    • Frequent use of laxatives    • Hearing loss    • High blood pressure    • High cholesterol    • History of depression    • History of eating disorder    • Indigestion    • Irregular bowel habits    • Itch of skin with the cane occasionally but usually without device independently.      SUBJECTIVE  \"I have pain\" referring to the L LE    Patient self-stated goal is get stronger    OBJECTIVE  Precautions: Bed/chair alarm  Fall Risk: High fall risk    WEIGHT BEARING R Assessment   Gait Assistance: Not tested                   Skilled Therapy Provided: RN approved session, patient was received in supine position agreeable to PT.   Patient was max assist-dependent to L LE & min-mod assist to trunk during supine->sit with H fixture secondary to comminuted and displaced periprosthetic femur fx ORIF on 7/8/21.   In this PT evaluation, the patient presents with the following impairments; impaired strength & ROM of L LE, impaired independence during bed mobility/transfers/standing

## 2021-07-11 NOTE — OCCUPATIONAL THERAPY NOTE
OCCUPATIONAL THERAPY EVALUATION - INPATIENT     Room Number: 351/351-A  Evaluation Date: 7/11/2021  Type of Evaluation: Initial  Presenting Problem: Hyperglycemia; L femoral leg Fx    Physician Order: IP Consult to Occupational Therapy  Reason for Therapy: Atherosclerosis of coronary artery    • Atherosclerotic heart disease of native coronary artery without angina pectoris    • Back pain    • Bad breath    • Belching    • Bilateral leg edema 3/16/2018   • Bloating    • Blurred vision    • Change in hair VAGINAL HYSTERECTOMY;  Surgeon: Sharyle Epp, MD;  Location: [address] [place] [address] MAIN OR       OCCUPATIONAL PROFILE    HOME SITUATION  Type of Home: House  Home Layout: One level  Lives With: Daughter;Family    Toilet and Equipment: Standard height toilet  Shower/Tub an Degree of Impairment: 56.46%  Standardized Score (AM-PAC Scale): 34.69  CMS Modifier (G-Code): CK    FUNCTIONAL TRANSFER ASSESSMENT  Supine to Sit : Maximum assistance (for L LE)  Sit to Stand: Dependent assistance (Mod of 1, Max of 1; 3rd person to suppor impairment. In this OT evaluation patient presents with the following performance deficits: increased pain, decreased balance, decreased endurance, decreased knowledge of adaptive techniques.  These deficits impact the patient’s ability to participate in stand:  with max assist x 2    UE Exercise Program Goal  Patient will be independent with bilateral AROM HEP (home exercise program).

## 2021-07-11 NOTE — H&P
[facility]  History and Physical     Rachelle Collado Patient Status:  Emergency    1955 MRN [id number]   Location [facility] Attending Jose A Corona MD   Hosp Day # 0 PCP Vern Glez MD     Chief Complaint: [place] without mention of complication, not stated as uncontrolled    • Uncomfortable fullness after meals    • Venous insufficiency of both lower extremities 3/16/2018   • Visual impairment     glasses   • Vomiting    • Weight loss         Past Surgical History: Tab, Take 1 tablet by mouth every 8 (eight) hours as needed for Pain., Disp: , Rfl:   ALTERNATIVE ANTIBIOTICS, NON-WEIGHT BASED,, q8, Disp: , Rfl:   celecoxib 200 MG Oral Cap, Take 200 mg by mouth 2 (two) times daily. , Disp: , Rfl:   JARDIANCE 25 MG Oral T Rfl: 6        Review of Systems:   A comprehensive 14 point review of systems was completed. Pertinent positives and negatives noted in the HPI.     Physical Exam:    /73   Pulse 97   Temp 98.4 °F (36.9 °C) (Temporal)   Resp 18   Ht 165.1 cm (5' 5\ COVID19 Detected (A) 11/24/2020    COVID19 Not Detected 07/15/2020       Pro-Calcitonin  No results for input(s): PCT in the last 168 hours. Cardiac  No results for input(s): TROP, PBNP in the last 168 hours.     Creatinine Kinase  No results for input(s

## 2021-07-11 NOTE — CM/SW NOTE
MSW spoke with Castillo Kruse from [address] [place] [address]  [phone] who informed MSW they do not have a bed available.   Jany from the [place] stated they are unable to accept the patient due to a 3rd party insurance for accident liability

## 2021-07-11 NOTE — CM/SW NOTE
Patient is here from [facility]. She was discharged from [facility] to [place] today. She and her two daughters are here in the ED.  Patient and her daughters report that patient didn't get her pain medication, have her brief changed, or have her b

## 2021-07-11 NOTE — PLAN OF CARE
NURSING ADMISSION NOTE      Patient admitted via Cart  Oriented to room. Safety precautions initiated. Bed in low position. Call light in reach. Received from ER awake, A/O x3. Rates pain 8/10 to lt surgical leg, will give oral pain med.  ABD/gauze/t Radiology History & Physical      SUBJECTIVE:     Chief Complaint: ascites    History of Present Illness:  Kenneth Sheikh is a 80 y.o. male who presents for ultrasound guided paracentesis  Past Medical History:   Diagnosis Date    Anticoagulant long-term use     Bipolar 1 disorder     Bipolar 1 disorder 11/24/2016    bipolar    Cancer     history of skin cancer/sinus cancer & rectal cancer    Depressed bipolar affective disorder     Diabetes mellitus     type 2    EBV infection 12/7/2016    EBV DNA, PCR Latest Ref Range: None detected  None detected EBV DNA-Copies/mL Unknown Test Not Performed EBV Early Antigen Ab, IgG Latest Ref Range: <1:10 Titer 1:40 (A) EBV Nuclear Ag Ab Latest Ref Range: <1:5 Titer >=1:80 (A) EBV VCA IgG Latest Ref Range: <1:10 Titer 1:160 (A) EBV VCA IgM Latest Ref Range: <1:10 Titer <1:10     History of TIA (transient ischemic attack)     several-taking Plavix    Hx of gallstones     Wife denies    Hypertension     Hyperthyroidism 11/30/2016    Low HDL (under 40) 12/7/2016    Mixed hyperlipidemia 11/23/2016    JUAN MANUEL (obstructive sleep apnea)     Pneumonia     Skin disease     Stroke     Transient cerebral ischemia 7/22/2016    Type 2 diabetes mellitus      Past Surgical History:   Procedure Laterality Date    Moh's procedure x4      rectal cancer      Sinus surgery for sinus cancer      sinus sx for cancer      skin cancer removed-several times-nose & ear      TONSILLECTOMY      Undescened testicle sx      UVULOPALATOPHARYNGOPLASTY      for sleep apnea       Home Meds:   Prior to Admission medications    Medication Sig Start Date End Date Taking? Authorizing Provider   ACCU-CHEK NEYDA PLUS TEST STRP Strp  2/23/17   Historical Provider, MD   ciprofloxacin HCl (CIPRO) 500 MG tablet Take 1 tablet (500 mg total) by mouth every Monday. 5/29/17   Renato Womack MD   divalproex (DEPAKOTE) 250 MG EC tablet  5/5/17   Historical Provider, MD   fluorouracil (EFUDEX) 5 % cream  Apply BID to affected areas as directed 3/1/18   Kenn Beach MD   furosemide (LASIX) 20 MG tablet Take 4 tablets (80 mg total) by mouth once daily.  Patient taking differently: Take 40 mg by mouth once daily. Taking 40 mg per day 4/25/17 4/25/18  Renato Womack MD   lactulose (CHRONULAC) 10 gram/15 mL solution TAKE 15 MLS BY MOUTH TWICE DAILY 3/8/18   Renato Womack MD   rifAXIMin (XIFAXAN) 550 mg Tab Take 1 tablet (550 mg total) by mouth 2 (two) times daily. 10/3/17   Renato Womack MD   SITagliptan (JANUVIA) 50 MG Tab Take 1 tablet (50 mg total) by mouth once daily. 4/6/17   Prisca Espinoza MD     Anticoagulants/Antiplatelets: no anticoagulation    Allergies:   Review of patient's allergies indicates:   Allergen Reactions    Abilify [aripiprazole] Other (See Comments)     Sleepy, could not function.     Sedation History:  no adverse reactions    Review of Systems:   Hematological: no known coagulopathies  Respiratory: no shortness of breath  Cardiovascular: no chest pain  Gastrointestinal: no abdominal pain  Genito-Urinary: no dysuria  Musculoskeletal: negative  Neurological: no TIA or stroke symptoms         OBJECTIVE:     Vital Signs (Most Recent)       Physical Exam:  ASA: 2  Mallampati: n/a    General: no acute distress  Mental Status: alert and oriented to person, place and time  HEENT: normocephalic, atraumatic  Chest: unlabored breathing  Heart: regular heart rate  Abdomen: distended  Extremity: moves all extremities    Laboratory  Lab Results   Component Value Date    INR 1.1 03/09/2018       Lab Results   Component Value Date    WBC 3.56 (L) 03/09/2018    HGB 11.1 (L) 03/09/2018    HCT 34.0 (L) 03/09/2018    MCV 86 03/09/2018     (L) 03/09/2018      Lab Results   Component Value Date     (H) 02/02/2018     02/02/2018    K 4.0 02/02/2018     02/02/2018    CO2 29 02/02/2018    BUN 25 (H) 02/02/2018    CREATININE 1.5 (H) 02/02/2018    CALCIUM 8.6 (L)  02/02/2018    MG 1.7 05/15/2017    ALT 9 (L) 02/02/2018    AST 21 02/02/2018    ALBUMIN 2.2 (L) 02/02/2018    BILITOT 0.7 02/02/2018       ASSESSMENT/PLAN:     Sedation Plan: local  Patient will undergo ultrasound guided paracentesis.    MATT Mauricio, FNP  Interventional Radiology  [phone] spectralink

## 2021-07-11 NOTE — ED INITIAL ASSESSMENT (HPI)
Pt presents to ed via ems from [facility], pt states she had surgery on Wednesday for a left femur fx following a fall. Pt daughter called 911 this evening because of poor pt care.

## 2021-07-11 NOTE — ED PROVIDER NOTES
Patient Seen in: [facility] Emergency Department      History   Patient presents with:  Pain    Stated Complaint: from [place], [place] called by daughter for pt not recieving medication on time    HPI/Subjective:   HPI    51-year-old female who was just rec • Osteoporosis    • Other and unspecified hyperlipidemia    • Pain in joints    • Peripheral neuropathy    • Problems with swallowing    • Sleep disturbance    • Stented coronary artery    • Stress    • Type II or unspecified type diabetes mellitus with 74.8 kg   LMP 09/01/2017 (LMP Unknown)   SpO2 96%   BMI 27.46 kg/m²         Physical Exam  General: This a pleasant nontoxic appearing patient in no apparent distress alert and oriented ×3  HEENT: Pupils are equal reactive to light.   Extra ocular motions a worker did see the patient and we attempted to try to get the patient placed in another skilled nursing facility but will be difficult to do so at this time the patient would need admission to the hospital for observation so we can work on placement in a n

## 2021-07-12 VITALS
RESPIRATION RATE: 18 BRPM | BODY MASS INDEX: 27.49 KG/M2 | HEIGHT: 65 IN | DIASTOLIC BLOOD PRESSURE: 54 MMHG | TEMPERATURE: 99 F | SYSTOLIC BLOOD PRESSURE: 127 MMHG | OXYGEN SATURATION: 100 % | HEART RATE: 83 BPM | WEIGHT: 165 LBS

## 2021-07-12 LAB
GLUCOSE BLD-MCNC: 208 MG/DL (ref 70–99)
GLUCOSE BLD-MCNC: 236 MG/DL (ref 70–99)

## 2021-07-12 PROCEDURE — 99217 OBSERVATION CARE DISCHARGE: CPT | Performed by: HOSPITALIST

## 2021-07-12 RX ORDER — DOCUSATE SODIUM 100 MG/1
100 CAPSULE, LIQUID FILLED ORAL 2 TIMES DAILY
Status: DISCONTINUED | OUTPATIENT
Start: 2021-07-12 | End: 2021-07-12

## 2021-07-12 RX ORDER — PSEUDOEPHEDRINE HCL 30 MG
100 TABLET ORAL 2 TIMES DAILY
Qty: 60 CAPSULE | Refills: 0 | Status: SHIPPED | OUTPATIENT
Start: 2021-07-12 | End: 2021-08-17 | Stop reason: CLARIF

## 2021-07-12 RX ORDER — SENNOSIDES 8.6 MG
8.6 TABLET ORAL 2 TIMES DAILY
Status: DISCONTINUED | OUTPATIENT
Start: 2021-07-12 | End: 2021-07-12

## 2021-07-12 RX ORDER — SENNA PLUS 8.6 MG/1
8.6 TABLET ORAL 2 TIMES DAILY PRN
Qty: 60 TABLET | Refills: 0 | Status: SHIPPED | OUTPATIENT
Start: 2021-07-12 | End: 2021-08-17 | Stop reason: CLARIF

## 2021-07-12 RX ORDER — POLYETHYLENE GLYCOL 3350 17 G/17G
17 POWDER, FOR SOLUTION ORAL DAILY
Status: DISCONTINUED | OUTPATIENT
Start: 2021-07-12 | End: 2021-07-12

## 2021-07-12 RX ORDER — OXYCODONE HYDROCHLORIDE 10 MG/1
10 TABLET ORAL EVERY 4 HOURS PRN
Qty: 30 TABLET | Refills: 0 | Status: SHIPPED | OUTPATIENT
Start: 2021-07-12

## 2021-07-12 NOTE — PHYSICAL THERAPY NOTE
PHYSICAL THERAPY TREATMENT NOTE - INPATIENT    Room Number: 535/200-W     Session: 1   Number of Visits to Meet Established Goals: 3    Presenting Problem: poor care from [facility], patient is S/P removal of L LE fixture secondary to comminuted and displaced pe • Anemia    • Anesthesia complication     slow to arouse   • Anxiety    • Arthritis    • Atherosclerosis of coronary artery    • Atherosclerotic heart disease of native coronary artery without angina pectoris    • Back pain    • Bad breath    • Belching • TOTAL HIP REPLACEMENT      rt hip   • VAGINAL HYSTERECTOMY N/A 11/16/2017    Procedure: VAGINAL HYSTERECTOMY;  Surgeon: Manoj Hall MD;  Location: [address] [place] [address] OR       [facility]  Pt agreeable to PT session, cooperative throughout.     Patient’s self-s Pt presents semi-reclined in bed. Agreeable to PT Session. Pt educated on goals of session. KI in place. Mobility as follows:  Supine to sit: min assist with L LE, utilizes bedrails.   Sit to supine: NT  Sit to stand :mod assist up x 2 to r/w with cueing f bed<>chair    Goal #5     Goal #6     Goal Comments: Goals established on 7/11/2021, all goals ongoing 7/12/21.

## 2021-07-12 NOTE — PROGRESS NOTES
Reviewed medication list with patient and daughter. Surgical dressing changed. New mepilexes applied to blisters to left abdomen/rightabdomen/left thigh. Report called to Kalie Jones at [facility]. Ambulance scheduled for 1530.  Patient and daughter in [place]

## 2021-07-12 NOTE — PLAN OF CARE
Pain controlled on PO medications. Left leg in immobilizer. Mepilex dressings to left and right lower abdomen are clean and dry. IS encouraged. Last BM 7/9, abdomen soft, will request laxative from MD. Plan of care discussed with patient who agrees.

## 2021-07-12 NOTE — PLAN OF CARE
Pt alert and oriented x4 primarily Pakistani speaking. RA//IS encouraged. VSS. IV SL. SCD to RLE/ankle pumps encouraged. CMS intact. Immobilizer on at all times. Pt has mepilex x2 on abdomen and x2 mepilex on L upper thigh from blister.  1800 ADA QID accu-

## 2021-07-12 NOTE — OCCUPATIONAL THERAPY NOTE
OCCUPATIONAL THERAPY TREATMENT NOTE - INPATIENT     Room Number: 351/351-A  Session: 1   Number of Visits to Meet Established Goals: 3    Presenting Problem: Hyperglycemia; L femoral leg Fx    History related to current admission: Patient is a 77year old angina pectoris    • Back pain    • Bad breath    • Belching    • Bilateral leg edema 3/16/2018   • Bloating    • Blurred vision    • Change in hair    • Constipation    • Diarrhea, unspecified    • Dizziness    • Easy bruising    • Fatigue    • Feeling lo reports improved pain control today. Patient self-stated goal is to get up and sit in chair.     OBJECTIVE  Precautions: Bed/chair alarm    WEIGHT BEARING RESTRICTION  Weight Bearing Restriction: L lower extremity           L Lower Extremity: Non-Weight right foot shoe to assist with height to help keep LLE off floor while standing. Patient verbalized understanding. Tolerated x 2 min in standing w/ min A to maintain NWB. Unable to take hands off walker to complete ADLs.     Patient End of Session: Up in

## 2021-07-12 NOTE — CM/SW NOTE
Met with pt and spoke with pt's dtr by speaker phone. Pt's dtr provided translation with pt's consent. Discussed referrals pending for [facility] and plan for DC today once accepting facility chosen by pt/family.   Pt's dtr stated she is unsure if she would pref

## 2021-07-12 NOTE — PROGRESS NOTES
[facility]  Progress Note     Eric Pierce Patient Status:  Observation    1955 MRN [id number]   [facility] 3SW-A Attending Surendra Mckeon, [address] Day # 0 PCP Jorge Alberto Stephens MD     Chief Complaint: Left hip pain    S: mg/dL). No results for input(s): PTP, INR in the last 168 hours.          COVID-19 Lab Results    COVID-19  Lab Results   Component Value Date    COVID19 Not Detected 07/06/2021    COVID19 Detected (A) 11/24/2020    COVID19 Not Detected 07/15/2020

## 2021-07-12 NOTE — PLAN OF CARE
Pt a/ox4. Pain rated moderate when assessed, controlled with PO oxycodone and PRN tramadol. Up to bedside with PT/OT today,  stood with max assist, continuing NWB status to LLE. Recommend stand pivot with 2 assist to chair or total lift for transfers.

## 2021-07-12 NOTE — CM/SW NOTE
Spoke with pt's dtr, Angel Madera who stated preference for [facility]. Discussed plan for DC today. Updated pt's RN who confirmed medical clearance for DC today.   Ambulance transport scheduled for 3:30pm.  PCS form completed and available for RN

## 2021-07-12 NOTE — CM/SW NOTE
Chart reviewed for DC planning. Referrals pending for [facility]. Pt/family preference for [facility]. Pt with accepting facilities on [address]. [facility] still reviewing.   Spoke with Guillermo Martin from [facility] who will contact [facility] [place] location to confirm

## 2021-07-13 ENCOUNTER — INITIAL APN SNF VISIT (OUTPATIENT)
Dept: INTERNAL MEDICINE CLINIC | Age: 66
End: 2021-07-13

## 2021-07-13 VITALS
OXYGEN SATURATION: 95 % | DIASTOLIC BLOOD PRESSURE: 38 MMHG | RESPIRATION RATE: 18 BRPM | HEART RATE: 84 BPM | SYSTOLIC BLOOD PRESSURE: 102 MMHG | BODY MASS INDEX: 27 KG/M2 | TEMPERATURE: 98 F | WEIGHT: 165 LBS

## 2021-07-13 DIAGNOSIS — Z98.890 STATUS POST HARDWARE REMOVAL: ICD-10-CM

## 2021-07-13 DIAGNOSIS — R26.89 IMPAIRED WEIGHT BEARING: Primary | ICD-10-CM

## 2021-07-13 DIAGNOSIS — Z87.81 HISTORY OF FRACTURE DUE TO FALL: ICD-10-CM

## 2021-07-13 DIAGNOSIS — Z76.0 PRESCRIPTION REFILL: ICD-10-CM

## 2021-07-13 DIAGNOSIS — Z79.899 MEDICATION MANAGEMENT: ICD-10-CM

## 2021-07-13 PROCEDURE — 99310 SBSQ NF CARE HIGH MDM 45: CPT | Performed by: NURSE PRACTITIONER

## 2021-07-13 PROCEDURE — 1111F DSCHRG MED/CURRENT MED MERGE: CPT | Performed by: NURSE PRACTITIONER

## 2021-07-14 ENCOUNTER — EXTERNAL FACILITY (OUTPATIENT)
Dept: FAMILY MEDICINE CLINIC | Facility: CLINIC | Age: 66
End: 2021-07-14

## 2021-07-14 VITALS
WEIGHT: 165 LBS | BODY MASS INDEX: 27 KG/M2 | SYSTOLIC BLOOD PRESSURE: 109 MMHG | OXYGEN SATURATION: 95 % | HEART RATE: 69 BPM | RESPIRATION RATE: 16 BRPM | DIASTOLIC BLOOD PRESSURE: 53 MMHG | TEMPERATURE: 97 F

## 2021-07-14 DIAGNOSIS — R53.81 PHYSICAL DECONDITIONING: Primary | ICD-10-CM

## 2021-07-14 DIAGNOSIS — I10 ESSENTIAL HYPERTENSION: ICD-10-CM

## 2021-07-14 DIAGNOSIS — E66.9 DIABETES MELLITUS TYPE 2 IN OBESE (HCC): ICD-10-CM

## 2021-07-14 DIAGNOSIS — E11.69 DIABETES MELLITUS TYPE 2 IN OBESE (HCC): ICD-10-CM

## 2021-07-14 DIAGNOSIS — S72.92XG CLOSED FRACTURE OF LEFT FEMUR WITH DELAYED HEALING, UNSPECIFIED FRACTURE MORPHOLOGY, UNSPECIFIED PORTION OF FEMUR, SUBSEQUENT ENCOUNTER: ICD-10-CM

## 2021-07-14 DIAGNOSIS — I25.10 CORONARY ARTERY DISEASE INVOLVING NATIVE CORONARY ARTERY OF NATIVE HEART WITHOUT ANGINA PECTORIS: ICD-10-CM

## 2021-07-14 DIAGNOSIS — Z87.81 S/P ORIF (OPEN REDUCTION INTERNAL FIXATION) FRACTURE: ICD-10-CM

## 2021-07-14 DIAGNOSIS — D64.9 POSTOPERATIVE ANEMIA: ICD-10-CM

## 2021-07-14 DIAGNOSIS — Z98.890 S/P ORIF (OPEN REDUCTION INTERNAL FIXATION) FRACTURE: ICD-10-CM

## 2021-07-14 PROCEDURE — 99306 1ST NF CARE HIGH MDM 50: CPT | Performed by: FAMILY MEDICINE

## 2021-07-14 NOTE — PROGRESS NOTES
Cjhenny Moraesly  : 1955  Age 77year old  female patient is admitted to Facility:  [facility],[address] for [address] [place] [address] date:  --7/10  Returned to the ED on  due to elevated blood glucose and pain  Discharge date to [facility]:  21 Abdominal hernia    • Abdominal pain    • Anemia    • Anesthesia complication     slow to arouse   • Anxiety    • Arthritis    • Atherosclerosis of coronary artery    • Atherosclerotic heart disease of native coronary artery without angina pectoris    • Ba ankle 2006     • TOTAL HIP REPLACEMENT      rt hip   • VAGINAL HYSTERECTOMY N/A 11/16/2017    Procedure: VAGINAL HYSTERECTOMY;  Surgeon: Keo López MD;  Location: [address] [place] [address] MAIN OR     Family History   Problem Relation Age of Onset   • Diabetes Brother    • skin every morning.  40 mL 1   • RAMIPRIL 2.5 MG Oral Cap TAKE 1 CAPSULE(2.5 MG) BY MOUTH EVERY DAY 90 capsule 0   • ALPRAZOLAM 0.5 MG Oral Tab TAKE 1 TABLET(0.5 MG) BY MOUTH DAILY AS NEEDED FOR ANXIETY OR SLEEP 30 tablet 0   • FLUTICASONE PROPIONATE 50 MCG reported. [facility] team/SW to assist with discharge planning. Ralph will need follow-up with Orthopedics, Dr. Con Meade. No further questions/concerns per patient or staff. Call light within reach.     REVIEW OF SYSTEMS:  GENERAL HEALTH:feels well otherwise  SKIN MAKING    DIAGNOSTICS REVIEWED AT THIS VISIT:    Lab Results   Component Value Date     (H) 07/11/2021    BUN 17 07/11/2021    BUNCREA 27.0 (H) 07/11/2021    CREATSERUM 0.63 07/11/2021    ANIONGAP 4 07/11/2021    GFR 56 (L) 09/02/2017    GFRNAA 94 0 participation with therapy as much as able  Xarelto 10mg po daily     Left Femoral Fracture r/t falling in [facility]; underwent external fixation 7/3, flew home on xarelto however presented to ED due to LLE pain.  Hardware removal.  Pain Management  Tylenol 1g

## 2021-07-14 NOTE — PROGRESS NOTES
Elio Cruz  : 1955  Age 77year old  female patient is admitted to [facility] for rehab and nursing.     Admitted to [facility]: 21      Chief complaint: left femur fracture  Hospital record:  Esvin Neris a 77year old fem Bloating    • Blurred vision    • Change in hair    • Constipation    • Diarrhea, unspecified    • Dizziness    • Easy bruising    • Fatigue    • Feeling lonely    • Flatulence/gas pain/belching    • Food intolerance    • Frequent use of laxatives    • Hea Social History    Tobacco Use      Smoking status: Never Smoker      Smokeless tobacco: Never Used    Vaping Use      Vaping Use: Never used    Alcohol use: No    Drug use: No      ALLERGIES:    Codeine                 NAUSEA ONLY  Vicodin [Hydrocodon 0   • Montelukast Sodium 10 MG Oral Tab Take 1 tablet (10 mg total) by mouth daily as needed. 90 tablet 3   • Metoprolol Succinate ER 25 MG Oral Tablet 24 Hr Take 1 tablet (25 mg total) by mouth daily.  90 tablet 1   • atorvastatin 10 MG Oral Tab Take 1 tab pink, moist, pharynx no exudate, no visible cerumen.   NECK: supple; FROM; no JVD, no TMG, no carotid bruits  BREAST: deferred  RESPIRATORY:clear to percussion and auscultation  CARDIOVASCULAR: S1, S2 normal, RRR; no S3, no S4;   ABDOMEN:  normal active BS+ Tanja Bosworth, DO  07/14/21   2:33 PM

## 2021-07-16 ENCOUNTER — SNF VISIT (OUTPATIENT)
Dept: INTERNAL MEDICINE CLINIC | Age: 66
End: 2021-07-16

## 2021-07-16 ENCOUNTER — MED REC SCAN ONLY (OUTPATIENT)
Dept: ORTHOPEDICS CLINIC | Facility: CLINIC | Age: 66
End: 2021-07-16

## 2021-07-16 DIAGNOSIS — Z74.1 REQUIRES ASSISTANCE WITH ACTIVITIES OF DAILY LIVING (ADL): Primary | ICD-10-CM

## 2021-07-16 DIAGNOSIS — Z98.890 STATUS POST HARDWARE REMOVAL: ICD-10-CM

## 2021-07-16 DIAGNOSIS — R26.89 IMPAIRED WEIGHT BEARING: ICD-10-CM

## 2021-07-16 DIAGNOSIS — Z87.81 HISTORY OF FRACTURE DUE TO FALL: ICD-10-CM

## 2021-07-16 PROCEDURE — 99307 SBSQ NF CARE SF MDM 10: CPT | Performed by: NURSE PRACTITIONER

## 2021-07-16 RX ORDER — METFORMIN HYDROCHLORIDE 500 MG/1
TABLET, EXTENDED RELEASE ORAL
Qty: 360 TABLET | Refills: 0 | OUTPATIENT
Start: 2021-07-16

## 2021-07-19 VITALS
SYSTOLIC BLOOD PRESSURE: 114 MMHG | OXYGEN SATURATION: 95 % | TEMPERATURE: 98 F | DIASTOLIC BLOOD PRESSURE: 57 MMHG | RESPIRATION RATE: 18 BRPM | HEART RATE: 82 BPM

## 2021-07-19 NOTE — DISCHARGE SUMMARY
[facility]  DISCHARGE SUMMARY     Keith Gruber Patient Status:  Observation    1955 MRN [id number]   [facility] 3SW-A Attending No att. providers found   Hosp Day # 0 PCP Austin Jain MD     Date of Admission: 7/10/2021  Date 0        CONTINUE taking these medications      Instructions Prescription details   Acetaminophen Extra Strength 500 MG Caps  Generic drug: Acetaminophen      Take 1,000 mg by mouth daily as needed.    Refills: 0     ALPRAZolam 0.5 MG Tabs  Commonly known a Quantity: 300 strip  Refills: 6     OxyCODONE HCl IR 10 MG Tabs  Commonly known as: ROXICODONE      Take 1 tablet (10 mg total) by mouth every 4 (four) hours as needed.    Quantity: 30 tablet  Refills: 0     ramipril 2.5 MG Caps  Commonly known as: ALTACE Regular rate and rhythm. No murmurs, rubs or gallops. Abdomen: Soft, nontender, nondistended. Positive bowel sounds. No rebound or guarding. Neurologic: No focal neurological deficits. Musculoskeletal: Moves all extremities. Extremities: No edema.

## 2021-07-19 NOTE — PROGRESS NOTES
Rosa Isela Moy, 39/1955, 77year old, female    Chief Complaint:  Patient presents with:   Follow - Up  Musculoskeletal Problem  Weakness  Pain  Medication Follow-Up     [facility] Admit date:  7/6--7/10  Returned to the ED on 7/11 due to elevated bl 114/57   Pulse 82   Temp 98.3 °F (36.8 °C)   Resp 18   LMP 09/01/2017 (LMP Unknown)   SpO2 95%     PHYSICAL EXAM:  GENERAL HEALTH: well developed, well nourished, in no apparent distress; up in chair  LINES, TUBES, DRAINS:  none  SKIN: no rashes, no suspic repositioning**  Follow-up with Orthopedics, Taurus Pollard, 7/21/21 @ 1300     HTN, HL  VS q shift  Cardiac diet  Metoprolol 25mg po daily   Ramipril 2.5mg po daily   Atorvastatin 10mg po nightly  Labs weekly in [facility]; more often if clinically indicated

## 2021-07-20 ENCOUNTER — TELEPHONE (OUTPATIENT)
Dept: ORTHOPEDICS CLINIC | Facility: CLINIC | Age: 66
End: 2021-07-20

## 2021-07-20 ENCOUNTER — SNF VISIT (OUTPATIENT)
Dept: INTERNAL MEDICINE CLINIC | Age: 66
End: 2021-07-20

## 2021-07-20 VITALS
SYSTOLIC BLOOD PRESSURE: 116 MMHG | RESPIRATION RATE: 18 BRPM | HEART RATE: 65 BPM | TEMPERATURE: 97 F | OXYGEN SATURATION: 95 % | DIASTOLIC BLOOD PRESSURE: 51 MMHG

## 2021-07-20 DIAGNOSIS — Z74.09 IMPAIRED MOBILITY AND ADLS: ICD-10-CM

## 2021-07-20 DIAGNOSIS — F34.1 DYSTHYMIC DISORDER: ICD-10-CM

## 2021-07-20 DIAGNOSIS — E78.2 MIXED HYPERLIPIDEMIA: ICD-10-CM

## 2021-07-20 DIAGNOSIS — Z87.81 S/P ORIF (OPEN REDUCTION INTERNAL FIXATION) FRACTURE: ICD-10-CM

## 2021-07-20 DIAGNOSIS — E11.49 DIABETES MELLITUS TYPE 2 WITH NEUROLOGICAL MANIFESTATIONS (HCC): ICD-10-CM

## 2021-07-20 DIAGNOSIS — Z87.81 HISTORY OF FRACTURE DUE TO FALL: Primary | ICD-10-CM

## 2021-07-20 DIAGNOSIS — Z78.9 IMPAIRED MOBILITY AND ADLS: ICD-10-CM

## 2021-07-20 DIAGNOSIS — I10 ESSENTIAL HYPERTENSION: ICD-10-CM

## 2021-07-20 DIAGNOSIS — M97.12XA PERIPROSTHETIC FRACTURE AROUND INTERNAL PROSTHETIC LEFT KNEE JOINT, INITIAL ENCOUNTER: Primary | ICD-10-CM

## 2021-07-20 DIAGNOSIS — Z98.890 S/P ORIF (OPEN REDUCTION INTERNAL FIXATION) FRACTURE: ICD-10-CM

## 2021-07-20 DIAGNOSIS — R53.81 PHYSICAL DECONDITIONING: ICD-10-CM

## 2021-07-20 PROCEDURE — 99309 SBSQ NF CARE MODERATE MDM 30: CPT | Performed by: NURSE PRACTITIONER

## 2021-07-20 NOTE — TELEPHONE ENCOUNTER
REMOVAL OF EXTERNAL FIXATURE LEFT LEG, OPEN REDUCATION INTERNAL FIXATION CONVALUTED PERIPROSTHETIC FEMUR FRACTURE (Left Leg Upper)       Patient is coming in tomorrow for PO LT LEG.   If applicable, please enter an Xray RX for and  PLEASE REPLY TO THIS MES

## 2021-07-21 ENCOUNTER — HOSPITAL ENCOUNTER (OUTPATIENT)
Dept: GENERAL RADIOLOGY | Age: 66
Discharge: HOME | End: 2021-07-21
Attending: PHYSICIAN ASSISTANT
Payer: COMMERCIAL

## 2021-07-21 ENCOUNTER — OFFICE VISIT (OUTPATIENT)
Dept: ORTHOPEDICS CLINIC | Facility: CLINIC | Age: 66
End: 2021-07-21
Payer: MEDICARE

## 2021-07-21 DIAGNOSIS — Z48.89 AFTERCARE FOLLOWING SURGERY: Primary | ICD-10-CM

## 2021-07-21 DIAGNOSIS — M97.12XD PERIPROSTHETIC FRACTURE AROUND INTERNAL PROSTHETIC LEFT KNEE JOINT, SUBSEQUENT ENCOUNTER: ICD-10-CM

## 2021-07-21 DIAGNOSIS — M97.12XA PERIPROSTHETIC FRACTURE AROUND INTERNAL PROSTHETIC LEFT KNEE JOINT, INITIAL ENCOUNTER: ICD-10-CM

## 2021-07-21 PROCEDURE — 73552 X-RAY EXAM OF FEMUR 2/>: CPT | Performed by: PHYSICIAN ASSISTANT

## 2021-07-21 PROCEDURE — 99212 OFFICE O/P EST SF 10 MIN: CPT | Performed by: PHYSICIAN ASSISTANT

## 2021-07-21 NOTE — PROGRESS NOTES
Rosa Isela Farzaneh, 39/1955, 77year old, female    Chief Complaint:  Patient presents with:   Follow - Up: Left femur fx s/p ORIF       Subjective:   PMH significant for Insomnia, Depression, Dysthymic d/o, Iron deficiency anemia, CAD s/p stent, HTN, HL, T2 establish care plan meeting with patient and POA/family as appropriate  · SIN team/ & discharge planner to assist with establishing safe discharge plan for next level of care     DVT Prophylaxis   · Encourage exercise and participation with th test negative on 7.6.21  · S/P Pfizer COVID vaccine: 1st dose=2.6; 2nd dose=2.27.21  · Monitor for s/sxs of illness  · Surveillance testing per facility protocol    Follow-ups after SIN discharge:   Follow-Up with PCP within 1-2 weeks following SIN discharg

## 2021-07-22 ENCOUNTER — SNF VISIT (OUTPATIENT)
Dept: INTERNAL MEDICINE CLINIC | Age: 66
End: 2021-07-22

## 2021-07-22 VITALS
DIASTOLIC BLOOD PRESSURE: 56 MMHG | RESPIRATION RATE: 18 BRPM | HEART RATE: 71 BPM | OXYGEN SATURATION: 97 % | SYSTOLIC BLOOD PRESSURE: 116 MMHG | TEMPERATURE: 98 F

## 2021-07-22 DIAGNOSIS — Z87.81 S/P ORIF (OPEN REDUCTION INTERNAL FIXATION) FRACTURE: ICD-10-CM

## 2021-07-22 DIAGNOSIS — Z87.81 HISTORY OF FRACTURE DUE TO FALL: Primary | ICD-10-CM

## 2021-07-22 DIAGNOSIS — Z78.9 IMPAIRED MOBILITY AND ADLS: ICD-10-CM

## 2021-07-22 DIAGNOSIS — Z98.890 S/P ORIF (OPEN REDUCTION INTERNAL FIXATION) FRACTURE: ICD-10-CM

## 2021-07-22 DIAGNOSIS — R53.81 PHYSICAL DECONDITIONING: ICD-10-CM

## 2021-07-22 DIAGNOSIS — Z74.09 IMPAIRED MOBILITY AND ADLS: ICD-10-CM

## 2021-07-22 PROCEDURE — 99308 SBSQ NF CARE LOW MDM 20: CPT | Performed by: NURSE PRACTITIONER

## 2021-07-22 RX ORDER — CALCIUM CARBONATE/VITAMIN D3 600 MG-10
2 TABLET ORAL
COMMUNITY
End: 2022-01-18

## 2021-07-23 NOTE — PROGRESS NOTES
Jay Brown, 39/1955, 77year old, female    Chief Complaint:  Patient presents with:   Follow - Up: Left femur fx s/p ORIF  Weakness       Subjective:   PMH significant for Insomnia, Depression, Dysthymic d/o, Iron deficiency anemia, CAD s/p stent, HT appropriate, pleasant       Medications reviewed: Yes    Diagnostics reviewed:   No new results available for review.      Assessment and plan:  Physical Deconditioning/Weakness; hx of falling  · Fall Precautions  · PT/OT/ST to evaluate and treat  · SIN tea mood     Seasonal allergies  · Flonase nasal spray daily   · Singulair 10mg po daily PRN     Bowel Management Regimen/Constipation   · Senna 8.6mg po BID prn  · Colace 100 mg BID  · Miralax 17gm po BID PRN  · Bisacodyl supp 10mg NM daily PRN     GI Prophyl

## 2021-07-27 ENCOUNTER — SNF VISIT (OUTPATIENT)
Dept: INTERNAL MEDICINE CLINIC | Age: 66
End: 2021-07-27

## 2021-07-27 VITALS
SYSTOLIC BLOOD PRESSURE: 107 MMHG | HEART RATE: 71 BPM | DIASTOLIC BLOOD PRESSURE: 48 MMHG | TEMPERATURE: 98 F | RESPIRATION RATE: 16 BRPM

## 2021-07-27 DIAGNOSIS — E11.49 DIABETES MELLITUS TYPE 2 WITH NEUROLOGICAL MANIFESTATIONS (HCC): ICD-10-CM

## 2021-07-27 DIAGNOSIS — Z87.81 S/P ORIF (OPEN REDUCTION INTERNAL FIXATION) FRACTURE: ICD-10-CM

## 2021-07-27 DIAGNOSIS — Z98.890 S/P ORIF (OPEN REDUCTION INTERNAL FIXATION) FRACTURE: ICD-10-CM

## 2021-07-27 DIAGNOSIS — Z87.81 HISTORY OF FRACTURE DUE TO FALL: Primary | ICD-10-CM

## 2021-07-27 PROCEDURE — 99309 SBSQ NF CARE MODERATE MDM 30: CPT | Performed by: NURSE PRACTITIONER

## 2021-07-27 NOTE — PROGRESS NOTES
Valentin Zaman, 39/1955, 77year old, female    Chief Complaint:  Patient presents with:   Follow - Up: Left femur fx s/p ORIF  Lab Results       Subjective:   PMH significant for Insomnia, Depression, Dysthymic d/o, Iron deficiency anemia, CAD s/p stent, 144  K  4.7    WBC  5.68  Hgb  12.0  Hematocrit  38.6    Assessment and plan:  Physical Deconditioning/Weakness; hx of falling  · Fall Precautions  · PT/OT/ST to evaluate and treat  · SIN team to establish care plan meeting with patient and POA/family as a 8.6mg po BID prn  · Colace 100 mg BID  · Miralax 17gm po BID PRN  · Bisacodyl supp 10mg OK daily PRN     GI Prophylaxis  · Monitor for sxs    DVT prophylaxis  · Xarelto 10 mg daily    COVID 19 Pandemic  · S/P COVID infection 11.24.20  · COVID test negative

## 2021-08-03 ENCOUNTER — SNF VISIT (OUTPATIENT)
Dept: INTERNAL MEDICINE CLINIC | Age: 66
End: 2021-08-03

## 2021-08-03 VITALS
HEART RATE: 63 BPM | RESPIRATION RATE: 16 BRPM | OXYGEN SATURATION: 96 % | TEMPERATURE: 97 F | SYSTOLIC BLOOD PRESSURE: 115 MMHG | DIASTOLIC BLOOD PRESSURE: 50 MMHG

## 2021-08-03 DIAGNOSIS — Z87.81 S/P ORIF (OPEN REDUCTION INTERNAL FIXATION) FRACTURE: ICD-10-CM

## 2021-08-03 DIAGNOSIS — Z98.890 S/P ORIF (OPEN REDUCTION INTERNAL FIXATION) FRACTURE: ICD-10-CM

## 2021-08-03 DIAGNOSIS — Z87.81 HISTORY OF FRACTURE DUE TO FALL: Primary | ICD-10-CM

## 2021-08-03 DIAGNOSIS — E11.49 DIABETES MELLITUS TYPE 2 WITH NEUROLOGICAL MANIFESTATIONS (HCC): ICD-10-CM

## 2021-08-03 PROCEDURE — 99308 SBSQ NF CARE LOW MDM 20: CPT | Performed by: NURSE PRACTITIONER

## 2021-08-03 NOTE — PROGRESS NOTES
Lottie Fierro, 39/1955, 77year old, female    Chief Complaint:  Patient presents with:   Follow - Up: Left femur fx s/p ORIF  Lab Results       Subjective:   PMH significant for Insomnia, Depression, Dysthymic d/o, Iron deficiency anemia, CAD s/p stent, <140. 8.3. Saroj Thomas 21  Glucose  76  BUN  17  Cr  0.74  GFR >60  Na  143  K  4.7    WBC  5.88  Hgb  12.4  Hematocrit  38.8    Assessment and plan:  Physical Deconditioning/Weakness; hx of falling  · Fall Precautions  · PT/OT/ST to evaluate and treat  · SIN team daily PRN     Bowel Management Regimen/Constipation   · Senna 8.6mg po BID prn  · Colace 100 mg BID  · Miralax 17gm po BID PRN  · Bisacodyl supp 10mg KS daily PRN     GI Prophylaxis  · Monitor for sxs    DVT prophylaxis  · Xarelto 10 mg daily    COVID 19 P

## 2021-08-04 ENCOUNTER — OFFICE VISIT (OUTPATIENT)
Dept: ORTHOPEDICS CLINIC | Facility: CLINIC | Age: 66
End: 2021-08-04
Payer: MEDICARE

## 2021-08-04 ENCOUNTER — HOSPITAL ENCOUNTER (OUTPATIENT)
Dept: GENERAL RADIOLOGY | Age: 66
Discharge: HOME | End: 2021-08-04
Attending: PHYSICIAN ASSISTANT
Payer: COMMERCIAL

## 2021-08-04 VITALS — BODY MASS INDEX: 27.32 KG/M2 | WEIGHT: 170 LBS | HEIGHT: 66 IN

## 2021-08-04 DIAGNOSIS — M97.12XD PERIPROSTHETIC FRACTURE AROUND INTERNAL PROSTHETIC LEFT KNEE JOINT, SUBSEQUENT ENCOUNTER: ICD-10-CM

## 2021-08-04 DIAGNOSIS — Z48.89 AFTERCARE FOLLOWING SURGERY: Primary | ICD-10-CM

## 2021-08-04 DIAGNOSIS — Z48.89 AFTERCARE FOLLOWING SURGERY: ICD-10-CM

## 2021-08-04 PROCEDURE — 73552 X-RAY EXAM OF FEMUR 2/>: CPT | Performed by: PHYSICIAN ASSISTANT

## 2021-08-04 PROCEDURE — 99024 POSTOP FOLLOW-UP VISIT: CPT | Performed by: PHYSICIAN ASSISTANT

## 2021-08-04 NOTE — PROGRESS NOTES
[facility] Post Op Progress Note      Date of Surgery: 07/07/2021      Subjective: Rachelle Collado is a 77year old female who is here for reevaluation of her left leg.   She underwent open reduction internal fixation and removal of external fixator by Dr. Juanita Mcallister all the comminution and will continue to be careful. We do recommend continuation of subacute rehab until her weight bearing status increases. She will follow-up in 4 weeks at which time we will get repeat x-rays of the left femur.   She will follow-up so

## 2021-08-05 ENCOUNTER — SNF VISIT (OUTPATIENT)
Dept: INTERNAL MEDICINE CLINIC | Age: 66
End: 2021-08-05

## 2021-08-05 VITALS
SYSTOLIC BLOOD PRESSURE: 107 MMHG | HEART RATE: 66 BPM | DIASTOLIC BLOOD PRESSURE: 42 MMHG | TEMPERATURE: 97 F | RESPIRATION RATE: 19 BRPM | OXYGEN SATURATION: 97 %

## 2021-08-05 DIAGNOSIS — Z98.890 S/P ORIF (OPEN REDUCTION INTERNAL FIXATION) FRACTURE: ICD-10-CM

## 2021-08-05 DIAGNOSIS — Z87.81 HISTORY OF FRACTURE DUE TO FALL: Primary | ICD-10-CM

## 2021-08-05 DIAGNOSIS — Z78.9 IMPAIRED MOBILITY AND ADLS: ICD-10-CM

## 2021-08-05 DIAGNOSIS — Z74.09 IMPAIRED MOBILITY AND ADLS: ICD-10-CM

## 2021-08-05 DIAGNOSIS — R53.81 PHYSICAL DECONDITIONING: ICD-10-CM

## 2021-08-05 DIAGNOSIS — Z87.81 S/P ORIF (OPEN REDUCTION INTERNAL FIXATION) FRACTURE: ICD-10-CM

## 2021-08-05 PROCEDURE — 99308 SBSQ NF CARE LOW MDM 20: CPT | Performed by: NURSE PRACTITIONER

## 2021-08-05 NOTE — PROGRESS NOTES
Maya Chris, 39/1955, 77year old, female    Chief Complaint:  Patient presents with:   Follow - Up: Left femur fx s/p ORIF  Weakness       Subjective:   PMH significant for Insomnia, Depression, Dysthymic d/o, Iron deficiency anemia, CAD s/p stent, HT affect appropriate, pleasant     Medications reviewed: Yes    Diagnostics reviewed:    No new results available for review.     Assessment and plan:  Physical Deconditioning/Weakness; hx of falling  · Fall Precautions  · PT/OT/ST to evaluate and treat  · SA mood     Seasonal allergies  · Flonase nasal spray daily   · Singulair 10mg po daily PRN     Bowel Management Regimen/Constipation   · Senna 8.6mg po BID prn  · Colace 100 mg BID  · Miralax 17gm po BID PRN  · Bisacodyl supp 10mg CT daily PRN     GI Prophyl

## 2021-08-10 ENCOUNTER — SNF VISIT (OUTPATIENT)
Dept: INTERNAL MEDICINE CLINIC | Age: 66
End: 2021-08-10

## 2021-08-10 VITALS
OXYGEN SATURATION: 96 % | DIASTOLIC BLOOD PRESSURE: 43 MMHG | SYSTOLIC BLOOD PRESSURE: 115 MMHG | HEART RATE: 66 BPM | RESPIRATION RATE: 18 BRPM | TEMPERATURE: 98 F

## 2021-08-10 DIAGNOSIS — Z98.890 S/P ORIF (OPEN REDUCTION INTERNAL FIXATION) FRACTURE: ICD-10-CM

## 2021-08-10 DIAGNOSIS — R30.0 DYSURIA: ICD-10-CM

## 2021-08-10 DIAGNOSIS — Z87.81 HISTORY OF FRACTURE DUE TO FALL: Primary | ICD-10-CM

## 2021-08-10 DIAGNOSIS — Z87.81 S/P ORIF (OPEN REDUCTION INTERNAL FIXATION) FRACTURE: ICD-10-CM

## 2021-08-10 DIAGNOSIS — E11.49 DIABETES MELLITUS TYPE 2 WITH NEUROLOGICAL MANIFESTATIONS (HCC): ICD-10-CM

## 2021-08-10 PROCEDURE — 99309 SBSQ NF CARE MODERATE MDM 30: CPT | Performed by: NURSE PRACTITIONER

## 2021-08-10 RX ORDER — LIDOCAINE 4 G/G
1 PATCH TOPICAL EVERY 24 HOURS
COMMUNITY

## 2021-08-10 RX ORDER — LISINOPRIL 5 MG/1
5 TABLET ORAL DAILY
COMMUNITY
End: 2021-08-23

## 2021-08-10 NOTE — PROGRESS NOTES
Fam Situ, 39/1955, 77year old, female    Chief Complaint:  Patient presents with:   Follow - Up: Left femur fx s/p ORIF  Dysuria  Hyperglycemia       Subjective:   PMH significant for Insomnia, Depression, Dysthymic d/o, Iron deficiency anemia, CAD reviewed:       Accu check readings reviewed:  Fasting range 125 to 147; PP range 133 to 255    Assessment and plan:  Physical Deconditioning/Weakness; hx of falling  · Fall Precautions  · PT/OT/ST to evaluate and treat  · SIN team to establish care plan me post-SIN     Depression, Anxiety  · Alprazolam 0.5mg po daily PRN anxiety  · Zoloft 50 mg q HS  · Monitor mood     Seasonal allergies  · Flonase nasal spray daily   · Singulair 10mg po daily PRN     Bowel Management Regimen/Constipation   · Senna 8.6mg po

## 2021-08-12 ENCOUNTER — SNF DISCHARGE (OUTPATIENT)
Dept: INTERNAL MEDICINE CLINIC | Age: 66
End: 2021-08-12

## 2021-08-12 VITALS
DIASTOLIC BLOOD PRESSURE: 55 MMHG | SYSTOLIC BLOOD PRESSURE: 126 MMHG | OXYGEN SATURATION: 96 % | BODY MASS INDEX: 30 KG/M2 | TEMPERATURE: 96 F | RESPIRATION RATE: 18 BRPM | HEART RATE: 69 BPM | WEIGHT: 183.63 LBS

## 2021-08-12 DIAGNOSIS — E78.2 MIXED HYPERLIPIDEMIA: ICD-10-CM

## 2021-08-12 DIAGNOSIS — Z78.9 IMPAIRED MOBILITY AND ADLS: ICD-10-CM

## 2021-08-12 DIAGNOSIS — Z87.81 HISTORY OF FRACTURE DUE TO FALL: Primary | ICD-10-CM

## 2021-08-12 DIAGNOSIS — Z98.890 S/P ORIF (OPEN REDUCTION INTERNAL FIXATION) FRACTURE: ICD-10-CM

## 2021-08-12 DIAGNOSIS — Z87.81 S/P ORIF (OPEN REDUCTION INTERNAL FIXATION) FRACTURE: ICD-10-CM

## 2021-08-12 DIAGNOSIS — F41.8 DEPRESSION WITH ANXIETY: ICD-10-CM

## 2021-08-12 DIAGNOSIS — Z74.09 IMPAIRED MOBILITY AND ADLS: ICD-10-CM

## 2021-08-12 DIAGNOSIS — E11.49 DIABETES MELLITUS TYPE 2 WITH NEUROLOGICAL MANIFESTATIONS (HCC): ICD-10-CM

## 2021-08-12 DIAGNOSIS — I10 ESSENTIAL HYPERTENSION: ICD-10-CM

## 2021-08-12 DIAGNOSIS — R53.81 PHYSICAL DECONDITIONING: ICD-10-CM

## 2021-08-12 DIAGNOSIS — B37.49 CANDIDURIA: ICD-10-CM

## 2021-08-12 PROCEDURE — 99316 NF DSCHRG MGMT 30 MIN+: CPT | Performed by: NURSE PRACTITIONER

## 2021-08-13 NOTE — PROGRESS NOTES
Chaz Romero, 39/1955, 77year old, female is being discharged from Facility: [address] [facility] SUMMARY    Date of Admission:  7.12.21    Date of Discharge:  Anticipated on 8.13.21                              Admitting Diagnoses:  1.  Ph wheezing/cough/accessory muscle use; on room air  CARDIOVASCULAR: S1, S2 normal, RRR; no S3, no S4;   ABDOMEN: active BS+, soft, nondistended; no visible masses; nontender, no guarding  : Deferred  MUSCULOSKELETAL: left femur fx; knee immobilizer to LLE; SEMI-QUANT NEGATIVE NEGATIVE -  NEGATIVE  Final  UROBILINOGEN, SEMI-QT 1.0 EU/dL 0.2 - 1.0 Final  LEUKOCYTES, SEMI-QT NEGATIVE NEGATIVE -  TRACE  Final  [facility], [address],  [place], [facility] [place]  Harleen Echeverria M.D., Ace Presume, 4 wks     Osteoporosis  · Fall from standing height resulting in fx  · Calcium plus vitamin D 600mg/400u; 2 tabs daily w/ dinner     Candiduria  · Diflucan 150 mg po x 1 dose STAT    Anemia, post-op  · Hgb=9.5-->10.0-->11.0-->12.0-->12.

## 2021-08-16 ENCOUNTER — TELEPHONE (OUTPATIENT)
Dept: INTERNAL MEDICINE CLINIC | Facility: CLINIC | Age: 66
End: 2021-08-16

## 2021-08-16 ENCOUNTER — PATIENT OUTREACH (OUTPATIENT)
Dept: CASE MANAGEMENT | Age: 66
End: 2021-08-16

## 2021-08-16 DIAGNOSIS — Z02.9 ENCOUNTERS FOR UNSPECIFIED ADMINISTRATIVE PURPOSE: ICD-10-CM

## 2021-08-16 NOTE — TELEPHONE ENCOUNTER
Patricia Valle from [facility] calling to notify AD there will be a delay in starting home health until 8/18/21 due to language barrier with patient and needing to get .

## 2021-08-17 ENCOUNTER — TELEMEDICINE (OUTPATIENT)
Dept: INTERNAL MEDICINE CLINIC | Facility: CLINIC | Age: 66
End: 2021-08-17

## 2021-08-17 DIAGNOSIS — E66.9 DIABETES MELLITUS TYPE 2 IN OBESE (HCC): ICD-10-CM

## 2021-08-17 DIAGNOSIS — R52 PAIN: ICD-10-CM

## 2021-08-17 DIAGNOSIS — K59.00 CONSTIPATION, UNSPECIFIED CONSTIPATION TYPE: ICD-10-CM

## 2021-08-17 DIAGNOSIS — Z87.81 S/P ORIF (OPEN REDUCTION INTERNAL FIXATION) FRACTURE: ICD-10-CM

## 2021-08-17 DIAGNOSIS — E11.69 DIABETES MELLITUS TYPE 2 IN OBESE (HCC): ICD-10-CM

## 2021-08-17 DIAGNOSIS — I10 ESSENTIAL HYPERTENSION: ICD-10-CM

## 2021-08-17 DIAGNOSIS — Z98.890 S/P ORIF (OPEN REDUCTION INTERNAL FIXATION) FRACTURE: ICD-10-CM

## 2021-08-17 DIAGNOSIS — S72.92XD CLOSED FRACTURE OF LEFT FEMUR WITH ROUTINE HEALING, UNSPECIFIED FRACTURE MORPHOLOGY, UNSPECIFIED PORTION OF FEMUR, SUBSEQUENT ENCOUNTER: Primary | ICD-10-CM
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
## 2021-08-17 PROCEDURE — 99495 TRANSJ CARE MGMT MOD F2F 14D: CPT | Performed by: NURSE PRACTITIONER

## 2021-08-17 RX ORDER — ASPIRIN 81 MG/1
81 TABLET, CHEWABLE ORAL DAILY
COMMUNITY

## 2021-08-17 RX ORDER — DOCUSATE SODIUM 100 MG/1
100 CAPSULE, LIQUID FILLED ORAL 2 TIMES DAILY PRN
Qty: 30 CAPSULE | Refills: 0 | Status: SHIPPED | OUTPATIENT
Start: 2021-08-17

## 2021-08-17 NOTE — PROGRESS NOTES
TRANSITIONAL CARE CLINIC PHARMACIST MEDICATION RECONCILIATION        Imelda Lee MRN [id number]    1955 PCP Nathaniel Cross MD       Comments: Medication history completed by the [address] [place] [address] Pharmacist with the patient and daughter (Al total) by mouth daily as needed.      Medication Adherence Assessment:   Patient reports her pain in the morning is about a 6-7, but after she takes the Oxycodone it goes down to a 3-4 and she is able to take Acetaminophen during the rest of the day to [facility]

## 2021-08-17 NOTE — PROGRESS NOTES
Video Visit  [address]      Please note that the following visit was completed using two-way, real-time interactive audio and video communication.   This has been done in good bee to provide continuity of care in the best exam. Patient is Japanese speaking and requested her daughter to translate for her. She reports feeling \"good\" since discharge from [place] [address].  She is sitting up in recliner at time of exam. She is 10% WB to LLE, with immobilizer present from almost hip to almos patch onto the skin daily.  Apply to left distal thigh, Disp: , Rfl:   Calcium Carbonate-Vitamin D 600-400 MG-UNIT Oral Tab, Take 2 tablets by mouth After dinner., Disp: , Rfl:   Sertraline HCl 50 MG Oral Tab, , Disp: , Rfl:   METOPROLOL SUCCINATE 25 MG Ora DAILY, Disp: 48 g, Rfl: 0  Montelukast Sodium 10 MG Oral Tab, Take 1 tablet (10 mg total) by mouth daily as needed. , Disp: 90 tablet, Rfl: 3  atorvastatin 10 MG Oral Tab, Take 1 tablet (10 mg total) by mouth daily. , Disp: 90 tablet, Rfl: 3  Acetaminophen ( unspecified type diabetes mellitus without mention of complication, not stated as uncontrolled    • Uncomfortable fullness after meals    • Venous insufficiency of both lower extremities 3/16/2018   • Visual impairment     glasses   • Vomiting    • Weight BUN 17 07/11/2021 12:28 AM    CREATSERUM 0.63 07/11/2021 12:28 AM    CA 8.2 (L) 07/11/2021 12:28 AM    MG 1.7 05/05/2018 08:28 AM    ALB 1.9 (L) 07/11/2021 12:28 AM    ALT 12 (L) 07/11/2021 12:28 AM    AST 26 07/11/2021 12:28 AM    INR 1.10 04/21/2020 1 ORIF  · Maintain fall precautions  · Use assistive devices-walker and w/c  · Assist with all activities  · 10% WB LLE with immobilizer in place  · Incision to left hip ALEJANDRINA and healing-no s/s of infection  · H/H RN for medical management  · H/H PT/OT to inc times a day as needed          Dispense:  30 capsule          Refill:  0          Medications & Refills for this Visit:  aspirin 81 MG Oral Chew Tab, Chew 81 mg by mouth daily. , Disp: , Rfl:   docusate sodium 100 MG Oral Cap, Take 1 capsule (100 mg total) THREE TIMES DAILY, Disp: 300 strip, Rfl: 6      Requested Prescriptions     Signed Prescriptions Disp Refills   • docusate sodium 100 MG Oral Cap 30 capsule 0     Sig: Take 1 capsule (100 mg total) by mouth 2 (two) times daily as needed for constipation.  Samehubera Marrow Diagnoses and/or Management Options: moderate  · Amount and/or Complexity of Data to Be Reviewed: moderate  · Risk of Significant Complications, Morbidity, and/or Mortality: moderate    Overall Risk:   moderate    Patient seen within 7 days from [facility]

## 2021-08-19 ENCOUNTER — TELEPHONE (OUTPATIENT)
Dept: INTERNAL MEDICINE CLINIC | Facility: CLINIC | Age: 66
End: 2021-08-19

## 2021-08-19 NOTE — TELEPHONE ENCOUNTER
Morales Law from [facility] calling; she did an admissions on patient today and will be seeing her twice a week for 2 weeks then 1x per week for 2 weeks. Requesting OT to start next week. Wants a triage nurse to call back to go over patient's Medications.     Pl

## 2021-08-19 NOTE — TELEPHONE ENCOUNTER
LOV 6/1/21    Please advise on orders. Can fax med list, please review and advise if all look correct.

## 2021-08-20 NOTE — TELEPHONE ENCOUNTER
LMTCB for fax number to fax medication list.     AD ok for orders as previously mentioned in message?

## 2021-08-23 RX ORDER — RAMIPRIL 2.5 MG/1
2.5 CAPSULE ORAL DAILY
Qty: 90 CAPSULE | Refills: 1 | Status: SHIPPED | OUTPATIENT
Start: 2021-08-23 | End: 2022-02-17

## 2021-08-23 NOTE — TELEPHONE ENCOUNTER
Last VISIT 6/1/21 AD Ankle pain     Last CPE 12/26/21 AD CPE    Last REFILL 6/1/21 Metformin 360T 3R, 5/20/21 Ramipril 90T 0R, (discontinued 7/11/21)    Last LABS  7/11/21 CBC, CMP 5/29/21 Lipid, HgA1c, MA/Creat    Future Appointments   Date Time Provider Margoth Jordana   9/1/2021  1:30 PM Vicente MURPHY PA-C [facility]   9/14/2021  5:20 PM Kiel Azevedo MD [facility]         Per PROTOCOL? Diabetic protocol failed, RX too soon to refill   HTN protocol passed, Ramipril discontinued 7/11/21 route to provider    Please Approve or Deny.

## 2021-08-24 ENCOUNTER — TELEPHONE (OUTPATIENT)
Dept: INTERNAL MEDICINE CLINIC | Facility: CLINIC | Age: 66
End: 2021-08-24

## 2021-08-24 RX ORDER — FLUTICASONE PROPIONATE 50 MCG
SPRAY, SUSPENSION (ML) NASAL
Qty: 48 G | Refills: 0 | Status: SHIPPED | OUTPATIENT
Start: 2021-08-24 | End: 2021-11-19

## 2021-08-24 NOTE — TELEPHONE ENCOUNTER
Paperwork was received from [facility] and placed on Dr Rere washington for review and signature.

## 2021-08-24 NOTE — TELEPHONE ENCOUNTER
Signed paperwork was faxed to [facility] fax#[phone]. Confirmation of receipt was received and paperwork was sent to scan.

## 2021-08-25 ENCOUNTER — TELEPHONE (OUTPATIENT)
Dept: INTERNAL MEDICINE CLINIC | Facility: CLINIC | Age: 66
End: 2021-08-25

## 2021-08-25 NOTE — TELEPHONE ENCOUNTER
[facility] will see patient for 1x a week for 5 weeks-will see Ortho next week

## 2021-08-26 ENCOUNTER — TELEPHONE (OUTPATIENT)
Dept: INTERNAL MEDICINE CLINIC | Facility: CLINIC | Age: 66
End: 2021-08-26

## 2021-08-26 NOTE — TELEPHONE ENCOUNTER
Paperwork from [facility] was received and placed on Dr Rere washington for review and signature.

## 2021-08-30 RX ORDER — GLIPIZIDE 5 MG/1
5 TABLET, FILM COATED, EXTENDED RELEASE ORAL DAILY
Qty: 90 TABLET | Refills: 3 | Status: SHIPPED | OUTPATIENT
Start: 2021-08-30 | End: 2022-07-05

## 2021-08-30 NOTE — TELEPHONE ENCOUNTER
Last VISIT 06/01/21    Last CPE 12/26/20    Last REFILL 06/02/21 qty 90 w/0 refills    Last LABS 07/11/21 CBC, CMP done    Future [facility]   9/14/2021  5:20 PM Kiel Azevedo MD [facility]         Per PROTOCOL? Failed       Please Approve or Deny.

## 2021-08-31 NOTE — TELEPHONE ENCOUNTER
Signed paperwork was faxed to [facility] fax#[phone]. Confirmation of receipt was received and paperwork was sent to scan.

## 2021-09-01 ENCOUNTER — HOSPITAL ENCOUNTER (OUTPATIENT)
Dept: GENERAL RADIOLOGY | Age: 66
Discharge: HOME | End: 2021-09-01
Attending: PHYSICIAN ASSISTANT
Payer: MEDICARE

## 2021-09-01 ENCOUNTER — OFFICE VISIT (OUTPATIENT)
Dept: ORTHOPEDICS CLINIC | Facility: CLINIC | Age: 66
End: 2021-09-01
Payer: MEDICARE

## 2021-09-01 VITALS — BODY MASS INDEX: 27.32 KG/M2 | WEIGHT: 170 LBS | HEIGHT: 66 IN

## 2021-09-01 DIAGNOSIS — Z48.89 AFTERCARE FOLLOWING SURGERY: Primary | ICD-10-CM

## 2021-09-01 DIAGNOSIS — Z48.89 AFTERCARE FOLLOWING SURGERY: ICD-10-CM

## 2021-09-01 DIAGNOSIS — M97.12XD PERIPROSTHETIC FRACTURE AROUND INTERNAL PROSTHETIC LEFT KNEE JOINT, SUBSEQUENT ENCOUNTER: ICD-10-CM

## 2021-09-01 PROCEDURE — 99024 POSTOP FOLLOW-UP VISIT: CPT | Performed by: PHYSICIAN ASSISTANT

## 2021-09-01 PROCEDURE — 73552 X-RAY EXAM OF FEMUR 2/>: CPT | Performed by: PHYSICIAN ASSISTANT

## 2021-09-01 RX ORDER — HYDROCODONE BITARTRATE AND ACETAMINOPHEN 5; 325 MG/1; MG/1
1 TABLET ORAL
Qty: 20 TABLET | Refills: 0 | Status: SHIPPED | OUTPATIENT
Start: 2021-09-01

## 2021-09-01 NOTE — PROGRESS NOTES
[facility] Orth[facility] Post Op Progress Note    Date of Surgery: 07/07/2021      Subjective: Ilah Koyanagi is a 77year old female who is here approximately 7 weeks status post open reduction internal fixation left periprosthetic fracture.   She has been discharged ap If the home health does not come twice a week they will go to outpatient physical therapy. She will follow-up in 4 weeks for reevaluation and to see how she is progressing with goals of 90 degrees of flexion of the left knee.   She will get x-rays prior t

## 2021-09-02 ENCOUNTER — TELEPHONE (OUTPATIENT)
Dept: INTERNAL MEDICINE CLINIC | Facility: CLINIC | Age: 66
End: 2021-09-02

## 2021-09-02 NOTE — TELEPHONE ENCOUNTER
Paperwork ws received from [facility] and place on Dr Rere washington for review and signature.

## 2021-09-02 NOTE — TELEPHONE ENCOUNTER
Signed paperwork was faxed to [facility] fax#[phone]. Confirmation of receipt was received and paperwork was sent to scan.

## 2021-09-03 ENCOUNTER — OFFICE VISIT (OUTPATIENT)
Dept: FAMILY MEDICINE CLINIC | Facility: CLINIC | Age: 66
End: 2021-09-03
Payer: MEDICARE

## 2021-09-03 ENCOUNTER — TELEPHONE (OUTPATIENT)
Dept: INTERNAL MEDICINE CLINIC | Facility: CLINIC | Age: 66
End: 2021-09-03

## 2021-09-03 VITALS
HEART RATE: 85 BPM | RESPIRATION RATE: 18 BRPM | OXYGEN SATURATION: 99 % | WEIGHT: 170 LBS | BODY MASS INDEX: 27.32 KG/M2 | SYSTOLIC BLOOD PRESSURE: 112 MMHG | HEIGHT: 66 IN | DIASTOLIC BLOOD PRESSURE: 60 MMHG | TEMPERATURE: 98 F

## 2021-09-03 DIAGNOSIS — H66.002 ACUTE SUPPURATIVE OTITIS MEDIA OF LEFT EAR WITHOUT SPONTANEOUS RUPTURE OF TYMPANIC MEMBRANE, RECURRENCE NOT SPECIFIED: Primary | ICD-10-CM

## 2021-09-03 PROCEDURE — 99213 OFFICE O/P EST LOW 20 MIN: CPT | Performed by: PHYSICIAN ASSISTANT

## 2021-09-03 RX ORDER — EMPAGLIFLOZIN 25 MG/1
1 TABLET, FILM COATED ORAL DAILY
Qty: 90 TABLET | Refills: 1 | Status: SHIPPED | OUTPATIENT
Start: 2021-09-03 | End: 2022-05-23

## 2021-09-03 RX ORDER — AMOXICILLIN 500 MG/1
500 CAPSULE ORAL 3 TIMES DAILY
Qty: 30 CAPSULE | Refills: 0 | Status: SHIPPED | OUTPATIENT
Start: 2021-09-03 | End: 2021-09-13

## 2021-09-03 NOTE — TELEPHONE ENCOUNTER
Waqar notified for patient to be seen in [facility]- he will advise the daughter these recommendations. Patient has left ear pain and HA. Advised to be seen today for evaluation. BASIA ANAYA

## 2021-09-03 NOTE — TELEPHONE ENCOUNTER
Last VISIT 06/01/21    Last CPE 12/26/20    Last REFILL 06/07/21 qty 90 w/0 refills    Last LABS 07/11/21 CBC, CMP done    Future Appointments   Date Time Provider Margoth Silveira   9/14/2021  5:20 PM Jhony Paulino MD [facility]         Per PROTOCOL? Failed       Please Approve or Deny.

## 2021-09-03 NOTE — TELEPHONE ENCOUNTER
Waqar GUERRA [facility] called and stated that he was with the pt now. Pt stated last week that she was having consistent HA that were not extreme just there all the time. At the beginning of this week she stated that they were gone. As of today pt states the HA are back and now she has LT ear pain for the last 2 days.  Pt has been taking Tylenol 500mg 1x daily for pain and it has not helped      Please advise

## 2021-09-03 NOTE — PROGRESS NOTES
CHIEF COMPLAINT:   Patient presents with:  Ear Pain      HPI:   Kadi Pryor is a 77year old female who presents to clinic today with complaints of left ear pain for one week. Associated symptoms:  Patient denies decreased hearing.  Patient Margarita Elias needed for constipation. Take 1 capsule 1 or 2 times a day as needed 30 capsule 0   • lidocaine 4 % External Patch Place 1 patch onto the skin daily.  Apply to left distal thigh (Patient not taking: Reported on 8/17/2021 )     • Calcium Carbonate-Vitamin D • Frequent use of laxatives    • Hearing loss    • High blood pressure    • High cholesterol    • History of depression    • History of eating disorder    • Indigestion    • Irregular bowel habits    • Itch of skin    • Leg swelling    • Loss of appetite retraction,++ effusion, bony landmarks normal.  NOSE: nostrils patent, no nasal discharge, nasal mucosa normal  THROAT: oral mucosa pink, moist. Posterior pharynx is not erythematous. No exudates.   Uvula midline  NECK: supple, non-tender  LUNGS: clear to a común. [place] ocurre cuando la congestión bloquea el pasaje interno (la “trompa de [place]) que drena el fluido del [place]. Cuando el oído medio se llena de fluido, pueden crecer las bacterias y provocar mateus infección.  Esta enfermedad se trata con ant agreement with treatment plan.

## 2021-09-03 NOTE — PATIENT INSTRUCTIONS
1. Amoxicillin  2. Tylenol  3. Follow up with PCP  4. If worsening symptoms seek treatment        Infección del oído medio (adulto)  Tiene mateus infección del oído medio (el espacio detrás del [facility]).  También se conoce howard otitis media aguda (AOM, por s ºF (38 ºC), o según le indiquen   · Convulsiones  © 2422-7179 [employer] Todos los derechos reservados. Esta información no pretende sustituir la atención médica profesional. Sólo velasquez médico puede diagnosticar y tratar un problema de griffin.

## 2021-09-11 ENCOUNTER — LAB ENCOUNTER (OUTPATIENT)
Dept: LAB | Age: 66
End: 2021-09-11
Attending: INTERNAL MEDICINE
Payer: MEDICARE

## 2021-09-11 DIAGNOSIS — R79.89 ELEVATED SERUM CREATININE: ICD-10-CM

## 2021-09-11 DIAGNOSIS — R74.8 ELEVATED ALKALINE PHOSPHATASE LEVEL: ICD-10-CM

## 2021-09-11 LAB
ALBUMIN SERPL-MCNC: 4 G/DL (ref 3.4–5)
ALBUMIN/GLOB SERPL: 1 {RATIO} (ref 1–2)
ALP LIVER SERPL-CCNC: 160 U/L
ALT SERPL-CCNC: 17 U/L
ANION GAP SERPL CALC-SCNC: 6 MMOL/L (ref 0–18)
AST SERPL-CCNC: 15 U/L (ref 15–37)
BILIRUB SERPL-MCNC: 0.3 MG/DL (ref 0.1–2)
BUN BLD-MCNC: 18 MG/DL (ref 7–18)
CALCIUM BLD-MCNC: 9.4 MG/DL (ref 8.5–10.1)
CHLORIDE SERPL-SCNC: 106 MMOL/L (ref 98–112)
CO2 SERPL-SCNC: 26 MMOL/L (ref 21–32)
CREAT BLD-MCNC: 0.82 MG/DL
GLOBULIN PLAS-MCNC: 3.9 G/DL (ref 2.8–4.4)
GLUCOSE BLD-MCNC: 126 MG/DL (ref 70–99)
M PROTEIN MFR SERPL ELPH: 7.9 G/DL (ref 6.4–8.2)
OSMOLALITY SERPL CALC.SUM OF ELEC: 289 MOSM/KG (ref 275–295)
PATIENT FASTING Y/N/NP: YES
POTASSIUM SERPL-SCNC: 4.3 MMOL/L (ref 3.5–5.1)
SODIUM SERPL-SCNC: 138 MMOL/L (ref 136–145)

## 2021-09-11 PROCEDURE — 36415 COLL VENOUS BLD VENIPUNCTURE: CPT

## 2021-09-11 PROCEDURE — 84075 ASSAY ALKALINE PHOSPHATASE: CPT

## 2021-09-11 PROCEDURE — 80053 COMPREHEN METABOLIC PANEL: CPT

## 2021-09-11 PROCEDURE — 84080 ASSAY ALKALINE PHOSPHATASES: CPT

## 2021-09-14 ENCOUNTER — OFFICE VISIT (OUTPATIENT)
Dept: INTERNAL MEDICINE CLINIC | Facility: CLINIC | Age: 66
End: 2021-09-14
Payer: MEDICARE

## 2021-09-14 VITALS — HEART RATE: 70 BPM | SYSTOLIC BLOOD PRESSURE: 104 MMHG | TEMPERATURE: 98 F | DIASTOLIC BLOOD PRESSURE: 56 MMHG

## 2021-09-14 DIAGNOSIS — Z79.4 TYPE 2 DIABETES MELLITUS WITH DIABETIC NEUROPATHY, WITH LONG-TERM CURRENT USE OF INSULIN (HCC): ICD-10-CM

## 2021-09-14 DIAGNOSIS — Z87.81 S/P ORIF (OPEN REDUCTION INTERNAL FIXATION) FRACTURE: ICD-10-CM

## 2021-09-14 DIAGNOSIS — R80.9 MICROALBUMINURIA DUE TO TYPE 2 DIABETES MELLITUS (HCC): Primary | ICD-10-CM

## 2021-09-14 DIAGNOSIS — Z98.890 S/P ORIF (OPEN REDUCTION INTERNAL FIXATION) FRACTURE: ICD-10-CM

## 2021-09-14 DIAGNOSIS — E78.2 MIXED HYPERLIPIDEMIA: ICD-10-CM

## 2021-09-14 DIAGNOSIS — M85.89 OSTEOPENIA OF MULTIPLE SITES: ICD-10-CM

## 2021-09-14 DIAGNOSIS — E11.29 MICROALBUMINURIA DUE TO TYPE 2 DIABETES MELLITUS (HCC): Primary | ICD-10-CM

## 2021-09-14 DIAGNOSIS — S72.92XD CLOSED FRACTURE OF LEFT FEMUR WITH ROUTINE HEALING, UNSPECIFIED FRACTURE MORPHOLOGY, UNSPECIFIED PORTION OF FEMUR, SUBSEQUENT ENCOUNTER: ICD-10-CM

## 2021-09-14 DIAGNOSIS — E11.40 TYPE 2 DIABETES MELLITUS WITH DIABETIC NEUROPATHY, WITH LONG-TERM CURRENT USE OF INSULIN (HCC): ICD-10-CM
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
## 2021-09-14 PROCEDURE — 99214 OFFICE O/P EST MOD 30 MIN: CPT | Performed by: INTERNAL MEDICINE

## 2021-09-14 RX ORDER — ALENDRONATE SODIUM 35 MG/1
35 TABLET ORAL
Qty: 12 TABLET | Refills: 1 | Status: SHIPPED | OUTPATIENT
Start: 2021-09-14 | End: 2021-12-13

## 2021-09-14 NOTE — TELEPHONE ENCOUNTER
Waqar from [facility] stated pt is discharged for nursing and PT will continue on.   FYI

## 2021-09-14 NOTE — PROGRESS NOTES
Maya Aries  2/9/1955    Patient presents with: Follow - Up: FILIPPO rm 1 f/u 3 month      Dannydeltalita 1923 is a 77year old female who presents as a follow-up.     The patient recently underwent an open reduction internal fixation of the left f 1 tablet by mouth daily as needed for Pain. 20 tablet 0   • glipiZIDE 5 MG Oral Tablet 24 Hr Take 1 tablet (5 mg total) by mouth daily.  90 tablet 3   • FLUTICASONE PROPIONATE 50 MCG/ACT Nasal Suspension SHAKE LIQUID AND USE 2 SPRAYS IN EACH NOSTRIL DAILY 4 complication     slow to arouse   • Anxiety    • Arthritis    • Atherosclerosis of coronary artery    • Atherosclerotic heart disease of native coronary artery without angina pectoris    • Back pain    • Bad breath    • Belching    • Bilateral leg edema 3/ tenosynovitis     Ganglion of tendon sheath     05900/38133 SX/ RLW/ GLOBAL EXP 01-20-15     Osteoarthrosis, unspecified whether generalized or localized, lower leg     Depression     LBBB (left bundle branch block)     Calculus of bile duct with obstructi Azotemia     Femur fracture, left (HCC)     Physical deconditioning     S/P ORIF (open reduction internal fixation) fracture     Diabetes mellitus type 2 in obese [facility])     Fall     Past Surgical History:   Procedure Laterality Date   • ANGIOPLASTY (CORONA of external fixator:  Pain is well-controlled with rare medical management.  Continue norco as needed on days undergoing physical therapy  No significant callus or new periosteal bone formation indicating healing of fracture, as reported by ortho surgery se

## 2021-09-15 LAB
ALK-PHOSPHATASE BONE CALC: 75 U/L
ALK-PHOSPHATASE LIVER CALC: 75 U/L
ALK-PHOSPHATASE OTHER CALC: 0 U/L
ALKALINE PHOSPHATASE: 150 U/L

## 2021-09-23 ENCOUNTER — TELEPHONE (OUTPATIENT)
Dept: INTERNAL MEDICINE CLINIC | Facility: CLINIC | Age: 66
End: 2021-09-23

## 2021-09-23 NOTE — TELEPHONE ENCOUNTER
Paperwork was received from [facility] and placed on Dr Ceasar Sidhu desnabil for review only.

## 2021-09-28 ENCOUNTER — TELEPHONE (OUTPATIENT)
Dept: INTERNAL MEDICINE CLINIC | Facility: CLINIC | Age: 66
End: 2021-09-28

## 2021-09-28 NOTE — TELEPHONE ENCOUNTER
Paperwork was received from [facility] and reviewed by Dr Mohinder Boudreaux. Paperwork was sent to scan.

## 2021-09-29 ENCOUNTER — HOSPITAL ENCOUNTER (OUTPATIENT)
Dept: GENERAL RADIOLOGY | Age: 66
Discharge: HOME | End: 2021-09-29
Attending: PHYSICIAN ASSISTANT
Payer: MEDICARE

## 2021-09-29 ENCOUNTER — OFFICE VISIT (OUTPATIENT)
Dept: ORTHOPEDICS CLINIC | Facility: CLINIC | Age: 66
End: 2021-09-29
Payer: MEDICARE

## 2021-09-29 VITALS — BODY MASS INDEX: 27.32 KG/M2 | WEIGHT: 170 LBS | HEIGHT: 66 IN

## 2021-09-29 DIAGNOSIS — Z48.89 AFTERCARE FOLLOWING SURGERY: ICD-10-CM

## 2021-09-29 DIAGNOSIS — M97.12XD PERIPROSTHETIC FRACTURE AROUND INTERNAL PROSTHETIC LEFT KNEE JOINT, SUBSEQUENT ENCOUNTER: Primary | ICD-10-CM

## 2021-09-29 DIAGNOSIS — M97.12XD PERIPROSTHETIC FRACTURE AROUND INTERNAL PROSTHETIC LEFT KNEE JOINT, SUBSEQUENT ENCOUNTER: ICD-10-CM

## 2021-09-29 PROCEDURE — 99024 POSTOP FOLLOW-UP VISIT: CPT | Performed by: PHYSICIAN ASSISTANT

## 2021-09-29 PROCEDURE — 73552 X-RAY EXAM OF FEMUR 2/>: CPT | Performed by: PHYSICIAN ASSISTANT

## 2021-09-29 NOTE — PROGRESS NOTES
[facility] Post Op Progress Note      Date of Surgery: 07/07/2021      Subjective: Bibiana Jeffrey is a 77year old female who is here with her daughter just under 3 months status post open reduction internal fixation left femur periprosthetic fracture.   Sh was seen and evaluated by Dr. Rylee Wheatley who agrees with the assessment and plan.         Eunice Thompson PA-C  [facility] Orthopedic Surgery

## 2021-10-04 ENCOUNTER — ORDER TRANSCRIPTION (OUTPATIENT)
Dept: PHYSICAL THERAPY | Facility: HOSPITAL | Age: 66
End: 2021-10-04

## 2021-10-04 DIAGNOSIS — M97.12XA: ICD-10-CM

## 2021-10-04 DIAGNOSIS — Z48.89 OTHER SPECIFIED AFTERCARE FOLLOWING SURGERY: Primary | ICD-10-CM

## 2021-10-11 ENCOUNTER — OFFICE VISIT (OUTPATIENT)
Dept: PHYSICAL THERAPY | Age: 66
End: 2021-10-11
Attending: PHYSICIAN ASSISTANT
Payer: MEDICARE

## 2021-10-11 DIAGNOSIS — M97.12XA: ICD-10-CM

## 2021-10-11 DIAGNOSIS — Z48.89 OTHER SPECIFIED AFTERCARE FOLLOWING SURGERY: ICD-10-CM

## 2021-10-11 PROCEDURE — 97162 PT EVAL MOD COMPLEX 30 MIN: CPT

## 2021-10-11 PROCEDURE — 97110 THERAPEUTIC EXERCISES: CPT

## 2021-10-12 ENCOUNTER — PATIENT OUTREACH (OUTPATIENT)
Dept: CASE MANAGEMENT | Age: 66
End: 2021-10-12

## 2021-10-12 NOTE — PROGRESS NOTES
INITIAL EVALUATION:   Referring Physician: Dr. 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, subsequent encounter (J77.00BC)  Aftercare following surgery (Z48.89       Date of Service: 10/11/2021     PATIENT VERGARA native coronary artery without angina pectoris, Back pain, Bad breath, Belching, Bilateral leg edema (3/16/2018), Bloating, Blurred vision, Change in hair, Constipation, Diarrhea, unspecified, Dizziness, Easy bruising, Fatigue, Feeling lonely, Flatulence/g PLAN OF CARE:    Goals:  (To be met in 16 visits)  · Patient to demonstrate independence and compliance with a comprehensive home exercise program  · Patient to improve FOTO score to be at least 49/100  · Patient to demonstrate improved left knee AROM

## 2021-10-12 NOTE — PROGRESS NOTES
Chart reviewed. Patient has had follow-up with PCP and ortho. Patient has also started outpatient PT. No needs for NCM to address at this time. Will follow-up.

## 2021-10-13 ENCOUNTER — OFFICE VISIT (OUTPATIENT)
Dept: PHYSICAL THERAPY | Age: 66
End: 2021-10-13
Attending: PHYSICIAN ASSISTANT
Payer: MEDICARE

## 2021-10-13 DIAGNOSIS — Z48.89 OTHER SPECIFIED AFTERCARE FOLLOWING SURGERY: ICD-10-CM

## 2021-10-13 DIAGNOSIS — M97.12XA: ICD-10-CM

## 2021-10-13 PROCEDURE — 97110 THERAPEUTIC EXERCISES: CPT

## 2021-10-14 NOTE — PROGRESS NOTES
Dx: Periprosthetic fracture around internal prosthetic left knee joint, subsequent encounter (K26.68DJ)  Aftercare following surgery (R62.64        Authorized # of Visits:  16  Fall Risk: standard         Precautions: n/a             Subjective:    The radha

## 2021-10-18 ENCOUNTER — OFFICE VISIT (OUTPATIENT)
Dept: PHYSICAL THERAPY | Age: 66
End: 2021-10-18
Attending: PHYSICIAN ASSISTANT
Payer: MEDICARE

## 2021-10-18 DIAGNOSIS — Z48.89 OTHER SPECIFIED AFTERCARE FOLLOWING SURGERY: ICD-10-CM

## 2021-10-18 DIAGNOSIS — M97.12XA: ICD-10-CM

## 2021-10-18 PROCEDURE — 97110 THERAPEUTIC EXERCISES: CPT

## 2021-10-18 NOTE — PROGRESS NOTES
Dx: Periprosthetic fracture around internal prosthetic left knee joint, subsequent encounter (B67.08WE)  Aftercare following surgery (D38.13        Authorized # of Visits:  16  Fall Risk: standard         Precautions: n/a             Subjective:    The radha

## 2021-10-20 ENCOUNTER — OFFICE VISIT (OUTPATIENT)
Dept: PHYSICAL THERAPY | Age: 66
End: 2021-10-20
Attending: PHYSICIAN ASSISTANT
Payer: MEDICARE

## 2021-10-20 DIAGNOSIS — M97.12XA: ICD-10-CM

## 2021-10-20 DIAGNOSIS — Z48.89 OTHER SPECIFIED AFTERCARE FOLLOWING SURGERY: ICD-10-CM

## 2021-10-20 PROCEDURE — 97110 THERAPEUTIC EXERCISES: CPT

## 2021-10-20 NOTE — PROGRESS NOTES
Dx: Periprosthetic fracture around internal prosthetic left knee joint, subsequent encounter (I50.05JU)  Aftercare following surgery (O44.30        Authorized # of Visits:  16  Fall Risk: standard         Precautions: n/a             Subjective:    The radha

## 2021-10-25 ENCOUNTER — APPOINTMENT (OUTPATIENT)
Dept: PHYSICAL THERAPY | Age: 66
End: 2021-10-25
Attending: PHYSICIAN ASSISTANT
Payer: MEDICARE

## 2021-10-27 ENCOUNTER — OFFICE VISIT (OUTPATIENT)
Dept: PHYSICAL THERAPY | Age: 66
End: 2021-10-27
Attending: PHYSICIAN ASSISTANT
Payer: MEDICARE

## 2021-10-27 DIAGNOSIS — M97.12XA: ICD-10-CM

## 2021-10-27 DIAGNOSIS — Z48.89 OTHER SPECIFIED AFTERCARE FOLLOWING SURGERY: ICD-10-CM

## 2021-10-27 PROCEDURE — 97110 THERAPEUTIC EXERCISES: CPT

## 2021-10-27 NOTE — PROGRESS NOTES
Dx: Periprosthetic fracture around internal prosthetic left knee joint, subsequent encounter (N06.97SQ)  Aftercare following surgery (Q38.38        Authorized # of Visits:  16  Fall Risk: standard         Precautions: n/a             Subjective:    The radha

## 2021-11-08 ENCOUNTER — OFFICE VISIT (OUTPATIENT)
Dept: PHYSICAL THERAPY | Age: 66
End: 2021-11-08
Attending: PHYSICIAN ASSISTANT
Payer: MEDICARE

## 2021-11-08 DIAGNOSIS — M97.12XA: ICD-10-CM

## 2021-11-08 DIAGNOSIS — Z48.89 OTHER SPECIFIED AFTERCARE FOLLOWING SURGERY: ICD-10-CM

## 2021-11-08 PROCEDURE — 97110 THERAPEUTIC EXERCISES: CPT

## 2021-11-09 NOTE — PROGRESS NOTES
Dx: Periprosthetic fracture around internal prosthetic left knee joint, subsequent encounter (G98.94XV)  Aftercare following surgery (L42.73        Authorized # of Visits:  16  Fall Risk: standard         Precautions: n/a             Subjective:    The radha x 10 min Nustep L2 x 10 min Nustep L2 x 10 min     Quad sets 2 x 10 with 10 sec holds Heel slides x 10 with 5 sec holds Heel slides x 10 with 5 sec holds Heel slides x 10 with 5 sec holds SAQ 2 x 15 with 5 sec holds     Supine passive knee flexion as lg

## 2021-11-10 ENCOUNTER — APPOINTMENT (OUTPATIENT)
Dept: PHYSICAL THERAPY | Age: 66
End: 2021-11-10
Attending: PHYSICIAN ASSISTANT
Payer: MEDICARE

## 2021-11-10 ENCOUNTER — HOSPITAL ENCOUNTER (OUTPATIENT)
Dept: GENERAL RADIOLOGY | Age: 66
Discharge: HOME | End: 2021-11-10
Attending: PHYSICIAN ASSISTANT
Payer: MEDICARE

## 2021-11-10 ENCOUNTER — OFFICE VISIT (OUTPATIENT)
Dept: ORTHOPEDICS CLINIC | Facility: CLINIC | Age: 66
End: 2021-11-10
Payer: MEDICARE

## 2021-11-10 DIAGNOSIS — Z48.89 AFTERCARE FOLLOWING SURGERY: ICD-10-CM

## 2021-11-10 DIAGNOSIS — M24.562 CONTRACTURE OF LEFT KNEE: ICD-10-CM

## 2021-11-10 DIAGNOSIS — M97.12XD PERIPROSTHETIC FRACTURE AROUND INTERNAL PROSTHETIC LEFT KNEE JOINT, SUBSEQUENT ENCOUNTER: Primary | ICD-10-CM

## 2021-11-10 DIAGNOSIS — M97.12XD PERIPROSTHETIC FRACTURE AROUND INTERNAL PROSTHETIC LEFT KNEE JOINT, SUBSEQUENT ENCOUNTER: ICD-10-CM

## 2021-11-10 DIAGNOSIS — Z09 FOLLOW-UP EXAMINATION FOLLOWING TREATMENT OF FRACTURE: ICD-10-CM

## 2021-11-10 PROCEDURE — 73552 X-RAY EXAM OF FEMUR 2/>: CPT | Performed by: PHYSICIAN ASSISTANT

## 2021-11-10 PROCEDURE — 99213 OFFICE O/P EST LOW 20 MIN: CPT | Performed by: ORTHOPAEDIC SURGERY

## 2021-11-10 RX ORDER — METFORMIN HYDROCHLORIDE 500 MG/1
1000 TABLET, EXTENDED RELEASE ORAL 2 TIMES DAILY WITH MEALS
COMMUNITY
Start: 2021-10-14

## 2021-11-10 NOTE — PROGRESS NOTES
[facility] Fracture follow-up Progress Note      Date of surgery: 7/7/2021      History: Joi Sears is a 54-year-old female presenting with her daughter as  for postop follow-up approximately 4 months status post ORIF of a periprosthetic [employer] voice recognition software.   Although every attempt is made to correct errors where identified, discrepancies may still exist.

## 2021-11-13 ENCOUNTER — LAB ENCOUNTER (OUTPATIENT)
Dept: LAB | Age: 66
End: 2021-11-13
Attending: INTERNAL MEDICINE
Payer: MEDICARE

## 2021-11-13 DIAGNOSIS — E11.40 TYPE 2 DIABETES MELLITUS WITH DIABETIC NEUROPATHY, WITH LONG-TERM CURRENT USE OF INSULIN (HCC): ICD-10-CM

## 2021-11-13 DIAGNOSIS — E11.29 MICROALBUMINURIA DUE TO TYPE 2 DIABETES MELLITUS (HCC): ICD-10-CM

## 2021-11-13 DIAGNOSIS — R80.9 MICROALBUMINURIA DUE TO TYPE 2 DIABETES MELLITUS (HCC): ICD-10-CM

## 2021-11-13 DIAGNOSIS — R74.01 TRANSAMINITIS: ICD-10-CM

## 2021-11-13 DIAGNOSIS — Z79.4 TYPE 2 DIABETES MELLITUS WITH DIABETIC NEUROPATHY, WITH LONG-TERM CURRENT USE OF INSULIN (HCC): ICD-10-CM

## 2021-11-13 PROCEDURE — 80061 LIPID PANEL: CPT

## 2021-11-13 PROCEDURE — 84080 ASSAY ALKALINE PHOSPHATASES: CPT

## 2021-11-13 PROCEDURE — 83036 HEMOGLOBIN GLYCOSYLATED A1C: CPT

## 2021-11-13 PROCEDURE — 36415 COLL VENOUS BLD VENIPUNCTURE: CPT

## 2021-11-13 PROCEDURE — 80053 COMPREHEN METABOLIC PANEL: CPT

## 2021-11-13 PROCEDURE — 84075 ASSAY ALKALINE PHOSPHATASE: CPT

## 2021-11-15 ENCOUNTER — OFFICE VISIT (OUTPATIENT)
Dept: PHYSICAL THERAPY | Age: 66
End: 2021-11-15
Attending: PHYSICIAN ASSISTANT
Payer: MEDICARE

## 2021-11-15 DIAGNOSIS — Z48.89 OTHER SPECIFIED AFTERCARE FOLLOWING SURGERY: ICD-10-CM

## 2021-11-15 DIAGNOSIS — M97.12XA: ICD-10-CM

## 2021-11-15 PROCEDURE — 97110 THERAPEUTIC EXERCISES: CPT

## 2021-11-16 NOTE — PROGRESS NOTES
Dx: Periprosthetic fracture around internal prosthetic left knee joint, subsequent encounter (B69.68LS)  Aftercare following surgery (W46.45        Authorized # of Visits:  16  Fall Risk: standard         Precautions: n/a             Subjective:    The radha Therapy    S/L clams 2 x 15 1/2 long sitting quad set x 10 with 5 sec holds Reviewed HEP - - STM plantarfascia x 4 min    Reviewed HEP Reviewed HEP - - - -    - - - - - -    HEP: Heel slides, Quad sets, HS stretch, S/L clams    Charges:  TherEx x 3       To

## 2021-11-17 ENCOUNTER — OFFICE VISIT (OUTPATIENT)
Dept: PHYSICAL THERAPY | Age: 66
End: 2021-11-17
Attending: PHYSICIAN ASSISTANT
Payer: MEDICARE

## 2021-11-17 DIAGNOSIS — M97.12XA: ICD-10-CM

## 2021-11-17 DIAGNOSIS — Z48.89 OTHER SPECIFIED AFTERCARE FOLLOWING SURGERY: ICD-10-CM

## 2021-11-17 PROCEDURE — 97110 THERAPEUTIC EXERCISES: CPT

## 2021-11-18 ENCOUNTER — TELEPHONE (OUTPATIENT)
Dept: INTERNAL MEDICINE CLINIC | Facility: CLINIC | Age: 66
End: 2021-11-18

## 2021-11-18 NOTE — PROGRESS NOTES
Dx: Periprosthetic fracture around internal prosthetic left knee joint, subsequent encounter (L27.49AE)  Aftercare following surgery (R57.88        Authorized # of Visits:  16  Fall Risk: standard         Precautions: n/a             Subjective:    The radha Ambulation with single point cane with verbal cues for sequencing H/L clams red band 2 x 15   Seated HS stretch 3 x 30 sec S/L clams x 15 1/2 long sitting quad stretch 2 x 10 with 10 sec holds - Reviewed HEP Manual Therapy Standing in parallel bars small s

## 2021-11-18 NOTE — TELEPHONE ENCOUNTER
Future Appointments   Date Time Provider Margoth Silveira   1/22/2022  9:20 AM Malissa Reddy MD [facility]     Orders to edward- Pt informed that labs need to be completed no sooner than 2 weeks prior to the appt.  Pt aware to fast-no call back required

## 2021-11-19 RX ORDER — FLUTICASONE PROPIONATE 50 MCG
SPRAY, SUSPENSION (ML) NASAL
Qty: 48 G | Refills: 0 | Status: SHIPPED | OUTPATIENT
Start: 2021-11-19

## 2021-11-22 ENCOUNTER — OFFICE VISIT (OUTPATIENT)
Dept: PHYSICAL THERAPY | Age: 66
End: 2021-11-22
Attending: PHYSICIAN ASSISTANT
Payer: MEDICARE

## 2021-11-22 DIAGNOSIS — Z48.89 OTHER SPECIFIED AFTERCARE FOLLOWING SURGERY: ICD-10-CM

## 2021-11-22 DIAGNOSIS — M97.12XA: ICD-10-CM

## 2021-11-22 PROCEDURE — 97110 THERAPEUTIC EXERCISES: CPT

## 2021-11-23 NOTE — PROGRESS NOTES
Dx: Periprosthetic fracture around internal prosthetic left knee joint, subsequent encounter (B95.07SK)  Aftercare following surgery (J53.03        Authorized # of Visits:  16  Fall Risk: standard         Precautions: n/a             Subjective:    The radha 4 min Slantboard gastroc stretch 3 x 30 sec 6 inch step FSU in parallel bars x 5; 8 inch step FSU in parallel bars x 5   - - - Reviewed HEP Marching in parallel bars   - - - - Reviewed HEP   HEP: Heel slides, Quad sets, HS stretch, S/L clams    Charges:  [address] [place]

## 2021-11-24 ENCOUNTER — OFFICE VISIT (OUTPATIENT)
Dept: PHYSICAL THERAPY | Age: 66
End: 2021-11-24
Attending: PHYSICIAN ASSISTANT
Payer: MEDICARE

## 2021-11-24 DIAGNOSIS — M97.12XA: ICD-10-CM

## 2021-11-24 DIAGNOSIS — Z48.89 OTHER SPECIFIED AFTERCARE FOLLOWING SURGERY: ICD-10-CM

## 2021-11-24 PROCEDURE — 97110 THERAPEUTIC EXERCISES: CPT

## 2021-11-25 NOTE — PROGRESS NOTES
Dx: Periprosthetic fracture around internal prosthetic left knee joint, subsequent encounter (F51.97VN)  Aftercare following surgery (B47.09        Authorized # of Visits:  16  Fall Risk: standard         Precautions: n/a             Subjective:    The radha From: Justa Hewitt  To: Natty Ma  Sent: 1/19/2021 12:27 PM CST  Subject: Medication Question    Our insurance changed again this year and we are no longer using [facility]. This can be removed from my profile. [facility] [place] [facility] as this is a covered pharmacy. They are able to fill 90 day supplies, which would be preferred. If you could please [facility] I would greatly appreciate it!    Have a great day!   parallel bars small step forward with weight shift onto left leg 2 x 10 Standing in parallel bars with small step onto left LE x 10 H/L clams green band 2 x 15   - - STM plantarfascia x 4 min Slantboard gastroc stretch 3 x 30 sec 6 inch step FSU in paralle

## 2021-11-29 ENCOUNTER — APPOINTMENT (OUTPATIENT)
Dept: PHYSICAL THERAPY | Age: 66
End: 2021-11-29
Attending: PHYSICIAN ASSISTANT
Payer: MEDICARE

## 2021-11-29 ENCOUNTER — TELEPHONE (OUTPATIENT)
Dept: PHYSICAL THERAPY | Facility: HOSPITAL | Age: 66
End: 2021-11-29

## 2021-12-01 ENCOUNTER — APPOINTMENT (OUTPATIENT)
Dept: PHYSICAL THERAPY | Age: 66
End: 2021-12-01
Attending: PHYSICIAN ASSISTANT
Payer: MEDICARE

## 2021-12-03 RX ORDER — EMPAGLIFLOZIN 25 MG/1
1 TABLET, FILM COATED ORAL DAILY
Qty: 90 TABLET | Refills: 1 | OUTPATIENT
Start: 2021-12-03

## 2021-12-03 NOTE — PROGRESS NOTES
Elevated bone fraction of ALKP. I am awaiting a response from the ortho surgery service if this could be explained by her recent fracture.

## 2021-12-06 ENCOUNTER — APPOINTMENT (OUTPATIENT)
Dept: PHYSICAL THERAPY | Age: 66
End: 2021-12-06
Attending: PHYSICIAN ASSISTANT
Payer: MEDICARE

## 2021-12-08 ENCOUNTER — APPOINTMENT (OUTPATIENT)
Dept: PHYSICAL THERAPY | Age: 66
End: 2021-12-08
Attending: PHYSICIAN ASSISTANT
Payer: MEDICARE

## 2021-12-21 NOTE — TELEPHONE ENCOUNTER
Patient had the following labs completed:    11---hemoglobin a1c, lipid, cmp  7-7-2021--cbc      Please advise any further labs need to be added. Your Appointments    Wednesday December 22, 2021  6:30 PM  Physical Therapy Ortho Treatment with Nico Sink, PT  [facility] [facility] ([address]) [address] [facility]      Monday December 27, 2021  6:30 PM  Physical Therapy Ortho Treatment with [place] Sink, PT  [facility] ([address]) [address] [facility] [address] [facility] [address]      Wednesday December 29, 2021  6:30 PM  Physical Therapy Ortho Treatment with [place] Sink, PT  [facility] ([address]) [address] [facility] [address] [facility] [address]      Wednesday January 12, 2022  4:20 PM  Follow Up Visit with Roby Bhagat MD  [facility] ([address] [place] [address]) [address] [place] [address]  [phone]      Tuesday January 18, 2022  3:30 PM  Follow Up Visit with Anson Fofana MD  Cardiology [address] ([address]) [address] [place] [id number]   For the safety of our patients, visitors and care teams, all patients and visitors are required to wear a mask during their entire visit, only to be removed if asked to do so by your provider. We are working to limit the number of people in our waiting rooms and ask that patients do not bring anyone with them to their appointment unless clinically required.        Saturday January 22, 2022  9:20 AM  Medicare Annual Well Visit with Helen Paul MD  [place] [address], [place] [place] ([facility] IM/[facility]) [address] [facility] [address] [phone]

## 2021-12-22 ENCOUNTER — OFFICE VISIT (OUTPATIENT)
Dept: PHYSICAL THERAPY | Age: 66
End: 2021-12-22
Attending: PHYSICIAN ASSISTANT
Payer: MEDICARE

## 2021-12-22 PROCEDURE — 97110 THERAPEUTIC EXERCISES: CPT

## 2021-12-23 NOTE — PROGRESS NOTES
Dx: Periprosthetic fracture around internal prosthetic left knee joint, subsequent encounter (E71.58KD)  Aftercare following surgery (N22.14        Authorized # of Visits:  16  Fall Risk: standard         Precautions: n/a             Subjective:    The radha Reviewed HEP Reviewed HEP -   HEP: Heel slides, Quad sets, HS stretch, S/L clams    Charges:  TherEx x 3       Total Timed Treatment: 40 min  Total Treatment Time: 40 min

## 2021-12-27 ENCOUNTER — OFFICE VISIT (OUTPATIENT)
Dept: PHYSICAL THERAPY | Age: 66
End: 2021-12-27
Attending: PHYSICIAN ASSISTANT
Payer: MEDICARE

## 2021-12-27 PROCEDURE — 97110 THERAPEUTIC EXERCISES: CPT

## 2021-12-28 NOTE — PROGRESS NOTES
Dx: Periprosthetic fracture around internal prosthetic left knee joint, subsequent encounter (K87.21YY)  Aftercare following surgery (N49.12        Authorized # of Visits:  16  Fall Risk: standard         Precautions: n/a             Subjective:    The radha step FSU in parallel bars x 5 S/L clams 2 x 15 - Sidestepping with red band 2 x to fatigue   Reviewed HEP Marching in parallel bars SAQ 2 x 15 - Standing HS stretch 3 x 30 sec   - Reviewed HEP Reviewed HEP - Stance on Airex with AP/Lateral weight shifting

## 2021-12-29 ENCOUNTER — OFFICE VISIT (OUTPATIENT)
Dept: PHYSICAL THERAPY | Age: 66
End: 2021-12-29
Attending: PHYSICIAN ASSISTANT
Payer: MEDICARE

## 2021-12-29 PROCEDURE — 97110 THERAPEUTIC EXERCISES: CPT

## 2021-12-30 NOTE — PROGRESS NOTES
Dx: Periprosthetic fracture around internal prosthetic left knee joint, subsequent encounter (E47.70BI)  Aftercare following surgery (N02.10        Authorized # of Visits:  16  Fall Risk: standard         Precautions: n/a             Subjective:    The radha Pt arrived on unit; Pt Alert and Oriented; Consent signed; See MAC_lab for sedation report and/or vital signs. Pt transferred to treatment table for procedure. parallel bars small step forward with weight shift onto left leg 2 x 10 Standing in parallel bars with small step onto left LE x 10 H/L clams green band 2 x 15 - Standing heel raises 2 x 15 Stance on Airex with FTU/LTU to 8 inch step x 15 each   Slantboard

## 2022-01-04 ENCOUNTER — APPOINTMENT (OUTPATIENT)
Dept: PHYSICAL THERAPY | Age: 67
End: 2022-01-04
Attending: PHYSICIAN ASSISTANT
Payer: MEDICARE

## 2022-01-05 ENCOUNTER — OFFICE VISIT (OUTPATIENT)
Dept: PHYSICAL THERAPY | Age: 67
End: 2022-01-05
Attending: INTERNAL MEDICINE
Payer: MEDICARE

## 2022-01-05 PROCEDURE — 97110 THERAPEUTIC EXERCISES: CPT

## 2022-01-05 NOTE — PROGRESS NOTES
Dx: Periprosthetic fracture around internal prosthetic left knee joint, subsequent encounter (U66.95IZ)  Aftercare following surgery (Q74.26        Authorized # of Visits:  16  Fall Risk: standard         Precautions: n/a             Subjective:    The radha Sidestep/lunge onto Airex   SAQ 2 x 15 - Standing HS stretch 3 x 30 sec Sidestep/lunge onto Airex Rockerboard AP/lateral rocking and balance with 1 UE assist as needed   Reviewed HEP - Stance on Airex with AP/Lateral weight shifting - Reviewed HEP   HEP: H

## 2022-01-10 ENCOUNTER — OFFICE VISIT (OUTPATIENT)
Dept: PHYSICAL THERAPY | Age: 67
End: 2022-01-10
Attending: PHYSICIAN ASSISTANT
Payer: MEDICARE

## 2022-01-10 PROCEDURE — 97110 THERAPEUTIC EXERCISES: CPT

## 2022-01-11 ENCOUNTER — APPOINTMENT (OUTPATIENT)
Dept: PHYSICAL THERAPY | Age: 67
End: 2022-01-11
Attending: PHYSICIAN ASSISTANT
Payer: MEDICARE

## 2022-01-11 NOTE — PROGRESS NOTES
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

## 2022-01-12 ENCOUNTER — OFFICE VISIT (OUTPATIENT)
Dept: ORTHOPEDICS CLINIC | Facility: CLINIC | Age: 67
End: 2022-01-12
Payer: MEDICARE

## 2022-01-12 ENCOUNTER — HOSPITAL ENCOUNTER (OUTPATIENT)
Dept: GENERAL RADIOLOGY | Age: 67
Discharge: HOME | End: 2022-01-12
Attending: ORTHOPAEDIC SURGERY
Payer: MEDICARE

## 2022-01-12 DIAGNOSIS — M24.562 CONTRACTURE OF LEFT KNEE: ICD-10-CM

## 2022-01-12 DIAGNOSIS — M97.12XD PERIPROSTHETIC FRACTURE AROUND INTERNAL PROSTHETIC LEFT KNEE JOINT, SUBSEQUENT ENCOUNTER: Primary | ICD-10-CM

## 2022-01-12 DIAGNOSIS — M97.12XD PERIPROSTHETIC FRACTURE AROUND INTERNAL PROSTHETIC LEFT KNEE JOINT, SUBSEQUENT ENCOUNTER: ICD-10-CM

## 2022-01-12 PROCEDURE — 73552 X-RAY EXAM OF FEMUR 2/>: CPT | Performed by: ORTHOPAEDIC SURGERY

## 2022-01-12 PROCEDURE — 99213 OFFICE O/P EST LOW 20 MIN: CPT | Performed by: ORTHOPAEDIC SURGERY

## 2022-01-12 NOTE — PROGRESS NOTES
[facility] Fracture follow-up Progress Note      Date of surgery: 7/7/2021      History: Holger Arechiga is a 45-year-old female presenting with her daughter as  for postop follow-up approximately 6 months status post ORIF of a periprosthetic Surgery    The dictation was partially prepared using [employer] voice recognition software.   Although every attempt is made to correct errors where identified, discrepancies may still exist.

## 2022-01-15 ENCOUNTER — LAB ENCOUNTER (OUTPATIENT)
Dept: LAB | Age: 67
End: 2022-01-15
Attending: INTERNAL MEDICINE
Payer: MEDICARE

## 2022-01-15 DIAGNOSIS — R74.8 ELEVATED ALKALINE PHOSPHATASE LEVEL: ICD-10-CM

## 2022-01-15 LAB
ALBUMIN SERPL-MCNC: 3.7 G/DL (ref 3.4–5)
ALBUMIN/GLOB SERPL: 1 {RATIO} (ref 1–2)
ALP LIVER SERPL-CCNC: 172 U/L
ALT SERPL-CCNC: 17 U/L
ANION GAP SERPL CALC-SCNC: 6 MMOL/L (ref 0–18)
AST SERPL-CCNC: 12 U/L (ref 15–37)
BILIRUB SERPL-MCNC: 0.5 MG/DL (ref 0.1–2)
BUN BLD-MCNC: 19 MG/DL (ref 7–18)
CALCIUM BLD-MCNC: 9.3 MG/DL (ref 8.5–10.1)
CHLORIDE SERPL-SCNC: 107 MMOL/L (ref 98–112)
CO2 SERPL-SCNC: 27 MMOL/L (ref 21–32)
CREAT BLD-MCNC: 0.85 MG/DL
FASTING STATUS PATIENT QL REPORTED: YES
GLOBULIN PLAS-MCNC: 3.6 G/DL (ref 2.8–4.4)
GLUCOSE BLD-MCNC: 156 MG/DL (ref 70–99)
OSMOLALITY SERPL CALC.SUM OF ELEC: 295 MOSM/KG (ref 275–295)
POTASSIUM SERPL-SCNC: 4.9 MMOL/L (ref 3.5–5.1)
PROT SERPL-MCNC: 7.3 G/DL (ref 6.4–8.2)
SODIUM SERPL-SCNC: 140 MMOL/L (ref 136–145)

## 2022-01-15 PROCEDURE — 36415 COLL VENOUS BLD VENIPUNCTURE: CPT

## 2022-01-15 PROCEDURE — 80053 COMPREHEN METABOLIC PANEL: CPT

## 2022-01-21 ENCOUNTER — OFFICE VISIT (OUTPATIENT)
Dept: PHYSICAL THERAPY | Age: 67
End: 2022-01-21
Attending: PHYSICIAN ASSISTANT
Payer: MEDICARE

## 2022-01-21 PROCEDURE — 97110 THERAPEUTIC EXERCISES: CPT

## 2022-01-21 NOTE — PROGRESS NOTES
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

## 2022-01-22 ENCOUNTER — OFFICE VISIT (OUTPATIENT)
Dept: INTERNAL MEDICINE CLINIC | Facility: CLINIC | Age: 67
End: 2022-01-22
Payer: MEDICARE

## 2022-01-22 VITALS
DIASTOLIC BLOOD PRESSURE: 60 MMHG | TEMPERATURE: 97 F | WEIGHT: 190 LBS | RESPIRATION RATE: 16 BRPM | OXYGEN SATURATION: 98 % | BODY MASS INDEX: 30.9 KG/M2 | HEIGHT: 65.75 IN | SYSTOLIC BLOOD PRESSURE: 118 MMHG | HEART RATE: 70 BPM

## 2022-01-22 DIAGNOSIS — Z87.81 S/P ORIF (OPEN REDUCTION INTERNAL FIXATION) FRACTURE: ICD-10-CM

## 2022-01-22 DIAGNOSIS — E11.29 MICROALBUMINURIA DUE TO TYPE 2 DIABETES MELLITUS (HCC): ICD-10-CM

## 2022-01-22 DIAGNOSIS — I10 ESSENTIAL HYPERTENSION: ICD-10-CM

## 2022-01-22 DIAGNOSIS — S72.92XD CLOSED FRACTURE OF LEFT FEMUR WITH ROUTINE HEALING, UNSPECIFIED FRACTURE MORPHOLOGY, UNSPECIFIED PORTION OF FEMUR, SUBSEQUENT ENCOUNTER: ICD-10-CM

## 2022-01-22 DIAGNOSIS — E11.49 DIABETES MELLITUS TYPE 2 WITH NEUROLOGICAL MANIFESTATIONS (HCC): ICD-10-CM

## 2022-01-22 DIAGNOSIS — K21.9 GASTROESOPHAGEAL REFLUX DISEASE WITHOUT ESOPHAGITIS: ICD-10-CM

## 2022-01-22 DIAGNOSIS — S72.22XA CLOSED DISPLACED SUBTROCHANTERIC FRACTURE OF LEFT FEMUR, INITIAL ENCOUNTER (HCC): ICD-10-CM

## 2022-01-22 DIAGNOSIS — S72.92XG CLOSED FRACTURE OF LEFT FEMUR WITH DELAYED HEALING, UNSPECIFIED FRACTURE MORPHOLOGY, UNSPECIFIED PORTION OF FEMUR, SUBSEQUENT ENCOUNTER: ICD-10-CM

## 2022-01-22 DIAGNOSIS — M97.12XD PERIPROSTHETIC FRACTURE AROUND INTERNAL PROSTHETIC LEFT KNEE JOINT, SUBSEQUENT ENCOUNTER: ICD-10-CM

## 2022-01-22 DIAGNOSIS — E78.5 DYSLIPIDEMIA: ICD-10-CM

## 2022-01-22 DIAGNOSIS — I83.893 VARICOSE VEINS OF LOWER EXTREMITIES WITH COMPLICATIONS, BILATERAL: ICD-10-CM

## 2022-01-22 DIAGNOSIS — E78.2 MIXED HYPERLIPIDEMIA: ICD-10-CM

## 2022-01-22 DIAGNOSIS — I44.7 LBBB (LEFT BUNDLE BRANCH BLOCK): ICD-10-CM

## 2022-01-22 DIAGNOSIS — R80.9 MICROALBUMINURIA DUE TO TYPE 2 DIABETES MELLITUS (HCC): ICD-10-CM

## 2022-01-22 DIAGNOSIS — I87.2 VENOUS INSUFFICIENCY OF BOTH LOWER EXTREMITIES: ICD-10-CM

## 2022-01-22 DIAGNOSIS — Z00.00 ENCOUNTER FOR ANNUAL HEALTH EXAMINATION: Primary | ICD-10-CM

## 2022-01-22 DIAGNOSIS — Z12.31 ENCOUNTER FOR SCREENING MAMMOGRAM FOR MALIGNANT NEOPLASM OF BREAST: ICD-10-CM

## 2022-01-22 DIAGNOSIS — Z98.890 S/P ORIF (OPEN REDUCTION INTERNAL FIXATION) FRACTURE: ICD-10-CM

## 2022-01-22 DIAGNOSIS — Z79.4 TYPE 2 DIABETES MELLITUS WITH DIABETIC NEUROPATHY, WITH LONG-TERM CURRENT USE OF INSULIN (HCC): ICD-10-CM

## 2022-01-22 DIAGNOSIS — M85.89 OSTEOPENIA OF MULTIPLE SITES: ICD-10-CM

## 2022-01-22 DIAGNOSIS — Z95.5 S/P CORONARY ARTERY STENT PLACEMENT: ICD-10-CM

## 2022-01-22 DIAGNOSIS — E11.40 TYPE 2 DIABETES MELLITUS WITH DIABETIC NEUROPATHY, WITH LONG-TERM CURRENT USE OF INSULIN (HCC): ICD-10-CM

## 2022-01-22 DIAGNOSIS — I25.10 CORONARY ARTERY DISEASE INVOLVING NATIVE CORONARY ARTERY OF NATIVE HEART WITHOUT ANGINA PECTORIS: ICD-10-CM

## 2022-01-22 PROBLEM — N12 PYELONEPHRITIS: Status: RESOLVED | Noted: 2020-04-21 | Resolved: 2022-01-22

## 2022-01-22 PROBLEM — R11.2 N&V (NAUSEA AND VOMITING): Status: RESOLVED | Noted: 2018-04-28 | Resolved: 2022-01-22

## 2022-01-22 PROBLEM — D50.9 IRON DEFICIENCY ANEMIA, UNSPECIFIED: Status: RESOLVED | Noted: 2019-10-11 | Resolved: 2022-01-22

## 2022-01-22 PROBLEM — D69.6 THROMBOCYTOPENIA (HCC): Status: RESOLVED | Noted: 2020-04-21 | Resolved: 2022-01-22

## 2022-01-22 PROBLEM — E11.69 DIABETES MELLITUS TYPE 2 IN OBESE  (HCC): Status: RESOLVED | Noted: 2021-07-14 | Resolved: 2022-01-22

## 2022-01-22 PROBLEM — I87.1 COMPRESSION OF VEIN: Status: RESOLVED | Noted: 2018-03-29 | Resolved: 2022-01-22

## 2022-01-22 PROBLEM — E87.1 HYPONATREMIA: Status: RESOLVED | Noted: 2020-04-21 | Resolved: 2022-01-22

## 2022-01-22 PROBLEM — S62.616D CLOSED DISPLACED FRACTURE OF PROXIMAL PHALANX OF RIGHT LITTLE FINGER WITH ROUTINE HEALING, SUBSEQUENT ENCOUNTER: Status: RESOLVED | Noted: 2017-11-13 | Resolved: 2022-01-22

## 2022-01-22 PROBLEM — D69.6 THROMBOCYTOPENIA: Status: RESOLVED | Noted: 2020-04-21 | Resolved: 2022-01-22

## 2022-01-22 PROBLEM — R13.10 DYSPHAGIA: Status: RESOLVED | Noted: 2019-10-11 | Resolved: 2022-01-22

## 2022-01-22 PROBLEM — R79.89 LOW VITAMIN B12 LEVEL: Status: RESOLVED | Noted: 2017-07-10 | Resolved: 2022-01-22

## 2022-01-22 PROBLEM — N17.9 ACUTE KIDNEY INJURY: Status: RESOLVED | Noted: 2020-04-21 | Resolved: 2022-01-22

## 2022-01-22 PROBLEM — R73.9 HYPERGLYCEMIA: Status: RESOLVED | Noted: 2017-01-22 | Resolved: 2022-01-22

## 2022-01-22 PROBLEM — E66.9 DIABETES MELLITUS TYPE 2 IN OBESE (HCC): Status: RESOLVED | Noted: 2021-07-14 | Resolved: 2022-01-22

## 2022-01-22 PROBLEM — N17.9 ACUTE KIDNEY INJURY (HCC): Status: RESOLVED | Noted: 2020-04-21 | Resolved: 2022-01-22

## 2022-01-22 PROBLEM — S62.616A CLOSED DISPLACED FRACTURE OF PROXIMAL PHALANX OF RIGHT LITTLE FINGER, INITIAL ENCOUNTER: Status: RESOLVED | Noted: 2017-11-08 | Resolved: 2022-01-22

## 2022-01-22 PROBLEM — R60.0 LOCALIZED EDEMA: Status: RESOLVED | Noted: 2018-03-29 | Resolved: 2022-01-22

## 2022-01-22 PROBLEM — M17.11 PRIMARY OSTEOARTHRITIS OF RIGHT KNEE: Status: RESOLVED | Noted: 2020-07-17 | Resolved: 2022-01-22

## 2022-01-22 PROBLEM — E66.9 DIABETES MELLITUS TYPE 2 IN OBESE  (HCC): Status: RESOLVED | Noted: 2021-07-14 | Resolved: 2022-01-22

## 2022-01-22 PROBLEM — I83.813 VARICOSE VEINS OF BOTH LOWER EXTREMITIES WITH PAIN: Status: RESOLVED | Noted: 2018-03-29 | Resolved: 2022-01-22

## 2022-01-22 PROBLEM — Z01.810 PREOP CARDIOVASCULAR EXAM: Status: RESOLVED | Noted: 2017-09-07 | Resolved: 2022-01-22

## 2022-01-22 PROBLEM — R79.89 AZOTEMIA: Status: RESOLVED | Noted: 2021-07-11 | Resolved: 2022-01-22

## 2022-01-22 PROBLEM — R53.81 PHYSICAL DECONDITIONING: Status: RESOLVED | Noted: 2021-07-14 | Resolved: 2022-01-22

## 2022-01-22 PROBLEM — L72.9 CYST OF BUTTOCKS: Status: RESOLVED | Noted: 2019-12-14 | Resolved: 2022-01-22

## 2022-01-22 PROBLEM — E53.8 LOW VITAMIN B12 LEVEL: Status: RESOLVED | Noted: 2017-07-10 | Resolved: 2022-01-22

## 2022-01-22 PROBLEM — D72.829 LEUKOCYTOSIS: Status: RESOLVED | Noted: 2020-04-21 | Resolved: 2022-01-22

## 2022-01-22 PROBLEM — E11.69 DIABETES MELLITUS TYPE 2 IN OBESE (HCC): Status: RESOLVED | Noted: 2021-07-14 | Resolved: 2022-01-22
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
## 2022-01-22 PROBLEM — M97.12XA PERIPROSTHETIC FRACTURE AROUND INTERNAL PROSTHETIC LEFT KNEE JOINT: Status: ACTIVE | Noted: 2022-01-22

## 2022-01-22 PROBLEM — R10.9 UNSPECIFIED ABDOMINAL PAIN: Status: RESOLVED | Noted: 2019-10-11 | Resolved: 2022-01-22

## 2022-01-22 PROBLEM — E66.9 DIABETES MELLITUS TYPE 2 IN OBESE: Status: RESOLVED | Noted: 2021-07-14 | Resolved: 2022-01-22

## 2022-01-22 PROBLEM — E11.69 DIABETES MELLITUS TYPE 2 IN OBESE: Status: RESOLVED | Noted: 2021-07-14 | Resolved: 2022-01-22

## 2022-01-22 PROBLEM — R25.2 MUSCLE CRAMPS: Status: RESOLVED | Noted: 2018-04-28 | Resolved: 2022-01-22

## 2022-01-22 PROBLEM — R60.0 BILATERAL LEG EDEMA: Status: RESOLVED | Noted: 2018-03-16 | Resolved: 2022-01-22

## 2022-01-22 PROBLEM — D64.9 ANEMIA: Status: RESOLVED | Noted: 2021-07-11 | Resolved: 2022-01-22

## 2022-01-22 PROCEDURE — G0439 PPPS, SUBSEQ VISIT: HCPCS | Performed by: INTERNAL MEDICINE

## 2022-01-22 RX ORDER — ALENDRONATE SODIUM 35 MG/1
35 TABLET ORAL
COMMUNITY

## 2022-01-22 RX ORDER — SERTRALINE HYDROCHLORIDE 100 MG/1
100 TABLET, FILM COATED ORAL DAILY
Qty: 90 TABLET | Refills: 1 | Status: SHIPPED | OUTPATIENT
Start: 2022-01-22

## 2022-01-22 RX ORDER — PEN NEEDLE, DIABETIC 31 GX5/16"
1 NEEDLE, DISPOSABLE MISCELLANEOUS DAILY
Qty: 100 EACH | Refills: 1 | Status: SHIPPED | OUTPATIENT
Start: 2022-01-22

## 2022-01-22 NOTE — PROGRESS NOTES
Subjective:   Heladio Escamilla is a 77year old female who presents for a Medicare Subsequent Annual Wellness visit (Pt already had Initial Annual Wellness). Since last evaluation the patient has been improving on several accounts.  She notes improved Active Problem List:     Disturbance of skin sensation     Other B-complex deficiencies     Iron deficiency anemia     Dysthymic disorder     Insomnia     Vitamin D deficiency     Arthropathy, unspecified, site unspecified     Pain in joint, lower leg Pyelonephritis     Leukocytosis     Hyponatremia     Thrombocytopenia (HCC)     Acute kidney injury ([facility] [address]     Sepsis with acute renal failure without septic shock (HCC)     Septicemia (HCC)     Primary osteoarthritis of right knee     Pain     Acute postop mouth every 4 (four) hours as needed. linagliptin (TRADJENTA) 5 mg Oral Tab, Take 1 tablet (5 mg total) by mouth daily. insulin glargine (LANTUS SOLOSTAR) 100 UNIT/ML Subcutaneous Solution Pen-injector, Inject 30 Units into the skin every morning.   Sydnee Bailey hip replacement surgery (2011); total hip replacement; endovenous laser vein addon (Left, 2009); endovenous laser vein addon (Right, 2009); angioplasty (coronary) (2002);  Vaginal hysterectomy (N/A, 11/16/2017); colonoscopy; cath drug eluting stent; knee re conjunctiva/corneas clear, EOM's intact both eyes   Ears:  Normal TM's and external ear canals, both ears   Nose: Deferred   Throat: Deferred   Neck: Supple, symmetrical, trachea midline, no adenopathy;  thyroid: not enlarged, symmetric, no tenderness/mass Peripheral neuropathy and moderately-increased proteinuria  Laboratory evaluation to be completed at the end of February     Depression and anxiety:  Mild regression  Increase sertraline from 50 mg daily to 100 mg daily     Dyslipidemia:  Stable  Continue pre-diabetes Lab Results   Component Value Date     (H) 01/15/2022        Cardiovascular Disease Screening    Lipid Panel  Cholesterol  Lipoprotein (HDL)  Triglycerides Covered every 5 years for all Medicare beneficiaries without apparent signs or s 10/21/2021  No recommendations at this time    Pneumococcal Each vaccine (Ibvxbcc99 & [id number]) covered once after 65 Prevnar 13: -    Cduxavnro85: 09/05/2018     No recommendations at this time    Hepatitis B One screening covered for patients with cer

## 2022-01-22 NOTE — PATIENT INSTRUCTIONS
Kaylin Bledsoe's SCREENING SCHEDULE   Tests on this list are recommended by your physician but may not be covered, or covered at this frequency, by your insurer. Please check with your insurance carrier before scheduling to verify coverage.    Alber Interiano 08/25/2020      No recommendations at this time   Pap and Pelvic    Pap   Covered every 2 years for women at normal risk;  Annually if at high risk -  No recommendations at this time    Chlamydia Annually if high risk -  No recommendations at this time   Sc 01/15/2022         Creatinine   Annually Lab Results   Component Value Date    CREATSERUM 0.85 01/15/2022         BUN Annually Lab Results   Component Value Date    BUN 19 (H) 01/15/2022       Drug Serum Conc Annually No results found for: DIGOXIN, DIG, [place]

## 2022-01-31 RX ORDER — INSULIN GLARGINE 100 [IU]/ML
30 INJECTION, SOLUTION SUBCUTANEOUS EVERY MORNING
Qty: 40 ML | Refills: 1 | Status: SHIPPED | OUTPATIENT
Start: 2022-01-31

## 2022-02-17 RX ORDER — FLUTICASONE PROPIONATE 50 MCG
SPRAY, SUSPENSION (ML) NASAL
Qty: 48 G | Refills: 0 | Status: SHIPPED | OUTPATIENT
Start: 2022-02-17

## 2022-02-17 RX ORDER — LINAGLIPTIN 5 MG/1
TABLET, FILM COATED ORAL
Qty: 90 TABLET | Refills: 1 | Status: SHIPPED | OUTPATIENT
Start: 2022-02-17

## 2022-02-17 RX ORDER — RAMIPRIL 2.5 MG/1
CAPSULE ORAL
Qty: 90 CAPSULE | Refills: 0 | Status: SHIPPED | OUTPATIENT
Start: 2022-02-17

## 2022-02-19 ENCOUNTER — HOSPITAL ENCOUNTER (OUTPATIENT)
Dept: MAMMOGRAPHY | Facility: HOSPITAL | Age: 67
Discharge: HOME | End: 2022-02-19
Attending: INTERNAL MEDICINE
Payer: MEDICARE

## 2022-02-19 DIAGNOSIS — Z12.31 ENCOUNTER FOR SCREENING MAMMOGRAM FOR MALIGNANT NEOPLASM OF BREAST: ICD-10-CM

## 2022-02-19 PROCEDURE — 77067 SCR MAMMO BI INCL CAD: CPT | Performed by: INTERNAL MEDICINE

## 2022-02-19 PROCEDURE — 77063 BREAST TOMOSYNTHESIS BI: CPT | Performed by: INTERNAL MEDICINE

## 2022-03-22 ENCOUNTER — TELEPHONE (OUTPATIENT)
Dept: ORTHOPEDICS CLINIC | Facility: CLINIC | Age: 67
End: 2022-03-22

## 2022-03-22 NOTE — TELEPHONE ENCOUNTER
PSR. Please disregard for now, sorry. Dr Nabeel Bateman, pt has upcoming appt for bilateral knee pain. Pt has h/o left knee arthroplasty 2020. Also approx 8 months post-ORIF (outside facility). Is following with Vladimir Ortiz and Dr Demetrio Marin for follow ups.   1/12/22 note by Dr Demetrio Marin states pt is WB and with progressive healing, to repeat xrays 6 months from that date. Question is for upcoming bilat knee appt with you, would left knee still be non-WB (with right knee WB)? Thank you!

## 2022-03-24 ENCOUNTER — OFFICE VISIT (OUTPATIENT)
Dept: OBGYN CLINIC | Facility: CLINIC | Age: 67
End: 2022-03-24
Payer: MEDICARE

## 2022-03-24 VITALS
HEART RATE: 69 BPM | HEIGHT: 65.75 IN | SYSTOLIC BLOOD PRESSURE: 122 MMHG | WEIGHT: 192.19 LBS | BODY MASS INDEX: 31.26 KG/M2 | DIASTOLIC BLOOD PRESSURE: 60 MMHG

## 2022-03-24 DIAGNOSIS — Z01.419 WELL WOMAN EXAM WITH ROUTINE GYNECOLOGICAL EXAM: Primary | ICD-10-CM

## 2022-03-24 DIAGNOSIS — M79.662 PAIN OF LEFT CALF: ICD-10-CM

## 2022-03-24 DIAGNOSIS — N30.00 ACUTE CYSTITIS WITHOUT HEMATURIA: ICD-10-CM

## 2022-03-24 DIAGNOSIS — Z87.81: ICD-10-CM

## 2022-03-24 DIAGNOSIS — N95.2 VAGINAL ATROPHY: ICD-10-CM

## 2022-03-24 DIAGNOSIS — R30.9 PAIN WITH URINATION: ICD-10-CM

## 2022-03-24 DIAGNOSIS — R82.90 ABNORMAL URINE ODOR: ICD-10-CM

## 2022-03-24 DIAGNOSIS — N89.8 VAGINAL IRRITATION: ICD-10-CM

## 2022-03-24 DIAGNOSIS — Z12.31 ENCOUNTER FOR SCREENING MAMMOGRAM FOR BREAST CANCER: ICD-10-CM

## 2022-03-24 LAB
APPEARANCE: CLEAR
BILIRUBIN: NEGATIVE
GLUCOSE (URINE DIPSTICK): >=1000 MG/DL
KETONES (URINE DIPSTICK): NEGATIVE MG/DL
LEUKOCYTES: NEGATIVE
MULTISTIX LOT#: ABNORMAL NUMERIC
NITRITE, URINE: POSITIVE
PH, URINE: 5.5 (ref 4.5–8)
PROTEIN (URINE DIPSTICK): NEGATIVE MG/DL
SPECIFIC GRAVITY: 10.02 (ref 1–1.03)
URINE-COLOR: YELLOW
UROBILINOGEN,SEMI-QN: 0.2 MG/DL (ref 0–1.9)

## 2022-03-24 PROCEDURE — 87660 TRICHOMONAS VAGIN DIR PROBE: CPT | Performed by: OBSTETRICS & GYNECOLOGY

## 2022-03-24 PROCEDURE — 87186 SC STD MICRODIL/AGAR DIL: CPT | Performed by: OBSTETRICS & GYNECOLOGY

## 2022-03-24 PROCEDURE — 81002 URINALYSIS NONAUTO W/O SCOPE: CPT | Performed by: OBSTETRICS & GYNECOLOGY

## 2022-03-24 PROCEDURE — 87510 GARDNER VAG DNA DIR PROBE: CPT | Performed by: OBSTETRICS & GYNECOLOGY

## 2022-03-24 PROCEDURE — 87077 CULTURE AEROBIC IDENTIFY: CPT | Performed by: OBSTETRICS & GYNECOLOGY

## 2022-03-24 PROCEDURE — 87480 CANDIDA DNA DIR PROBE: CPT | Performed by: OBSTETRICS & GYNECOLOGY

## 2022-03-24 PROCEDURE — G0101 CA SCREEN;PELVIC/BREAST EXAM: HCPCS | Performed by: OBSTETRICS & GYNECOLOGY

## 2022-03-24 PROCEDURE — 87086 URINE CULTURE/COLONY COUNT: CPT | Performed by: OBSTETRICS & GYNECOLOGY

## 2022-03-24 RX ORDER — NITROFURANTOIN 25; 75 MG/1; MG/1
100 CAPSULE ORAL 2 TIMES DAILY
Qty: 14 CAPSULE | Refills: 0 | Status: SHIPPED | OUTPATIENT
Start: 2022-03-24 | End: 2022-03-31

## 2022-03-24 RX ORDER — ESTRADIOL 0.1 MG/G
1 CREAM VAGINAL NIGHTLY
Qty: 42.5 G | Refills: 0 | Status: SHIPPED | OUTPATIENT
Start: 2022-03-24

## 2022-03-24 NOTE — TELEPHONE ENCOUNTER
Gabino Amanda MD  You 33 minutes ago (9:51 AM)     WB images would be appropriate since she has been cleared for full weight bearing   Thank you    Message text

## 2022-03-26 ENCOUNTER — HOSPITAL ENCOUNTER (OUTPATIENT)
Dept: ULTRASOUND IMAGING | Age: 67
Discharge: HOME | End: 2022-03-26
Attending: OBSTETRICS & GYNECOLOGY
Payer: MEDICARE

## 2022-03-26 DIAGNOSIS — M79.662 PAIN OF LEFT CALF: ICD-10-CM

## 2022-03-26 DIAGNOSIS — Z87.81: ICD-10-CM

## 2022-03-26 PROCEDURE — 93971 EXTREMITY STUDY: CPT | Performed by: OBSTETRICS & GYNECOLOGY

## 2022-03-30 NOTE — PROGRESS NOTES
Urine culture positive for urinary tract infection. Patient prescribed Macrobid. Continue with Macrobid as cultures are sensitive to antibiotic.

## 2022-04-04 ENCOUNTER — OFFICE VISIT (OUTPATIENT)
Dept: PODIATRY CLINIC | Facility: CLINIC | Age: 67
End: 2022-04-04
Payer: MEDICARE

## 2022-04-04 ENCOUNTER — TELEPHONE (OUTPATIENT)
Dept: PODIATRY CLINIC | Facility: CLINIC | Age: 67
End: 2022-04-04

## 2022-04-04 DIAGNOSIS — M19.072 ARTHROSIS OF MIDFOOT, LEFT: ICD-10-CM

## 2022-04-04 DIAGNOSIS — S92.301P: ICD-10-CM

## 2022-04-04 DIAGNOSIS — M77.50 CAPSULITIS OF METATARSOPHALANGEAL (MTP) JOINT: ICD-10-CM

## 2022-04-04 DIAGNOSIS — M79.672 FOOT PAIN, LEFT: Primary | ICD-10-CM

## 2022-04-04 PROCEDURE — 20600 DRAIN/INJ JOINT/BURSA W/O US: CPT | Performed by: PODIATRIST

## 2022-04-04 PROCEDURE — 99203 OFFICE O/P NEW LOW 30 MIN: CPT | Performed by: PODIATRIST

## 2022-04-04 RX ORDER — TRIAMCINOLONE ACETONIDE 40 MG/ML
20 INJECTION, SUSPENSION INTRA-ARTICULAR; INTRAMUSCULAR ONCE
Status: COMPLETED | OUTPATIENT
Start: 2022-04-04 | End: 2022-04-04

## 2022-04-04 NOTE — PROGRESS NOTES
Per Dr. Rosas Eliel draw up 0.5ml of 0.5% Marcaine and 0.5ml of Kenalog 40 for injection to left  foot.

## 2022-04-05 VITALS — SYSTOLIC BLOOD PRESSURE: 118 MMHG | DIASTOLIC BLOOD PRESSURE: 62 MMHG

## 2022-04-06 RX ORDER — MONTELUKAST SODIUM 10 MG/1
TABLET ORAL
Qty: 90 TABLET | Refills: 3 | Status: SHIPPED | OUTPATIENT
Start: 2022-04-06

## 2022-04-06 RX ORDER — RAMIPRIL 2.5 MG/1
CAPSULE ORAL
Qty: 90 CAPSULE | Refills: 0 | Status: SHIPPED | OUTPATIENT
Start: 2022-04-06

## 2022-04-25 ENCOUNTER — OFFICE VISIT (OUTPATIENT)
Dept: PODIATRY CLINIC | Facility: CLINIC | Age: 67
End: 2022-04-25
Payer: MEDICARE

## 2022-04-25 VITALS — DIASTOLIC BLOOD PRESSURE: 60 MMHG | SYSTOLIC BLOOD PRESSURE: 124 MMHG

## 2022-04-25 DIAGNOSIS — G57.82 NEUROMA OF THIRD INTERSPACE OF LEFT FOOT: ICD-10-CM

## 2022-04-25 DIAGNOSIS — M77.50 CAPSULITIS OF METATARSOPHALANGEAL (MTP) JOINT: Primary | ICD-10-CM

## 2022-04-25 DIAGNOSIS — G57.62 MORTON'S METATARSALGIA, NEURALGIA, OR NEUROMA, LEFT: ICD-10-CM

## 2022-04-25 PROCEDURE — 64455 NJX AA&/STRD PLTR COM DG NRV: CPT | Performed by: PODIATRIST

## 2022-04-25 RX ORDER — TRIAMCINOLONE ACETONIDE 40 MG/ML
20 INJECTION, SUSPENSION INTRA-ARTICULAR; INTRAMUSCULAR ONCE
Status: COMPLETED | OUTPATIENT
Start: 2022-04-25 | End: 2022-04-25

## 2022-04-26 NOTE — PROGRESS NOTES
Per Dr. Jennifer Hopson draw up 0.5ml of 0.5% Marcaine and 0.5ml of Kenalog 40 for injection to left foot.

## 2022-04-27 ENCOUNTER — HOSPITAL ENCOUNTER (OUTPATIENT)
Dept: GENERAL RADIOLOGY | Age: 67
Discharge: HOME | End: 2022-04-27
Attending: ORTHOPAEDIC SURGERY
Payer: MEDICARE

## 2022-04-27 ENCOUNTER — OFFICE VISIT (OUTPATIENT)
Dept: ORTHOPEDICS CLINIC | Facility: CLINIC | Age: 67
End: 2022-04-27
Payer: MEDICARE

## 2022-04-27 VITALS — HEIGHT: 66.5 IN | WEIGHT: 193 LBS | BODY MASS INDEX: 30.65 KG/M2 | HEART RATE: 73 BPM | OXYGEN SATURATION: 98 %

## 2022-04-27 DIAGNOSIS — G89.29 CHRONIC PAIN OF LEFT KNEE: ICD-10-CM

## 2022-04-27 DIAGNOSIS — M25.561 PAIN IN BOTH KNEES, UNSPECIFIED CHRONICITY: ICD-10-CM

## 2022-04-27 DIAGNOSIS — M25.562 CHRONIC PAIN OF LEFT KNEE: ICD-10-CM

## 2022-04-27 DIAGNOSIS — M17.11 PRIMARY OSTEOARTHRITIS OF RIGHT KNEE: Primary | ICD-10-CM

## 2022-04-27 DIAGNOSIS — M25.562 PAIN IN BOTH KNEES, UNSPECIFIED CHRONICITY: ICD-10-CM

## 2022-04-27 PROCEDURE — 20610 DRAIN/INJ JOINT/BURSA W/O US: CPT | Performed by: ORTHOPAEDIC SURGERY

## 2022-04-27 PROCEDURE — 73564 X-RAY EXAM KNEE 4 OR MORE: CPT | Performed by: ORTHOPAEDIC SURGERY

## 2022-04-27 PROCEDURE — 73562 X-RAY EXAM OF KNEE 3: CPT | Performed by: ORTHOPAEDIC SURGERY

## 2022-04-27 PROCEDURE — 99213 OFFICE O/P EST LOW 20 MIN: CPT | Performed by: ORTHOPAEDIC SURGERY

## 2022-04-27 RX ORDER — TRIAMCINOLONE ACETONIDE 40 MG/ML
40 INJECTION, SUSPENSION INTRA-ARTICULAR; INTRAMUSCULAR ONCE
Status: COMPLETED | OUTPATIENT
Start: 2022-04-27 | End: 2022-04-27

## 2022-04-27 RX ADMIN — TRIAMCINOLONE ACETONIDE 40 MG: 40 INJECTION, SUSPENSION INTRA-ARTICULAR; INTRAMUSCULAR at 16:36:00

## 2022-04-27 NOTE — PROCEDURES
After informed consent, the patient's right knee was marked, locally anesthetized with skin refrigerant, prepped with topical antiseptic, and injected with a mixture of 1mL 40mg/mL Kenalog, 2mL 1% lidocaine and 2mL 0.5% marcaine through the inferolateral portal.  A band-aid was applied. The patient tolerated the procedure well.     Myrick Gottron, MD, [address] [place] [facility]  Phone [phone]  Fax [phone]

## 2022-04-28 ENCOUNTER — HOSPITAL ENCOUNTER (EMERGENCY)
Age: 67
Discharge: STILL ADMITTED OR ADMINISTRATIVE | End: 2022-04-28
Payer: MEDICARE

## 2022-04-28 ENCOUNTER — OFFICE VISIT (OUTPATIENT)
Dept: FAMILY MEDICINE CLINIC | Facility: CLINIC | Age: 67
End: 2022-04-28
Payer: MEDICARE

## 2022-04-28 VITALS
OXYGEN SATURATION: 98 % | DIASTOLIC BLOOD PRESSURE: 60 MMHG | HEART RATE: 72 BPM | SYSTOLIC BLOOD PRESSURE: 118 MMHG | TEMPERATURE: 97 F | RESPIRATION RATE: 20 BRPM

## 2022-04-28 DIAGNOSIS — Z02.9 ADMINISTRATIVE ENCOUNTER: Primary | ICD-10-CM

## 2022-05-09 ENCOUNTER — OFFICE VISIT (OUTPATIENT)
Dept: PODIATRY CLINIC | Facility: CLINIC | Age: 67
End: 2022-05-09
Payer: MEDICARE

## 2022-05-09 VITALS — HEART RATE: 76 BPM | SYSTOLIC BLOOD PRESSURE: 124 MMHG | DIASTOLIC BLOOD PRESSURE: 62 MMHG

## 2022-05-09 DIAGNOSIS — M25.572 SINUS TARSITIS, LEFT: Primary | ICD-10-CM

## 2022-05-09 PROCEDURE — 20605 DRAIN/INJ JOINT/BURSA W/O US: CPT | Performed by: PODIATRIST

## 2022-05-09 RX ORDER — GLIPIZIDE 5 MG/1
TABLET, FILM COATED, EXTENDED RELEASE ORAL
COMMUNITY
Start: 2021-06-02 | End: 2022-05-09

## 2022-05-09 RX ORDER — EMPAGLIFLOZIN 25 MG/1
TABLET, FILM COATED ORAL
COMMUNITY
Start: 2021-06-07 | End: 2022-05-09

## 2022-05-09 RX ORDER — FLUTICASONE PROPIONATE 50 MCG
SPRAY, SUSPENSION (ML) NASAL
COMMUNITY
Start: 2021-05-20 | End: 2022-05-09

## 2022-05-09 RX ORDER — CALCIUM CARBONATE/VITAMIN D3 600 MG-10
TABLET ORAL
COMMUNITY
Start: 2021-08-13

## 2022-05-09 RX ORDER — TRIAMCINOLONE ACETONIDE 40 MG/ML
20 INJECTION, SUSPENSION INTRA-ARTICULAR; INTRAMUSCULAR ONCE
Status: COMPLETED | OUTPATIENT
Start: 2022-05-09 | End: 2022-05-09

## 2022-05-09 RX ORDER — ATORVASTATIN CALCIUM 10 MG/1
10 TABLET, FILM COATED ORAL
COMMUNITY
Start: 2021-05-19 | End: 2022-05-09

## 2022-05-09 RX ORDER — RAMIPRIL 2.5 MG/1
CAPSULE ORAL
COMMUNITY
Start: 2021-05-20 | End: 2022-05-09

## 2022-05-09 RX ORDER — LINAGLIPTIN 5 MG/1
TABLET, FILM COATED ORAL
COMMUNITY
Start: 2021-06-01 | End: 2022-05-09

## 2022-05-09 NOTE — PROGRESS NOTES
Per Dr. Mati Ruano draw up 0.5ml of 0.5% Marcaine and 0.5ml of Kenalog 40 for injection to left foot.

## 2022-05-16 RX ORDER — FLUTICASONE PROPIONATE 50 MCG
SPRAY, SUSPENSION (ML) NASAL
Qty: 48 G | Refills: 0 | Status: SHIPPED | OUTPATIENT
Start: 2022-05-16

## 2022-05-23 ENCOUNTER — OFFICE VISIT (OUTPATIENT)
Dept: PODIATRY CLINIC | Facility: CLINIC | Age: 67
End: 2022-05-23
Payer: MEDICARE

## 2022-05-23 DIAGNOSIS — G57.62 MORTON'S METATARSALGIA, NEURALGIA, OR NEUROMA, LEFT: ICD-10-CM

## 2022-05-23 DIAGNOSIS — M79.672 FOOT PAIN, LEFT: ICD-10-CM

## 2022-05-23 DIAGNOSIS — M25.572 SINUS TARSITIS, LEFT: Primary | ICD-10-CM

## 2022-05-23 DIAGNOSIS — M19.072 ARTHROSIS OF MIDFOOT, LEFT: ICD-10-CM

## 2022-05-23 DIAGNOSIS — G57.82 NEUROMA OF THIRD INTERSPACE OF LEFT FOOT: ICD-10-CM

## 2022-05-23 PROCEDURE — 99213 OFFICE O/P EST LOW 20 MIN: CPT | Performed by: PODIATRIST

## 2022-05-23 RX ORDER — EMPAGLIFLOZIN 25 MG/1
1 TABLET, FILM COATED ORAL DAILY
Qty: 90 TABLET | Refills: 1 | Status: SHIPPED | OUTPATIENT
Start: 2022-05-23

## 2022-05-23 NOTE — TELEPHONE ENCOUNTER
Last VISIT 01/22/22    Last CPE 01/22/22    Last REFILL 09/03/21 qty 90 w/1 refill    Last LABS 03/24/22 UA/C    Future Appointments   Date Time Provider Margoth Silveira                 8/2/2022  5:00 PM Venu Lowe MD [facility]         Per PROTOCOL? Failed       Please Approve or Deny.

## 2022-05-25 RX ORDER — EMPAGLIFLOZIN 25 MG/1
1 TABLET, FILM COATED ORAL DAILY
Qty: 90 TABLET | Refills: 1 | OUTPATIENT
Start: 2022-05-25

## 2022-05-29 ENCOUNTER — HOSPITAL ENCOUNTER (EMERGENCY)
Age: 67
Discharge: HOME | End: 2022-05-29
Payer: MEDICARE

## 2022-05-29 ENCOUNTER — APPOINTMENT (OUTPATIENT)
Dept: GENERAL RADIOLOGY | Age: 67
End: 2022-05-29
Payer: MEDICARE

## 2022-05-29 VITALS
HEART RATE: 69 BPM | RESPIRATION RATE: 17 BRPM | TEMPERATURE: 97 F | SYSTOLIC BLOOD PRESSURE: 108 MMHG | OXYGEN SATURATION: 95 % | WEIGHT: 180 LBS | DIASTOLIC BLOOD PRESSURE: 47 MMHG | BODY MASS INDEX: 29 KG/M2

## 2022-05-29 DIAGNOSIS — L03.116 CELLULITIS OF LEFT LOWER EXTREMITY: Primary | ICD-10-CM

## 2022-05-29 PROCEDURE — 99283 EMERGENCY DEPT VISIT LOW MDM: CPT | Performed by: EMERGENCY MEDICINE

## 2022-05-29 PROCEDURE — 73630 X-RAY EXAM OF FOOT: CPT

## 2022-05-29 RX ORDER — IBUPROFEN 600 MG/1
600 TABLET ORAL ONCE
Status: COMPLETED | OUTPATIENT
Start: 2022-05-29 | End: 2022-05-29

## 2022-05-29 RX ORDER — TRAMADOL HYDROCHLORIDE 50 MG/1
TABLET ORAL EVERY 6 HOURS PRN
Qty: 10 TABLET | Refills: 0 | Status: SHIPPED | OUTPATIENT
Start: 2022-05-29 | End: 2022-06-03

## 2022-05-29 RX ORDER — CEPHALEXIN 500 MG/1
500 CAPSULE ORAL 4 TIMES DAILY
Qty: 40 CAPSULE | Refills: 0 | Status: SHIPPED | OUTPATIENT
Start: 2022-05-29 | End: 2022-06-08

## 2022-06-01 ENCOUNTER — TELEPHONE (OUTPATIENT)
Dept: INTERNAL MEDICINE CLINIC | Facility: CLINIC | Age: 67
End: 2022-06-01

## 2022-06-18 DIAGNOSIS — N95.2 VAGINAL ATROPHY: ICD-10-CM

## 2022-06-20 ENCOUNTER — OFFICE VISIT (OUTPATIENT)
Dept: PODIATRY CLINIC | Facility: CLINIC | Age: 67
End: 2022-06-20
Payer: MEDICARE

## 2022-06-20 VITALS — DIASTOLIC BLOOD PRESSURE: 60 MMHG | SYSTOLIC BLOOD PRESSURE: 110 MMHG

## 2022-06-20 DIAGNOSIS — M77.42 METATARSALGIA OF LEFT FOOT: ICD-10-CM

## 2022-06-20 DIAGNOSIS — M77.50 CAPSULITIS OF METATARSOPHALANGEAL (MTP) JOINT: Primary | ICD-10-CM

## 2022-06-20 PROCEDURE — 1125F AMNT PAIN NOTED PAIN PRSNT: CPT | Performed by: PODIATRIST

## 2022-06-20 PROCEDURE — 20600 DRAIN/INJ JOINT/BURSA W/O US: CPT | Performed by: PODIATRIST

## 2022-06-20 RX ORDER — ESTRADIOL 0.1 MG/G
CREAM VAGINAL
Qty: 42.5 G | Refills: 2 | Status: SHIPPED | OUTPATIENT
Start: 2022-06-20

## 2022-06-20 RX ORDER — TRIAMCINOLONE ACETONIDE 40 MG/ML
20 INJECTION, SUSPENSION INTRA-ARTICULAR; INTRAMUSCULAR ONCE
Status: COMPLETED | OUTPATIENT
Start: 2022-06-20 | End: 2022-06-20

## 2022-06-21 ENCOUNTER — TELEPHONE (OUTPATIENT)
Dept: ORTHOPEDICS CLINIC | Facility: CLINIC | Age: 67
End: 2022-06-21

## 2022-06-21 DIAGNOSIS — M97.12XD PERIPROSTHETIC FRACTURE AROUND INTERNAL PROSTHETIC LEFT KNEE JOINT, SUBSEQUENT ENCOUNTER: Primary | ICD-10-CM

## 2022-06-22 ENCOUNTER — HOSPITAL ENCOUNTER (OUTPATIENT)
Dept: GENERAL RADIOLOGY | Age: 67
Discharge: HOME | End: 2022-06-22
Attending: ORTHOPAEDIC SURGERY
Payer: MEDICAID

## 2022-06-22 ENCOUNTER — OFFICE VISIT (OUTPATIENT)
Dept: ORTHOPEDICS CLINIC | Facility: CLINIC | Age: 67
End: 2022-06-22
Payer: MEDICARE

## 2022-06-22 DIAGNOSIS — M97.12XD PERIPROSTHETIC FRACTURE AROUND INTERNAL PROSTHETIC LEFT KNEE JOINT, SUBSEQUENT ENCOUNTER: ICD-10-CM

## 2022-06-22 DIAGNOSIS — Z09 FOLLOW-UP EXAMINATION FOLLOWING TREATMENT OF FRACTURE: ICD-10-CM

## 2022-06-22 DIAGNOSIS — M97.12XD PERIPROSTHETIC FRACTURE AROUND INTERNAL PROSTHETIC LEFT KNEE JOINT, SUBSEQUENT ENCOUNTER: Primary | ICD-10-CM

## 2022-06-22 PROCEDURE — 73552 X-RAY EXAM OF FEMUR 2/>: CPT | Performed by: ORTHOPAEDIC SURGERY

## 2022-06-22 PROCEDURE — 99213 OFFICE O/P EST LOW 20 MIN: CPT | Performed by: ORTHOPAEDIC SURGERY

## 2022-06-22 NOTE — PROGRESS NOTES
[facility] Fracture follow-up Progress Note      Date of surgery: 7/7/2021      History: Isa Orlando is a 70-year-old female presenting with her daughter as  for postop follow-up approximately 1 year status post ORIF of a periprosthetic femoral shaft fracture. She tolerates free ambulation at this point without assistance of the cane. This was discontinued about a month ago. Although her endurance and strength are still subjectively diminished she feels she no longer needs assistive devices. She was recently seen by Yessica Driver in April and provided a corticosteroid injection to the contralateral right knee. This resulted in improvement. She is now struggling with some metatarsal capsulitis being managed by Dr. Alison Julian. Physical Exam: There is minimal tenderness about the distal femur and knee laterally. She can extend actively with range of motion 0 to 105 degrees. Knee is stable on gentle varus valgus stress. No effusion. Distal exam remains intact with a negative Homans. Inspection of her gait reveals a fluid reciprocal pattern without antalgia but a relatively short stride. Imaging Results: AP lateral views left femur personally viewed, independently interpreted and radiology report read. No interval change in hardware position with robust periosteal callus bridging the fracture site consistent with progressive healing. Assessment: Diagnoses and all orders for this visit:    Periprosthetic fracture around internal prosthetic left knee joint, subsequent encounter    Follow-up examination following treatment of fracture     Plan: Reviewed imaging and exam findings with Isa Orlando and her daughter. Overall she is doing well with clinical and radiographic signs of progressive healing. She has experienced some irritation likely related to the large lateral plate. Functionally she is progressing and no longer dependent on use of a cane.   Fall prevention, continued passive stretching and light close kinetic chain strengthening was advised to improve endurance and strength. Over the course of time, if symptoms persist at the lateral knee hardware removal could be considered but this would involve a significant incision with inherent risk including potential for recurrent fracture in this very slow healing highly comminuted injury. I would refrain from hardware removal for at least 2 years postop. For now the patient is satisfied with her slow but steady progress and may follow-up with us on an as-needed basis. All questions were answered and the patient and her daughter verbalized understanding and appreciation. Heather Weir MD  [address] [place] [address] Surgery    The dictation was partially prepared using [id number] voice recognition software.   Although every attempt is made to correct errors where identified, discrepancies may still exist.

## 2022-07-01 RX ORDER — SERTRALINE HYDROCHLORIDE 100 MG/1
TABLET, FILM COATED ORAL
Qty: 90 TABLET | Refills: 0 | Status: SHIPPED | OUTPATIENT
Start: 2022-07-01

## 2022-07-05 RX ORDER — GLIPIZIDE 5 MG/1
TABLET, FILM COATED, EXTENDED RELEASE ORAL
Qty: 90 TABLET | Refills: 3 | Status: SHIPPED | OUTPATIENT
Start: 2022-07-05

## 2022-07-05 RX ORDER — PEN NEEDLE, DIABETIC 31 GX5/16"
NEEDLE, DISPOSABLE MISCELLANEOUS
Qty: 100 EACH | Refills: 1 | Status: SHIPPED | OUTPATIENT
Start: 2022-07-05

## 2022-07-05 RX ORDER — METFORMIN HYDROCHLORIDE 500 MG/1
TABLET, EXTENDED RELEASE ORAL
Qty: 360 TABLET | Refills: 0 | Status: SHIPPED | OUTPATIENT
Start: 2022-07-05

## 2022-07-30 ENCOUNTER — LAB ENCOUNTER (OUTPATIENT)
Dept: LAB | Age: 67
End: 2022-07-30
Attending: INTERNAL MEDICINE
Payer: MEDICAID

## 2022-07-30 DIAGNOSIS — Z00.00 ENCOUNTER FOR ANNUAL HEALTH EXAMINATION: ICD-10-CM

## 2022-07-30 DIAGNOSIS — I25.10 CORONARY ARTERY DISEASE INVOLVING NATIVE CORONARY ARTERY OF NATIVE HEART WITHOUT ANGINA PECTORIS: ICD-10-CM

## 2022-07-30 DIAGNOSIS — I44.7 LBBB (LEFT BUNDLE BRANCH BLOCK): ICD-10-CM

## 2022-07-30 DIAGNOSIS — R80.9 MICROALBUMINURIA DUE TO TYPE 2 DIABETES MELLITUS (HCC): ICD-10-CM

## 2022-07-30 DIAGNOSIS — E78.2 MIXED HYPERLIPIDEMIA: ICD-10-CM

## 2022-07-30 DIAGNOSIS — E11.40 TYPE 2 DIABETES MELLITUS WITH DIABETIC NEUROPATHY, WITH LONG-TERM CURRENT USE OF INSULIN (HCC): ICD-10-CM

## 2022-07-30 DIAGNOSIS — I10 ESSENTIAL HYPERTENSION: ICD-10-CM

## 2022-07-30 DIAGNOSIS — Z79.4 TYPE 2 DIABETES MELLITUS WITH DIABETIC NEUROPATHY, WITH LONG-TERM CURRENT USE OF INSULIN (HCC): ICD-10-CM

## 2022-07-30 DIAGNOSIS — E11.29 MICROALBUMINURIA DUE TO TYPE 2 DIABETES MELLITUS (HCC): ICD-10-CM

## 2022-07-30 LAB
ALBUMIN SERPL-MCNC: 3.5 G/DL (ref 3.4–5)
ALBUMIN/GLOB SERPL: 0.9 {RATIO} (ref 1–2)
ALP LIVER SERPL-CCNC: 144 U/L
ALT SERPL-CCNC: 22 U/L
ANION GAP SERPL CALC-SCNC: 3 MMOL/L (ref 0–18)
AST SERPL-CCNC: 19 U/L (ref 15–37)
BILIRUB SERPL-MCNC: 0.4 MG/DL (ref 0.1–2)
BUN BLD-MCNC: 12 MG/DL (ref 7–18)
CALCIUM BLD-MCNC: 9.6 MG/DL (ref 8.5–10.1)
CHLORIDE SERPL-SCNC: 107 MMOL/L (ref 98–112)
CHOLEST SERPL-MCNC: 163 MG/DL (ref ?–200)
CO2 SERPL-SCNC: 30 MMOL/L (ref 21–32)
CREAT BLD-MCNC: 0.89 MG/DL
CREAT UR-SCNC: 97.1 MG/DL
EST. AVERAGE GLUCOSE BLD GHB EST-MCNC: 183 MG/DL (ref 68–126)
FASTING PATIENT LIPID ANSWER: YES
FASTING STATUS PATIENT QL REPORTED: YES
GLOBULIN PLAS-MCNC: 4.1 G/DL (ref 2.8–4.4)
GLUCOSE BLD-MCNC: 163 MG/DL (ref 70–99)
HBA1C MFR BLD: 8 % (ref ?–5.7)
HDLC SERPL-MCNC: 46 MG/DL (ref 40–59)
LDLC SERPL CALC-MCNC: 81 MG/DL (ref ?–100)
MICROALBUMIN UR-MCNC: 1.34 MG/DL
MICROALBUMIN/CREAT 24H UR-RTO: 13.8 UG/MG (ref ?–30)
NONHDLC SERPL-MCNC: 117 MG/DL (ref ?–130)
OSMOLALITY SERPL CALC.SUM OF ELEC: 293 MOSM/KG (ref 275–295)
POTASSIUM SERPL-SCNC: 5.5 MMOL/L (ref 3.5–5.1)
PROT SERPL-MCNC: 7.6 G/DL (ref 6.4–8.2)
SODIUM SERPL-SCNC: 140 MMOL/L (ref 136–145)
TRIGL SERPL-MCNC: 217 MG/DL (ref 30–149)
VLDLC SERPL CALC-MCNC: 34 MG/DL (ref 0–30)

## 2022-07-30 PROCEDURE — 80061 LIPID PANEL: CPT

## 2022-07-30 PROCEDURE — 82570 ASSAY OF URINE CREATININE: CPT

## 2022-07-30 PROCEDURE — 36415 COLL VENOUS BLD VENIPUNCTURE: CPT

## 2022-07-30 PROCEDURE — 80053 COMPREHEN METABOLIC PANEL: CPT

## 2022-07-30 PROCEDURE — 82043 UR ALBUMIN QUANTITATIVE: CPT

## 2022-07-30 PROCEDURE — 83036 HEMOGLOBIN GLYCOSYLATED A1C: CPT

## 2022-07-31 NOTE — PROGRESS NOTES
K elevated on labs. I spoke with pt's dtr Omer Hall. Pt denies CP and palpitations, etc.  She consistently feels \"tired,\" that is pt's only complaint. I asked pt to hold Ramipril and high K foods until her appt with Jolly on 8/2/22. ER warnings given. Pt's dtr Rajiv Hansen voiced understanding.

## 2022-08-01 ENCOUNTER — TELEPHONE (OUTPATIENT)
Dept: ORTHOPEDICS CLINIC | Facility: CLINIC | Age: 67
End: 2022-08-01

## 2022-08-02 ENCOUNTER — OFFICE VISIT (OUTPATIENT)
Dept: INTERNAL MEDICINE CLINIC | Facility: CLINIC | Age: 67
End: 2022-08-02
Payer: MEDICARE

## 2022-08-02 VITALS
BODY MASS INDEX: 31 KG/M2 | SYSTOLIC BLOOD PRESSURE: 102 MMHG | HEART RATE: 62 BPM | DIASTOLIC BLOOD PRESSURE: 52 MMHG | TEMPERATURE: 97 F | WEIGHT: 195 LBS

## 2022-08-02 DIAGNOSIS — I10 ESSENTIAL HYPERTENSION: ICD-10-CM

## 2022-08-02 DIAGNOSIS — E78.5 DYSLIPIDEMIA: ICD-10-CM

## 2022-08-02 DIAGNOSIS — E87.5 HYPERKALEMIA: ICD-10-CM

## 2022-08-02 DIAGNOSIS — E11.40 TYPE 2 DIABETES MELLITUS WITH DIABETIC NEUROPATHY, WITH LONG-TERM CURRENT USE OF INSULIN (HCC): Primary | ICD-10-CM

## 2022-08-02 DIAGNOSIS — Z79.4 TYPE 2 DIABETES MELLITUS WITH DIABETIC NEUROPATHY, WITH LONG-TERM CURRENT USE OF INSULIN (HCC): Primary | ICD-10-CM

## 2022-08-02 PROCEDURE — 99214 OFFICE O/P EST MOD 30 MIN: CPT | Performed by: INTERNAL MEDICINE

## 2022-08-13 ENCOUNTER — LAB ENCOUNTER (OUTPATIENT)
Dept: LAB | Age: 67
End: 2022-08-13
Attending: INTERNAL MEDICINE
Payer: MEDICARE

## 2022-08-13 DIAGNOSIS — E87.5 HYPERKALEMIA: ICD-10-CM

## 2022-08-13 LAB
ANION GAP SERPL CALC-SCNC: 5 MMOL/L (ref 0–18)
BUN BLD-MCNC: 18 MG/DL (ref 7–18)
CALCIUM BLD-MCNC: 9.4 MG/DL (ref 8.5–10.1)
CHLORIDE SERPL-SCNC: 102 MMOL/L (ref 98–112)
CO2 SERPL-SCNC: 27 MMOL/L (ref 21–32)
CREAT BLD-MCNC: 1.05 MG/DL
FASTING STATUS PATIENT QL REPORTED: NO
GFR SERPLBLD BASED ON 1.73 SQ M-ARVRAT: 58 ML/MIN/1.73M2 (ref 60–?)
GLUCOSE BLD-MCNC: 303 MG/DL (ref 70–99)
OSMOLALITY SERPL CALC.SUM OF ELEC: 291 MOSM/KG (ref 275–295)
POTASSIUM SERPL-SCNC: 5.1 MMOL/L (ref 3.5–5.1)
SODIUM SERPL-SCNC: 134 MMOL/L (ref 136–145)

## 2022-08-13 PROCEDURE — 80048 BASIC METABOLIC PNL TOTAL CA: CPT

## 2022-08-13 PROCEDURE — 36415 COLL VENOUS BLD VENIPUNCTURE: CPT

## 2022-09-01 ENCOUNTER — TELEPHONE (OUTPATIENT)
Dept: INTERNAL MEDICINE CLINIC | Facility: CLINIC | Age: 67
End: 2022-09-01

## 2022-09-01 DIAGNOSIS — I83.893 SYMPTOMATIC VARICOSE VEINS, BILATERAL: Primary | ICD-10-CM

## 2022-09-03 ENCOUNTER — HOSPITAL ENCOUNTER (OUTPATIENT)
Dept: GENERAL RADIOLOGY | Age: 67
Discharge: HOME | End: 2022-09-03
Attending: PODIATRIST
Payer: MEDICARE

## 2022-09-03 DIAGNOSIS — M79.672 LEFT FOOT PAIN: ICD-10-CM

## 2022-09-03 PROCEDURE — 73630 X-RAY EXAM OF FOOT: CPT | Performed by: PODIATRIST

## 2022-09-06 ENCOUNTER — OFFICE VISIT (OUTPATIENT)
Dept: ORTHOPEDICS CLINIC | Facility: CLINIC | Age: 67
End: 2022-09-06
Payer: MEDICARE

## 2022-09-06 DIAGNOSIS — M25.572 CHRONIC PAIN OF LEFT ANKLE: ICD-10-CM

## 2022-09-06 DIAGNOSIS — M19.079 ARTHRITIS, MIDFOOT: ICD-10-CM

## 2022-09-06 DIAGNOSIS — S92.345S CLOSED NONDISPLACED FRACTURE OF FOURTH METATARSAL BONE OF LEFT FOOT, SEQUELA: ICD-10-CM

## 2022-09-06 DIAGNOSIS — L90.9 PLANTAR FAT PAD ATROPHY: ICD-10-CM

## 2022-09-06 DIAGNOSIS — E11.49 DIABETES MELLITUS TYPE 2 WITH NEUROLOGICAL MANIFESTATIONS (HCC): ICD-10-CM

## 2022-09-06 DIAGNOSIS — G89.29 CHRONIC PAIN OF LEFT ANKLE: ICD-10-CM

## 2022-09-06 DIAGNOSIS — M85.89 OSTEOPENIA OF MULTIPLE SITES: ICD-10-CM

## 2022-09-06 DIAGNOSIS — M21.619 BUNION: ICD-10-CM

## 2022-09-06 DIAGNOSIS — M20.42 HAMMER TOE OF LEFT FOOT: Primary | ICD-10-CM

## 2022-09-06 DIAGNOSIS — I87.2 VENOUS INSUFFICIENCY OF BOTH LOWER EXTREMITIES: ICD-10-CM

## 2022-09-06 PROBLEM — S92.345A CLOSED NONDISPLACED FRACTURE OF FOURTH METATARSAL BONE OF LEFT FOOT: Status: ACTIVE | Noted: 2022-09-06

## 2022-09-06 PROCEDURE — 99204 OFFICE O/P NEW MOD 45 MIN: CPT | Performed by: PODIATRIST

## 2022-09-07 ENCOUNTER — TELEPHONE (OUTPATIENT)
Dept: ORTHOPEDICS CLINIC | Facility: CLINIC | Age: 67
End: 2022-09-07

## 2022-09-07 NOTE — TELEPHONE ENCOUNTER
----- Message from [place].  ALYSSA Agrawal sent at 9/6/2022  4:51 PM CDT -----  Surgery for her in December please  Plan in my notes from today    Thanks,  JF    Fusion first mpj left foot  Fusion 2nd and 3rd toes (pipj) with inion chaya left foot  [facility]  1 1/2 hr  MAC with local  A Monday in December

## 2022-09-11 NOTE — TELEPHONE ENCOUNTER
Unfortunately she will be just shy of 90 days from last HbA1c. This won't be covered at this time. I do not have any further orders to add. [facility] Division of [facility]  Pau SantanaDO  Pager (M-F, 8A-5P): [phone]      Patient is a 81y old  Male who presents with a chief complaint of Suicidal Ideation (09 Sep 2022 11:49)      SUBJECTIVE / OVERNIGHT EVENTS: no overnight events, pt seen at bedside, PCA present. Pt feeling well, has no complaints. denies feeling sad at this moment. Asking about metformin and why he isn't getting it inpt.   ADDITIONAL REVIEW OF SYSTEMS: no cp/sob    MEDICATIONS  (STANDING):  enoxaparin Injectable 40 milliGRAM(s) SubCutaneous every 24 hours  mirtazapine 30 milliGRAM(s) Oral at bedtime    MEDICATIONS  (PRN):  simethicone 80 milliGRAM(s) Chew three times a day PRN Upset Stomach      CAPILLARY BLOOD GLUCOSE        I&O's Summary      PHYSICAL EXAM:  Vital Signs Last 24 Hrs  T(C): 36.3 (10 Sep 2022 10:00), Max: 36.6 (10 Sep 2022 05:00)  T(F): 97.3 (10 Sep 2022 10:00), Max: 97.8 (10 Sep 2022 05:00)  HR: 70 (10 Sep 2022 10:00) (52 - 70)  BP: 129/74 (10 Sep 2022 10:00) (129/74 - 155/79)  BP(mean): --  RR: 18 (10 Sep 2022 10:00) (16 - 18)  SpO2: 98% (10 Sep 2022 05:00) (97% - 100%)    Parameters below as of 10 Sep 2022 10:00    O2 Flow (L/min): 99    CONSTITUTIONAL: NAD, elderly, laying in bed   HEENT: EOMI, MMM, edentulous   RESPIRATORY: Normal respiratory effort; lungs are clear to auscultation bilaterally  CARDIOVASCULAR: Regular rate and rhythm, normal S1 and S2  ABDOMEN: Nontender to palpation, normoactive bowel sounds  MUSKULOSKELETAL:  no clubbing or cyanosis of digits; no joint swelling or tenderness to palpation  PSYCH: calm, cooperative  NEUROLOGY: nonfocal, answering questions, following commands  SKIN: No rashes; no palpable lesions  LABS:                      RADIOLOGY & ADDITIONAL TESTS:  Results Reviewed:   Imaging Personally Reviewed:  Electrocardiogram Personally Reviewed:    COORDINATION OF CARE:  Care Discussed with Consultants/Other Providers [Y/N]: Y  Prior or Outpatient Records Reviewed [Y/N]: Y   [facility] Division of Hospital Medicine  Pau Santana DO  Pager (M-F, 8A-5P): [phone]      Patient is a 81y old  Male who presents with a chief complaint of Suicidal Ideation (10 Sep 2022 11:10)      SUBJECTIVE / OVERNIGHT EVENTS: no overnight events, pt seen at bedside, feeling well. PCA reports pt has been ambulating around room.  ADDITIONAL REVIEW OF SYSTEMS: no cp/sob     MEDICATIONS  (STANDING):  budesonide  80 MICROgram(s)/formoterol 4.5 MICROgram(s) Inhaler 2 Puff(s) Inhalation two times a day  enoxaparin Injectable 40 milliGRAM(s) SubCutaneous every 24 hours  mirtazapine 30 milliGRAM(s) Oral at bedtime  polyethylene glycol 3350 17 Gram(s) Oral daily    MEDICATIONS  (PRN):  simethicone 80 milliGRAM(s) Chew three times a day PRN Upset Stomach      CAPILLARY BLOOD GLUCOSE        I&O's Summary      PHYSICAL EXAM:  Vital Signs Last 24 Hrs  T(C): 36.4 (11 Sep 2022 05:34), Max: 36.4 (10 Sep 2022 18:00)  T(F): 97.5 (11 Sep 2022 05:34), Max: 97.5 (10 Sep 2022 18:00)  HR: 63 (11 Sep 2022 05:34) (63 - 64)  BP: 144/75 (11 Sep 2022 05:34) (136/73 - 144/75)  BP(mean): --  RR: 17 (11 Sep 2022 05:34) (17 - 17)  SpO2: 97% (11 Sep 2022 05:34) (97% - 98%)    Parameters below as of 11 Sep 2022 05:34  Patient On (Oxygen Delivery Method): room air      CONSTITUTIONAL: NAD, elderly, laying in bed   HEENT: EOMI, MMM, edentulous   RESPIRATORY: Normal respiratory effort; lungs are clear to auscultation bilaterally  CARDIOVASCULAR: Regular rate and rhythm, normal S1 and S2  ABDOMEN: Nontender to palpation, normoactive bowel sounds  MUSKULOSKELETAL:  no clubbing or cyanosis of digits; no joint swelling or tenderness to palpation  PSYCH: calm, cooperative  NEUROLOGY: nonfocal, answering questions, following commands  SKIN: No rashes; no palpable lesions  LABS:                      RADIOLOGY & ADDITIONAL TESTS:  Results Reviewed:   Imaging Personally Reviewed:  Electrocardiogram Personally Reviewed:    COORDINATION OF CARE:  Care Discussed with Consultants/Other Providers [Y/N]: Y  Prior or Outpatient Records Reviewed [Y/N]: Y   [facility] Division of Hospital Medicine  Pau SantanaDO  Pager (M-F, 8A-5P): [phone]      Patient is a 81y old  Male who presents with a chief complaint of Suicidal Ideation (08 Sep 2022 15:25)      SUBJECTIVE / OVERNIGHT EVENTS: no overnight events, PCA reports pt was coughing with food this morning, however passed bedside dysphagia. No other events noted in ER prior to arrival to .    ADDITIONAL REVIEW OF SYSTEMS: no cp/sob     MEDICATIONS  (STANDING):  enoxaparin Injectable 40 milliGRAM(s) SubCutaneous every 24 hours  mirtazapine 30 milliGRAM(s) Oral at bedtime    MEDICATIONS  (PRN):  simethicone 80 milliGRAM(s) Chew three times a day PRN Upset Stomach      CAPILLARY BLOOD GLUCOSE      POCT Blood Glucose.: 112 mg/dL (09 Sep 2022 07:16)  POCT Blood Glucose.: 124 mg/dL (08 Sep 2022 23:18)  POCT Blood Glucose.: 79 mg/dL (08 Sep 2022 22:21)  POCT Blood Glucose.: 117 mg/dL (08 Sep 2022 13:04)    I&O's Summary      PHYSICAL EXAM:  Vital Signs Last 24 Hrs  T(C): 36.2 (08 Sep 2022 21:06), Max: 36.6 (08 Sep 2022 12:13)  T(F): 97.2 (08 Sep 2022 21:06), Max: 97.8 (08 Sep 2022 12:13)  HR: 63 (08 Sep 2022 21:06) (61 - 63)  BP: 111/70 (08 Sep 2022 21:06) (111/70 - 144/88)  BP(mean): --  RR: 17 (08 Sep 2022 21:06) (17 - 20)  SpO2: 96% (08 Sep 2022 21:06) (96% - 99%)    Parameters below as of 08 Sep 2022 21:06  Patient On (Oxygen Delivery Method): room air      CONSTITUTIONAL: NAD, elderly, laying in stretcher   HEENT: EOMI, MMM, edentulous   RESPIRATORY: Normal respiratory effort; lungs are clear to auscultation bilaterally  CARDIOVASCULAR: Regular rate and rhythm, normal S1 and S2  ABDOMEN: Nontender to palpation, normoactive bowel sounds  MUSKULOSKELETAL:  no clubbing or cyanosis of digits; no joint swelling or tenderness to palpation  PSYCH: calm, cooperative   NEUROLOGY: nonfocal   SKIN: No rashes; no palpable lesions  LABS:                      RADIOLOGY & ADDITIONAL TESTS:  Results Reviewed:   Imaging Personally Reviewed:  Electrocardiogram Personally Reviewed:    COORDINATION OF CARE:  Care Discussed with Consultants/Other Providers [Y/N]: Y  Prior or Outpatient Records Reviewed [Y/N]: Y   [facility] Division of Hospital Medicine  Pau Santana DO  Pager (M-F, 8A-5P): [phone]      Patient is a 81y old  Male who presents with a chief complaint of Suicidal Ideation (07 Sep 2022 16:19)      SUBJECTIVE / OVERNIGHT EVENTS: no overnight events, pt sitting at bedside, ate all of his breakfast  ADDITIONAL REVIEW OF SYSTEMS: no cp/sob     MEDICATIONS  (STANDING):  dextrose 5%. 1000 milliLiter(s) (100 mL/Hr) IV Continuous <Continuous>  dextrose 5%. 1000 milliLiter(s) (50 mL/Hr) IV Continuous <Continuous>  dextrose 50% Injectable 25 Gram(s) IV Push once  dextrose 50% Injectable 12.5 Gram(s) IV Push once  dextrose 50% Injectable 25 Gram(s) IV Push once  enoxaparin Injectable 40 milliGRAM(s) SubCutaneous every 24 hours  glucagon  Injectable 1 milliGRAM(s) IntraMuscular once  insulin lispro (ADMELOG) corrective regimen sliding scale   SubCutaneous three times a day before meals  insulin lispro (ADMELOG) corrective regimen sliding scale   SubCutaneous at bedtime  mirtazapine 30 milliGRAM(s) Oral at bedtime    MEDICATIONS  (PRN):  dextrose Oral Gel 15 Gram(s) Oral once PRN Blood Glucose LESS THAN 70 milliGRAM(s)/deciliter  simethicone 80 milliGRAM(s) Chew three times a day PRN Upset Stomach      CAPILLARY BLOOD GLUCOSE  94 (08 Sep 2022 09:06)      POCT Blood Glucose.: 117 mg/dL (08 Sep 2022 13:04)    I&O's Summary      PHYSICAL EXAM:  Vital Signs Last 24 Hrs  T(C): 36.3 (08 Sep 2022 14:43), Max: 36.7 (07 Sep 2022 16:30)  T(F): 97.3 (08 Sep 2022 14:43), Max: 98 (07 Sep 2022 16:30)  HR: 61 (08 Sep 2022 14:43) (60 - 61)  BP: 142/76 (08 Sep 2022 14:43) (141/80 - 147/85)  BP(mean): --  RR: 17 (08 Sep 2022 14:43) (17 - 20)  SpO2: 99% (08 Sep 2022 14:43) (97% - 100%)    Parameters below as of 08 Sep 2022 14:43  Patient On (Oxygen Delivery Method): room air      CONSTITUTIONAL: NAD, elderly, laying in stretcher   HEENT: EOMI, MMM, edentulous   RESPIRATORY: Normal respiratory effort; lungs are clear to auscultation bilaterally  CARDIOVASCULAR: Regular rate and rhythm, normal S1 and S2  ABDOMEN: Nontender to palpation, normoactive bowel sounds  MUSKULOSKELETAL:  no clubbing or cyanosis of digits; no joint swelling or tenderness to palpation  PSYCH: calm, cooperative   NEUROLOGY: nonfocal   SKIN: No rashes; no palpable lesions    LABS:                        12.7   4.62  )-----------( 210      ( 06 Sep 2022 20:52 )             37.4         137  |  103  |  14  ----------------------------<  99  4.0   |  25  |  0.80    Ca    9.4      06 Sep 2022 20:52    TPro  6.5  /  Alb  4.2  /  TBili  0.4  /  DBili  x   /  AST  12  /  ALT  8   /  AlkPhos  38<L>            Urinalysis Basic - ( 07 Sep 2022 04:47 )    Color: Light Yellow / Appearance: Clear / S.014 / pH: x  Gluc: x / Ketone: Small  / Bili: Negative / Urobili: <2 mg/dL   Blood: x / Protein: Negative / Nitrite: Negative   Leuk Esterase: Negative / RBC: x / WBC x   Sq Epi: x / Non Sq Epi: x / Bacteria: x          RADIOLOGY & ADDITIONAL TESTS:  Results Reviewed:   Imaging Personally Reviewed:  Electrocardiogram Personally Reviewed:    COORDINATION OF CARE:  Care Discussed with Consultants/Other Providers [Y/N]: Y  Prior or Outpatient Records Reviewed [Y/N]: Y   [facility] Division of Hospital Medicine  Pau Santana DO  Pager (M-F, 8A-5P): [phone]      Patient is a 81y old  Male who presents with a chief complaint of Suicidal Ideation (07 Sep 2022 00:41)      SUBJECTIVE / OVERNIGHT EVENTS: no overnight events, pt seen in ER. Calm, comfortable in bed. Per PCA, no agitation, ate some lunch  ADDITIONAL REVIEW OF SYSTEMS: no cp/sob     MEDICATIONS  (STANDING):  dextrose 5%. 1000 milliLiter(s) (50 mL/Hr) IV Continuous <Continuous>  dextrose 5%. 1000 milliLiter(s) (100 mL/Hr) IV Continuous <Continuous>  dextrose 50% Injectable 25 Gram(s) IV Push once  dextrose 50% Injectable 12.5 Gram(s) IV Push once  dextrose 50% Injectable 25 Gram(s) IV Push once  enoxaparin Injectable 40 milliGRAM(s) SubCutaneous every 24 hours  glucagon  Injectable 1 milliGRAM(s) IntraMuscular once  insulin lispro (ADMELOG) corrective regimen sliding scale   SubCutaneous three times a day before meals  insulin lispro (ADMELOG) corrective regimen sliding scale   SubCutaneous at bedtime  mirtazapine 30 milliGRAM(s) Oral at bedtime    MEDICATIONS  (PRN):  dextrose Oral Gel 15 Gram(s) Oral once PRN Blood Glucose LESS THAN 70 milliGRAM(s)/deciliter  simethicone 80 milliGRAM(s) Chew three times a day PRN Upset Stomach      CAPILLARY BLOOD GLUCOSE      POCT Blood Glucose.: 102 mg/dL (07 Sep 2022 12:50)  POCT Blood Glucose.: 97 mg/dL (07 Sep 2022 08:45)  POCT Blood Glucose.: 87 mg/dL (07 Sep 2022 03:29)    I&O's Summary      PHYSICAL EXAM:  Vital Signs Last 24 Hrs  T(C): 36.6 (07 Sep 2022 09:00), Max: 37 (07 Sep 2022 04:02)  T(F): 97.8 (07 Sep 2022 09:00), Max: 98.6 (07 Sep 2022 04:02)  HR: 60 (07 Sep 2022 11:45) (47 - 60)  BP: 147/92 (07 Sep 2022 11:45) (122/63 - 157/79)  BP(mean): --  RR: 20 (07 Sep 2022 11:45) (16 - 20)  SpO2: 100% (07 Sep 2022 11:45) (97% - 100%)    Parameters below as of 07 Sep 2022 11:45  Patient On (Oxygen Delivery Method): room air      CONSTITUTIONAL: NAD, elderly, comfortable in stretcher   HEENT: EOMI, MMM, edentulous   RESPIRATORY: Normal respiratory effort; lungs are clear to auscultation bilaterally  CARDIOVASCULAR: Regular rate and rhythm, normal S1 and S2  ABDOMEN: Nontender to palpation, normoactive bowel sounds  MUSKULOSKELETAL:  no clubbing or cyanosis of digits; no joint swelling or tenderness to palpation  PSYCH: calm, cooperative   NEUROLOGY: nonfocal   SKIN: No rashes; no palpable lesions    LABS:                        12.7   4.62  )-----------( 210      ( 06 Sep 2022 20:52 )             37.4         137  |  103  |  14  ----------------------------<  99  4.0   |  25  |  0.80    Ca    9.4      06 Sep 2022 20:52    TPro  6.5  /  Alb  4.2  /  TBili  0.4  /  DBili  x   /  AST  12  /  ALT  8   /  AlkPhos  38<L>  09-06          Urinalysis Basic - ( 07 Sep 2022 04:47 )    Color: Light Yellow / Appearance: Clear / S.014 / pH: x  Gluc: x / Ketone: Small  / Bili: Negative / Urobili: <2 mg/dL   Blood: x / Protein: Negative / Nitrite: Negative   Leuk Esterase: Negative / RBC: x / WBC x   Sq Epi: x / Non Sq Epi: x / Bacteria: x          RADIOLOGY & ADDITIONAL TESTS:  Results Reviewed:   Imaging Personally Reviewed:  Electrocardiogram Personally Reviewed:    COORDINATION OF CARE:  Care Discussed with Consultants/Other Providers [Y/N]: Y  Prior or Outpatient Records Reviewed [Y/N]: Y

## 2022-09-12 ENCOUNTER — TELEPHONE (OUTPATIENT)
Dept: ORTHOPEDICS CLINIC | Facility: CLINIC | Age: 67
End: 2022-09-12

## 2022-09-12 NOTE — TELEPHONE ENCOUNTER
Surgeon: Dr. Marjorie Kirkland    Date of Surgery: 12/12/22    Post Op Appt: 12/20/22 WDR    Prior Authorization:   Inpatient or Outpatient: OUTPATIENT   Facility: [address] [place] [address] Comp: NO  CPT: 95626,50513  DX: M20.42, M21.619    Surgical Assistant: NONE    Preadmission Testing: PER ANESTHESIA GUIDELINES     Pre-Op Clearance Requested: HISTORY AND PHYSICAL and MEDICAL CLEARANCE    DME:  NONE NEEDED    Rehab Services Ordered: NONE     Disability Paperwork: NO    On [facility] Calendar: YES    Notes: Fusion first mpj left foot  Fusion 2nd and 3rd toes (pipj) with inion chaya left foot  [facility]  1 1/2 hr  MAC with local

## 2022-09-13 ENCOUNTER — TELEPHONE (OUTPATIENT)
Dept: INTERNAL MEDICINE CLINIC | Facility: CLINIC | Age: 67
End: 2022-09-13

## 2022-09-13 ENCOUNTER — TELEPHONE (OUTPATIENT)
Dept: ORTHOPEDICS CLINIC | Facility: CLINIC | Age: 67
End: 2022-09-13

## 2022-09-13 NOTE — TELEPHONE ENCOUNTER
Received fax advising pt is having foot/toe surgery with Dr. Sal Barrett on 12/19. Pt scheduled on below for pre-op.   pw in [facility]    Future Appointments   Date Time Provider Margoth Silveira   12/5/2022  5:20 PM Whitney Valdez MD [facility]

## 2022-09-19 NOTE — TELEPHONE ENCOUNTER
Patients daughter Jenniffer Card calling to let AD know the patients surgery has been moved to 12/12/22 and Dr. Justyna Perez office should be faxing the new information to our office.

## 2022-09-26 RX ORDER — LINAGLIPTIN 5 MG/1
TABLET, FILM COATED ORAL
Qty: 90 TABLET | Refills: 1 | Status: SHIPPED | OUTPATIENT
Start: 2022-09-26

## 2022-09-26 RX ORDER — RAMIPRIL 2.5 MG/1
CAPSULE ORAL
Qty: 90 CAPSULE | Refills: 1 | Status: SHIPPED | OUTPATIENT
Start: 2022-09-26

## 2022-09-26 RX ORDER — SERTRALINE HYDROCHLORIDE 100 MG/1
TABLET, FILM COATED ORAL
Qty: 90 TABLET | Refills: 1 | Status: SHIPPED | OUTPATIENT
Start: 2022-09-26

## 2022-09-26 RX ORDER — METFORMIN HYDROCHLORIDE 500 MG/1
TABLET, EXTENDED RELEASE ORAL
Qty: 360 TABLET | Refills: 1 | Status: SHIPPED | OUTPATIENT
Start: 2022-09-26

## 2022-09-26 NOTE — TELEPHONE ENCOUNTER
Last VISIT 08/02/22    Last CPE 01/22/22    Last REFILL 02/17/22   TRADJENTA 5 MG Oral Tab 90 tablet 1   07/01/22   SERTRALINE 100 MG Oral Tab 90 tablet 0   07/05/22  METFORMIN  MG Oral Tablet 24 Hr 360 tablet 0     Last LABS 08/13/22 BMP done    Future Appointments   Date Time Provider Ivonne Young.   12/5/2022  5:20 PM Rivera Wallace MD [facility]       Per PROTOCOL? Failed       Please Approve or Deny.

## 2022-09-30 ENCOUNTER — TELEPHONE (OUTPATIENT)
Dept: ORTHOPEDICS CLINIC | Facility: CLINIC | Age: 67
End: 2022-09-30

## 2022-09-30 NOTE — TELEPHONE ENCOUNTER
No prior auth or pre-cert required for OP CPT CODES 30861 and 79871  - medicare- for tracking purposes only  Medicare is active and no auth is required  Procedure not on auth list     [email or web address]

## 2022-09-30 NOTE — TELEPHONE ENCOUNTER
LENIN paperwork filled out for daughter. She was called to be made aware that they were ready for  here in [place].

## 2022-10-21 RX ORDER — INSULIN GLARGINE 100 [IU]/ML
INJECTION, SOLUTION SUBCUTANEOUS
Qty: 42 ML | Refills: 0 | Status: SHIPPED | OUTPATIENT
Start: 2022-10-21

## 2022-10-21 NOTE — TELEPHONE ENCOUNTER
Last VISIT 08/02/22    Last CPE 01/22/22    Last REFILL 01/31/22 qty 40 w/1 refill    Last LABS 08/13/22 BMP done    Future Appointments   Date Time Provider Margoth Silveira          12/5/2022  5:20 PM Shelley Mckeon MD [facility]       Per PROTOCOL? Failed       Please Approve or Deny.

## 2022-11-03 ENCOUNTER — HOSPITAL ENCOUNTER (EMERGENCY)
Age: 67
Discharge: HOME | End: 2022-11-03
Attending: EMERGENCY MEDICINE
Payer: MEDICARE

## 2022-11-03 VITALS
SYSTOLIC BLOOD PRESSURE: 133 MMHG | BODY MASS INDEX: 28.93 KG/M2 | HEART RATE: 88 BPM | HEIGHT: 66 IN | OXYGEN SATURATION: 100 % | DIASTOLIC BLOOD PRESSURE: 77 MMHG | WEIGHT: 180 LBS | RESPIRATION RATE: 18 BRPM | TEMPERATURE: 98 F

## 2022-11-03 DIAGNOSIS — S61.012A LACERATION OF LEFT THUMB WITHOUT FOREIGN BODY WITHOUT DAMAGE TO NAIL, INITIAL ENCOUNTER: Primary | ICD-10-CM

## 2022-11-03 PROCEDURE — 99283 EMERGENCY DEPT VISIT LOW MDM: CPT

## 2022-11-03 PROCEDURE — 12001 RPR S/N/AX/GEN/TRNK 2.5CM/<: CPT

## 2022-11-03 PROCEDURE — 90471 IMMUNIZATION ADMIN: CPT

## 2022-11-03 NOTE — ED NOTES
I reviewed that chart and discussed the case. I have examined the patient and noted patient is a 59-year-old right-hand-dominant female presents emergency room with a history of cutting her left thumb while cooking with a knife. The patient's last tetanus is not up-to-date. The patient's pain is well localized to the left thumb where she cut her self only. The patient denies excessive bleeding. Patient denies history of any other somatic complaints or discomfort at this time. GENERAL: Well-developed, well-nourished female sitting up breathing easily in no apparent distress. Patient is nontoxic in appearance. EXTREMITIES: There is no cyanosis, clubbing, or edema appreciated. Pulses are 2+ and equal in both upper  extremities. There is approximately 1 cm semicircular superficial laceration to the distal tip of the left thumb only. There is no active bleeding appreciated. There is no focal bony tenderness to palpation throughout the left hand. NEURO: Patient is awake, alert and oriented to time place and person. Motor strength is 5 over 5 in all 4 digits of the left upper extremity. There is no sensory deficit appreciated left upper extremity. Patient had repair of the laceration as noted in the nurse practitioner note. Patient is sitting back and breathing easily in no apparent distress this time. Patient be discharged home with close follow-up with primary care instructions take ibuprofen for pain and to return to the ER immediately if symptoms worsen or if any other problems arise. Patient discharged home at this time. I did the substantive portion of the medical decision making. I agree with the following clinical impression(s):  Acute left thumb laceration  I agree with the plan as noted.

## 2022-11-08 ENCOUNTER — OFFICE VISIT (OUTPATIENT)
Dept: ORTHOPEDICS CLINIC | Facility: CLINIC | Age: 67
End: 2022-11-08
Payer: MEDICARE

## 2022-11-08 DIAGNOSIS — M77.42 METATARSALGIA OF LEFT FOOT: ICD-10-CM

## 2022-11-08 DIAGNOSIS — M85.89 OSTEOPENIA OF MULTIPLE SITES: ICD-10-CM

## 2022-11-08 DIAGNOSIS — M21.619 BUNION: ICD-10-CM

## 2022-11-08 DIAGNOSIS — E11.49 DIABETES MELLITUS TYPE 2 WITH NEUROLOGICAL MANIFESTATIONS (HCC): ICD-10-CM

## 2022-11-08 DIAGNOSIS — M20.42 HAMMER TOE OF LEFT FOOT: Primary | ICD-10-CM

## 2022-11-08 PROCEDURE — 99213 OFFICE O/P EST LOW 20 MIN: CPT | Performed by: PODIATRIST

## 2022-11-08 NOTE — PROGRESS NOTES
[facility] Progress Note    Subjective:     Daquan Pope comes in today and her daughter is with her for help interpreting, in anticipation of her surgery in December. We had gone over everything prior but I wanted her to come in and discuss everything again prior to surgery as it got closer because we are taking care of the bunion and several of the toes. She brought some questions with her today and she is ready to proceed        Objective:     Exam significant bunion deformity with crowding of the second digit. Palpable bone over the second and fourth metatarsal heads region from previous fractures. She has pain across the dorsal forefoot area. No swelling today. Sniffing and bunion deformity with increased hallux abductus  Palpable pedal pulses  He can feel light touch to the toes      X-rays reviewed of the left foot show osteopenic bone. Previous fractures at the second fourth and fifth metatarsal heads with healing in a dislocated position. Shortening of the second and fourth metatarsals with a longer third metatarsal.  Hammertoe deformities are noted. Assessment/Plan:     Diagnoses and all orders for this visit:    Hammer toe of left foot  -     DME - EXTERNAL     Bunion  -     DME - EXTERNAL     Metatarsalgia of left foot  -     DME - EXTERNAL     Osteopenia of multiple sites  -     DME - EXTERNAL     Diabetes mellitus type 2 with neurological manifestations ([facility] [address]    Discussed the procedure today, what is involved with the procedure. Anesthesia which is MAC with local and any complications.   We discussed the potential complications with the procedure including recurrence, continued pain, need to remove hardware, infection and continued pain, among others  I am considering adding a procedure to shorten the third metatarsal because of the altered metatarsal parabola and how that has been affected by the fractures in the past.  This would be a Weil osteotomy of the third metatarsal in addition to the second and third toe fusions and fusion of the first MP joint. Knee scooter thru [facility] as a [place]  LIZ Zhu DPM  [facility]    [employer] speech recognition software was used to prepare this note. If a word or phrase is confusing, it is likely do to a failure of recognition. Please contact me with any questions or clarifications.

## 2022-11-10 RX ORDER — EMPAGLIFLOZIN 25 MG/1
1 TABLET, FILM COATED ORAL DAILY
Qty: 90 TABLET | Refills: 1 | Status: SHIPPED | OUTPATIENT
Start: 2022-11-10

## 2022-11-10 RX ORDER — EMPAGLIFLOZIN 25 MG/1
1 TABLET, FILM COATED ORAL DAILY
Qty: 90 TABLET | Refills: 1 | OUTPATIENT
Start: 2022-11-10

## 2022-11-10 NOTE — TELEPHONE ENCOUNTER
HgBA1C procedure resulted in past 6 months    Microalbumin procedure in past 12 months or taking ACE/ARB    Appointment in past 6 or next 3 months     LOV 8/2/22  A D     LAST LAB   7/30/33  a1c 8.0      LAST RX  5/23/22 90 with 1     Next OV   Future Appointments   Date Time Provider Margoth Jordana   12/5/2022  5:20 PM Masha Triana MD [facility]   12/20/2022 11:00 AM Elroy Bojorquez, FREDM [facility] [id number]   2/7/2023  4:40 PM Kiel Azevedo MD [facility]         PROTOCOL failed

## 2022-11-10 NOTE — TELEPHONE ENCOUNTER
Diabetic Medication Protocol Failed 11/10/2022 01:01 PM   Protocol Details  Last HgBA1C < 7.5    HgBA1C procedure resulted in past 6 months    Microalbumin procedure in past 12 months or taking ACE/ARB    Appointment in past 6 or next 3 months        5/23/22 90 with 1   Future Appointments   Date Time Provider Margoth Silveira   12/5/2022  5:20 PM Masha Triana MD [facility]   12/20/2022 11:00 AM Elroy Bojorquez, DPM [facility] [id number]   2/7/2023  4:40 PM Kiel Azevedo MD [facility]

## 2022-12-02 ENCOUNTER — LAB ENCOUNTER (OUTPATIENT)
Dept: LAB | Age: 67
End: 2022-12-02
Attending: INTERNAL MEDICINE
Payer: MEDICARE

## 2022-12-02 DIAGNOSIS — E87.1 HYPONATREMIA: ICD-10-CM

## 2022-12-02 DIAGNOSIS — R79.89 ELEVATED SERUM CREATININE: ICD-10-CM

## 2022-12-02 DIAGNOSIS — E11.40 TYPE 2 DIABETES MELLITUS WITH DIABETIC NEUROPATHY, WITH LONG-TERM CURRENT USE OF INSULIN (HCC): ICD-10-CM

## 2022-12-02 DIAGNOSIS — Z79.4 TYPE 2 DIABETES MELLITUS WITH DIABETIC NEUROPATHY, WITH LONG-TERM CURRENT USE OF INSULIN (HCC): ICD-10-CM

## 2022-12-02 LAB
ALBUMIN SERPL-MCNC: 3.7 G/DL (ref 3.4–5)
ALBUMIN/GLOB SERPL: 1 {RATIO} (ref 1–2)
ALP LIVER SERPL-CCNC: 124 U/L
ALT SERPL-CCNC: 21 U/L
ANION GAP SERPL CALC-SCNC: 4 MMOL/L (ref 0–18)
AST SERPL-CCNC: 15 U/L (ref 15–37)
BILIRUB SERPL-MCNC: 0.4 MG/DL (ref 0.1–2)
BUN BLD-MCNC: 12 MG/DL (ref 7–18)
CALCIUM BLD-MCNC: 9.4 MG/DL (ref 8.5–10.1)
CHLORIDE SERPL-SCNC: 106 MMOL/L (ref 98–112)
CHOLEST SERPL-MCNC: 151 MG/DL (ref ?–200)
CO2 SERPL-SCNC: 29 MMOL/L (ref 21–32)
CREAT BLD-MCNC: 0.86 MG/DL
EST. AVERAGE GLUCOSE BLD GHB EST-MCNC: 189 MG/DL (ref 68–126)
FASTING PATIENT LIPID ANSWER: YES
FASTING STATUS PATIENT QL REPORTED: YES
GFR SERPLBLD BASED ON 1.73 SQ M-ARVRAT: 74 ML/MIN/1.73M2 (ref 60–?)
GLOBULIN PLAS-MCNC: 3.6 G/DL (ref 2.8–4.4)
GLUCOSE BLD-MCNC: 185 MG/DL (ref 70–99)
HBA1C MFR BLD: 8.2 % (ref ?–5.7)
HDLC SERPL-MCNC: 49 MG/DL (ref 40–59)
LDLC SERPL CALC-MCNC: 66 MG/DL (ref ?–100)
NONHDLC SERPL-MCNC: 102 MG/DL (ref ?–130)
OSMOLALITY SERPL CALC.SUM OF ELEC: 293 MOSM/KG (ref 275–295)
POTASSIUM SERPL-SCNC: 5.2 MMOL/L (ref 3.5–5.1)
PROT SERPL-MCNC: 7.3 G/DL (ref 6.4–8.2)
SODIUM SERPL-SCNC: 139 MMOL/L (ref 136–145)
TRIGL SERPL-MCNC: 223 MG/DL (ref 30–149)
VLDLC SERPL CALC-MCNC: 34 MG/DL (ref 0–30)

## 2022-12-02 PROCEDURE — 80061 LIPID PANEL: CPT

## 2022-12-02 PROCEDURE — 36415 COLL VENOUS BLD VENIPUNCTURE: CPT

## 2022-12-02 PROCEDURE — 80053 COMPREHEN METABOLIC PANEL: CPT

## 2022-12-02 PROCEDURE — 83036 HEMOGLOBIN GLYCOSYLATED A1C: CPT

## 2022-12-05 ENCOUNTER — OFFICE VISIT (OUTPATIENT)
Dept: INTERNAL MEDICINE CLINIC | Facility: CLINIC | Age: 67
End: 2022-12-05
Payer: MEDICARE

## 2022-12-05 VITALS
SYSTOLIC BLOOD PRESSURE: 124 MMHG | DIASTOLIC BLOOD PRESSURE: 80 MMHG | HEART RATE: 70 BPM | OXYGEN SATURATION: 99 % | WEIGHT: 197 LBS | HEIGHT: 66 IN | RESPIRATION RATE: 18 BRPM | BODY MASS INDEX: 31.66 KG/M2

## 2022-12-05 DIAGNOSIS — I10 ESSENTIAL HYPERTENSION: ICD-10-CM

## 2022-12-05 DIAGNOSIS — Z95.5 S/P CORONARY ARTERY STENT PLACEMENT: ICD-10-CM

## 2022-12-05 DIAGNOSIS — I25.10 CORONARY ARTERY DISEASE INVOLVING NATIVE CORONARY ARTERY OF NATIVE HEART WITHOUT ANGINA PECTORIS: ICD-10-CM

## 2022-12-05 DIAGNOSIS — R80.9 MICROALBUMINURIA DUE TO TYPE 2 DIABETES MELLITUS (HCC): ICD-10-CM

## 2022-12-05 DIAGNOSIS — M20.42 HAMMER TOE OF LEFT FOOT: ICD-10-CM

## 2022-12-05 DIAGNOSIS — I44.7 LBBB (LEFT BUNDLE BRANCH BLOCK): ICD-10-CM

## 2022-12-05 DIAGNOSIS — E11.40 TYPE 2 DIABETES MELLITUS WITH DIABETIC NEUROPATHY, WITH LONG-TERM CURRENT USE OF INSULIN (HCC): ICD-10-CM

## 2022-12-05 DIAGNOSIS — E78.5 DYSLIPIDEMIA: ICD-10-CM

## 2022-12-05 DIAGNOSIS — Z79.4 TYPE 2 DIABETES MELLITUS WITH DIABETIC NEUROPATHY, WITH LONG-TERM CURRENT USE OF INSULIN (HCC): ICD-10-CM

## 2022-12-05 DIAGNOSIS — E87.5 HYPERKALEMIA: ICD-10-CM

## 2022-12-05 DIAGNOSIS — E11.29 MICROALBUMINURIA DUE TO TYPE 2 DIABETES MELLITUS (HCC): ICD-10-CM

## 2022-12-05 DIAGNOSIS — Z01.818 PREOP EXAMINATION: Primary | ICD-10-CM

## 2022-12-05 PROCEDURE — 99214 OFFICE O/P EST MOD 30 MIN: CPT | Performed by: INTERNAL MEDICINE

## 2022-12-05 PROCEDURE — 90677 PCV20 VACCINE IM: CPT | Performed by: INTERNAL MEDICINE

## 2022-12-05 PROCEDURE — G0009 ADMIN PNEUMOCOCCAL VACCINE: HCPCS | Performed by: INTERNAL MEDICINE

## 2022-12-05 RX ORDER — GLIPIZIDE 10 MG/1
10 TABLET, FILM COATED, EXTENDED RELEASE ORAL DAILY
Qty: 90 TABLET | Refills: 1 | Status: SHIPPED | OUTPATIENT
Start: 2022-12-05

## 2022-12-12 ENCOUNTER — TELEPHONE (OUTPATIENT)
Dept: ORTHOPEDICS CLINIC | Facility: CLINIC | Age: 67
End: 2022-12-12

## 2022-12-12 DIAGNOSIS — M21.619 BUNION: ICD-10-CM

## 2022-12-12 DIAGNOSIS — M20.42 HAMMER TOE OF LEFT FOOT: Primary | ICD-10-CM

## 2022-12-12 DIAGNOSIS — M77.42 METATARSALGIA OF LEFT FOOT: ICD-10-CM

## 2022-12-12 RX ORDER — HYDROCODONE BITARTRATE AND ACETAMINOPHEN 5; 325 MG/1; MG/1
1 TABLET ORAL
Qty: 30 TABLET | Refills: 0 | Status: SHIPPED | OUTPATIENT
Start: 2022-12-12

## 2022-12-12 NOTE — TELEPHONE ENCOUNTER
Can you please sign off on script for patient for post op care for lynne.       Thank you,   Endy Alcaraz

## 2022-12-12 NOTE — TELEPHONE ENCOUNTER
Patient's daughter called and said that the prescription for her mother has the name spelled wrong and the pharmacy won't take it. Patient needs a new prescription for the Norco with correct spelling.  Please advise

## 2022-12-13 ENCOUNTER — HOSPITAL ENCOUNTER (OUTPATIENT)
Dept: GENERAL RADIOLOGY | Age: 67
Discharge: HOME | End: 2022-12-13
Attending: ORTHOPAEDIC SURGERY
Payer: MEDICARE

## 2022-12-13 ENCOUNTER — OFFICE VISIT (OUTPATIENT)
Dept: ORTHOPEDICS CLINIC | Facility: CLINIC | Age: 67
End: 2022-12-13
Payer: MEDICARE

## 2022-12-13 DIAGNOSIS — Z98.890 STATUS POST LEFT FOOT SURGERY: ICD-10-CM

## 2022-12-13 DIAGNOSIS — Z48.89 AFTERCARE FOLLOWING SURGERY: Primary | ICD-10-CM

## 2022-12-13 PROCEDURE — 1125F AMNT PAIN NOTED PAIN PRSNT: CPT | Performed by: PODIATRIST

## 2022-12-13 PROCEDURE — 73630 X-RAY EXAM OF FOOT: CPT | Performed by: ORTHOPAEDIC SURGERY

## 2022-12-13 PROCEDURE — 99024 POSTOP FOLLOW-UP VISIT: CPT | Performed by: PODIATRIST

## 2022-12-13 RX ORDER — CEPHALEXIN 500 MG/1
500 CAPSULE ORAL 2 TIMES DAILY
Qty: 14 CAPSULE | Refills: 0 | Status: SHIPPED | OUTPATIENT
Start: 2022-12-13 | End: 2022-12-20

## 2022-12-13 NOTE — PROGRESS NOTES
[facility] Progress Note    Subjective:     Fernando Oconnell is here for follow-up. Yesterday she had the fusion first MP joint and hammertoe correction with metatarsal osteotomy on the left foot. She had fair amount of pain last night and has had some bleeding but overall is doing pretty well. Objective:     Exam moderate amount of bleeding on the bandage which is removed. Left foot incisions are well coapted the third interspace incision is slowly bleeding. This was improved with pressure over the wound with gauze over 5 minutes. Moderate swelling and ecchymosis. Toes are in good alignment. X-rays show plate and screws to be intact first MP joint with good position for fusion. K wire in place third digit all left foot          Assessment/Plan:     Diagnoses and all orders for this visit:    Aftercare following surgery    Other orders  -     cephalexin 500 MG Oral Cap; Take 1 capsule (500 mg total) by mouth 2 (two) times daily for 7 days. Bandage reapplied and continue the nonweightbearing status for a total of 3 weeks. Follow-up is in 2 weeks for suture removal.  No x-rays needed at that visit. Continue Norco as needed and increase fluids and fiber. A lot of elevation and rest.            Trinity Tafoya. Magalis Torres DPM  [facility]    [employer] speech recognition software was used to prepare this note. If a word or phrase is confusing, it is likely do to a failure of recognition. Please contact me with any questions or clarifications.

## 2022-12-22 ENCOUNTER — TELEPHONE (OUTPATIENT)
Dept: ORTHOPEDICS CLINIC | Facility: CLINIC | Age: 67
End: 2022-12-22

## 2022-12-22 RX ORDER — HYDROCODONE BITARTRATE AND ACETAMINOPHEN 5; 325 MG/1; MG/1
1 TABLET ORAL EVERY 6 HOURS PRN
Qty: 20 TABLET | Refills: 0 | Status: SHIPPED | OUTPATIENT
Start: 2022-12-22

## 2022-12-22 RX ORDER — PEN NEEDLE, DIABETIC 31 GX5/16"
NEEDLE, DISPOSABLE MISCELLANEOUS
Qty: 100 EACH | Refills: 1 | Status: SHIPPED | OUTPATIENT
Start: 2022-12-22

## 2022-12-22 NOTE — TELEPHONE ENCOUNTER
Patient requesting refill for Norco. Medication last prescribed 12/12/22. Pharmacy on file, [facility]. Message routed to provider. Please advise, can new script be sent to pharmacy for patient?

## 2022-12-30 ENCOUNTER — OFFICE VISIT (OUTPATIENT)
Dept: ORTHOPEDICS CLINIC | Facility: CLINIC | Age: 67
End: 2022-12-30
Payer: MEDICARE

## 2022-12-30 DIAGNOSIS — M21.619 BUNION: ICD-10-CM

## 2022-12-30 DIAGNOSIS — Z48.89 AFTERCARE FOLLOWING SURGERY: Primary | ICD-10-CM

## 2022-12-30 DIAGNOSIS — M20.42 HAMMER TOE OF LEFT FOOT: ICD-10-CM

## 2022-12-30 DIAGNOSIS — E11.49 DIABETES MELLITUS TYPE 2 WITH NEUROLOGICAL MANIFESTATIONS (HCC): ICD-10-CM

## 2022-12-30 PROCEDURE — 1125F AMNT PAIN NOTED PAIN PRSNT: CPT | Performed by: PODIATRIST

## 2022-12-30 PROCEDURE — 99024 POSTOP FOLLOW-UP VISIT: CPT | Performed by: PODIATRIST

## 2022-12-30 NOTE — PROGRESS NOTES
[facility] Progress Note    Subjective:     Janell Marrero is here and her mom daughter is with her today for suture removal and pin removal.  She is almost 3 weeks postop fusion first MP joint and hammertoe correction as well as metatarsal osteotomy for chronic metatarsalgia. Is having some discomfort but overall is well controlled and has been nonweightbearing with use of the knee scooter as instructed        Objective:     Exam sutures intact and were removed without incident. No signs of infection. Moderate swelling persists. Pin is intact. Toes are well aligned. X-rays will be taken next visit          Assessment/Plan:     Diagnoses and all orders for this visit:    Aftercare following surgery  -     OP REFERRAL TO [facility]  -     XR FOOT, COMPLETE (MIN 3 VIEWS), LEFT (CPT=73630); Future    Hammer toe of left foot  -     OP REFERRAL TO [facility]  -     XR FOOT, COMPLETE (MIN 3 VIEWS), LEFT (CPT=73630); Future    Bunion  -     OP REFERRAL TO [facility]  -     XR FOOT, COMPLETE (MIN 3 VIEWS), LEFT (CPT=73630); Future    Diabetes mellitus type 2 with neurological manifestations ([facility] [address]  -     OP REFERRAL TO [facility]  -     XR FOOT, COMPLETE (MIN 3 VIEWS), LEFT (CPT=73630); Future    Other orders  -     HYDROcodone-acetaminophen ([address]) 5-325 MG Oral Tab; Take 1 tablet by mouth every 6 (six) hours as needed for Pain. Sutures removed without incident and K wire removed third digit without incident as well. Start physical therapy for scar control, swelling control and movement of the toes. She can start weightbearing in the boot on Monday which is 3 weeks postop as planned. Follow-up with me is in 1 month for an x-ray            Eduardo Rodriguez. Gloria Carmen DPM  [facility]    Micro[employer] speech recognition software was used to prepare this note.  If a word or phrase is confusing, it is likely do to a failure of recognition. Please contact me with any questions or clarifications.

## 2023-01-10 ENCOUNTER — OFFICE VISIT (OUTPATIENT)
Dept: PHYSICAL THERAPY | Age: 68
End: 2023-01-10
Attending: PODIATRIST
Payer: MEDICARE

## 2023-01-10 DIAGNOSIS — G89.18 ACUTE POSTOPERATIVE PAIN OF FOOT, LEFT: ICD-10-CM

## 2023-01-10 DIAGNOSIS — M79.672 ACUTE POSTOPERATIVE PAIN OF FOOT, LEFT: ICD-10-CM

## 2023-01-10 DIAGNOSIS — Z48.89 AFTERCARE FOLLOWING SURGERY: Primary | ICD-10-CM

## 2023-01-10 PROCEDURE — 97161 PT EVAL LOW COMPLEX 20 MIN: CPT

## 2023-01-10 PROCEDURE — 97110 THERAPEUTIC EXERCISES: CPT

## 2023-01-11 NOTE — PROGRESS NOTES
PT DAILY NOTE  Diagnosis:   Aftercare following surgery (Z48.89), acute postoperative pain of left foot (P87.617, G89.18)     Referring Provider: Noemi Chauhan DPM  Date of Evaluation:    1/10/2023    Precautions:  diabetic neuropathy, venous insufficiency Next MD visit:   1-24-23  Date of Surgery: 12-12-22 Lf foot 1st MTP fusion, 3rd metatarsal osteotomy and hammertoe correction     Insurance Primary/Secondary: MEDICARE     Visit 2 of 8   Date POC Expires: 4-10-23       Subjective: Pt states a little pain today. The home ex's are going ok. Pain: 5/10   @eval: Melquiades Logan is a 79year old female who presents to therapy today with complaints of Lf foot pain s/p bunion and hammer toe surgery 1 month ago. Pt's daughter present today and translating for pt. 8 days ago pt began weightbearing in walking boot per MD. She presents today s A.D., but daughter reports she intermittently uses S.C. on Rt side since Lf TKR 2 yrs ago. She used scooter for 3 wks post op before initiating WB. MD wants her to stay in boot for another 3-4 wks, has MD f/u in 2 wks. She does not use ice but she does elevate LLE 3-4x/day (sitting upright c LLE in front of her, not above heart-level). Pt does not drive; lives c daughter in ranch house s need for stairs. UA to squat. Walking tolerance 6-8 min s pain. Denies any N/T. Pt describes pain level at best 2/10, at worst 6/10. Current functional limitations include limited walking tolerance, UA to squat. Naida Saldana describes prior level of function painfree foot, walking at least 30 min/day to/from park 1-1.5 miles. Pt goals include return to daily activities helping granddaughter, walk around the house painfree, more participation in cooking at home, decrease stiffness. Past medical history was reviewed with Naida Saldana.  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    Objective:   Improved perfusion per visual in Lf foot/ankle noted today vs last session    Treatment:  (\"NP\" indicates Not Performed this date)  Manual Therapy: Added scar mob  Added Retrograde massage foot/ankle for swelling  Added PROM Lf foot/ankle    Neuromuscular Re-education:    Therapeutic Exercise: Added NuStep for CKC strength and ankle mobility s boot 5min  AROM Lf ankle DF/PF x20  AROM Lf ankle inv/ev x20  Added AROM ankle circles cw/ccw x20 ea  Long sit calf stretch c strap 10\"x5  Seated towel scrunches x20  Seated toe extension x20  Added seated CKC heel raises Lf x20  LAQ Lf 5\"x15    HEP:  ([email or web address]. [employer], code [email or web address])      Assessment/Plan: Pt reports moderate pain at start of session, HEP going well at home. Her Lf foot/ankle are visibly more perfused today vs last visit and incisions healing well. She tolerated several new ex's today to promote incr foot/ankle mobility and soft tissue healing, s any c/o pain. Cont to address deficits to work toward [place] and set goals. PLAN OF CARE:    Goals: (to be met in 8 visits)  1. Consistently decr pain < or = 2/10 intermittent for incr QOL and activity tolerance  2. Overall incr in function indicated by incr FOTO at least 11 pts  3. incr Lf foot/ankle AROM within 5 degrees of uninvolved side to promote improved quality of gait and ability to squat  4. Pt able to ambulate s boot 1 mile c little to no pain for incr community mobility  5. Pt able to ascend/descend stairs reciprocally c UE assist to railing comfortably for incr community mobility  6. Pt able to squat to reach low cabinet c little to no pain to incr functional mobility at home and c grandchild  7. indep HEP to promote cont progress toward functional goals    Frequency / Duration: Patient will be seen for 2 x/week or a total of 8 visits over a 90 day period.     All Treatments performed at distinct and separate times during the therapy session.   Treatment Minutes  Units charged   Manual Therapy 15 minutes 1   Therapeutic Activity 0 minutes    Neuromuscular Re-education 0 minutes    Therapeutic Exercise 27 minutes 2   Total Direct Treatment Time 42 minutes

## 2023-01-12 ENCOUNTER — OFFICE VISIT (OUTPATIENT)
Dept: PHYSICAL THERAPY | Age: 68
End: 2023-01-12
Attending: PODIATRIST
Payer: MEDICARE

## 2023-01-12 DIAGNOSIS — M79.672 ACUTE POSTOPERATIVE PAIN OF FOOT, LEFT: ICD-10-CM

## 2023-01-12 DIAGNOSIS — G89.18 ACUTE POSTOPERATIVE PAIN OF FOOT, LEFT: ICD-10-CM

## 2023-01-12 DIAGNOSIS — Z48.89 AFTERCARE FOLLOWING SURGERY: Primary | ICD-10-CM

## 2023-01-12 PROCEDURE — 97140 MANUAL THERAPY 1/> REGIONS: CPT

## 2023-01-12 PROCEDURE — 97110 THERAPEUTIC EXERCISES: CPT

## 2023-01-17 ENCOUNTER — HOSPITAL ENCOUNTER (OUTPATIENT)
Dept: GENERAL RADIOLOGY | Age: 68
Discharge: HOME | End: 2023-01-17
Attending: PODIATRIST
Payer: MEDICARE

## 2023-01-17 ENCOUNTER — OFFICE VISIT (OUTPATIENT)
Dept: PHYSICAL THERAPY | Age: 68
End: 2023-01-17
Attending: PODIATRIST
Payer: MEDICARE

## 2023-01-17 DIAGNOSIS — M79.672 ACUTE POSTOPERATIVE PAIN OF FOOT, LEFT: ICD-10-CM

## 2023-01-17 DIAGNOSIS — E11.49 DIABETES MELLITUS TYPE 2 WITH NEUROLOGICAL MANIFESTATIONS (HCC): ICD-10-CM

## 2023-01-17 DIAGNOSIS — M21.619 BUNION: ICD-10-CM

## 2023-01-17 DIAGNOSIS — G89.18 ACUTE POSTOPERATIVE PAIN OF FOOT, LEFT: ICD-10-CM

## 2023-01-17 DIAGNOSIS — Z48.89 AFTERCARE FOLLOWING SURGERY: Primary | ICD-10-CM

## 2023-01-17 DIAGNOSIS — M20.42 HAMMER TOE OF LEFT FOOT: ICD-10-CM

## 2023-01-17 DIAGNOSIS — Z48.89 AFTERCARE FOLLOWING SURGERY: ICD-10-CM

## 2023-01-17 PROCEDURE — 97140 MANUAL THERAPY 1/> REGIONS: CPT

## 2023-01-17 PROCEDURE — 97110 THERAPEUTIC EXERCISES: CPT

## 2023-01-17 PROCEDURE — 73630 X-RAY EXAM OF FOOT: CPT | Performed by: PODIATRIST

## 2023-01-17 NOTE — PROGRESS NOTES
PT DAILY NOTE  Diagnosis:   Aftercare following surgery (Z48.89), acute postoperative pain of left foot (W97.510, G89.18)     Referring Provider: Juan Miguel Moran DPM  Date of Evaluation:    1/10/2023    Precautions:  diabetic neuropathy, venous insufficiency Next MD visit:   1-24-23  Date of Surgery: 12-12-22 Lf foot 1st MTP fusion, 3rd metatarsal osteotomy and hammertoe correction     Insurance Primary/Secondary: MEDICARE     Visit 3 of 8   Date POC Expires: 4-10-23       Subjective: Pt states foot/ankle feeling a little better. She states occasional burning and cramping in toes. Pain: 2-3/10   @eval: Lindsey Harris is a 79year old female who presents to therapy today with complaints of Lf foot pain s/p bunion and hammer toe surgery 1 month ago. Pt's daughter present today and translating for pt. 8 days ago pt began weightbearing in walking boot per MD. She presents today s A.D., but daughter reports she intermittently uses S.C. on Rt side since Lf TKR 2 yrs ago. She used scooter for 3 wks post op before initiating WB. MD wants her to stay in boot for another 3-4 wks, has MD f/u in 2 wks. She does not use ice but she does elevate LLE 3-4x/day (sitting upright c LLE in front of her, not above heart-level). Pt does not drive; lives c daughter in ran[employer] house s need for stairs. UA to squat. Walking tolerance 6-8 min s pain. Denies any N/T. Pt describes pain level at best 2/10, at worst 6/10. Current functional limitations include limited walking tolerance, UA to squat. Jil Dean describes prior level of function painfree foot, walking at least 30 min/day to/from park 1-1.5 miles. Pt goals include return to daily activities helping granddaughter, walk around the house painfree, more participation in cooking at home, decrease stiffness. Past medical history was reviewed with Jil Dean.  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    Objective:   Improving ease of AROM Lf foot/ankle vs last week    Treatment:  (\"NP\" indicates Not Performed this date)  Manual Therapy:  scar mob  Retrograde massage foot/ankle for swelling  PROM Lf foot/ankle    Neuromuscular Re-education:    Therapeutic Exercise:  NuStep for CKC strength and ankle mobility s boot 5min  AROM Lf ankle DF/PF x20 (incr next visit)  AROM Lf ankle inv/ev x20(incr next visit)  AROM ankle circles cw/ccw x20 ea  Long sit calf stretch c strap 10\"x5  Seated towel scrunches x2min (increased)  Seated toe extension x20  seated CKC heel raises Lf x20  LAQ Lf 5\"x15    Future sessions: tandem balance in boot, sit to  boot, standing march, step-outs    HEP:  ([email or web address]., code [email or web address])  Initial-AROM ankle DF/PF, AROM ankle inv/ev, long sit calf stretch c towel, seated towel scrunches, seated CKC toe extension, LAQ    Assessment/Plan: Pt demo improving tolerance for AROM c progressing excursion. She cont to use straight cane for safety; continued in low-profile CAM boot until MD f/u next week. Progress functional LLE strength next visit in boot as tolerated. PLAN OF CARE:    Goals: (to be met in 8 visits)  1. Consistently decr pain < or = 2/10 intermittent for incr QOL and activity tolerance  2. Overall incr in function indicated by incr FOTO at least 11 pts  3. incr Lf foot/ankle AROM within 5 degrees of uninvolved side to promote improved quality of gait and ability to squat  4. Pt able to ambulate s boot 1 mile c little to no pain for incr community mobility  5. Pt able to ascend/descend stairs reciprocally c UE assist to railing comfortably for incr community mobility  6.  Pt able to squat to reach low cabinet c little to no pain to incr functional mobility at home and c grandchild  7. indep HEP to promote cont progress toward functional goals    Frequency / Duration: Patient will be seen for 2 x/week or a total of 8 visits over a 90 day period. All Treatments performed at distinct and separate times during the therapy session.   Treatment Minutes  Units charged   Manual Therapy 15 minutes 1   Therapeutic Activity 0 minutes    Neuromuscular Re-education 0 minutes    Therapeutic Exercise 27 minutes 2   Total Direct Treatment Time 42 minutes

## 2023-01-20 NOTE — PROGRESS NOTES
PT DAILY NOTE  Diagnosis:   Aftercare following surgery (Z48.89), acute postoperative pain of left foot (Q51.746, G89.18)     Referring Provider: Angeles Chen DPM  Date of Evaluation:    1/10/2023    Precautions:  diabetic neuropathy, venous insufficiency Next MD visit:   1-24-23  Date of Surgery: 12-12-22 Lf foot 1st MTP fusion, 3rd metatarsal osteotomy and hammertoe correction     Insurance Primary/Secondary: MEDICARE     Visit 4 of 8   Date POC Expires: 4-10-23       Subjective: Pt states some discomfort on top of foot, still in boot. Sees MD tomorrow. Pain: 3/10   @eval: Cesia Ruiz is a 79year old female who presents to therapy today with complaints of Lf foot pain s/p bunion and hammer toe surgery 1 month ago. Pt's daughter present today and translating for pt. 8 days ago pt began weightbearing in walking boot per MD. She presents today s A.D., but daughter reports she intermittently uses S.C. on Rt side since Lf TKR 2 yrs ago. She used scooter for 3 wks post op before initiating WB. MD wants her to stay in boot for another 3-4 wks, has MD f/u in 2 wks. She does not use ice but she does elevate LLE 3-4x/day (sitting upright c LLE in front of her, not above heart-level). Pt does not drive; lives c daughter in ranch house s need for stairs. UA to squat. Walking tolerance 6-8 min s pain. Denies any N/T. Pt describes pain level at best 2/10, at worst 6/10. Current functional limitations include limited walking tolerance, UA to squat. Janell Marrero describes prior level of function painfree foot, walking at least 30 min/day to/from park 1-1.5 miles. Pt goals include return to daily activities helping granddaughter, walk around the house painfree, more participation in cooking at home, decrease stiffness. Past medical history was reviewed with Janell Marrero.  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    Objective:   Pt demo ability to march in boot s UE assist, and to ambulate in-clinic distances s A.D. Treatment:  (\"NP\" indicates Not Performed this date)  Manual Therapy:  scar mob  Retrograde massage foot/ankle for swelling  AP TC jt gr 2  PROM Lf foot/ankle    Neuromuscular Re-education:    Therapeutic Exercise:  NuStep for CKC strength and ankle mobility s boot 5min  Lf ankle DF/PF vs YTB x20 (increased)  Lf ankle inv/ev vs YTB x20 (increased)  AROM ankle circles cw/ccw x20 ea  Long sit calf stretch c strap 20\"x3 (increased)  Seated towel scrunches x2min  Seated toe extension x20  seated CKC heel raises Lf x20  LAQ Lf 5\"x20 (increased)  Added standing march s UEs x20  Added Sit to  boot x20    Future sessions: tandem balance in boot, step-outs    HEP:  ([email or web address], code [email or web address])  Initial-AROM ankle DF/PF, AROM ankle inv/ev, long sit calf stretch c towel, seated towel scrunches, seated CKC toe extension, LAQ  1-23: pt given YTB to progress ankle ex's    Assessment/Plan: Pt tolerated several new/progressed ex's today s any pain c/o. Improved AROM mobility noted in Lf foot and ankle and light resistence added c tband. She also demo ability to march s UE assist and to ambulate short in-clinic distances s cane safely. Cont to progress LLE strength and mobility as tolerated. PLAN OF CARE:    Goals: (to be met in 8 visits)  1. Consistently decr pain < or = 2/10 intermittent for incr QOL and activity tolerance  2. Overall incr in function indicated by incr FOTO at least 11 pts  3. incr Lf foot/ankle AROM within 5 degrees of uninvolved side to promote improved quality of gait and ability to squat  4. Pt able to ambulate s boot 1 mile c little to no pain for incr community mobility  5.  Pt able to ascend/descend stairs reciprocally c UE assist to railing comfortably for incr community mobility  6. Pt able to squat to reach low cabinet c little to no pain to incr functional mobility at home and c grandchild  7. indep HEP to promote cont progress toward functional goals    Frequency / Duration: Patient will be seen for 2 x/week or a total of 8 visits over a 90 day period. All Treatments performed at distinct and separate times during the therapy session.   Treatment Minutes  Units charged   Manual Therapy 15 minutes 1   Therapeutic Activity 0 minutes    Neuromuscular Re-education 0 minutes    Therapeutic Exercise 25 minutes 2   Total Direct Treatment Time 40 minutes

## 2023-01-23 ENCOUNTER — OFFICE VISIT (OUTPATIENT)
Dept: PHYSICAL THERAPY | Age: 68
End: 2023-01-23
Attending: PODIATRIST
Payer: MEDICARE

## 2023-01-23 DIAGNOSIS — G89.18 ACUTE POSTOPERATIVE PAIN OF FOOT, LEFT: ICD-10-CM

## 2023-01-23 DIAGNOSIS — Z48.89 AFTERCARE FOLLOWING SURGERY: Primary | ICD-10-CM

## 2023-01-23 DIAGNOSIS — M79.672 ACUTE POSTOPERATIVE PAIN OF FOOT, LEFT: ICD-10-CM

## 2023-01-23 PROCEDURE — 97110 THERAPEUTIC EXERCISES: CPT

## 2023-01-23 PROCEDURE — 97140 MANUAL THERAPY 1/> REGIONS: CPT

## 2023-01-24 ENCOUNTER — OFFICE VISIT (OUTPATIENT)
Dept: ORTHOPEDICS CLINIC | Facility: CLINIC | Age: 68
End: 2023-01-24
Payer: MEDICARE

## 2023-01-24 DIAGNOSIS — M21.611 BUNION, RIGHT: ICD-10-CM

## 2023-01-24 DIAGNOSIS — Z48.89 AFTERCARE FOLLOWING SURGERY: Primary | ICD-10-CM

## 2023-01-24 PROCEDURE — 99024 POSTOP FOLLOW-UP VISIT: CPT | Performed by: PODIATRIST

## 2023-01-24 PROCEDURE — 1125F AMNT PAIN NOTED PAIN PRSNT: CPT | Performed by: PODIATRIST

## 2023-01-24 RX ORDER — GLIPIZIDE 5 MG/1
TABLET, FILM COATED, EXTENDED RELEASE ORAL
COMMUNITY
Start: 2022-12-12

## 2023-01-24 NOTE — PROGRESS NOTES
[facility] Progress Note    Subjective:   Isa Orlando and daughter return  6 weeks post fusion first mpj left foot and metatarsal osteotomy  She is doing well  In boot  In PT        Objective: Moderate swelling persists. Toes are in good alignment. Contracture of the skin about the MP joint of the third digit. X-rays show healing of the metatarsal osteotomy third metatarsal with hardware in place. Progressive fusion first MP joint. Osteopenic bone. Hardware is in place                  Assessment/Plan:     Diagnoses and all orders for this visit:    Aftercare following surgery  -     XR FOOT WEIGHTBEARING (3 VIEWS), LEFT (CPT=73630); Future    Bunion, right  -     XR FOOT WEIGHTBEARING (3 VIEWS), RIGHT   (CPT=73630); Future      Isa Orlando is doing well and continuing PT  Start doing more in a tennis shoe and we discussed the type of tennis shoe and getting out of the boot      followup 6 weeks for xray    Wants to discuss surgery on the right bunion so we will get x-rays at that time for the right foot as well            Vimal Paula. Betzaida Arnold DPM  [facility]    Li[employer] speech recognition software was used to prepare this note. If a word or phrase is confusing, it is likely do to a failure of recognition. Please contact me with any questions or clarifications.

## 2023-01-27 NOTE — PROGRESS NOTES
PT DAILY NOTE  Diagnosis:   Aftercare following surgery (Z48.89), acute postoperative pain of left foot (U59.571, G89.18)     Referring Provider: Angeles Chen DPM  Date of Evaluation:    1/10/2023    Precautions:  diabetic neuropathy, venous insufficiency Next MD visit:   1-24-23  Date of Surgery: 12-12-22 Lf foot 1st MTP fusion, 3rd metatarsal osteotomy and hammertoe correction     Insurance Primary/Secondary: MEDICARE-     Visit 5 of 8   Date POC Expires: 4-10-23       Subjective: Pt states MD said to start weaning out of the boot a few hours a day. She has some pain on bottom of foot. She states she has walked around the house s boot in her [employer]. Got a little swelling in foot. Pain: 4/10   @eval: Cesia Ruiz is a 79year old female who presents to therapy today with complaints of Lf foot pain s/p bunion and hammer toe surgery 1 month ago. Pt's daughter present today and translating for pt. 8 days ago pt began weightbearing in walking boot per MD. She presents today s A.D., but daughter reports she intermittently uses S.C. on Rt side since Lf TKR 2 yrs ago. She used scooter for 3 wks post op before initiating WB. MD wants her to stay in boot for another 3-4 wks, has MD f/u in 2 wks. She does not use ice but she does elevate LLE 3-4x/day (sitting upright c LLE in front of her, not above heart-level). Pt does not drive; lives c daughter in ranch house s need for stairs. UA to squat. Walking tolerance 6-8 min s pain. Denies any N/T. Pt describes pain level at best 2/10, at worst 6/10. Current functional limitations include limited walking tolerance, UA to squat. Janell Marrreo describes prior level of function painfree foot, walking at least 30 min/day to/from park 1-1.5 miles. Pt goals include return to daily activities helping granddaughter, walk around the house painfree, more participation in cooking at home, decrease stiffness. Past medical history was reviewed with Janell Marrero.  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    Objective:   Pt demo ability to march in boot s UE assist, and to ambulate in-clinic distances s A.D. Discussed mild swelling and discomfort as pt weans out of boot is WNL. Suggested occasional icing and elevation. Can also wear compression socks if she likes. Treatment:  (\"NP\" indicates Not Performed this date)  Manual Therapy:  scar mob  Retrograde massage foot/ankle for swelling  AP TC jt gr 2-NP  PROM Lf foot/ankle    Neuromuscular Re-education:    Therapeutic Exercise:  NuStep for CKC strength and ankle mobility s boot 5min-NP  Lf ankle DF/PF vs YTB x20  Lf ankle inv/ev vs YTB x20  AROM ankle circles cw/ccw x20 ea  Long sit calf stretch c strap 20\"x3  Seated towel scrunches x2min  Seated toe extension x20  seated CKC heel raises Lf x20  LAQ Lf 5\"x20   Sit to  boot 1x20  standing march s UEs 2x20 (increased)  Added sidestepping 1min  Added Fwd and retro stepouts c RLE x20 ea    Future sessions: tandem balance in boot    HEP:  (www.[email or web address] Perfect [email or web address]. [employer], code [email or web address])  Initial-AROM ankle DF/PF, AROM ankle inv/ev, long sit calf stretch c towel, seated towel scrunches, seated CKC toe extension, LAQ  1-23: pt given YTB to progress ankle ex's    Assessment/Plan: Pt reports starting to wean out of the boot per MD f/u last week. She reports mild pain and swelling; discussed management strategies. She tolerated new WB ex's today s issue. Pt to bring shoe next visit to begin WB strengthening and balance out of the boot in therapy next visit. PLAN OF CARE:    Goals: (to be met in 8 visits)  1. Consistently decr pain < or = 2/10 intermittent for incr QOL and activity tolerance  2. Overall incr in function indicated by incr FOTO at least 11 pts  3.  incr Lf foot/ankle AROM within 5 degrees of uninvolved side to promote improved quality of gait and ability to squat  4. Pt able to ambulate s boot 1 mile c little to no pain for incr community mobility  5. Pt able to ascend/descend stairs reciprocally c UE assist to railing comfortably for incr community mobility  6. Pt able to squat to reach low cabinet c little to no pain to incr functional mobility at home and c grandchild  7. indep HEP to promote cont progress toward functional goals    Frequency / Duration: Patient will be seen for 2 x/week or a total of 8 visits over a 90 day period. All Treatments performed at distinct and separate times during the therapy session.   Treatment Minutes  Units charged   Manual Therapy 15 minutes 1   Therapeutic Activity 0 minutes    Neuromuscular Re-education 0 minutes    Therapeutic Exercise 28 minutes 2   Total Direct Treatment Time 43 minutes

## 2023-01-30 ENCOUNTER — OFFICE VISIT (OUTPATIENT)
Dept: PHYSICAL THERAPY | Age: 68
End: 2023-01-30
Attending: PODIATRIST
Payer: MEDICARE

## 2023-01-30 DIAGNOSIS — G89.18 ACUTE POSTOPERATIVE PAIN OF FOOT, LEFT: ICD-10-CM

## 2023-01-30 DIAGNOSIS — M79.672 ACUTE POSTOPERATIVE PAIN OF FOOT, LEFT: ICD-10-CM

## 2023-01-30 DIAGNOSIS — Z48.89 AFTERCARE FOLLOWING SURGERY: Primary | ICD-10-CM

## 2023-01-30 PROCEDURE — 97110 THERAPEUTIC EXERCISES: CPT

## 2023-01-30 PROCEDURE — 97140 MANUAL THERAPY 1/> REGIONS: CPT

## 2023-02-02 NOTE — PROGRESS NOTES
PT DAILY NOTE  Diagnosis:   Aftercare following surgery (Z48.89), acute postoperative pain of left foot (T18.847, G89.18)     Referring Provider: Lexa Michelle DPM  Date of Evaluation:    1/10/2023    Precautions:  diabetic neuropathy, venous insufficiency Next MD visit:   1-24-23  Date of Surgery: 12-12-22 Lf foot 1st MTP fusion, 3rd metatarsal osteotomy and hammertoe correction     Insurance Primary/Secondary: MEDICARE-     Visit 6 of 8   Date POC Expires: 4-10-23       Subjective: Pt states she is in shoe 50% weaning from boot. She still feels that she needs the cane for balance. Pain: \"a little\"/10   @eval: Uma Mcdnoald is a 79year old female who presents to therapy today with complaints of Lf foot pain s/p bunion and hammer toe surgery 1 month ago. Pt's daughter present today and translating for pt. 8 days ago pt began weightbearing in walking boot per MD. She presents today s A.D., but daughter reports she intermittently uses S.C. on Rt side since Lf TKR 2 yrs ago. She used scooter for 3 wks post op before initiating WB. MD wants her to stay in boot for another 3-4 wks, has MD f/u in 2 wks. She does not use ice but she does elevate LLE 3-4x/day (sitting upright c LLE in front of her, not above heart-level). Pt does not drive; lives c daughter in ranch house s need for stairs. UA to squat. Walking tolerance 6-8 min s pain. Denies any N/T. Pt describes pain level at best 2/10, at worst 6/10. Current functional limitations include limited walking tolerance, UA to squat. Isa Orlando describes prior level of function painfree foot, walking at least 30 min/day to/from park 1-1.5 miles. Pt goals include return to daily activities helping granddaughter, walk around the house painfree, more participation in cooking at home, decrease stiffness. Past medical history was reviewed with Isa Orlando.  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    Objective:   Pt wore shoes today, able to do WB work outside of [facility] today  She demo safety and balance c sidestepping and marching s UE assist or cane today  Mild sway noted c static and dynamic balance ex's    Treatment:  (\"NP\" indicates Not Performed this date)  Manual Therapy:  scar mob  Retrograde massage foot/ankle for swelling  AP TC jt gr 2-NP  PROM Lf foot/ankle    Neuromuscular Re-education:    Therapeutic Exercise:  NuStep for CKC strength and ankle mobility s boot 5min-NP  Lf ankle DF/PF vs YTB x20  Lf ankle inv/ev vs YTB x20  AROM ankle circles cw/ccw x20 ea  Long sit calf stretch c strap 20\"x3-NP  Seated towel scrunches x2min  Seated toe extension x20  seated CKC heel raises Lf x20  LAQ Lf 5\"x20   Sit to  [id number] (increased)  Added standing Lf gastroc stretch 20\"x3  standing march s UEs 2x20  sidestepping 1min  Added tandem balance in shoes 1 min ea  Fwd and retro stepouts in shoes x10 ea Rt, Lf  (increased)    Future sessions: balance board controlled taps    HEP:  ([email or web address], code [email or web address])  Initial-AROM ankle DF/PF, AROM ankle inv/ev, long sit calf stretch c towel, seated towel scrunches, seated CKC toe extension, LAQ  1-23: pt given YTB to progress ankle ex's    Assessment/Plan: Per pt report she is tolerating weaning from boot well, presents in B shoes today. She tolerates new/progressed ex's today s any report of incr pain. She demo difficulty c static and dynamic balance but s any incr pain. Cont to progress LLE strength and proprioception as tolerated. PLAN OF CARE:    Goals: (to be met in 8 visits)  1. Consistently decr pain < or = 2/10 intermittent for incr QOL and activity tolerance  2. Overall incr in function indicated by incr FOTO at least 11 pts  3.  incr Lf foot/ankle AROM within 5 degrees of uninvolved side to promote improved quality of gait and ability to squat  4. Pt able to ambulate s boot 1 mile c little to no pain for incr community mobility  5. Pt able to ascend/descend stairs reciprocally c UE assist to railing comfortably for incr community mobility  6. Pt able to squat to reach low cabinet c little to no pain to incr functional mobility at home and c grandchild  7. indep HEP to promote cont progress toward functional goals    Frequency / Duration: Patient will be seen for 2 x/week or a total of 8 visits over a 90 day period. All Treatments performed at distinct and separate times during the therapy session.   Treatment Minutes  Units charged   Manual Therapy 15 minutes 1   Therapeutic Activity 0 minutes    Neuromuscular Re-education 0 minutes    Therapeutic Exercise 28 minutes 2   Total Direct Treatment Time 43 minutes

## 2023-02-06 ENCOUNTER — OFFICE VISIT (OUTPATIENT)
Dept: INTERNAL MEDICINE CLINIC | Facility: CLINIC | Age: 68
End: 2023-02-06
Payer: MEDICARE

## 2023-02-06 ENCOUNTER — OFFICE VISIT (OUTPATIENT)
Dept: PHYSICAL THERAPY | Age: 68
End: 2023-02-06
Attending: PODIATRIST
Payer: MEDICARE

## 2023-02-06 VITALS
BODY MASS INDEX: 31.5 KG/M2 | HEIGHT: 66 IN | WEIGHT: 196 LBS | DIASTOLIC BLOOD PRESSURE: 66 MMHG | OXYGEN SATURATION: 99 % | HEART RATE: 69 BPM | RESPIRATION RATE: 18 BRPM | SYSTOLIC BLOOD PRESSURE: 122 MMHG

## 2023-02-06 DIAGNOSIS — I44.7 LBBB (LEFT BUNDLE BRANCH BLOCK): ICD-10-CM

## 2023-02-06 DIAGNOSIS — Z95.5 S/P CORONARY ARTERY STENT PLACEMENT: ICD-10-CM

## 2023-02-06 DIAGNOSIS — F32.A ANXIETY AND DEPRESSION: ICD-10-CM

## 2023-02-06 DIAGNOSIS — Z79.4 TYPE 2 DIABETES MELLITUS WITH DIABETIC NEUROPATHY, WITH LONG-TERM CURRENT USE OF INSULIN (HCC): ICD-10-CM

## 2023-02-06 DIAGNOSIS — K21.9 GASTROESOPHAGEAL REFLUX DISEASE WITHOUT ESOPHAGITIS: ICD-10-CM

## 2023-02-06 DIAGNOSIS — E11.40 TYPE 2 DIABETES MELLITUS WITH DIABETIC NEUROPATHY, WITH LONG-TERM CURRENT USE OF INSULIN (HCC): ICD-10-CM

## 2023-02-06 DIAGNOSIS — Z00.00 ENCOUNTER FOR ANNUAL HEALTH EXAMINATION: Primary | ICD-10-CM

## 2023-02-06 DIAGNOSIS — E78.2 MIXED HYPERLIPIDEMIA: ICD-10-CM

## 2023-02-06 DIAGNOSIS — Z98.890 S/P ORIF (OPEN REDUCTION INTERNAL FIXATION) FRACTURE: ICD-10-CM

## 2023-02-06 DIAGNOSIS — M79.672 ACUTE POSTOPERATIVE PAIN OF FOOT, LEFT: ICD-10-CM

## 2023-02-06 DIAGNOSIS — M85.89 OSTEOPENIA OF MULTIPLE SITES: ICD-10-CM

## 2023-02-06 DIAGNOSIS — M19.079 ARTHRITIS, MIDFOOT: ICD-10-CM

## 2023-02-06 DIAGNOSIS — M20.42 HAMMER TOE OF LEFT FOOT: ICD-10-CM

## 2023-02-06 DIAGNOSIS — Z87.81 S/P ORIF (OPEN REDUCTION INTERNAL FIXATION) FRACTURE: ICD-10-CM

## 2023-02-06 DIAGNOSIS — Z47.89 ORTHOPEDIC AFTERCARE: ICD-10-CM

## 2023-02-06 DIAGNOSIS — R30.0 DYSURIA: ICD-10-CM

## 2023-02-06 DIAGNOSIS — F41.9 ANXIETY AND DEPRESSION: ICD-10-CM

## 2023-02-06 DIAGNOSIS — I83.893 SYMPTOMATIC VARICOSE VEINS, BILATERAL: ICD-10-CM

## 2023-02-06 DIAGNOSIS — E87.5 HYPERKALEMIA: ICD-10-CM

## 2023-02-06 DIAGNOSIS — I87.2 VENOUS INSUFFICIENCY OF BOTH LOWER EXTREMITIES: ICD-10-CM

## 2023-02-06 DIAGNOSIS — K86.2 PANCREATIC CYST: ICD-10-CM

## 2023-02-06 DIAGNOSIS — G89.18 ACUTE POSTOPERATIVE PAIN OF FOOT, LEFT: ICD-10-CM

## 2023-02-06 DIAGNOSIS — I25.10 CORONARY ARTERY DISEASE INVOLVING NATIVE CORONARY ARTERY OF NATIVE HEART WITHOUT ANGINA PECTORIS: ICD-10-CM

## 2023-02-06 DIAGNOSIS — I10 ESSENTIAL HYPERTENSION: ICD-10-CM

## 2023-02-06 DIAGNOSIS — Z48.89 AFTERCARE FOLLOWING SURGERY: Primary | ICD-10-CM

## 2023-02-06 DIAGNOSIS — S72.92XG CLOSED FRACTURE OF LEFT FEMUR WITH DELAYED HEALING, UNSPECIFIED FRACTURE MORPHOLOGY, UNSPECIFIED PORTION OF FEMUR, SUBSEQUENT ENCOUNTER: ICD-10-CM

## 2023-02-06 DIAGNOSIS — S72.22XA CLOSED DISPLACED SUBTROCHANTERIC FRACTURE OF LEFT FEMUR, INITIAL ENCOUNTER (HCC): ICD-10-CM

## 2023-02-06 PROBLEM — S72.92XD CLOSED FRACTURE OF LEFT FEMUR WITH ROUTINE HEALING: Status: RESOLVED | Noted: 2021-07-06 | Resolved: 2023-02-06

## 2023-02-06 PROBLEM — R80.9 MICROALBUMINURIA DUE TO TYPE 2 DIABETES MELLITUS: Status: RESOLVED | Noted: 2021-06-01 | Resolved: 2023-02-06

## 2023-02-06 PROBLEM — L90.9 PLANTAR FAT PAD ATROPHY: Status: RESOLVED | Noted: 2018-04-28 | Resolved: 2023-02-06

## 2023-02-06 PROBLEM — S92.345A CLOSED NONDISPLACED FRACTURE OF FOURTH METATARSAL BONE OF LEFT FOOT: Status: RESOLVED | Noted: 2022-09-06 | Resolved: 2023-02-06

## 2023-02-06 PROBLEM — E11.29 MICROALBUMINURIA DUE TO TYPE 2 DIABETES MELLITUS  (HCC): Status: RESOLVED | Noted: 2021-06-01 | Resolved: 2023-02-06

## 2023-02-06 PROBLEM — E78.5 DYSLIPIDEMIA: Status: RESOLVED | Noted: 2019-03-23 | Resolved: 2023-02-06

## 2023-02-06 PROBLEM — R80.9 MICROALBUMINURIA DUE TO TYPE 2 DIABETES MELLITUS  (HCC): Status: RESOLVED | Noted: 2021-06-01 | Resolved: 2023-02-06

## 2023-02-06 PROBLEM — E11.29 MICROALBUMINURIA DUE TO TYPE 2 DIABETES MELLITUS: Status: RESOLVED | Noted: 2021-06-01 | Resolved: 2023-02-06

## 2023-02-06 PROBLEM — R80.9 MICROALBUMINURIA DUE TO TYPE 2 DIABETES MELLITUS (HCC): Status: RESOLVED | Noted: 2021-06-01 | Resolved: 2023-02-06

## 2023-02-06 PROBLEM — E11.29 MICROALBUMINURIA DUE TO TYPE 2 DIABETES MELLITUS (HCC): Status: RESOLVED | Noted: 2021-06-01 | Resolved: 2023-02-06

## 2023-02-06 PROBLEM — M21.611 BUNION, RIGHT: Status: RESOLVED | Noted: 2022-09-06 | Resolved: 2023-02-06

## 2023-02-06 PROBLEM — M97.12XA PERIPROSTHETIC FRACTURE AROUND INTERNAL PROSTHETIC LEFT KNEE JOINT: Status: RESOLVED | Noted: 2022-01-22 | Resolved: 2023-02-06

## 2023-02-06 LAB
APPEARANCE: CLEAR
BILIRUBIN: NEGATIVE
GLUCOSE (URINE DIPSTICK): >=1000 MG/DL
KETONES (URINE DIPSTICK): NEGATIVE MG/DL
MULTISTIX LOT#: ABNORMAL NUMERIC
NITRITE, URINE: POSITIVE
PH, URINE: 5 (ref 4.5–8)
PROTEIN (URINE DIPSTICK): NEGATIVE MG/DL
SPECIFIC GRAVITY: 1.01 (ref 1–1.03)
UROBILINOGEN,SEMI-QN: 0.2 MG/DL (ref 0–1.9)

## 2023-02-06 PROCEDURE — 87086 URINE CULTURE/COLONY COUNT: CPT | Performed by: INTERNAL MEDICINE

## 2023-02-06 PROCEDURE — 87088 URINE BACTERIA CULTURE: CPT | Performed by: INTERNAL MEDICINE

## 2023-02-06 PROCEDURE — 97140 MANUAL THERAPY 1/> REGIONS: CPT

## 2023-02-06 PROCEDURE — 87186 SC STD MICRODIL/AGAR DIL: CPT | Performed by: INTERNAL MEDICINE

## 2023-02-06 PROCEDURE — 97110 THERAPEUTIC EXERCISES: CPT

## 2023-02-06 RX ORDER — FAMOTIDINE 40 MG/1
40 TABLET, FILM COATED ORAL DAILY
Qty: 90 TABLET | Refills: 0 | Status: SHIPPED | OUTPATIENT
Start: 2023-02-06

## 2023-02-06 RX ORDER — NITROFURANTOIN 25; 75 MG/1; MG/1
100 CAPSULE ORAL 2 TIMES DAILY
Qty: 14 CAPSULE | Refills: 0 | Status: SHIPPED | OUTPATIENT
Start: 2023-02-06 | End: 2023-02-13

## 2023-02-06 RX ORDER — INSULIN GLARGINE 100 [IU]/ML
30 INJECTION, SOLUTION SUBCUTANEOUS EVERY MORNING
Qty: 12 EACH | Refills: 1 | Status: SHIPPED | OUTPATIENT
Start: 2023-02-06

## 2023-02-14 ENCOUNTER — OFFICE VISIT (OUTPATIENT)
Dept: PHYSICAL THERAPY | Age: 68
End: 2023-02-14
Attending: PODIATRIST
Payer: MEDICARE

## 2023-02-14 DIAGNOSIS — G89.18 ACUTE POSTOPERATIVE PAIN OF FOOT, LEFT: ICD-10-CM

## 2023-02-14 DIAGNOSIS — M79.672 ACUTE POSTOPERATIVE PAIN OF FOOT, LEFT: ICD-10-CM

## 2023-02-14 DIAGNOSIS — Z48.89 AFTERCARE FOLLOWING SURGERY: Primary | ICD-10-CM

## 2023-02-14 PROCEDURE — 97140 MANUAL THERAPY 1/> REGIONS: CPT

## 2023-02-14 PROCEDURE — 97110 THERAPEUTIC EXERCISES: CPT

## 2023-02-20 ENCOUNTER — HOSPITAL ENCOUNTER (OUTPATIENT)
Dept: MAMMOGRAPHY | Age: 68
Discharge: HOME | End: 2023-02-20
Attending: OBSTETRICS & GYNECOLOGY
Payer: MEDICARE

## 2023-02-20 DIAGNOSIS — Z12.31 ENCOUNTER FOR SCREENING MAMMOGRAM FOR BREAST CANCER: ICD-10-CM

## 2023-02-20 PROCEDURE — 77067 SCR MAMMO BI INCL CAD: CPT | Performed by: OBSTETRICS & GYNECOLOGY

## 2023-02-20 PROCEDURE — 77063 BREAST TOMOSYNTHESIS BI: CPT | Performed by: OBSTETRICS & GYNECOLOGY

## 2023-02-20 NOTE — PROGRESS NOTES
PT DAILY NOTE  Diagnosis:   Aftercare following surgery (Z48.89), acute postoperative pain of left foot (D29.546, G89.18)     Referring Provider: Berto Duron DPM  Date of Evaluation:    1/10/2023    Precautions:  diabetic neuropathy, venous insufficiency Next MD visit:   3-7-23  Date of Surgery: 12-12-22 Lf foot 1st MTP fusion, 3rd metatarsal osteotomy and hammertoe correction     Insurance Primary/Secondary: MEDICARE-     Visit 8 of 10   Date POC Expires: 4-10-23       Subjective: Pt states she is able to walk in the store. She has been able to go for 10-15 min walks. Pain: 2/10   @eval: Sonido Espinosa is a 79year old female who presents to therapy today with complaints of Lf foot pain s/p bunion and hammer toe surgery 1 month ago. Pt's daughter present today and translating for pt. 8 days ago pt began weightbearing in walking boot per MD. She presents today s A.D., but daughter reports she intermittently uses S.C. on Rt side since Lf TKR 2 yrs ago. She used scooter for 3 wks post op before initiating WB. MD wants her to stay in boot for another 3-4 wks, has MD f/u in 2 wks. She does not use ice but she does elevate LLE 3-4x/day (sitting upright c LLE in front of her, not above heart-level). Pt does not drive; lives c daughter in ranch house s need for stairs. UA to squat. Walking tolerance 6-8 min s pain. Denies any N/T. Pt describes pain level at best 2/10, at worst 6/10. Current functional limitations include limited walking tolerance, UA to squat. Criselda Lazar describes prior level of function painfree foot, walking at least 30 min/day to/from park 1-1.5 miles. Pt goals include return to daily activities helping granddaughter, walk around the house painfree, more participation in cooking at home, decrease stiffness. Past medical history was reviewed with Criselda Lazar.  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    Objective:   Continued challenge c balance activities c occasionsl UE contact to railing to maintain balance    Treatment:  (\"NP\" indicates Not Performed this date)  Manual Therapy:  scar mob  Retrograde massage foot/ankle for swelling  PROM Lf foot/ankle    Neuromuscular Re-education:    Therapeutic Exercise:  treadmill walk at self-selected speed (1.1mph) 5min  Lf ankle DF/PF vs YTB x20  Lf ankle inv/ev vs YTB x20  AROM ankle circles cw/ccw x20 ea  Seated towel scrunches x2min  Seated toe extension x20  seated CKC heel raises Lf x20  LAQ Lf 5\"x20-NP  Sit to  shoes x20  standing Lf gastroc stretch 20\"x3  standing march s UEs x2min (increased)  sidestepping 2min (increased)  tandem balance in shoes 1 min ea  Fwd and retro stepouts in shoes x10 ea Rt, Lf    Future sessions: balance board controlled taps    HEP:  ([email or web address], code [email or web address])  Initial-AROM ankle DF/PF, AROM ankle inv/ev, long sit calf stretch c towel, seated towel scrunches, seated CKC toe extension, LAQ  1-23: pt given YTB to progress ankle ex's    Assessment/Plan: Pt continues to demo good stability ambulating in therapy s A.D. or boot, but c slow speed. She is challenged c balance activities and has mild pain today at dorsal lateral foot. Continue to progress balance and WB LE strength as tolerated. PLAN OF CARE:    Goals: (to be met in 8 visits)  1. Consistently decr pain < or = 2/10 intermittent for incr QOL and activity tolerance  2. Overall incr in function indicated by incr FOTO at least 11 pts  3. incr Lf foot/ankle AROM within 5 degrees of uninvolved side to promote improved quality of gait and ability to squat  4. Pt able to ambulate s boot 1 mile c little to no pain for incr community mobility  5.  Pt able to ascend/descend stairs reciprocally c UE assist to railing comfortably for incr community mobility  6. Pt able to squat to reach low cabinet c little to no pain to incr functional mobility at home and c grandchild  7. indep HEP to promote cont progress toward functional goals    Frequency / Duration: Patient will be seen for 2 x/week or a total of 8 visits over a 90 day period. All Treatments performed at distinct and separate times during the therapy session.   Treatment Minutes  Units charged   Manual Therapy 8 minutes 1   Therapeutic Activity 0 minutes    Neuromuscular Re-education 0 minutes    Therapeutic Exercise 38 minutes 3   Total Direct Treatment Time 46 minutes

## 2023-02-21 ENCOUNTER — OFFICE VISIT (OUTPATIENT)
Dept: PHYSICAL THERAPY | Age: 68
End: 2023-02-21
Attending: PODIATRIST
Payer: MEDICARE

## 2023-02-21 DIAGNOSIS — Z48.89 AFTERCARE FOLLOWING SURGERY: Primary | ICD-10-CM

## 2023-02-21 DIAGNOSIS — M79.672 ACUTE POSTOPERATIVE PAIN OF FOOT, LEFT: ICD-10-CM

## 2023-02-21 DIAGNOSIS — G89.18 ACUTE POSTOPERATIVE PAIN OF FOOT, LEFT: ICD-10-CM

## 2023-02-21 PROCEDURE — 97110 THERAPEUTIC EXERCISES: CPT

## 2023-02-21 PROCEDURE — 97140 MANUAL THERAPY 1/> REGIONS: CPT

## 2023-02-28 ENCOUNTER — APPOINTMENT (OUTPATIENT)
Dept: PHYSICAL THERAPY | Age: 68
End: 2023-02-28
Attending: PODIATRIST
Payer: MEDICARE

## 2023-03-01 ENCOUNTER — TELEPHONE (OUTPATIENT)
Dept: INTERNAL MEDICINE CLINIC | Facility: CLINIC | Age: 68
End: 2023-03-01

## 2023-03-01 NOTE — TELEPHONE ENCOUNTER
Patient's daughter, Lorena Hernandez states that the medication, Erenest Haque is not working for her Mother. Also patient had a UTI and is still having symptoms; strong ordor and urgency and then can't urinate. Daughter did state that she did talk to Triage last evening and was calling back today.

## 2023-03-02 ENCOUNTER — OFFICE VISIT (OUTPATIENT)
Dept: INTERNAL MEDICINE CLINIC | Facility: CLINIC | Age: 68
End: 2023-03-02
Payer: MEDICARE

## 2023-03-02 ENCOUNTER — HOSPITAL ENCOUNTER (OUTPATIENT)
Dept: CT IMAGING | Age: 68
Discharge: HOME | End: 2023-03-02
Attending: INTERNAL MEDICINE
Payer: MEDICARE

## 2023-03-02 VITALS
OXYGEN SATURATION: 100 % | BODY MASS INDEX: 31.31 KG/M2 | HEIGHT: 66 IN | SYSTOLIC BLOOD PRESSURE: 124 MMHG | HEART RATE: 64 BPM | WEIGHT: 194.81 LBS | DIASTOLIC BLOOD PRESSURE: 66 MMHG

## 2023-03-02 DIAGNOSIS — R10.9 ACUTE LEFT FLANK PAIN: ICD-10-CM

## 2023-03-02 DIAGNOSIS — R30.0 DYSURIA: ICD-10-CM

## 2023-03-02 DIAGNOSIS — R34 DECREASED URINE OUTPUT: ICD-10-CM

## 2023-03-02 DIAGNOSIS — F32.A ANXIETY AND DEPRESSION: ICD-10-CM

## 2023-03-02 DIAGNOSIS — R10.9 LEFT SIDED ABDOMINAL PAIN: ICD-10-CM

## 2023-03-02 DIAGNOSIS — Z79.4 TYPE 2 DIABETES MELLITUS WITH DIABETIC NEUROPATHY, WITH LONG-TERM CURRENT USE OF INSULIN (HCC): ICD-10-CM

## 2023-03-02 DIAGNOSIS — E11.40 TYPE 2 DIABETES MELLITUS WITH DIABETIC NEUROPATHY, WITH LONG-TERM CURRENT USE OF INSULIN (HCC): ICD-10-CM

## 2023-03-02 DIAGNOSIS — F41.9 ANXIETY AND DEPRESSION: ICD-10-CM

## 2023-03-02 DIAGNOSIS — R10.9 ACUTE LEFT FLANK PAIN: Primary | ICD-10-CM

## 2023-03-02 LAB
APPEARANCE: CLEAR
BILIRUBIN: NEGATIVE
GLUCOSE (URINE DIPSTICK): 500 MG/DL
KETONES (URINE DIPSTICK): NEGATIVE MG/DL
LEUKOCYTES: NEGATIVE
MULTISTIX LOT#: ABNORMAL NUMERIC
NITRITE, URINE: NEGATIVE
PH, URINE: 5.5 (ref 4.5–8)
PROTEIN (URINE DIPSTICK): NEGATIVE MG/DL
SPECIFIC GRAVITY: 1.01 (ref 1–1.03)
URINE-COLOR: YELLOW
UROBILINOGEN,SEMI-QN: 0.2 MG/DL (ref 0–1.9)

## 2023-03-02 PROCEDURE — 87086 URINE CULTURE/COLONY COUNT: CPT | Performed by: INTERNAL MEDICINE

## 2023-03-02 PROCEDURE — 87088 URINE BACTERIA CULTURE: CPT | Performed by: INTERNAL MEDICINE

## 2023-03-02 PROCEDURE — 99214 OFFICE O/P EST MOD 30 MIN: CPT | Performed by: INTERNAL MEDICINE

## 2023-03-02 PROCEDURE — 82565 ASSAY OF CREATININE: CPT

## 2023-03-02 PROCEDURE — 87186 SC STD MICRODIL/AGAR DIL: CPT | Performed by: INTERNAL MEDICINE

## 2023-03-02 PROCEDURE — 81003 URINALYSIS AUTO W/O SCOPE: CPT | Performed by: INTERNAL MEDICINE

## 2023-03-02 PROCEDURE — 74177 CT ABD & PELVIS W/CONTRAST: CPT | Performed by: INTERNAL MEDICINE

## 2023-03-02 NOTE — TELEPHONE ENCOUNTER
Please call and offer appt today for possible UTI. We did try calling daughter regarding med recommendations but did not hear back. Can speak to triage once scheduled for appt to address UTI. Daughter previously told this RN that pt was not having urinary symptoms (please see result note).

## 2023-03-02 NOTE — TELEPHONE ENCOUNTER
Future Appointments   Date Time Provider Margoth Silveira   3/2/2023  3:20 PM Jonna Epperson MD [facility]

## 2023-03-04 RX ORDER — CEPHALEXIN 500 MG/1
500 CAPSULE ORAL 2 TIMES DAILY
Qty: 14 CAPSULE | Refills: 0 | Status: SHIPPED | OUTPATIENT
Start: 2023-03-04 | End: 2023-03-11

## 2023-03-05 LAB
CREAT BLD-MCNC: 1 MG/DL
GFR SERPLBLD BASED ON 1.73 SQ M-ARVRAT: 61 ML/MIN/1.73M2 (ref 60–?)

## 2023-03-07 ENCOUNTER — HOSPITAL ENCOUNTER (OUTPATIENT)
Dept: GENERAL RADIOLOGY | Age: 68
Discharge: HOME | End: 2023-03-07
Attending: PODIATRIST
Payer: MEDICARE

## 2023-03-07 ENCOUNTER — APPOINTMENT (OUTPATIENT)
Dept: PHYSICAL THERAPY | Age: 68
End: 2023-03-07
Attending: PODIATRIST
Payer: MEDICARE

## 2023-03-07 ENCOUNTER — OFFICE VISIT (OUTPATIENT)
Dept: ORTHOPEDICS CLINIC | Facility: CLINIC | Age: 68
End: 2023-03-07
Payer: MEDICARE

## 2023-03-07 DIAGNOSIS — Z48.89 AFTERCARE FOLLOWING SURGERY: Primary | ICD-10-CM

## 2023-03-07 DIAGNOSIS — Z48.89 AFTERCARE FOLLOWING SURGERY: ICD-10-CM

## 2023-03-07 DIAGNOSIS — M21.611 BUNION, RIGHT: ICD-10-CM

## 2023-03-07 DIAGNOSIS — E11.49 DIABETES MELLITUS TYPE 2 WITH NEUROLOGICAL MANIFESTATIONS (HCC): ICD-10-CM

## 2023-03-07 DIAGNOSIS — M85.89 OSTEOPENIA OF MULTIPLE SITES: ICD-10-CM

## 2023-03-07 DIAGNOSIS — M20.42 HAMMER TOE OF LEFT FOOT: ICD-10-CM

## 2023-03-07 PROCEDURE — 73630 X-RAY EXAM OF FOOT: CPT | Performed by: PODIATRIST

## 2023-03-07 PROCEDURE — 99024 POSTOP FOLLOW-UP VISIT: CPT | Performed by: PODIATRIST

## 2023-03-07 NOTE — PROGRESS NOTES
[facility] Progress Note    Subjective:     Ralph is here with her daughter and she is now almost 3 months postop fusion first MP joint and hammertoe repair second and third with osteotomy of the third metatarsal left foot. She is doing pretty well and is into her new new balance shoes. She is finishing up physical therapy  She wanted to discussed the right foot surgery for the bunion at some point though so they did get an x-ray today of the right foot      Objective: Toes are in good alignment she still has a little contracture at the incision of the third metatarsal and the second interspace. She still has swelling and discoloration of the skin but overall improved. Great toe is in good alignment and there is no movement. Exam right foot mild bunion deformity with mild hallux valgus and abductus but no hammertoes    X-rays show osteopenic bone. No movement of the hardware and partial fusion already noted at the first MP joint  X-rays right foot show bunion deformity but minimal to no hammertoe deformities. Osteopenic bone          Assessment/Plan:     Diagnoses and all orders for this visit:    Aftercare following surgery    Bunion, right    Hammer toe of left foot    Diabetes mellitus type 2 with neurological manifestations ([address]    Osteopenia of multiple sites        She is doing well and I would like her to finish out therapy. Follow-up at 6 months postop for another x-ray of the left foot to check the fusion site. As far as the right foot we will wait at least to the 1 year post chetna to consider doing that surgery. She needs to be well-healed from the left and sometimes it can take close to 1 year with everything she had going on. Planning to go ahead and get the orthotics from Laura as well      Daren. Harmony Song DPM  [facility]    [address] speech recognition software was used to prepare this note.  If a word or phrase is confusing, it is likely do to a failure of recognition. Please contact me with any questions or clarifications.

## 2023-03-14 DIAGNOSIS — N95.2 VAGINAL ATROPHY: ICD-10-CM

## 2023-03-14 NOTE — TELEPHONE ENCOUNTER
Last OV for [facility] was 3/24/22. Patient needs annual scheduled. Please call to schedule then route back to clinical pool to refill med. Thanks.

## 2023-03-23 RX ORDER — METFORMIN HYDROCHLORIDE 500 MG/1
TABLET, EXTENDED RELEASE ORAL
Qty: 360 TABLET | Refills: 1 | Status: SHIPPED | OUTPATIENT
Start: 2023-03-23

## 2023-03-23 RX ORDER — EMPAGLIFLOZIN 25 MG/1
1 TABLET, FILM COATED ORAL DAILY
Qty: 90 TABLET | Refills: 1 | Status: SHIPPED | OUTPATIENT
Start: 2023-03-23

## 2023-03-23 RX ORDER — LINAGLIPTIN 5 MG/1
TABLET, FILM COATED ORAL
Qty: 90 TABLET | Refills: 1 | Status: SHIPPED | OUTPATIENT
Start: 2023-03-23

## 2023-03-23 RX ORDER — SERTRALINE HYDROCHLORIDE 100 MG/1
TABLET, FILM COATED ORAL
Qty: 90 TABLET | Refills: 1 | Status: SHIPPED | OUTPATIENT
Start: 2023-03-23

## 2023-03-23 RX ORDER — RAMIPRIL 2.5 MG/1
CAPSULE ORAL
Qty: 90 CAPSULE | Refills: 1 | Status: SHIPPED | OUTPATIENT
Start: 2023-03-23

## 2023-03-23 RX ORDER — MONTELUKAST SODIUM 10 MG/1
TABLET ORAL
Qty: 90 TABLET | Refills: 3 | Status: SHIPPED | OUTPATIENT
Start: 2023-03-23

## 2023-03-23 RX ORDER — GLIPIZIDE 10 MG/1
TABLET, FILM COATED, EXTENDED RELEASE ORAL
Qty: 90 TABLET | Refills: 1 | Status: SHIPPED | OUTPATIENT
Start: 2023-03-23

## 2023-03-27 ENCOUNTER — OFFICE VISIT (OUTPATIENT)
Dept: PHYSICAL THERAPY | Age: 68
End: 2023-03-27
Attending: INTERNAL MEDICINE
Payer: MEDICARE

## 2023-03-27 DIAGNOSIS — Z48.89 AFTERCARE FOLLOWING SURGERY: Primary | ICD-10-CM

## 2023-03-27 DIAGNOSIS — G89.18 ACUTE POSTOPERATIVE PAIN OF FOOT, LEFT: ICD-10-CM

## 2023-03-27 DIAGNOSIS — M79.672 ACUTE POSTOPERATIVE PAIN OF FOOT, LEFT: ICD-10-CM

## 2023-03-27 PROCEDURE — 97110 THERAPEUTIC EXERCISES: CPT

## 2023-03-28 NOTE — PROGRESS NOTES
PT DAILY NOTE  Diagnosis:   Aftercare following surgery (Z48.89), acute postoperative pain of left foot (E97.165, G89.18)     Referring Provider: Bhupendra Bui DPM  Date of Evaluation:    1/10/2023, [id number]    Precautions:  diabetic neuropathy, venous insufficiency Next MD visit:   3-7-23  Date of Surgery: 12-12-22 Lf foot 1st MTP fusion, 3rd metatarsal osteotomy and hammertoe correction     Insurance Primary/Secondary: MEDICARE     Visit 10 of 15   Date POC Expires: 6-25-23       Subjective: Pt presents s cane today. No pain reported. Pt is 13' late 2o traffic, accommodated for shortened session  Pain: 0/10     @eval: Pepe Ying is a 79year old female who presents to therapy today with complaints of Lf foot pain s/p bunion and hammer toe surgery 1 month ago. Pt's daughter present today and translating for pt. 8 days ago pt began weightbearing in walking boot per MD. She presents today s A.D., but daughter reports she intermittently uses S.C. on Rt side since Lf TKR 2 yrs ago. She used scooter for 3 wks post op before initiating WB. MD wants her to stay in boot for another 3-4 wks, has MD f/u in 2 wks. She does not use ice but she does elevate LLE 3-4x/day (sitting upright c LLE in front of her, not above heart-level). Pt does not drive; lives c daughter in ranch house s need for stairs. UA to squat. Walking tolerance 6-8 min s pain. Denies any N/T. Pt describes pain level at best 2/10, at worst 6/10. Current functional limitations include limited walking tolerance, UA to squat. Laura Ramirez describes prior level of function painfree foot, walking at least 30 min/day to/from park 1-1.5 miles. Pt goals include return to daily activities helping granddaughter, walk around the house painfree, more participation in cooking at home, decrease stiffness. Past medical history was reviewed with Laura Ramirez.  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    Objective:   Pt reports she feels that she tilts to one side when she walks. Pt does not demo a lateral trunk lean during session today, but discussed c pt that she may have developed compensations over time 2o her chronic LLE pain. Performed ambulation in mirror today for visual feedback, performed both fwd and retro ambulation, to challenge balance. She requires occasional UE contact to countertop during standing hip abd and balance board ex's. Treatment:  (\"NP\" indicates Not Performed this date)  Manual Therapy:    Neuromuscular Re-education:    Therapeutic Exercise:  treadmill walk at self-selected speed (2.2mph) 5min (increased)  standing Lf gastroc stretch 20\"x3  LAQ Lf 5\"x1min  (increased)  Sit to  shoes x20  balance board controlled taps med-lat 2min  balance board controlled taps ant-post 2min  standing march s UEs on airex x2min (increased)  Added fwd/retro walking c visual feedback in mirror 3min  Added alt 6\" taps 1min  standing hip abd 1x10 ea Rt, Lf c minimal UE use  sidestepping 2min    Future session: SLS    NP today:  tandem balance in shoes 1 min ea  Fwd and retro stepouts in shoes x10 ea Rt, Lf    HEP:  ([email or web address], code [email or web address])  Initial-AROM ankle DF/PF, AROM ankle inv/ev, long sit calf stretch c towel, seated towel scrunches, seated CKC toe extension, LAQ  1-23: pt given YTB to progress ankle ex's  3-27: tandem balance and SLS balance at countertop    Assessment/Plan: Pt presents today s cane. She tolerated new ex's to continue to challenge her balance, c retro walking and alternating box taps. She demo occasional UE contact to countertop during some ex's, and performed well with others and no UE contact needed nor incr sway (retro walking, box taps). Cont to progress balance activities as tolerated.     PLAN OF CARE: Goals: (to be met in 15 visits)  1. Consistently decr pain < or = 2/10 intermittent for incr QOL and activity tolerance--met 3-27-23  2. Overall incr in function indicated by incr FOTO at least 11 pts  3. incr Lf foot/ankle AROM within 5 degrees of uninvolved side to promote improved quality of gait and ability to squat-progressing, within 10 degrees or less 3-27-23  4. Pt able to ambulate s boot 1 mile c little to no pain for incr community mobility-not met 3-27-23  5. Pt able to ascend/descend stairs reciprocally c UE assist to railing comfortably for incr community mobility-not met 3-27-23  6. Pt able to squat to reach low cabinet c little to no pain to incr functional mobility at home and c grandchild-not met 2o knee pain 3-27-23  7. indep HEP to promote cont progress toward functional goals    Plan: Continue skilled Physical Therapy 2 x/week for 3 additional weeks, or a total of 15 visits over a 90 day period. All Treatments performed at distinct and separate times during the therapy session.   Treatment Minutes  Units charged   Manual Therapy 0 minutes    Therapeutic Activity 0 minutes    Neuromuscular Re-education 0 minutes    Therapeutic Exercise 33 minutes 2   Total Direct Treatment Time 33 minutes

## 2023-03-29 ENCOUNTER — OFFICE VISIT (OUTPATIENT)
Dept: PHYSICAL THERAPY | Age: 68
End: 2023-03-29
Attending: INTERNAL MEDICINE
Payer: MEDICARE

## 2023-03-29 DIAGNOSIS — Z48.89 AFTERCARE FOLLOWING SURGERY: Primary | ICD-10-CM

## 2023-03-29 DIAGNOSIS — G89.18 ACUTE POSTOPERATIVE PAIN OF FOOT, LEFT: ICD-10-CM

## 2023-03-29 DIAGNOSIS — M79.672 ACUTE POSTOPERATIVE PAIN OF FOOT, LEFT: ICD-10-CM

## 2023-03-29 PROCEDURE — 97110 THERAPEUTIC EXERCISES: CPT

## 2023-04-03 ENCOUNTER — APPOINTMENT (OUTPATIENT)
Dept: PHYSICAL THERAPY | Age: 68
End: 2023-04-03
Attending: INTERNAL MEDICINE
Payer: MEDICARE

## 2023-04-03 ENCOUNTER — TELEPHONE (OUTPATIENT)
Dept: PHYSICAL THERAPY | Facility: HOSPITAL | Age: 68
End: 2023-04-03

## 2023-04-05 ENCOUNTER — TELEPHONE (OUTPATIENT)
Dept: PHYSICAL THERAPY | Facility: HOSPITAL | Age: 68
End: 2023-04-05

## 2023-04-07 ENCOUNTER — TELEPHONE (OUTPATIENT)
Dept: PHYSICAL THERAPY | Facility: HOSPITAL | Age: 68
End: 2023-04-07

## 2023-04-10 ENCOUNTER — OFFICE VISIT (OUTPATIENT)
Dept: PHYSICAL THERAPY | Age: 68
End: 2023-04-10
Attending: INTERNAL MEDICINE
Payer: MEDICARE

## 2023-04-10 DIAGNOSIS — Z48.89 AFTERCARE FOLLOWING SURGERY: Primary | ICD-10-CM

## 2023-04-10 DIAGNOSIS — G89.18 ACUTE POSTOPERATIVE PAIN OF FOOT, LEFT: ICD-10-CM

## 2023-04-10 DIAGNOSIS — M79.672 ACUTE POSTOPERATIVE PAIN OF FOOT, LEFT: ICD-10-CM

## 2023-04-10 PROCEDURE — 97110 THERAPEUTIC EXERCISES: CPT

## 2023-04-14 RX ORDER — ESTRADIOL 0.1 MG/G
CREAM VAGINAL
Qty: 42.5 G | Refills: 2 | OUTPATIENT
Start: 2023-04-14

## 2023-04-26 ENCOUNTER — APPOINTMENT (OUTPATIENT)
Dept: PHYSICAL THERAPY | Age: 68
End: 2023-04-26
Attending: INTERNAL MEDICINE
Payer: MEDICARE

## 2023-05-01 ENCOUNTER — APPOINTMENT (OUTPATIENT)
Dept: PHYSICAL THERAPY | Age: 68
End: 2023-05-01
Attending: INTERNAL MEDICINE
Payer: MEDICARE

## 2023-05-03 ENCOUNTER — APPOINTMENT (OUTPATIENT)
Dept: PHYSICAL THERAPY | Age: 68
End: 2023-05-03
Attending: INTERNAL MEDICINE
Payer: MEDICARE

## 2023-05-04 RX ORDER — FAMOTIDINE 40 MG/1
TABLET, FILM COATED ORAL
Qty: 90 TABLET | Refills: 0 | Status: SHIPPED | OUTPATIENT
Start: 2023-05-04

## 2023-05-04 NOTE — TELEPHONE ENCOUNTER
Last OV: 02/06/2023    No future appointments.      Latest labs: pending     Latest RX: famotidine 40 mg 02/06/2023    Per protocol, no cosign required

## 2023-05-05 RX ORDER — SITAGLIPTIN 100 MG/1
TABLET, FILM COATED ORAL
Qty: 90 TABLET | Refills: 0 | Status: SHIPPED | OUTPATIENT
Start: 2023-05-05

## 2023-05-05 NOTE — TELEPHONE ENCOUNTER
Last OV 3/2/23   Last PE 2/6/23   Last REFILL Januvia unknown  Last LABS 12/02/22     No future appointments. Per PROTOCOL?     Please Advise

## 2023-05-08 ENCOUNTER — APPOINTMENT (OUTPATIENT)
Dept: PHYSICAL THERAPY | Age: 68
End: 2023-05-08
Attending: INTERNAL MEDICINE
Payer: MEDICARE

## 2023-05-10 ENCOUNTER — APPOINTMENT (OUTPATIENT)
Dept: PHYSICAL THERAPY | Age: 68
End: 2023-05-10
Attending: INTERNAL MEDICINE
Payer: MEDICARE

## 2023-05-26 RX ORDER — INSULIN GLARGINE 100 [IU]/ML
30 INJECTION, SOLUTION SUBCUTANEOUS EVERY MORNING
Qty: 12 ML | Refills: 0 | Status: SHIPPED | OUTPATIENT
Start: 2023-05-26

## 2023-06-10 ENCOUNTER — LABORATORY ENCOUNTER (OUTPATIENT)
Dept: LAB | Age: 68
End: 2023-06-10
Attending: INTERNAL MEDICINE
Payer: MEDICARE

## 2023-06-10 DIAGNOSIS — R30.0 DYSURIA: ICD-10-CM

## 2023-06-10 DIAGNOSIS — Z79.4 TYPE 2 DIABETES MELLITUS WITH DIABETIC NEUROPATHY, WITH LONG-TERM CURRENT USE OF INSULIN (HCC): ICD-10-CM

## 2023-06-10 DIAGNOSIS — R10.9 LEFT SIDED ABDOMINAL PAIN: ICD-10-CM

## 2023-06-10 DIAGNOSIS — R10.9 ACUTE LEFT FLANK PAIN: ICD-10-CM

## 2023-06-10 DIAGNOSIS — E87.5 HYPERKALEMIA: ICD-10-CM

## 2023-06-10 DIAGNOSIS — R34 DECREASED URINE OUTPUT: ICD-10-CM

## 2023-06-10 DIAGNOSIS — E11.40 TYPE 2 DIABETES MELLITUS WITH DIABETIC NEUROPATHY, WITH LONG-TERM CURRENT USE OF INSULIN (HCC): ICD-10-CM

## 2023-06-10 LAB
ALBUMIN SERPL-MCNC: 3.8 G/DL (ref 3.4–5)
ALBUMIN/GLOB SERPL: 1 {RATIO} (ref 1–2)
ALP LIVER SERPL-CCNC: 173 U/L
ALT SERPL-CCNC: 22 U/L
ANION GAP SERPL CALC-SCNC: 3 MMOL/L (ref 0–18)
AST SERPL-CCNC: 18 U/L (ref 15–37)
BILIRUB SERPL-MCNC: 0.5 MG/DL (ref 0.1–2)
BUN BLD-MCNC: 20 MG/DL (ref 7–18)
CALCIUM BLD-MCNC: 9.8 MG/DL (ref 8.5–10.1)
CHLORIDE SERPL-SCNC: 105 MMOL/L (ref 98–112)
CHOLEST SERPL-MCNC: 175 MG/DL (ref ?–200)
CO2 SERPL-SCNC: 27 MMOL/L (ref 21–32)
CREAT BLD-MCNC: 0.96 MG/DL
CREAT UR-SCNC: 49.6 MG/DL
EST. AVERAGE GLUCOSE BLD GHB EST-MCNC: 200 MG/DL (ref 68–126)
FASTING PATIENT LIPID ANSWER: YES
FASTING STATUS PATIENT QL REPORTED: YES
GFR SERPLBLD BASED ON 1.73 SQ M-ARVRAT: 64 ML/MIN/1.73M2 (ref 60–?)
GLOBULIN PLAS-MCNC: 3.9 G/DL (ref 2.8–4.4)
GLUCOSE BLD-MCNC: 228 MG/DL (ref 70–99)
HBA1C MFR BLD: 8.6 % (ref ?–5.7)
HDLC SERPL-MCNC: 45 MG/DL (ref 40–59)
LDLC SERPL CALC-MCNC: 91 MG/DL (ref ?–100)
MICROALBUMIN UR-MCNC: 0.72 MG/DL
MICROALBUMIN/CREAT 24H UR-RTO: 14.5 UG/MG (ref ?–30)
NONHDLC SERPL-MCNC: 130 MG/DL (ref ?–130)
OSMOLALITY SERPL CALC.SUM OF ELEC: 290 MOSM/KG (ref 275–295)
POTASSIUM SERPL-SCNC: 4.8 MMOL/L (ref 3.5–5.1)
PROT SERPL-MCNC: 7.7 G/DL (ref 6.4–8.2)
SODIUM SERPL-SCNC: 135 MMOL/L (ref 136–145)
TRIGL SERPL-MCNC: 234 MG/DL (ref 30–149)
VLDLC SERPL CALC-MCNC: 38 MG/DL (ref 0–30)

## 2023-06-10 PROCEDURE — 83036 HEMOGLOBIN GLYCOSYLATED A1C: CPT

## 2023-06-10 PROCEDURE — 80053 COMPREHEN METABOLIC PANEL: CPT

## 2023-06-10 PROCEDURE — 82043 UR ALBUMIN QUANTITATIVE: CPT

## 2023-06-10 PROCEDURE — 80061 LIPID PANEL: CPT

## 2023-06-10 PROCEDURE — 82570 ASSAY OF URINE CREATININE: CPT

## 2023-06-10 PROCEDURE — 36415 COLL VENOUS BLD VENIPUNCTURE: CPT

## 2023-06-13 ENCOUNTER — MED REC SCAN ONLY (OUTPATIENT)
Dept: INTERNAL MEDICINE CLINIC | Facility: CLINIC | Age: 68
End: 2023-06-13

## 2023-06-15 ENCOUNTER — OFFICE VISIT (OUTPATIENT)
Dept: ORTHOPEDICS CLINIC | Facility: CLINIC | Age: 68
End: 2023-06-15
Payer: MEDICARE

## 2023-06-15 DIAGNOSIS — Z48.89 AFTERCARE FOLLOWING SURGERY: Primary | ICD-10-CM

## 2023-06-15 PROCEDURE — 99213 OFFICE O/P EST LOW 20 MIN: CPT | Performed by: PODIATRIST

## 2023-06-15 PROCEDURE — 1125F AMNT PAIN NOTED PAIN PRSNT: CPT | Performed by: PODIATRIST

## 2023-06-15 RX ORDER — METOPROLOL SUCCINATE 25 MG/1
TABLET, EXTENDED RELEASE ORAL
COMMUNITY
Start: 2023-06-07

## 2023-06-15 NOTE — PROGRESS NOTES
[facility] Progress Note    Subjective:   Laura Ramirez is here with her daughter for follow-up, 6 months postop surgery of the left foot. Wears her good NB shoes  Dealing with knee pain  Unsure if gait effected by knee and vice versa        Objective:     No swelling today about the left foot. The toes are in pretty good alignment although the first MP joint has gone into a little bit of abductus with the fusion. There is painful areas about the forefoot dorsally more so than plantar. Seems to be centered over the second and third MP joint and unfortunately that was where she had pain prior. Imaging: none today        Assessment/Plan:     Diagnoses and all orders for this visit:    Aftercare following surgery  -     XR FOOT WEIGHTBEARING (3 VIEWS), LEFT (CPT=73630); Future        We discussed through interpretation with her daughters help that she is still having pain and I am sorry about that. I was hopeful that she would have more relief at this point. She does have a lot of arthritis and she does have osteopenic bone. She does wear her good new balance shoes and is able to do a fair amount but has good days and bad days    I am hoping that she has more improvement at 1 year post op  We did a lot to the forefoot surgically    Xray 1 year post op        Benji Emery. Juanita Simpson DPM  [facility]    [employer] speech recognition software was used to prepare this note. If a word or phrase is confusing, it is likely do to a failure of recognition. Please contact me with any questions or clarifications.

## 2023-07-03 RX ORDER — INSULIN GLARGINE 100 [IU]/ML
30 INJECTION, SOLUTION SUBCUTANEOUS EVERY MORNING
Qty: 12 ML | Refills: 0 | Status: SHIPPED | OUTPATIENT
Start: 2023-07-03

## 2023-07-12 ENCOUNTER — OFFICE VISIT (OUTPATIENT)
Dept: INTERNAL MEDICINE CLINIC | Facility: CLINIC | Age: 68
End: 2023-07-12
Payer: MEDICARE

## 2023-07-12 VITALS
OXYGEN SATURATION: 96 % | HEIGHT: 66 IN | TEMPERATURE: 98 F | WEIGHT: 192.19 LBS | HEART RATE: 63 BPM | BODY MASS INDEX: 30.89 KG/M2 | RESPIRATION RATE: 17 BRPM | DIASTOLIC BLOOD PRESSURE: 69 MMHG | SYSTOLIC BLOOD PRESSURE: 122 MMHG

## 2023-07-12 DIAGNOSIS — E66.9 CLASS 1 OBESITY: ICD-10-CM

## 2023-07-12 DIAGNOSIS — F41.9 ANXIETY AND DEPRESSION: ICD-10-CM

## 2023-07-12 DIAGNOSIS — F32.A ANXIETY AND DEPRESSION: ICD-10-CM

## 2023-07-12 DIAGNOSIS — E11.40 TYPE 2 DIABETES MELLITUS WITH DIABETIC NEUROPATHY, WITH LONG-TERM CURRENT USE OF INSULIN (HCC): Primary | ICD-10-CM

## 2023-07-12 DIAGNOSIS — Z79.4 TYPE 2 DIABETES MELLITUS WITH DIABETIC NEUROPATHY, WITH LONG-TERM CURRENT USE OF INSULIN (HCC): Primary | ICD-10-CM

## 2023-07-12 PROBLEM — E66.811 CLASS 1 OBESITY: Status: ACTIVE | Noted: 2023-07-12

## 2023-07-12 PROCEDURE — 1125F AMNT PAIN NOTED PAIN PRSNT: CPT | Performed by: INTERNAL MEDICINE

## 2023-07-12 PROCEDURE — 99214 OFFICE O/P EST MOD 30 MIN: CPT | Performed by: INTERNAL MEDICINE

## 2023-07-12 RX ORDER — INSULIN GLARGINE AND LIXISENATIDE 100; 33 U/ML; UG/ML
30 INJECTION, SOLUTION SUBCUTANEOUS DAILY
Qty: 3 EACH | Refills: 0 | Status: SHIPPED | OUTPATIENT
Start: 2023-07-12

## 2023-07-12 RX ORDER — BLOOD-GLUCOSE METER
1 EACH MISCELLANEOUS 2 TIMES DAILY
Qty: 1 KIT | Refills: 0 | COMMUNITY
Start: 2023-07-12 | End: 2024-07-11

## 2023-07-12 RX ORDER — DULAGLUTIDE 0.75 MG/.5ML
0.75 INJECTION, SOLUTION SUBCUTANEOUS WEEKLY
Qty: 1 EACH | Refills: 0 | Status: SHIPPED | OUTPATIENT
Start: 2023-07-12 | End: 2023-07-12

## 2023-07-27 RX ORDER — FAMOTIDINE 40 MG/1
TABLET, FILM COATED ORAL
Qty: 90 TABLET | Refills: 0 | Status: SHIPPED | OUTPATIENT
Start: 2023-07-27

## 2023-07-27 NOTE — TELEPHONE ENCOUNTER
Gastrointestinal Medication Protocol [id number]/26/2023 10:42 AM    Appointment in past 6 or next 1 month       Last visit- 7/12/23

## 2023-08-07 NOTE — TELEPHONE ENCOUNTER
Last visit 7/12/23    Diabetes mellitus type 2:  Uncontrolled, with hemoglobin A1c of 8.6%. Discontinue Lantus insulin. Initiate Soliqua at the equivalent dosing is Lantus: 30 units every morning 1 hour within consuming breakfast.  Discontinue Januvia. Continue Tradjenta 5 mg daily, metformin 1 g twice daily, Jardiance 25 mg daily, and reduce glipizide by 50% to 5 mg daily.        Component  Ref Range & Units 6/10/23  8:25 AM   HgbA1C  <5.7 % 8.6 High

## 2023-08-08 NOTE — TELEPHONE ENCOUNTER
Please call patient and confirm current regimen for diabetes. This was the recent change:    Discontinue Lantus insulin. Initiate Soliqua at the equivalent dosing is Lantus: 30 units every morning 1 hour within consuming breakfast. Discontinue Januvia. Continue Tradjenta 5 mg daily, metformin 1 g twice daily, Jardiance 25 mg daily, and reduce glipizide by 50% to 5 mg daily. Please let me know what she is currently taking.  Thanks

## 2023-08-08 NOTE — TELEPHONE ENCOUNTER
Spoke with pt's daughter Tristen Perkins and she stated that pt never picked up the [facility] and is taking lantus. She also asked if there is any alternatives and would like an update. Pharmacy stated that [facility] is not covered.

## 2023-08-09 ENCOUNTER — OFFICE VISIT (OUTPATIENT)
Dept: ORTHOPEDICS CLINIC | Facility: CLINIC | Age: 68
End: 2023-08-09
Payer: MEDICARE

## 2023-08-09 VITALS — WEIGHT: 193.63 LBS | BODY MASS INDEX: 31.12 KG/M2 | HEIGHT: 66 IN

## 2023-08-09 DIAGNOSIS — T84.84XA PAINFUL ORTHOPAEDIC HARDWARE (HCC): Primary | ICD-10-CM

## 2023-08-09 PROCEDURE — 99213 OFFICE O/P EST LOW 20 MIN: CPT | Performed by: ORTHOPAEDIC SURGERY

## 2023-08-09 PROCEDURE — 20610 DRAIN/INJ JOINT/BURSA W/O US: CPT | Performed by: ORTHOPAEDIC SURGERY

## 2023-08-09 RX ORDER — TRIAMCINOLONE ACETONIDE 40 MG/ML
40 INJECTION, SUSPENSION INTRA-ARTICULAR; INTRAMUSCULAR ONCE
Status: COMPLETED | OUTPATIENT
Start: 2023-08-09 | End: 2023-08-09

## 2023-08-09 RX ORDER — INSULIN GLARGINE 100 [IU]/ML
30 INJECTION, SOLUTION SUBCUTANEOUS EVERY MORNING
Qty: 12 ML | Refills: 0 | Status: SHIPPED | OUTPATIENT
Start: 2023-08-09

## 2023-08-09 RX ADMIN — TRIAMCINOLONE ACETONIDE 40 MG: 40 INJECTION, SUSPENSION INTRA-ARTICULAR; INTRAMUSCULAR at 17:19:00

## 2023-08-09 NOTE — PROGRESS NOTES
[facility] Progress Note    Subjective: Patient returns for bilateral knee pain. From the right knee standpoint, she has known diagnosis of osteoarthritis and had great relief from injection in the past that was performed by myself over a year ago. From the left knee standpoint, she has history of ORIF of a periprosthetic distal femur fracture. This was performed by my partner Dr. Sohail Guerrero. She reports pain about the distal lateral aspect of the thigh, proximal lateral aspect of the leg/knee area. Symptoms worse with direct pressure. Objective: Patient is present with daughter who translates. Both very pleasant, appears comfortable. Left knee incisions are well-healed. She does have some edema over the distal IT band. Palpation over palpable distal hardware reproduces pain complaint. On the right knee, she does have tenderness to palpation over the medial joint line. Imaging: Previously reviewed x-rays of the left femur and right knee personally viewed, independently interpreted and radiology report read. Right knee films with moderate osteoarthritis medial joint space loss more than lateral, left femur with healed periprosthetic fracture with cortical bridging      Labs: Hemoglobin A1c 8.5      Assessment/Plan: 55-year-old female with symptomatic radiographically moderate right knee osteoarthritis, also left knee area pain potentially secondary to symptomatic hardware. From the right knee standpoint we revisited the discussion of treatment options. She would like to proceed with injection today, but does want to consider knee replacement surgery potentially for the winter. I did highly encourage improvement in diabetes control prior to surgery as her current A1c would put her at a level of risk significantly elevated for infection and other surgical complications. She expressed understanding of this and will continue to work on improving this.   From the left knee standpoint, she has tenderness and some local edema over the area where her distal plate is somewhat palpable. We did discuss possibility of symptomatic hardware causing tendinitis. Unfortunately given her bone quality and the amount of hardware that would need to be removed, I did discuss with her that I believe she would be at an elevated risk of refracture should implant need to be removed. We did discuss other potential nonsurgical treatment options for tendinitis/bursitis. We did discuss physical therapy, also discussed potential consideration of radiofrequency ablation.   I did discuss that my colleague Dr. Jeannie Black would be a better  as to whether this may help with her symptoms, however given that it is over the anterior knee we will set her up with him for evaluation and opinion as to whether RFA may be beneficial.    Yimi De Guzman MD, [address] [place] [address] Orthopedic Surgery  Phone [phone]  Fax [phone]

## 2023-08-11 RX ORDER — ATORVASTATIN CALCIUM 10 MG/1
10 TABLET, FILM COATED ORAL DAILY
Qty: 90 TABLET | Refills: 0 | Status: SHIPPED | OUTPATIENT
Start: 2023-08-11

## 2023-08-11 RX ORDER — PEN NEEDLE, DIABETIC 31 GX5/16"
1 NEEDLE, DISPOSABLE MISCELLANEOUS DAILY
Qty: 100 EACH | Refills: 0 | Status: SHIPPED | OUTPATIENT
Start: 2023-08-11

## 2023-08-11 RX ORDER — FAMOTIDINE 40 MG/1
40 TABLET, FILM COATED ORAL DAILY
Qty: 90 TABLET | Refills: 0 | Status: SHIPPED | OUTPATIENT
Start: 2023-08-11

## 2023-08-11 NOTE — TELEPHONE ENCOUNTER
PASSED per protocol, refill sent.   Last PE 2/6/23  Future Appointments   Date Time Provider Margoth Silveira   9/28/2023  3:45 PM Orlando Donis, [id number]

## 2023-08-24 RX ORDER — GLIPIZIDE 10 MG/1
10 TABLET, FILM COATED, EXTENDED RELEASE ORAL DAILY
Qty: 90 TABLET | Refills: 1 | Status: SHIPPED | OUTPATIENT
Start: 2023-08-24

## 2023-08-24 NOTE — TELEPHONE ENCOUNTER
Requested Prescriptions     Pending Prescriptions Disp Refills    GLIPIZIDE ER 10 MG Oral Tablet 24 Hr [Pharmacy Med Name: GLIPIZIDE ER 10MG TABLETS] 90 tablet 1     Sig: TAKE 1 TABLET(10 MG) BY MOUTH DAILY       LOV:7/12/23    LAST PHYSICAL: 2/6/23    LAST REFILL: glipizide-90-3/23/23    LAST LAB: 6/10/23    Your Appointments      Thursday September 28, 2023  3:45 PM  Follow Up Visit with Dillon Buchanan.  Lexy Stevenson, [address] [place] [address] [facility] [phone] [place] [address] [phone]

## 2023-08-25 RX ORDER — METFORMIN HYDROCHLORIDE 500 MG/1
TABLET, EXTENDED RELEASE ORAL
Qty: 360 TABLET | Refills: 0 | Status: SHIPPED | OUTPATIENT
Start: 2023-08-25

## 2023-08-25 RX ORDER — RAMIPRIL 2.5 MG/1
CAPSULE ORAL
Qty: 90 CAPSULE | Refills: 0 | Status: SHIPPED | OUTPATIENT
Start: 2023-08-25

## 2023-08-25 NOTE — TELEPHONE ENCOUNTER
PASSED per protocol, refill sent.   Last PE 2/6/23  Future Appointments   Date Time Provider Margoth Silveira   9/28/2023  3:45 PM Glenroy Benitez., DPM [facility] [id number]

## 2023-08-29 RX ORDER — SERTRALINE HYDROCHLORIDE 100 MG/1
100 TABLET, FILM COATED ORAL DAILY
Qty: 90 TABLET | Refills: 1 | Status: SHIPPED | OUTPATIENT
Start: 2023-08-29

## 2023-09-13 RX ORDER — INSULIN GLARGINE 100 [IU]/ML
30 INJECTION, SOLUTION SUBCUTANEOUS EVERY MORNING
Qty: 12 ML | Refills: 0 | OUTPATIENT
Start: 2023-09-13

## 2023-09-28 ENCOUNTER — HOSPITAL ENCOUNTER (OUTPATIENT)
Dept: GENERAL RADIOLOGY | Age: 68
Discharge: HOME | End: 2023-09-28
Attending: PODIATRIST
Payer: MEDICARE

## 2023-09-28 ENCOUNTER — OFFICE VISIT (OUTPATIENT)
Dept: ORTHOPEDICS CLINIC | Facility: CLINIC | Age: 68
End: 2023-09-28
Payer: MEDICARE

## 2023-09-28 VITALS — WEIGHT: 193.63 LBS | BODY MASS INDEX: 31.12 KG/M2 | HEIGHT: 66 IN

## 2023-09-28 DIAGNOSIS — E11.49 DIABETES MELLITUS TYPE 2 WITH NEUROLOGICAL MANIFESTATIONS (HCC): ICD-10-CM

## 2023-09-28 DIAGNOSIS — Z48.89 AFTERCARE FOLLOWING SURGERY: ICD-10-CM

## 2023-09-28 DIAGNOSIS — M77.42 METATARSALGIA OF LEFT FOOT: ICD-10-CM

## 2023-09-28 DIAGNOSIS — T84.84XA PAINFUL ORTHOPAEDIC HARDWARE (HCC): Primary | ICD-10-CM

## 2023-09-28 PROCEDURE — 73630 X-RAY EXAM OF FOOT: CPT | Performed by: PODIATRIST

## 2023-09-28 PROCEDURE — 1125F AMNT PAIN NOTED PAIN PRSNT: CPT | Performed by: PODIATRIST

## 2023-09-28 PROCEDURE — 99214 OFFICE O/P EST MOD 30 MIN: CPT | Performed by: PODIATRIST

## 2023-09-28 NOTE — PROGRESS NOTES
[facility] Progress Note    Subjective:   Johnathon Mclaughlin is here and her daughter is with her to talk about her foot. Still having pain and swelling  Uses hoka shoes  Is not doing as much activity as she would like  Now 9 months post op fusion first mpj and metatarsal osteotomy hammertoe correction. Pain with foot and tiredness of LE    Swelling down in AM    Objective:     Exam toes are well aligned but she describes pain over the first MP joint near the hardware area. The joint is fused clinically. There is minimal swelling noted. Tenderness over the fourth metatarsal head which has always been prominent and plantar third MP joint which is the area of the osteotomy. Imaging: Hardware intact looks like fibrous union of first MP joint and healing of the osteotomy third metatarsal   on the lateral view the screw is a bit long        Assessment/Plan:     Diagnoses and all orders for this visit:    Painful orthopaedic hardware ([address]    Diabetes mellitus type 2 with neurological manifestations ([address]    Metatarsalgia of left foot        We talked about the x-rays and the fact that the hardware could be an issue. I do want to wait until 1 year postop because she may find that the symptoms just gradually go away which would be nice. She wears good [employer] shoes she just is not able to do as much as she would like. Swelling is down in the morning. Talked about removal of the hardware and what would be involved with that procedure. Postoperatively in a boot for 2 weeks weightbearing allowed until sutures come out and then gradual increase in activity. Very little involved with hardware removal although there always is a chance that we cannot get some of the hardware out. Always a chance of infection and continued pain after hardware is removed. They understand. No xrays at 1 year visit      Josse Reeves.  Tobias Enriquez DPM  [facility]    [employer] speech recognition software was used to prepare this note. If a word or phrase is confusing, it is likely do to a failure of recognition. Please contact me with any questions or clarifications.

## 2023-10-02 RX ORDER — EMPAGLIFLOZIN 25 MG/1
1 TABLET, FILM COATED ORAL DAILY
Qty: 90 TABLET | Refills: 1 | Status: SHIPPED | OUTPATIENT
Start: 2023-10-02

## 2023-10-02 NOTE — TELEPHONE ENCOUNTER
Last OV? 07/12/2023    Last CPE? 02/06/2023    Last Refill? Medication Quantity Refills Start End   JARDIANCE 25 MG Oral Tab 90 tablet 1 3/23/2023    Sig:   TAKE 1 TABLET BY MOUTH DAILY       Last Labs? CMP,Lipid,HGA1C, Urine Microalb- 06/10/2023    Future Appointments   Date Time Provider Margoth Silveira   12/14/2023  3:30 PM Jenny Guzman, [facility]SSA [facility] [id number]     Per Protocol?    Not met- Refill pended   Last HgBA1C < 7.5     Please Approve or Deny

## 2023-11-06 RX ORDER — METOPROLOL SUCCINATE 25 MG/1
TABLET, EXTENDED RELEASE ORAL
Refills: 0 | OUTPATIENT
Start: 2023-11-06

## 2023-11-06 NOTE — TELEPHONE ENCOUNTER
Last Refill?    Documenting Provider Encounter Provider   External/Patient, Reported Destiny Castro DPM     Medication Detail    Medication Quantity Refills Start End   metoprolol succinate ER 25 MG Oral Tablet 24 Hr   6/7/2023    Route:   (none)       Rx denied- Provider not at this office

## 2023-11-29 RX ORDER — INSULIN GLARGINE AND LIXISENATIDE 100; 33 U/ML; UG/ML
30 INJECTION, SOLUTION SUBCUTANEOUS DAILY
Qty: 3 EACH | Refills: 0 | Status: SHIPPED | OUTPATIENT
Start: 2023-11-29

## 2023-11-30 NOTE — TELEPHONE ENCOUNTER
Last VISIT:07/12/2023 MD ROSELYN    Last CPE:02/06/2023 MD ROSELYN    Last REFILL:07/12/2023   Medication Quantity Refills Start End   Insulin Glargine-Lixisenatide (SOLIQUA) 100-33 UNT-MCG/ML Subcutaneous Solution Pen-injector 3 each 0 7/12/2023        Last LABS:06/10/2023    Future Appointments   Date Time Provider Margoth Silveira   12/14/2023  3:30 PM Flash Hansen., DPJEFFREY [facility] [id number]         Per PROTOCOL? Non Protocol    Please Approve or Deny.

## 2023-12-14 ENCOUNTER — OFFICE VISIT (OUTPATIENT)
Dept: ORTHOPEDICS CLINIC | Facility: CLINIC | Age: 68
End: 2023-12-14
Payer: MEDICARE

## 2023-12-14 ENCOUNTER — TELEPHONE (OUTPATIENT)
Dept: INTERNAL MEDICINE CLINIC | Facility: CLINIC | Age: 68
End: 2023-12-14

## 2023-12-14 DIAGNOSIS — M19.072 ARTHRITIS OF BOTH MIDFEET: Primary | ICD-10-CM

## 2023-12-14 DIAGNOSIS — Z96.9 PRESENCE OF RETAINED HARDWARE: ICD-10-CM

## 2023-12-14 DIAGNOSIS — M19.071 ARTHRITIS OF BOTH MIDFEET: Primary | ICD-10-CM

## 2023-12-14 DIAGNOSIS — M25.50 ARTHRALGIA OF MULTIPLE JOINTS: ICD-10-CM

## 2023-12-14 DIAGNOSIS — E11.49 DIABETES MELLITUS TYPE 2 WITH NEUROLOGICAL MANIFESTATIONS (HCC): ICD-10-CM

## 2023-12-14 DIAGNOSIS — M25.50 ARTHRALGIA OF MULTIPLE JOINTS: Primary | ICD-10-CM

## 2023-12-14 PROCEDURE — 1125F AMNT PAIN NOTED PAIN PRSNT: CPT | Performed by: PODIATRIST

## 2023-12-14 PROCEDURE — 99214 OFFICE O/P EST MOD 30 MIN: CPT | Performed by: PODIATRIST

## 2023-12-15 NOTE — PROGRESS NOTES
[facility] Progress Note    Subjective:     Daquan Pope is here and her daughter is with her for interpretation. She is about 1 year post surgery for the left forefoot and she is doing better as far as the toes and the MP joint area. She does not seem to have pain with the retained hardware about the big toe joint. Her pain is more in the midfoot area and she is wondering about that. She also has a little bit of issue with the second toe of her right foot        Objective:     Exam left foot very little swelling but it is a little more edematous than the right. No pain about the foot fused first MP joint no pain about the plate and screws retained. Second third fourth MP joint region she has palpable bone but no pain. Interdigitally she feels good. Toes are pretty rectus considering the amount of pathology. Further proximally of that left foot she has midfoot pain dorsally and plantarly. Mild swelling. No neuritis symptoms. Exam of the right foot mild hammertoe second digit but no corn and very little deformity          Imaging: X-rays 3 views of the left foot show retained hardware with partial fusion first MP joint. On the lateral view and oblique view we see a fair amount of midfoot arthritis especially involving second metatarsal tarsal joint        Assessment/Plan:     Diagnoses and all orders for this visit:    Arthritis of both midfeet    Diabetes mellitus type 2 with neurological manifestations ([address]    Presence of retained hardware    Arthralgia of multiple joints        She is doing well 1 year postop as far as the surgical forefoot area of the left foot. She may have a little transfer discomfort to the midfoot area because of lack of motion of the first MP joint. She had already has arthritis and it may be a little aggravated by a change in her gait.   Recommendation for good solid arch support and good shoes again    Because of some general pain and swelling of several joints about her body including the right wrist they are inquiring about other ways to check for arthritis. We discussed an arthritic profile and they would like to proceed with this I did reach out to Dr. Arian Naik and he is going to put in an order for blood work    Brief discussion of simple exostectomy if there are spot areas with a lot of arthritic pain or exostosis. Otherwise to get surgical relief of the midfoot arthritis we discussed to the fusion of the joints but this is more surgery and there is again time with the knee scooter off the foot. They want to hold off on that for now and I think that is a good idea. Follow-up yearly would be helpful to check her feet and also is a diabetic. Rowdy Banks. Guillermina Aguirre, ALYSSA  [facility]    [email or web address] speech recognition software was used to prepare this note. If a word or phrase is confusing, it is likely do to a failure of recognition. Please contact me with any questions or clarifications.

## 2023-12-16 ENCOUNTER — LAB ENCOUNTER (OUTPATIENT)
Dept: LAB | Age: 68
End: 2023-12-16
Attending: INTERNAL MEDICINE
Payer: MEDICARE

## 2023-12-16 DIAGNOSIS — M25.50 ARTHRALGIA OF MULTIPLE JOINTS: Primary | ICD-10-CM

## 2023-12-16 LAB
CRP SERPL-MCNC: <0.29 MG/DL (ref ?–0.3)
ERYTHROCYTE [SEDIMENTATION RATE] IN BLOOD: 16 MM/HR
RHEUMATOID FACT SERPL-ACNC: <10 IU/ML (ref ?–15)
URATE SERPL-MCNC: 4.3 MG/DL

## 2023-12-16 PROCEDURE — 86038 ANTINUCLEAR ANTIBODIES: CPT

## 2023-12-16 PROCEDURE — 84550 ASSAY OF BLOOD/URIC ACID: CPT

## 2023-12-16 PROCEDURE — 36415 COLL VENOUS BLD VENIPUNCTURE: CPT

## 2023-12-16 PROCEDURE — 86431 RHEUMATOID FACTOR QUANT: CPT

## 2023-12-16 PROCEDURE — 86200 CCP ANTIBODY: CPT

## 2023-12-16 PROCEDURE — 85652 RBC SED RATE AUTOMATED: CPT

## 2023-12-16 PROCEDURE — 86225 DNA ANTIBODY NATIVE: CPT

## 2023-12-16 PROCEDURE — 86140 C-REACTIVE PROTEIN: CPT

## 2023-12-18 LAB
CCP IGG SERPL-ACNC: 1.4 U/ML (ref 0–6.9)
DSDNA IGG SERPL IA-ACNC: <0.6 IU/ML
ENA AB SER QL IA: 0.1 UG/L
ENA AB SER QL IA: NEGATIVE

## 2023-12-26 RX ORDER — BLOOD SUGAR DIAGNOSTIC
STRIP MISCELLANEOUS
Qty: 300 STRIP | Refills: 6 | Status: SHIPPED | OUTPATIENT
Start: 2023-12-26

## 2023-12-26 RX ORDER — INSULIN GLARGINE AND LIXISENATIDE 100; 33 U/ML; UG/ML
30 INJECTION, SOLUTION SUBCUTANEOUS DAILY
Qty: 3 EACH | Refills: 0 | Status: SHIPPED | OUTPATIENT
Start: 2023-12-26

## 2023-12-26 RX ORDER — EMPAGLIFLOZIN 25 MG/1
1 TABLET, FILM COATED ORAL DAILY
Qty: 90 TABLET | Refills: 1 | Status: SHIPPED | OUTPATIENT
Start: 2023-12-26

## 2023-12-27 RX ORDER — METFORMIN HYDROCHLORIDE 500 MG/1
1000 TABLET, EXTENDED RELEASE ORAL 2 TIMES DAILY WITH MEALS
Qty: 360 TABLET | Refills: 0 | Status: SHIPPED | OUTPATIENT
Start: 2023-12-27

## 2023-12-28 DIAGNOSIS — Z79.4 TYPE 2 DIABETES MELLITUS WITH DIABETIC NEUROPATHY, WITH LONG-TERM CURRENT USE OF INSULIN (HCC): Primary | ICD-10-CM

## 2023-12-28 DIAGNOSIS — E11.40 TYPE 2 DIABETES MELLITUS WITH DIABETIC NEUROPATHY, WITH LONG-TERM CURRENT USE OF INSULIN (HCC): Primary | ICD-10-CM

## 2023-12-30 RX ORDER — LINAGLIPTIN 5 MG/1
TABLET, FILM COATED ORAL
Qty: 90 TABLET | Refills: 1 | Status: SHIPPED | OUTPATIENT
Start: 2023-12-30

## 2024-01-02 ENCOUNTER — TELEPHONE (OUTPATIENT)
Dept: INTERNAL MEDICINE CLINIC | Facility: CLINIC | Age: 69
End: 2024-01-02

## 2024-01-22 ENCOUNTER — PATIENT MESSAGE (OUTPATIENT)
Dept: OBGYN CLINIC | Facility: CLINIC | Age: 69
End: 2024-01-22

## 2024-01-22 DIAGNOSIS — Z12.31 BREAST CANCER SCREENING BY MAMMOGRAM: Primary | ICD-10-CM

## 2024-01-31 RX ORDER — ATORVASTATIN CALCIUM 10 MG/1
10 TABLET, FILM COATED ORAL DAILY
Qty: 90 TABLET | Refills: 0 | Status: SHIPPED | OUTPATIENT
Start: 2024-01-31

## 2024-01-31 NOTE — TELEPHONE ENCOUNTER
From: Kaylin Bledsoe  To: Soo Ramesh  Sent: 1/22/2024 10:14 AM CST  Subject: Kaylin Bledsoe mammogram order    Good morning,  Could you please enter a mammogram order so I can schedule my appointment.  Thank you,  Kaylin Bledsoe

## 2024-02-13 RX ORDER — SERTRALINE HYDROCHLORIDE 100 MG/1
100 TABLET, FILM COATED ORAL DAILY
Qty: 90 TABLET | Refills: 3 | Status: SHIPPED | OUTPATIENT
Start: 2024-02-13

## 2024-02-16 RX ORDER — INSULIN GLARGINE AND LIXISENATIDE 100; 33 U/ML; UG/ML
30 INJECTION, SOLUTION SUBCUTANEOUS DAILY
Qty: 9 ML | Refills: 0 | Status: SHIPPED | OUTPATIENT
Start: 2024-02-16

## 2024-03-05 ENCOUNTER — TELEPHONE (OUTPATIENT)
Dept: OBGYN CLINIC | Facility: CLINIC | Age: 69
End: 2024-03-05

## 2024-03-05 RX ORDER — FAMOTIDINE 40 MG/1
40 TABLET, FILM COATED ORAL DAILY
Qty: 90 TABLET | Refills: 0 | Status: SHIPPED | OUTPATIENT
Start: 2024-03-05

## 2024-03-05 RX ORDER — MONTELUKAST SODIUM 10 MG/1
10 TABLET ORAL
Qty: 90 TABLET | Refills: 3 | Status: SHIPPED | OUTPATIENT
Start: 2024-03-05

## 2024-03-14 RX ORDER — INSULIN GLARGINE AND LIXISENATIDE 100; 33 U/ML; UG/ML
30 INJECTION, SOLUTION SUBCUTANEOUS DAILY
Qty: 9 ML | Refills: 0 | Status: SHIPPED | OUTPATIENT
Start: 2024-03-14

## 2024-03-14 RX ORDER — RAMIPRIL 2.5 MG/1
CAPSULE ORAL
Qty: 90 CAPSULE | Refills: 0 | Status: SHIPPED | OUTPATIENT
Start: 2024-03-14

## 2024-03-14 NOTE — TELEPHONE ENCOUNTER
Last time medication was refilled 2/16/24  Quantity and # of refills 9 mL w 0  Last OV 7/12/23  Next OV 5/30/24  Failed protocol.   Routed to Dr. Azevedo for approval.

## 2024-03-18 RX ORDER — GLIPIZIDE 10 MG/1
10 TABLET, FILM COATED, EXTENDED RELEASE ORAL DAILY
Qty: 90 TABLET | Refills: 0 | Status: SHIPPED | OUTPATIENT
Start: 2024-03-18

## 2024-03-18 RX ORDER — METFORMIN HYDROCHLORIDE 500 MG/1
1000 TABLET, EXTENDED RELEASE ORAL 2 TIMES DAILY WITH MEALS
Qty: 360 TABLET | Refills: 0 | Status: SHIPPED | OUTPATIENT
Start: 2024-03-18

## 2024-03-18 NOTE — TELEPHONE ENCOUNTER
Glipizide er 10 mg  Last time medication was refilled 8/24/23  Quantity and # of refills 90 w 1  Last OV 7/12/23  Next OV 5/30/24    metFORMIN  MG Oral Tablet 24 Hr   Last time medication was refilled 12/27/23  Quantity and # of refills 360 w 0  Last OV 7/12/23  Next OV 5/30/24    Failed protocol.   Routed to Dr. Azevedo for approval.

## 2024-04-06 ENCOUNTER — HOSPITAL ENCOUNTER (OUTPATIENT)
Dept: MAMMOGRAPHY | Age: 69
Discharge: HOME | End: 2024-04-06
Attending: OBSTETRICS & GYNECOLOGY
Payer: MEDICARE

## 2024-04-06 DIAGNOSIS — Z12.31 BREAST CANCER SCREENING BY MAMMOGRAM: ICD-10-CM

## 2024-04-06 PROCEDURE — 77067 SCR MAMMO BI INCL CAD: CPT | Performed by: OBSTETRICS & GYNECOLOGY

## 2024-04-06 PROCEDURE — 77063 BREAST TOMOSYNTHESIS BI: CPT | Performed by: OBSTETRICS & GYNECOLOGY

## 2024-04-08 RX ORDER — INSULIN GLARGINE AND LIXISENATIDE 100; 33 U/ML; UG/ML
30 INJECTION, SOLUTION SUBCUTANEOUS DAILY
Qty: 9 ML | Refills: 0 | Status: SHIPPED | OUTPATIENT
Start: 2024-04-08

## 2024-04-09 DIAGNOSIS — Z79.4 TYPE 2 DIABETES MELLITUS WITH DIABETIC NEUROPATHY, WITH LONG-TERM CURRENT USE OF INSULIN (HCC): ICD-10-CM

## 2024-04-09 DIAGNOSIS — E11.40 TYPE 2 DIABETES MELLITUS WITH DIABETIC NEUROPATHY, WITH LONG-TERM CURRENT USE OF INSULIN (HCC): ICD-10-CM

## 2024-04-10 RX ORDER — LINAGLIPTIN 5 MG/1
5 TABLET, FILM COATED ORAL DAILY
Qty: 90 TABLET | Refills: 0 | Status: SHIPPED | OUTPATIENT
Start: 2024-04-10

## 2024-04-10 NOTE — TELEPHONE ENCOUNTER
Last time medication was refilled 12/30/23  Last OV 7/12/23  Next OV due/scheduled 5/30/24  FAILED PROTOCOL. ROUTED TO  FOR APPROVAL.

## 2024-05-10 ENCOUNTER — LAB ENCOUNTER (OUTPATIENT)
Dept: LAB | Age: 69
End: 2024-05-10
Attending: INTERNAL MEDICINE

## 2024-05-10 ENCOUNTER — OFFICE VISIT (OUTPATIENT)
Dept: OBGYN CLINIC | Facility: CLINIC | Age: 69
End: 2024-05-10
Payer: MEDICARE

## 2024-05-10 VITALS
WEIGHT: 190 LBS | SYSTOLIC BLOOD PRESSURE: 122 MMHG | BODY MASS INDEX: 30.53 KG/M2 | HEIGHT: 66 IN | DIASTOLIC BLOOD PRESSURE: 78 MMHG | HEART RATE: 71 BPM

## 2024-05-10 DIAGNOSIS — Z12.31 ENCOUNTER FOR SCREENING MAMMOGRAM FOR MALIGNANT NEOPLASM OF BREAST: ICD-10-CM

## 2024-05-10 DIAGNOSIS — I10 ESSENTIAL HYPERTENSION: ICD-10-CM

## 2024-05-10 DIAGNOSIS — Z79.4 TYPE 2 DIABETES MELLITUS WITH DIABETIC NEUROPATHY, WITH LONG-TERM CURRENT USE OF INSULIN (HCC): ICD-10-CM

## 2024-05-10 DIAGNOSIS — E11.40 TYPE 2 DIABETES MELLITUS WITH DIABETIC NEUROPATHY, WITH LONG-TERM CURRENT USE OF INSULIN (HCC): ICD-10-CM

## 2024-05-10 DIAGNOSIS — N90.89 VULVAR IRRITATION: ICD-10-CM

## 2024-05-10 DIAGNOSIS — N76.0 VAGINITIS AND VULVOVAGINITIS: ICD-10-CM

## 2024-05-10 DIAGNOSIS — Z01.419 WELL WOMAN EXAM WITH ROUTINE GYNECOLOGICAL EXAM: Primary | ICD-10-CM

## 2024-05-10 DIAGNOSIS — N95.2 VAGINAL ATROPHY: ICD-10-CM

## 2024-05-10 DIAGNOSIS — Z00.00 LABORATORY EXAMINATION ORDERED AS PART OF A COMPLETE PHYSICAL EXAMINATION: ICD-10-CM

## 2024-05-10 LAB
ALBUMIN SERPL-MCNC: 3.6 G/DL (ref 3.4–5)
ALBUMIN/GLOB SERPL: 1 {RATIO} (ref 1–2)
ALP LIVER SERPL-CCNC: 142 U/L
ALT SERPL-CCNC: 21 U/L
ANION GAP SERPL CALC-SCNC: 5 MMOL/L (ref 0–18)
AST SERPL-CCNC: 14 U/L (ref 15–37)
BASOPHILS # BLD AUTO: 0.03 X10(3) UL (ref 0–0.2)
BASOPHILS NFR BLD AUTO: 0.4 %
BILIRUB SERPL-MCNC: 0.4 MG/DL (ref 0.1–2)
BUN BLD-MCNC: 15 MG/DL (ref 9–23)
CALCIUM BLD-MCNC: 9.1 MG/DL (ref 8.5–10.1)
CHLORIDE SERPL-SCNC: 105 MMOL/L (ref 98–112)
CHOLEST SERPL-MCNC: 158 MG/DL (ref ?–200)
CO2 SERPL-SCNC: 27 MMOL/L (ref 21–32)
CREAT BLD-MCNC: 0.86 MG/DL
CREAT UR-SCNC: 50.5 MG/DL
EGFRCR SERPLBLD CKD-EPI 2021: 73 ML/MIN/1.73M2 (ref 60–?)
EOSINOPHIL # BLD AUTO: 0.09 X10(3) UL (ref 0–0.7)
EOSINOPHIL NFR BLD AUTO: 1.3 %
ERYTHROCYTE [DISTWIDTH] IN BLOOD BY AUTOMATED COUNT: 13.7 %
EST. AVERAGE GLUCOSE BLD GHB EST-MCNC: 220 MG/DL (ref 68–126)
FASTING PATIENT LIPID ANSWER: YES
FASTING STATUS PATIENT QL REPORTED: YES
GLOBULIN PLAS-MCNC: 3.6 G/DL (ref 2.8–4.4)
GLUCOSE BLD-MCNC: 205 MG/DL (ref 70–99)
HBA1C MFR BLD: 9.3 % (ref ?–5.7)
HCT VFR BLD AUTO: 42.9 %
HDLC SERPL-MCNC: 44 MG/DL (ref 40–59)
HGB BLD-MCNC: 14.3 G/DL
IMM GRANULOCYTES # BLD AUTO: 0.06 X10(3) UL (ref 0–1)
IMM GRANULOCYTES NFR BLD: 0.9 %
LDLC SERPL CALC-MCNC: 74 MG/DL (ref ?–100)
LYMPHOCYTES # BLD AUTO: 1.23 X10(3) UL (ref 1–4)
LYMPHOCYTES NFR BLD AUTO: 17.9 %
MCH RBC QN AUTO: 28 PG (ref 26–34)
MCHC RBC AUTO-ENTMCNC: 33.3 G/DL (ref 31–37)
MCV RBC AUTO: 84 FL
MICROALBUMIN UR-MCNC: 1.37 MG/DL
MICROALBUMIN/CREAT 24H UR-RTO: 27.1 UG/MG (ref ?–30)
MONOCYTES # BLD AUTO: 0.56 X10(3) UL (ref 0.1–1)
MONOCYTES NFR BLD AUTO: 8.1 %
NEUTROPHILS # BLD AUTO: 4.92 X10 (3) UL (ref 1.5–7.7)
NEUTROPHILS # BLD AUTO: 4.92 X10(3) UL (ref 1.5–7.7)
NEUTROPHILS NFR BLD AUTO: 71.4 %
NONHDLC SERPL-MCNC: 114 MG/DL (ref ?–130)
OSMOLALITY SERPL CALC.SUM OF ELEC: 291 MOSM/KG (ref 275–295)
PLATELET # BLD AUTO: 153 10(3)UL (ref 150–450)
POTASSIUM SERPL-SCNC: 4.7 MMOL/L (ref 3.5–5.1)
PROT SERPL-MCNC: 7.2 G/DL (ref 6.4–8.2)
RBC # BLD AUTO: 5.11 X10(6)UL
SODIUM SERPL-SCNC: 137 MMOL/L (ref 136–145)
TRIGL SERPL-MCNC: 245 MG/DL (ref 30–149)
TSI SER-ACNC: 1.08 MIU/ML (ref 0.36–3.74)
VLDLC SERPL CALC-MCNC: 38 MG/DL (ref 0–30)
WBC # BLD AUTO: 6.9 X10(3) UL (ref 4–11)

## 2024-05-10 PROCEDURE — 80061 LIPID PANEL: CPT

## 2024-05-10 PROCEDURE — 83036 HEMOGLOBIN GLYCOSYLATED A1C: CPT

## 2024-05-10 PROCEDURE — 81514 NFCT DS BV&VAGINITIS DNA ALG: CPT | Performed by: OBSTETRICS & GYNECOLOGY

## 2024-05-10 PROCEDURE — 84443 ASSAY THYROID STIM HORMONE: CPT

## 2024-05-10 PROCEDURE — 80053 COMPREHEN METABOLIC PANEL: CPT

## 2024-05-10 PROCEDURE — G0101 CA SCREEN;PELVIC/BREAST EXAM: HCPCS | Performed by: OBSTETRICS & GYNECOLOGY

## 2024-05-10 PROCEDURE — 82570 ASSAY OF URINE CREATININE: CPT

## 2024-05-10 PROCEDURE — 82043 UR ALBUMIN QUANTITATIVE: CPT

## 2024-05-10 PROCEDURE — 36415 COLL VENOUS BLD VENIPUNCTURE: CPT

## 2024-05-10 PROCEDURE — 85025 COMPLETE CBC W/AUTO DIFF WBC: CPT

## 2024-05-10 RX ORDER — NYSTATIN AND TRIAMCINOLONE ACETONIDE 100000; 1 [USP'U]/G; MG/G
1 OINTMENT TOPICAL 2 TIMES DAILY
Qty: 60 G | Refills: 1 | Status: SHIPPED | OUTPATIENT
Start: 2024-05-10

## 2024-05-10 RX ORDER — ESTRADIOL 0.1 MG/G
1 CREAM VAGINAL NIGHTLY
Qty: 42.5 G | Refills: 2 | Status: SHIPPED | OUTPATIENT
Start: 2024-05-10

## 2024-05-10 NOTE — PROGRESS NOTES
[facility]  Obstetrics and Gynecology  History & Physical    CC: Patient presents for a well woman exam     Subjective:     HPI: Kaylin Bledsoe is a 69 year old  female here for a well women exam. Patient reports doing well.  Episodes of urinary incontinence but significantly improved since her last surgery.  Does note vulvar irritation.    OB History:  OB History    Para Term  AB Living   3 3 3 0 0 2   SAB IAB Ectopic Multiple Live Births   0 0 0 0 3      # Outcome Date GA Lbr Elijah/2nd Weight Sex Type Anes PTL Lv   3 Term 85 40w0d  9 lb (4.082 kg) F Caesarean Gen  HAL   2 Term 78 40w0d  8 lb (3.629 kg) F Caesarean Gen N HAL   1 Term 75 40w0d  7 lb (3.175 kg) F LOW FORCEPS None N DEC       Gyne History:  Hx Prior Abnormal Pap: Yes  Pap Date: 17  Pap Result Notes: wnl  Follow Up Recommendation: last pap: 2017 WNL  Patient's last menstrual period was 2017 (lmp unknown).    denies history of STDs.   Last pap NILM HPV neg in 2017. denies abn paps. S/p Vaginal Hysterectomy 2017.     Meds:  Current Outpatient Medications on File Prior to Visit   Medication Sig Dispense Refill    linaGLIPtin (TRADJENTA) 5 mg Oral Tab Take 1 tablet (5 mg total) by mouth daily. 90 tablet 0    Insulin Glargine-Lixisenatide (SOLIQUA) 100-33 UNT-MCG/ML Subcutaneous Solution Pen-injector Inject 30 Units/day into the skin daily. 9 mL 0    glipiZIDE ER 10 MG Oral Tablet 24 Hr Take 1 tablet (10 mg total) by mouth daily. 90 tablet 0    metFORMIN  MG Oral Tablet 24 Hr Take 2 tablets (1,000 mg total) by mouth 2 (two) times daily with meals. 360 tablet 0    montelukast 10 MG Oral Tab Take 1 tablet (10 mg total) by mouth daily as needed. 90 tablet 3    famotidine 40 MG Oral Tab Take 1 tablet (40 mg total) by mouth daily. 90 tablet 0    sertraline 100 MG Oral Tab Take 1 tablet (100 mg total) by mouth daily. 90 tablet 3    atorvastatin 10 MG Oral Tab Take 1 tablet (10 mg total)  by mouth daily. 90 tablet 0    Glucose Blood (ONETOUCH VERIO) In Vitro Strip TEST THREE TIMES DAILY 300 strip 6    Empagliflozin (JARDIANCE) 25 MG Oral Tab Take 1 tablet by mouth daily. 90 tablet 1    Insulin Pen Needle (BD PEN NEEDLE SHORT U/F) 31G X 8 MM Does not apply Misc 1 each daily. 100 each 0    Blood Glucose Monitoring Suppl (ONETOUCH VERIO IQ SYSTEM) w/Device Does not apply Kit 1 Device by Other route 2 (two) times daily. Use as directed. 1 kit 0    FLUTICASONE PROPIONATE 50 MCG/ACT Nasal Suspension SHAKE LIQUID AND USE 2 SPRAYS IN EACH NOSTRIL DAILY 48 g 0    Metoprolol Succinate 25 MG Oral Capsule ER 24 Hour Sprinkle 1 tablet ONCE DAILY (route: oral)      aspirin 81 MG Oral Chew Tab Chew 1 tablet (81 mg total) by mouth daily.      ALPRAZOLAM 0.5 MG Oral Tab TAKE 1 TABLET(0.5 MG) BY MOUTH DAILY AS NEEDED FOR ANXIETY OR SLEEP 30 tablet 0    HYDROcodone-acetaminophen (NORCO) 5-325 MG Oral Tab Take 1 tablet by mouth every 4 to 6 hours. (Patient not taking: Reported on 12/14/2023) 30 tablet 0    Acetaminophen 500 MG Oral Cap Take 2 capsules (1,000 mg total) by mouth daily as needed. (Patient not taking: Reported on 5/10/2024)       No current facility-administered medications on file prior to visit.       All:  Allergies   Allergen Reactions    Codeine NAUSEA ONLY    Vicodin [Hydrocodone-Acetaminophen] NAUSEA ONLY     TABS       PMH:  Past Medical History:    Abdominal hernia    Abdominal pain    Anemia    Anesthesia complication    slow to arouse    Anxiety    Arthritis    Atherosclerosis of coronary artery    Atherosclerotic heart disease of native coronary artery without angina pectoris    Back pain    Bad breath    Belching    Bilateral leg edema    Bloating    Blurred vision    Change in hair    Constipation    Diarrhea, unspecified    Dizziness    Easy bruising    Fatigue    Feeling lonely    Flatulence/gas pain/belching    Food intolerance    Frequent use of laxatives    Hearing loss    High blood  pressure    High cholesterol    History of depression    History of eating disorder    Indigestion    Irregular bowel habits    Itch of skin    Leg swelling    Loss of appetite    Nausea    Neuropathy    maria eugenia feet    Night sweats    Osteoarthrosis, unspecified whether generalized or localized, unspecified site    Osteoporosis    Other and unspecified hyperlipidemia    Pain in joints    Peripheral neuropathy    Problems with swallowing    Sleep disturbance    Stented coronary artery    Stress    Type II or unspecified type diabetes mellitus without mention of complication, not stated as uncontrolled    Uncomfortable fullness after meals    Venous insufficiency of both lower extremities    Visual impairment    glasses    Vomiting    Weight loss       Immunization History:   Immunization History   Administered Date(s) Administered    Covid-19 Vaccine Pfizer 30 mcg/0.3 ml 2021, 2021, 10/21/2021    Covid-19 Vaccine Pfizer Bivalent 30mcg/0.3mL 10/20/2022, 2023    Covid-19 Vaccine Pfizer Kendrick-Sucrose 30 mcg/0.3 ml 2022    FLU VAC High Dose 65 YRS & Older PRSV Free (93260) 10/17/2020    FLUAD High Dose 65 yr and older (37134) 10/21/2021    FLULAVAL 6 months & older 0.5 ml Prefilled syringe (29224) 10/27/2018, 2019    FLUZONE 6 months and older PFS 0.5 ml (45783) 10/27/2018, 2019    Fluzone Vaccine Medicare () 10/17/2020    Influenza 10/21/2013, 10/16/2020, 10/17/2020, 10/21/2021    Pfizer Covid-19 Vaccine 30mcg/0.3ml 12yrs+ (2730-7292) 2024    Pneumococcal Conjugate PCV20 2022    Pneumovax 23 2018    TDAP 2022    Tb Intradermal Test 2021, 2021       PSH:  Past Surgical History:   Procedure Laterality Date    Angioplasty (coronary)      coronary stent placed by Dr. Daniels      ,    Cataract Bilateral     Cath drug eluting stent      Colonoscopy      Endovenous laser vein addon Left 2009    Left GSV    Endovenous laser vein addon  Right 2009    right GSV    Hip replacement surgery  2011    Right hip    Knee replacement surgery Left 07/17/2020    Other surgical history  2004. 2006     Unspecified ankle surgery, Left ankle 2004 and right ankle 2006      Other surgical history  2021    left femur    Total hip replacement      rt hip    Vaginal hysterectomy N/A 11/16/2017    Procedure: VAGINAL HYSTERECTOMY;  Surgeon: Soo Ramesh MD;  Location:  MAIN OR       Social History:  Social History     Socioeconomic History    Marital status:      Spouse name: Not on file    Number of children: Not on file    Years of education: Not on file    Highest education level: Not on file   Occupational History    Not on file   Tobacco Use    Smoking status: Never    Smokeless tobacco: Never   Vaping Use    Vaping status: Never Used   Substance and Sexual Activity    Alcohol use: No    Drug use: No    Sexual activity: Not Currently     Birth control/protection: Hysterectomy   Other Topics Concern     Service Not Asked    Blood Transfusions Not Asked    Caffeine Concern Yes     Comment: 1 cup daily     Occupational Exposure Not Asked    Hobby Hazards Not Asked    Sleep Concern Not Asked    Stress Concern Not Asked    Weight Concern Not Asked    Special Diet Not Asked    Back Care Not Asked    Exercise No    Bike Helmet Not Asked    Seat Belt Yes    Self-Exams Not Asked   Social History Narrative    Not on file     Social Determinants of Health     Financial Resource Strain: Not on file   Food Insecurity: Not on file   Transportation Needs: Not on file   Physical Activity: Not on file   Stress: Not on file   Social Connections: Not on file   Housing Stability: Not on file         Patient feels unsafe or threatened?: denies    Abuse: denies physical, sexual or mental.     Family History:  Family History   Problem Relation Age of Onset    Diabetes Brother     Heart Attack Father         AMI    Hypertension Father     Diabetes Sister         Health maintenance:  Mammogram (age 40 and q1-2yr): 2024  Impression   CONCLUSION:  Stable mammogram     BI-RADS CATEGORY:    DIAGNOSTIC CATEGORY 2--BENIGN FINDING:       RECOMMENDATIONS:    ROUTINE MAMMOGRAM AND CLINICAL EVALUATION IN 12 MONTHS.          Colonoscopy (age 45 and q10yr):     Review of Systems:  General: no complaints per category. See HPI for additional information.   Breast: no complaints per category. See HPI for additional information.   Respiratory: no complaints per category. See HPI for additional information.   Cardiovascular: no complaints per category. See HPI for additional information.   GI: no complaints per category. See HPI for additional information.   : no complaints per category. See HPI for additional information.   Heme: no complaints per category. See HPI for additional information.       Objective:     Vitals:    05/10/24 1048   BP: 122/78   Pulse: 71   Weight: 190 lb (86.2 kg)   Height: 66\"         Body mass index is 30.67 kg/m².    General: AAO.NAD.   CVS exam: normal peripheral perfusion  Chest: non-labored breathing, no tachypnea   Breast: symmetric, no dominant or suspicious mass, no skin or nipple changes and no axillary adenopathy  Abdominal exam:  soft, nontender, nondistended  Pelvic exam:   VULVA: erythematous bilateral along vulva. No masses and non-tender.   PERINEUM:  erythematous appearing, no masses   URETHRAL MEATUS: normal appearing, no lesions   VAGINA: atrophic appearing vagina with normal color and discharge, no lesions but small adhesions noted lateral to midline at vaginal cuff.   CERVIX: surgically absent  UTERUS: surgically absent, vaginal cuff well healed  ADNEXA: normal adnexa in size, nontender and no masses  PERIRECTAL: normal appearing, no lesions   Ext: non-tender, no edema    Assessment:     Kaylin Bledsoe is a 69 year old  female here for a well women exam.       Plan:       Problem List Items Addressed This Visit     None  Visit Diagnoses       Well woman exam with routine gynecological exam    -  Primary    Relevant Medications    estradiol 0.1 MG/GM Vaginal Cream    Vaginal atrophy        Relevant Medications    estradiol 0.1 MG/GM Vaginal Cream    Vulvar irritation        Relevant Medications    estradiol 0.1 MG/GM Vaginal Cream    nystatin-triamcinolone 100,000-0.1 Units/g-% External Ointment    Other Relevant Orders    Vaginitis Vaginosis PCR Panel (Completed)    Vaginitis and vulvovaginitis        Relevant Medications    estradiol 0.1 MG/GM Vaginal Cream    Other Relevant Orders    Vaginitis Vaginosis PCR Panel (Completed)    Encounter for screening mammogram for malignant neoplasm of breast        Relevant Orders    [facility] KARAN 2D+3D SCREENING BILAT (CPT=77067/68463)            Cervical cancer screening  - discussion held with the patient about ASCCP guidelines  - repeat pap smear not indicated  Health maintenance  - encouraged to maintain weight at healthy BMI  - discussed importance of exercise and healthy eating  - self breast exam instructions provided   - bilateral screening mammogram recommendations discussed and order provided          Problem List Items Addressed This Visit    None  Visit Diagnoses       Well woman exam with routine gynecological exam    -  Primary    Relevant Medications    estradiol 0.1 MG/GM Vaginal Cream    Vaginal atrophy        Relevant Medications    estradiol 0.1 MG/GM Vaginal Cream    Vulvar irritation        Relevant Medications    estradiol 0.1 MG/GM Vaginal Cream    nystatin-triamcinolone 100,000-0.1 Units/g-% External Ointment    Other Relevant Orders    Vaginitis Vaginosis PCR Panel (Completed)    Vaginitis and vulvovaginitis        Relevant Medications    estradiol 0.1 MG/GM Vaginal Cream    Other Relevant Orders    Vaginitis Vaginosis PCR Panel (Completed)    Encounter for screening mammogram for malignant neoplasm of breast        Relevant Orders    [facility] KARAN 2D+3D SCREENING BILAT  (CPT=77067/81758)                  All of the findings and plan were discussed with the patient.  She notes understanding and agrees with the plan of care.  All questions were answered to the best of my ability at this time.      RTC in 1 year for a well woman exam or sooner if needed   Return to office in 6 weeks for follow-up with possible vulvar biopsy    Soo Ramesh MD   [facility] - OBGYN      Discussed with patient that there will not be further notification of normal or benign results other than receiving results on [facility]. A [employer] message or telephone call will be placed by the physician and/or office staff if results are abnormal.       Note to patient and family   The 21st Century Cures Act makes medical notes available to patients in the interest of transparency.  However, please be advised that this is a medical document.  It is intended as oumv-ya-farl communication.  It is written and medical language may contain abbreviations or verbiage that are technical and unfamiliar.  It may appear blunt or direct.  Medical documents are intended to carry relevant information, facts as evident, and the clinical opinion of the practitioner.      This note could include assistance by Dragon voice recognition. Errors in content may be related to improper recognition by the system; efforts to review and correct have been done but errors may still exist.

## 2024-05-11 LAB
BV BACTERIA DNA VAG QL NAA+PROBE: NEGATIVE
C GLABRATA DNA VAG QL NAA+PROBE: POSITIVE
C KRUSEI DNA VAG QL NAA+PROBE: NEGATIVE
CANDIDA DNA VAG QL NAA+PROBE: POSITIVE
T VAGINALIS DNA VAG QL NAA+PROBE: NEGATIVE

## 2024-05-13 DIAGNOSIS — B37.9 YEAST INFECTION: Primary | ICD-10-CM

## 2024-05-13 RX ORDER — INSULIN GLARGINE AND LIXISENATIDE 100; 33 U/ML; UG/ML
30 INJECTION, SOLUTION SUBCUTANEOUS DAILY
Qty: 9 ML | Refills: 1 | Status: SHIPPED | OUTPATIENT
Start: 2024-05-13

## 2024-05-13 RX ORDER — FLUCONAZOLE 150 MG/1
150 TABLET ORAL ONCE
Qty: 1 TABLET | Refills: 1 | Status: SHIPPED | OUTPATIENT
Start: 2024-05-13 | End: 2024-05-13

## 2024-05-13 NOTE — TELEPHONE ENCOUNTER
Please review; protocol failed/No Protocol    Requested Prescriptions   Pending Prescriptions Disp Refills    SOLIQUA 100-33 UNT-MCG/ML Subcutaneous Solution Pen-injector [Pharmacy Med Name: SOLIQUA 100/33MCG PEN INJ 3ML] 9 mL 0     Sig: ADMINISTER 30 UNITS UNDER THE SKIN EVERY DAY       Diabetes Medication Protocol Failed - 5/11/2024 10:07 AM        Failed - Last A1C < 7.5 and within past 6 months     Lab Results   Component Value Date    A1C 9.3 (H) 05/10/2024             Passed - In person appointment or virtual visit in the past 6 mos or appointment in next 3 mos     Recent Outpatient Visits              3 days ago Well woman exam with routine gynecological exam    [address] - OB/GYN Soo Ramesh MD    Office Visit    5 months ago Arthritis of both midfeet    [address] Lindsay Agrawal, DPM    Office Visit    7 months ago Painful orthopaedic hardware ([facility])    [address] Lindsay Agrawal, DPM    Office Visit    9 months ago Painful orthopaedic hardware ([facility])    [address] Jerman Santana MD    Office Visit    10 months ago Type 2 diabetes mellitus with diabetic neuropathy, with long-term current use of insulin ([facility])    [address] Kiel Azevedo MD    Office Visit          Future Appointments         Provider Department Appt Notes    In 2 weeks Kiel Azevedo MD [address] -last 2/23    In 1 month Soo Ramesh MD [address] - OB/GYN vulvar biopsy                    Passed - Microalbumin procedure in past 12 months or taking ACE/ARB        Passed - EGFRCR or GFRNAA > 50     GFR Evaluation  EGFRCR: 73 , resulted on 5/10/2024          Passed - GFR in the past 12 months           Future  Appointments         Provider Department Appt Notes    In 2 weeks Kiel Azevedo MD [facility] ([place], [place]), [address] [place] [address] -last 2/23    In 1 month Soo Ramesh MD [facility] ([place], [place]), [address] [place] [address] - OB/GYN vulvar biopsy          Recent Outpatient Visits              3 days ago Well woman exam with routine gynecological exam    [address] - OB/GYN Soo Ramesh MD    Office Visit    5 months ago Arthritis of both midfeet    [facility] ([place], [place]), [address] [place] [address], [place] FRED[place]    Office Visit    7 months ago Painful orthopaedic hardware ([facility])    [address] [place]    Office Visit    9 months ago Painful orthopaedic hardware ([facility])    [address] Jerman Santana MD    Office Visit    10 months ago Type 2 diabetes mellitus with diabetic neuropathy, with long-term current use of insulin ([facility])    [address] Kiel Azevedo MD    Office Visit

## 2024-05-15 ENCOUNTER — TELEPHONE (OUTPATIENT)
Dept: OBGYN CLINIC | Facility: CLINIC | Age: 69
End: 2024-05-15

## 2024-05-15 DIAGNOSIS — N76.0 VAGINITIS AND VULVOVAGINITIS: Primary | ICD-10-CM

## 2024-05-15 RX ORDER — NYSTATIN 100000 U/G
OINTMENT TOPICAL
Qty: 15 G | Refills: 0 | Status: SHIPPED | OUTPATIENT
Start: 2024-05-15

## 2024-05-15 RX ORDER — TRIAMCINOLONE ACETONIDE 1 MG/G
OINTMENT TOPICAL
Qty: 15 G | Refills: 0 | Status: SHIPPED | OUTPATIENT
Start: 2024-05-15

## 2024-05-15 NOTE — TELEPHONE ENCOUNTER
Patient notified and sent instructions via MyChart per patient's request.   No further questions.

## 2024-05-15 NOTE — TELEPHONE ENCOUNTER
Fax received from [facility] in [place].  Insurance does not cover Mycolog II but may cover separately as Nystatin and Triamcinolone.  Prescriptions pended for provider review and signature.

## 2024-05-20 RX ORDER — INSULIN GLARGINE AND LIXISENATIDE 100; 33 U/ML; UG/ML
30 INJECTION, SOLUTION SUBCUTANEOUS DAILY
Qty: 9 ML | Refills: 1 | OUTPATIENT
Start: 2024-05-20

## 2024-05-29 RX ORDER — FAMOTIDINE 40 MG/1
40 TABLET, FILM COATED ORAL DAILY
Qty: 90 TABLET | Refills: 3 | Status: SHIPPED | OUTPATIENT
Start: 2024-05-29

## 2024-05-29 NOTE — TELEPHONE ENCOUNTER
Refill Per Protocol   Please review pended refill request as unable to refill due to high/very high interaction warning copied here:  High Dose: famotidine, 40 mg, Oral, DailySingle dose of 40 mg exceeds recommended maximum of 20 mg by 100%    Recent Visits  Date Type Provider Dept   07/12/23 Office Visit Kiel Azevedo MD [facility] [address]   03/02/23 Office Visit Kiel Azevedo MD [facility] [address] [facility] [address]   02/06/23 Office Visit Kiel Azevedo MD [facility] [address]   Showing recent visits within past 540 days with a meds authorizing provider and meeting all other requirements  Future Appointments  Date Type Provider Dept   05/30/24 Appointment Kiel Azevedo MD [facility] [address]   Showing future appointments within next 150 days with a meds authorizing provider and meeting all other requirements             Requested Prescriptions   Pending Prescriptions Disp Refills    FAMOTIDINE 40 MG Oral Tab [Pharmacy Med Name: FAMOTIDINE 40MG TABLETS] 90 tablet 0     Sig: TAKE 1 TABLET(40 MG) BY MOUTH DAILY       Gastrointestional Medication Protocol Passed - 5/26/2024 11:14 AM        Passed - In person appointment or virtual visit in the past 12 mos or appointment in next 3 mos     Recent Outpatient Visits              2 weeks ago Well woman exam with routine gynecological exam    [facility], [address] [place] [address] - OB/GYN Soo Ramesh MD    Office Visit    5 months ago Arthritis of both midfeet    [facility], [address] [place] [address], [place]    Office Visit    8 months ago Painful orthopaedic hardware (HCC)    [facility], [address] [place] [address], Lindsay Wylie DPM    Office Visit    9 months ago Painful orthopaedic hardware (HCC)    [address], Jerman Acevedo MD    Office Visit    10 months ago Type 2 diabetes mellitus with diabetic neuropathy, with long-term current use of insulin ([facility])    [facility]  [facility], [address] [place] [address] Kiel Azevedo MD    Office Visit          Future Appointments         Provider Department Appt Notes    Tomorrow Kiel Azevedo MD [facility], [address] [place] [address] -last 2/23    In 1 month Soo Ramesh MD [facility], [address] [place] [address] - OB/GYN vulvar biopsy                           Future Appointments         Provider Department Appt Notes    Tomorrow Kiel Azevedo MD [facility], [address] [place] [address] -last 2/23    In 1 month Soo Ramesh MD [facility], [address] [place] [address] - OB/GYN vulvar biopsy          Recent Outpatient Visits              2 weeks ago Well woman exam with routine gynecological exam    [facility], [address] [place] [address] - OB/GYN Soo Ramesh MD    Office Visit    5 months ago Arthritis of both midfeet    [facility], [address] [place] [address], [place] Lindsay Agrawal, DPM    Office Visit    8 months ago Painful orthopaedic hardware ([facility])    [facility], [address] [place] [address], Lindsay Wylie DPM    Office Visit    9 months ago Painful orthopaedic hardware ([facility])    [facility], [address] [place] [address] Jerman Acevedo MD    Office Visit    10 months ago Type 2 diabetes mellitus with diabetic neuropathy, with long-term current use of insulin ([facility])    [facility], [address] [address]Kiel Rao MD    Office Visit

## 2024-05-30 ENCOUNTER — OFFICE VISIT (OUTPATIENT)
Dept: INTERNAL MEDICINE CLINIC | Facility: CLINIC | Age: 69
End: 2024-05-30

## 2024-05-30 VITALS
RESPIRATION RATE: 16 BRPM | SYSTOLIC BLOOD PRESSURE: 122 MMHG | BODY MASS INDEX: 31.49 KG/M2 | OXYGEN SATURATION: 98 % | HEART RATE: 66 BPM | WEIGHT: 189 LBS | HEIGHT: 65 IN | TEMPERATURE: 97 F | DIASTOLIC BLOOD PRESSURE: 60 MMHG

## 2024-05-30 DIAGNOSIS — I25.10 CORONARY ARTERY DISEASE INVOLVING NATIVE CORONARY ARTERY OF NATIVE HEART WITHOUT ANGINA PECTORIS: ICD-10-CM

## 2024-05-30 DIAGNOSIS — E78.2 MIXED HYPERLIPIDEMIA: ICD-10-CM

## 2024-05-30 DIAGNOSIS — I10 ESSENTIAL HYPERTENSION: ICD-10-CM

## 2024-05-30 DIAGNOSIS — M19.071 ARTHRITIS OF RIGHT MIDFOOT: ICD-10-CM

## 2024-05-30 DIAGNOSIS — I44.7 LBBB (LEFT BUNDLE BRANCH BLOCK): ICD-10-CM

## 2024-05-30 DIAGNOSIS — I83.893 SYMPTOMATIC VARICOSE VEINS, BILATERAL: ICD-10-CM

## 2024-05-30 DIAGNOSIS — Z98.890 S/P ORIF (OPEN REDUCTION INTERNAL FIXATION) FRACTURE: ICD-10-CM

## 2024-05-30 DIAGNOSIS — E11.49 DIABETES MELLITUS TYPE 2 WITH NEUROLOGICAL MANIFESTATIONS (HCC): ICD-10-CM

## 2024-05-30 DIAGNOSIS — I83.893 VARICOSE VEINS OF BOTH LEGS WITH EDEMA: ICD-10-CM

## 2024-05-30 DIAGNOSIS — Z87.81 S/P ORIF (OPEN REDUCTION INTERNAL FIXATION) FRACTURE: ICD-10-CM

## 2024-05-30 DIAGNOSIS — K21.9 GASTROESOPHAGEAL REFLUX DISEASE WITHOUT ESOPHAGITIS: ICD-10-CM

## 2024-05-30 DIAGNOSIS — F41.9 ANXIETY AND DEPRESSION: ICD-10-CM

## 2024-05-30 DIAGNOSIS — Z79.4 TYPE 2 DIABETES MELLITUS WITH DIABETIC NEUROPATHY, WITH LONG-TERM CURRENT USE OF INSULIN (HCC): ICD-10-CM

## 2024-05-30 DIAGNOSIS — M20.42 HAMMER TOE OF LEFT FOOT: ICD-10-CM

## 2024-05-30 DIAGNOSIS — E11.40 TYPE 2 DIABETES MELLITUS WITH DIABETIC NEUROPATHY, WITH LONG-TERM CURRENT USE OF INSULIN (HCC): ICD-10-CM

## 2024-05-30 DIAGNOSIS — D12.2 BENIGN NEOPLASM OF ASCENDING COLON: ICD-10-CM

## 2024-05-30 DIAGNOSIS — Z95.5 S/P CORONARY ARTERY STENT PLACEMENT: ICD-10-CM

## 2024-05-30 DIAGNOSIS — K86.2 PANCREATIC CYST (HCC): ICD-10-CM

## 2024-05-30 DIAGNOSIS — I87.2 VENOUS INSUFFICIENCY OF BOTH LOWER EXTREMITIES: ICD-10-CM

## 2024-05-30 DIAGNOSIS — F32.A ANXIETY AND DEPRESSION: ICD-10-CM

## 2024-05-30 DIAGNOSIS — E66.9 CLASS 1 OBESITY: ICD-10-CM

## 2024-05-30 DIAGNOSIS — M85.89 OSTEOPENIA OF MULTIPLE SITES: ICD-10-CM

## 2024-05-30 DIAGNOSIS — M21.611 BUNION, RIGHT: ICD-10-CM

## 2024-05-30 DIAGNOSIS — Z00.00 ENCOUNTER FOR ANNUAL HEALTH EXAMINATION: Primary | ICD-10-CM

## 2024-05-30 DIAGNOSIS — Z47.89 ORTHOPEDIC AFTERCARE: ICD-10-CM

## 2024-05-30 PROBLEM — S72.92XA FEMUR FRACTURE, LEFT (HCC): Status: RESOLVED | Noted: 2021-07-11 | Resolved: 2024-05-30

## 2024-05-30 PROBLEM — S72.22XA CLOSED DISPLACED SUBTROCHANTERIC FRACTURE OF LEFT FEMUR (HCC): Status: RESOLVED | Noted: 2021-07-06 | Resolved: 2024-05-30

## 2024-05-30 PROCEDURE — G0439 PPPS, SUBSEQ VISIT: HCPCS | Performed by: INTERNAL MEDICINE

## 2024-05-30 PROCEDURE — 99214 OFFICE O/P EST MOD 30 MIN: CPT | Performed by: INTERNAL MEDICINE

## 2024-05-30 RX ORDER — CYCLOBENZAPRINE HCL 5 MG
5 TABLET ORAL NIGHTLY PRN
Qty: 10 TABLET | Refills: 0 | Status: SHIPPED | OUTPATIENT
Start: 2024-05-30

## 2024-05-30 RX ORDER — MONTELUKAST SODIUM 10 MG/1
10 TABLET ORAL
Qty: 90 TABLET | Refills: 3 | Status: CANCELLED | OUTPATIENT
Start: 2024-05-30

## 2024-05-30 RX ORDER — INSULIN GLARGINE AND LIXISENATIDE 100; 33 U/ML; UG/ML
34 INJECTION, SOLUTION SUBCUTANEOUS DAILY
Qty: 9 ML | Refills: 1 | Status: SHIPPED | OUTPATIENT
Start: 2024-05-30

## 2024-05-30 RX ORDER — FAMOTIDINE 40 MG/1
40 TABLET, FILM COATED ORAL DAILY
Qty: 90 TABLET | Refills: 3 | Status: CANCELLED | OUTPATIENT
Start: 2024-05-30

## 2024-05-30 RX ORDER — FLUCONAZOLE 150 MG/1
150 TABLET ORAL AS NEEDED
COMMUNITY
Start: 2024-05-13

## 2024-05-30 NOTE — PATIENT INSTRUCTIONS
Kaylin Bledsoe's SCREENING SCHEDULE   Tests on this list are recommended by your physician but may not be covered, or covered at this frequency, by your insurer.   Please check with your insurance carrier before scheduling to verify coverage.   PREVENTATIVE SERVICES FREQUENCY &  COVERAGE DETAILS LAST COMPLETION DATE   Diabetes Screening    Fasting Blood Sugar /  Glucose    One screening every 12 months if never tested or if previously tested but not diagnosed with pre-diabetes   One screening every 6 months if diagnosed with pre-diabetes Lab Results   Component Value Date     (H) 05/10/2024        Cardiovascular Disease Screening    Lipid Panel  Cholesterol  Lipoprotein (HDL)  Triglycerides Covered every 5 years for all Medicare beneficiaries without apparent signs or symptoms of cardiovascular disease Lab Results   Component Value Date    CHOLEST 158 05/10/2024    HDL 44 05/10/2024    LDL 74 05/10/2024    TRIG 245 (H) 05/10/2024         Electrocardiogram (EKG)   Covered if needed at Welcome to Medicare, and non-screening if indicated for medical reasons 01/31/2022      Ultrasound Screening for Abdominal Aortic Aneurysm (AAA) Covered once in a lifetime for one of the following risk factors   • Men who are 65-75 years old and have ever smoked   • Anyone with a family history -     Colorectal Cancer Screening  Covered for ages 50-85; only need ONE of the following:    Colonoscopy   Covered every 10 years    Covered every 2 years if patient is at high risk or previous colonoscopy was abnormal 10/11/2019    Health Maintenance   Topic Date Due   • Colorectal Cancer Screening  10/11/2024       Flexible Sigmoidoscopy   Covered every 4 years -    Fecal Occult Blood Test Covered annually -   Bone Density Screening    Bone density screening    Covered every 2 years after age 65 if diagnosed with risk of osteoporosis or estrogen deficiency.    Covered yearly for long-term glucocorticoid medication use (Steroids) Last  Dexa Scan:    XR DEXA BONE DENSITOMETRY (CPT=77080) 08/25/2020      No recommendations at this time   Pap and Pelvic    Pap   Covered every 2 years for women at normal risk; Annually if at high risk -  No recommendations at this time    Chlamydia Annually if high risk -  No recommendations at this time   Screening Mammogram    Mammogram     Recommend annually for all female patients aged 40 and older    One baseline mammogram covered for patients aged 35-39 04/06/2024    Health Maintenance   Topic Date Due   • Mammogram  04/06/2025       Immunizations    Influenza Covered once per flu season  Please get every year 10/15/2023  No recommendations at this time    Pneumococcal Each vaccine ([id number] & [id number]) covered once after 65 Prevnar 13: -    [id number]: 09/05/2018     No recommendations at this time    Hepatitis B One screening covered for patients with certain risk factors   -  No recommendations at this time    Tetanus Toxoid Not covered by Medicare Part B unless medically necessary (cut with metal); may be covered with your pharmacy prescription benefits -    Tetanus, Diptheria and Pertusis TD and TDaP Not covered by Medicare Part B -  No recommendations at this time    Zoster Not covered by Medicare Part B; may be covered with your pharmacy  prescription benefits -  Zoster Vaccines(1 of 2) Never done     Diabetes      Hemoglobin A1C Annually; if last result is elevated, may be asked to retest more frequently.    Medicare covers every 3 months Lab Results   Component Value Date     (H) 05/10/2024    A1C 9.3 (H) 05/10/2024       No recommendations at this time    Creat/alb ratio Annually Lab Results   Component Value Date    MICROALBCREA 27.1 05/10/2024    MALBCRECALC UNABLE TO CALCULATE 01/17/2014       LDL Annually Lab Results   Component Value Date    LDL 74 05/10/2024       Dilated Eye Exam Annually [unfilled]

## 2024-05-30 NOTE — PROGRESS NOTES
Subjective:   Kaylin Bledsoe is a 69 year old female who presents for a Medicare Subsequent Annual Wellness visit (Pt already had Initial Annual Wellness) and scheduled follow up of multiple significant but stable problems.     Since last evaluation the patient has been making some efforts to increase physical activity, ambulating throughout her home and infrequently outdoors.  She has maintained stable dietary habits, including consumption of carbohydrates and sugars regularly.  She has maintained compliance with the prescribed medical regimen.  Hemoglobin A1c has further increased from 8.6% to 9.3%.  LDL is 74.  No microalbuminuria.  She reports overall improved mood with use of sertraline therapy.    History/Other:   Fall Risk Assessment:   She has been screened for Falls and is High Risk. Fall Prevention information provided to patient in After Visit Summary.    Do you feel unsteady when standing or walking?: Yes  Do you worry about falling?: Yes  Have you fallen in the past year?: No     Cognitive Assessment:   She had a completely normal cognitive assessment - see flowsheet entries     Functional Ability/Status:   Kaylin Bledsoe has some abnormal functions as listed below:  She has Driving difficulties based on screening of functional status. She has Hearing problems based on screening of functional status.She has Vision problems based on screening of functional status. She has Walking problems based on screening of functional status. She has problems with Daily Activities based on screening of functional status. She has problems with Memory based on screening of functional status.       Depression Screening (PHQ-2/PHQ-9): PHQ-2 SCORE: 0  , done 5/29/2024        10 minutes spent screening and counseling for depression    Advanced Directives:   She does NOT have a Living Will. [Do you have a living will?: No]  She does NOT have a Power of  for Health Care. [Do you have a healthcare power of ?:  No]  Discussed Advance Care Planning with patient (and family/surrogate if present). Standard forms made available to patient in After Visit Summary.      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)    Osteopenia    Anxiety and depression    LBBB (left bundle branch block)    Type 2 diabetes mellitus with diabetic neuropathy, with long-term current use of insulin (HCC)    Pancreatic cyst (HCC)    Coronary artery disease involving native coronary artery of native heart without angina pectoris    S/P coronary artery stent placement    Venous insufficiency of both lower extremities    Symptomatic varicose veins, bilateral    Gastroesophageal reflux disease without esophagitis    Benign neoplasm of ascending colon    Benign neoplasm of transverse colon    Left total knee replacement  Global 10/15/2020    Closed displaced subtrochanteric fracture of left femur ([facility])    Femur fracture, left ([facility])    S/P ORIF (open reduction internal fixation) fracture    Hammer toe of left foot    Bunion, right    Arthritis, midfoot    Class 1 obesity     Allergies:  She is allergic to codeine and vicodin [hydrocodone-acetaminophen].    Current Medications:  Outpatient Medications Marked as Taking for the 5/30/24 encounter (Office Visit) with Kiel Azevedo MD   Medication Sig    fluconazole 150 MG Oral Tab Take 1 tablet (150 mg total) by mouth as needed.    cyclobenzaprine 5 MG Oral Tab Take 1 tablet (5 mg total) by mouth nightly as needed for Muscle spasms.    FAMOTIDINE 40 MG Oral Tab TAKE 1 TABLET(40 MG) BY MOUTH DAILY    nystatin 100,000 Units/g External Ointment Apply 1 applicator topical twice daily for 4 weeks, then twice daily every other day for 2 weeks, then as needed.    triamcinolone 0.1 % External Ointment Apply 1 applicator topical twice daily for 4 weeks, then twice daily every other day for 2 weeks, then as  needed.    Insulin Glargine-Lixisenatide (SOLIQUA) 100-33 UNT-MCG/ML Subcutaneous Solution Pen-injector Inject 30 Units/day into the skin daily.    estradiol 0.1 MG/GM Vaginal Cream Place 1 g vaginally nightly.    nystatin-triamcinolone 100,000-0.1 Units/g-% External Ointment Apply 1 Application topically 2 (two) times daily. Apply twice daily for 4 weeks then twice daily every other day for 2 weeks then as needed    linaGLIPtin (TRADJENTA) 5 mg Oral Tab Take 1 tablet (5 mg total) by mouth daily.    glipiZIDE ER 10 MG Oral Tablet 24 Hr Take 1 tablet (10 mg total) by mouth daily.    metFORMIN  MG Oral Tablet 24 Hr Take 2 tablets (1,000 mg total) by mouth 2 (two) times daily with meals.    montelukast 10 MG Oral Tab Take 1 tablet (10 mg total) by mouth daily as needed.    sertraline 100 MG Oral Tab Take 1 tablet (100 mg total) by mouth daily.    atorvastatin 10 MG Oral Tab Take 1 tablet (10 mg total) by mouth daily.    Glucose Blood (ONETOUCH VERIO) In Vitro Strip TEST THREE TIMES DAILY    Empagliflozin (JARDIANCE) 25 MG Oral Tab Take 1 tablet by mouth daily.    Insulin Pen Needle (BD PEN NEEDLE SHORT U/F) 31G X 8 MM Does not apply Misc 1 each daily.    Blood Glucose Monitoring Suppl (ONETOUCH VERIO IQ SYSTEM) w/Device Does not apply Kit 1 Device by Other route 2 (two) times daily. Use as directed.    FLUTICASONE PROPIONATE 50 MCG/ACT Nasal Suspension SHAKE LIQUID AND USE 2 SPRAYS IN EACH NOSTRIL DAILY    Metoprolol Succinate 25 MG Oral Capsule ER 24 Hour Sprinkle 1 tablet ONCE DAILY (route: oral)    aspirin 81 MG Oral Chew Tab Chew 1 tablet (81 mg total) by mouth daily.    ALPRAZOLAM 0.5 MG Oral Tab TAKE 1 TABLET(0.5 MG) BY MOUTH DAILY AS NEEDED FOR ANXIETY OR SLEEP       Medical History:  She  has a past medical history of Abdominal hernia, Abdominal pain, Anemia, Anesthesia complication, Anxiety, Arthritis, Atherosclerosis of coronary artery, Atherosclerotic heart disease of native coronary artery without  angina pectoris, Back pain, Bad breath, Belching, Bilateral leg edema (3/16/2018), Bloating, Blurred vision, Change in hair, Constipation, Diarrhea, unspecified, Dizziness, Easy bruising, Fatigue, Feeling lonely, Flatulence/gas pain/belching, Food intolerance, Frequent use of laxatives, Hearing loss, High blood pressure, High cholesterol, History of depression, History of eating disorder, Indigestion, Irregular bowel habits, Itch of skin, Leg swelling, Loss of appetite, Nausea, Neuropathy, Night sweats, Osteoarthrosis, unspecified whether generalized or localized, unspecified site, Osteoporosis, Other and unspecified hyperlipidemia, Pain in joints, Peripheral neuropathy, Problems with swallowing, Sleep disturbance, Stented coronary artery, Stress, Type II or unspecified type diabetes mellitus without mention of complication, not stated as uncontrolled, Uncomfortable fullness after meals, Venous insufficiency of both lower extremities (3/16/2018), Visual impairment, Vomiting, and Weight loss.  Surgical History:  She  has a past surgical history that includes  (,); cataract (Bilateral); hip replacement surgery (); total hip replacement; endovenous laser vein addon (Left, ); endovenous laser vein addon (Right, ); angioplasty (coronary) (); Vaginal hysterectomy (N/A, 2017); colonoscopy; cath drug eluting stent; knee replacement surgery (Left, 2020); other surgical history (.  ); and other surgical history ().   Family History:  Her family history includes Diabetes in her brother and sister; Heart Attack in her father; Hypertension in her father.  Social History:  She  reports that she has never smoked. She has never used smokeless tobacco. She reports that she does not drink alcohol and does not use drugs.    Tobacco:  She has never smoked tobacco.    CAGE Alcohol Screen:   CAGE screening score of 0 on 2024, showing low risk of alcohol abuse.      Patient  Care Team:  Kiel Azevedo MD as PCP - General (Internal Medicine)  Violeta Sims APN (Nurse Practitioner Women's Health)  Mireya Salamanca, PT as Physical Therapist (Physical Therapy)  Laverne Kingston MD (CARDIOLOGY)  Marlon Daniels MD (Cardiovascular Diseases)  Cassandra Philip, RN, CDE as Registered Nurse (Registered Nurse)  Bola Montoya MD (SURGERY, ORTHOPEDIC)  Flor Mata MD (SURGERY, ORTHOPEDIC)  Radha Corey, PA-C (Physician Assistant Surgical)  Omar Billingsley, PT as Physical Therapist  Soo Ramesh MD as Obstetrician (OBSTETRICS & GYNECOLOGY)  Quynh Hidalgo, PT as Physical Therapist    Review of Systems  Constitutional: negative  Eyes: negative  Ears, nose, mouth, throat, and face: negative  Respiratory: negative  Cardiovascular: negative  Gastrointestinal: negative  Genitourinary:negative  Integument/breast: negative  Hematologic/lymphatic: negative  Musculoskeletal:negative  Neurological: negative  Behavioral/Psych: negative  Endocrine: negative  Allergic/Immunologic: negative    Objective:   Physical Exam  General Appearance:  Alert, cooperative, no distress, appears stated age   Head:  Normocephalic, without obvious abnormality, atraumatic   Eyes:  Bilateral conjunctiva within normal limits   Ears:  Tympanic membrane within normal limits bilaterally   Nose: Deferred   Throat: Deferred   Neck: Supple, symmetrical, trachea midline, no adenopathy;  thyroid: not enlarged, symmetric, no tenderness/mass/nodules; no carotid bruit or JVD   Back:   Symmetric, no curvature, ROM normal, no CVA tenderness   Lungs:   Clear to auscultation bilaterally, respirations unlabored   Heart:  Regular rate and rhythm, S1 and S2 normal, no murmur, rub, or gallop   Abdomen:   Soft, non-tender, bowel sounds active all four quadrants,  no masses, no organomegaly   Pelvic: Deferred   Extremities: No edema   Pulses: 2+ and symmetric   Skin: Skin color, texture, turgor normal, no rashes or lesions   Lymph  nodes: Cervical nodes normal   Neurologic: Grossly normal       /60 (BP Location: Left arm, Patient Position: Sitting, Cuff Size: large)   Pulse 66   Temp 97 °F (36.1 °C) (Temporal)   Resp 16   Ht 5' 5\" (1.651 m)   Wt 189 lb (85.7 kg)   LMP 09/01/2017 (LMP Unknown)   SpO2 98%   BMI 31.45 kg/m²  Estimated body mass index is 31.45 kg/m² as calculated from the following:    Height as of this encounter: 5' 5\" (1.651 m).    Weight as of this encounter: 189 lb (85.7 kg).    Medicare Hearing Assessment:   Hearing Screening    Time taken: 5/30/2024  2:34 PM  Entry User: Zita Wilcox MA  Screening Method: Finger Rub  Finger Rub Result: Pass         Visual Acuity:   Right Eye Visual Acuity: Corrected Right Eye Chart Acuity: 20/20   Left Eye Visual Acuity: Corrected Left Eye Chart Acuity: 20/20   Both Eyes Visual Acuity: Corrected Both Eyes Chart Acuity: 20/20   Able To Tolerate Visual Acuity: Yes        Assessment & Plan:   Kaylin Bledsoe is a 69 year old female who presents for a Medicare Assessment.     Outstanding screening and preventive measures:  None    DM2:  Uncontrolled with hemoglobin A1c of 9.3%: Dietary and lifestyle management reinforced at length.  Increase Soliqua from 30 units daily to 34 units daily.  Continue metformin 1 g twice daily, glipizide extended release 10 mg daily, Jardiance 25 mg daily, and Tradjenta 5 mg daily.  I have advised the patient to establish with the diabetes center.  Complications: Neuropathy  Up-to-date with ophthalmologic evaluation; scheduled for follow-up  No microalbuminuria: Currently holding ramipril secondary to symptomatic relative or absolute hypotension  LDL at goal: Continue atorvastatin 10 mg daily    Vaginal atrophy:  Following with gynecology service for topical therapy    Class I obesity:  Dietary and lifestyle management again reinforced     Left bunion and hammertoe and metatarsalgia:  Postsurgical intervention by podiatry service  Mild  improvement in pain, however symptoms are still interfering with activities of daily living and recreational activities  Following with podiatry service as needed  Undergoing physical therapy regularly     LBBB:  Stable and asymptomatic  Following with cardiology service regularly  2D echocardiogram in June     Mixed hyperlipidemia:  Triglyceride of 245 and LDL of 74  Continue atorvastatin 10 mg daily    History of colon polyps:  Advised due for follow-up with GI service later this year     Dilated cardiomyopathy:  Stable and asymptomatic  Left ventricular ejection fraction of 45%  Following with cardiology service     Hypertension:  Stable/controlled  Continue current regimen     CAD:  Stable and without angina  Post stent placement  Primarily in LAD without significant obstructive disease  Continue metoprolol succinate 25 mg daily and Jardiance 25 mg daily  Following with cardiology service     Depression and anxiety:  Improved  Continue sertraline 100 mg daily  Continue rare alprazolam use     GERD:  Stable  Continue current management     Pancreatic cyst:  Stable  Following with GI service     Osteopenia:  Continue current calcium and vitamin D supplementation  Weightbearing exercise encouraged     Venous insufficiency:  Stable  Stasis dermatitis on the left and some associated aching discomfort  Ultrasound ordered; recommendations to follow     History of left femur fracture:  Post ORIF without current complication     Osteoarthritis of left knee:  Post replacement    1. Varicose veins of both legs with edema (Primary)  -     US VENOUS INSUFFICIENCY (REFLUX) BILAT LOWER EXT SH(CPT=93970); Future; Expected date: 05/30/2024  2. Encounter for annual health examination  Other orders  -     Cyclobenzaprine HCl; Take 1 tablet (5 mg total) by mouth nightly as needed for Muscle spasms.  Dispense: 10 tablet; Refill: 0    The patient indicates understanding of these issues and agrees to the plan.  Reinforced healthy  diet, lifestyle, and exercise.      Return in 3 months (on 8/30/2024).     Kiel Azevedo MD, 5/30/2024     Supplementary Documentation:   General Health:  In the past six months, have you lost more than 10 pounds without trying?: 2 - No  Has your appetite been poor?: No  Type of Diet: Balanced  How does the patient maintain a good energy level?: Other  How would you describe your daily physical activity?: Light  How would you describe your current health state?: Fair  How do you maintain positive mental well-being?: Social Interaction  On a scale of 0 to 10, with 0 being no pain and 10 being severe pain, what is your pain level?: 7 - (Severe)  In the past six months, have you experienced urine leakage?: 0-No  At any time do you feel concerned for the safety/well-being of yourself and/or your children, in your home or elsewhere?: No  Have you had any immunizations at another office such as Influenza, Hepatitis B, Tetanus, or Pneumococcal?: Yes       Kaylin Bledsoe's SCREENING SCHEDULE   Tests on this list are recommended by your physician but may not be covered, or covered at this frequency, by your insurer.   Please check with your insurance carrier before scheduling to verify coverage.   PREVENTATIVE SERVICES FREQUENCY &  COVERAGE DETAILS LAST COMPLETION DATE   Diabetes Screening    Fasting Blood Sugar /  Glucose    One screening every 12 months if never tested or if previously tested but not diagnosed with pre-diabetes   One screening every 6 months if diagnosed with pre-diabetes Lab Results   Component Value Date     (H) 05/10/2024        Cardiovascular Disease Screening    Lipid Panel  Cholesterol  Lipoprotein (HDL)  Triglycerides Covered every 5 years for all Medicare beneficiaries without apparent signs or symptoms of cardiovascular disease Lab Results   Component Value Date    CHOLEST 158 05/10/2024    HDL 44 05/10/2024    LDL 74 05/10/2024    TRIG 245 (H) 05/10/2024         Electrocardiogram (EKG)    Covered if needed at Welcome to Medicare, and non-screening if indicated for medical reasons 01/31/2022      Ultrasound Screening for Abdominal Aortic Aneurysm (AAA) Covered once in a lifetime for one of the following risk factors    Men who are 65-75 years old and have ever smoked    Anyone with a family history -     Colorectal Cancer Screening  Covered for ages 50-85; only need ONE of the following:    Colonoscopy   Covered every 10 years    Covered every 2 years if patient is at high risk or previous colonoscopy was abnormal 10/11/2019    Health Maintenance   Topic Date Due    Colorectal Cancer Screening  10/11/2024       Flexible Sigmoidoscopy   Covered every 4 years -    Fecal Occult Blood Test Covered annually -   Bone Density Screening    Bone density screening    Covered every 2 years after age 65 if diagnosed with risk of osteoporosis or estrogen deficiency.    Covered yearly for long-term glucocorticoid medication use (Steroids) Last Dexa Scan:    XR DEXA BONE DENSITOMETRY (CPT=77080) 08/25/2020      No recommendations at this time   Pap and Pelvic    Pap   Covered every 2 years for women at normal risk; Annually if at high risk -  No recommendations at this time    Chlamydia Annually if high risk -  No recommendations at this time   Screening Mammogram    Mammogram     Recommend annually for all female patients aged 40 and older    One baseline mammogram covered for patients aged 35-39 04/06/2024    Health Maintenance   Topic Date Due    Mammogram  04/06/2025       Immunizations    Influenza Covered once per flu season  Please get every year 10/15/2023  No recommendations at this time    Pneumococcal Each vaccine ([id number] & [id number]) covered once after 65 Prevnar 13: -    [id number]: 09/05/2018     No recommendations at this time    Hepatitis B One screening covered for patients with certain risk factors   -  No recommendations at this time    Tetanus Toxoid Not covered by Medicare Part B unless  medically necessary (cut with metal); may be covered with your pharmacy prescription benefits -    Tetanus, Diptheria and Pertusis TD and TDaP Not covered by Medicare Part B -  No recommendations at this time    Zoster Not covered by Medicare Part B; may be covered with your pharmacy  prescription benefits -  Zoster Vaccines(1 of 2) Never done     Diabetes      Hemoglobin A1C Annually; if last result is elevated, may be asked to retest more frequently.    Medicare covers every 3 months Lab Results   Component Value Date     (H) 05/10/2024    A1C 9.3 (H) 05/10/2024       No recommendations at this time    Creat/alb ratio Annually Lab Results   Component Value Date    MICROALBCREA 27.1 05/10/2024    MALBCRECALC UNABLE TO CALCULATE 01/17/2014       LDL Annually Lab Results   Component Value Date    LDL 74 05/10/2024       Dilated Eye Exam Annually Last Diabetic Eye Exam:  Last Dilated Eye Exam: 06/08/23  Eye Exam shows Diabetic Retinopathy?: Yes

## 2024-05-31 ENCOUNTER — TELEPHONE (OUTPATIENT)
Dept: ENDOCRINOLOGY CLINIC | Facility: CLINIC | Age: 69
End: 2024-05-31

## 2024-05-31 ENCOUNTER — OFFICE VISIT (OUTPATIENT)
Dept: ENDOCRINOLOGY CLINIC | Facility: CLINIC | Age: 69
End: 2024-05-31

## 2024-05-31 VITALS
DIASTOLIC BLOOD PRESSURE: 74 MMHG | BODY MASS INDEX: 32 KG/M2 | SYSTOLIC BLOOD PRESSURE: 118 MMHG | HEART RATE: 67 BPM | OXYGEN SATURATION: 98 % | WEIGHT: 194 LBS | RESPIRATION RATE: 20 BRPM

## 2024-05-31 DIAGNOSIS — E11.40 TYPE 2 DIABETES MELLITUS WITH DIABETIC NEUROPATHY, WITH LONG-TERM CURRENT USE OF INSULIN (HCC): Primary | ICD-10-CM

## 2024-05-31 DIAGNOSIS — Z79.4 TYPE 2 DIABETES MELLITUS WITH DIABETIC NEUROPATHY, WITH LONG-TERM CURRENT USE OF INSULIN (HCC): Primary | ICD-10-CM

## 2024-05-31 LAB
GLUCOSE BLOOD: 355
TEST STRIP LOT #: NORMAL NUMERIC

## 2024-05-31 PROCEDURE — 82947 ASSAY GLUCOSE BLOOD QUANT: CPT | Performed by: NURSE PRACTITIONER

## 2024-05-31 PROCEDURE — 99215 OFFICE O/P EST HI 40 MIN: CPT | Performed by: NURSE PRACTITIONER

## 2024-05-31 NOTE — PROGRESS NOTES
Referring Physician: Kiel Azevedo  Initial consult date: 24    Chief Complaint   Patient presents with    Diabetes     New pt - checking daily, did not bring meter       HPI:   Kaylin Bledsoe is a 69 year old female who presents for an initial consult for management of diabetes. Last A1c value was 9.3% done 5/10/2024. Pt did not bring glucometer or glucose readings to appointment for review. POC glucose in office 355 mg/dl.  Pt states am glucose 120-180 mg/dl and up to 200-250 mg/dl in the evening.     DM Hx:  Type: 2  Onset:     Current Medications:   Metformin  m tabs twice daily with meals  Tradjenta 5 mg daily  Glipizide Er 10 mg daily  Jardiance 25 mg daily  Soliqua 34 units daily    Previous Medications:   Lantus  Victoza - pancreatitis  Januvia      Complications/Co-morbidities:   Neuropathy  CAD  HTN  Hyperlipidemia    Hospitalizations for diabetes: Yes; comment: once 5 years ago after going to [place]  History of pancreatitis: Yes, about 10 years ago   If yes 2/2: Medication (Victoza)  Episodes of hypoglycemia: No  Hypoglycemia management: Rule of 15      Modifying factors:  Medication/follow up adherence: Yes  Dietary compliance: Fair  (7) B: Coffee, pan, yogurt  (11) L: Fruit - apple, banana or orange  (230) D: Soup (sometimes chicken and rice) and meat  (7) S: nuts, sometimes chocolate  Exercise: No    Glucose monitoring:   Glucometer, checking 1-2x daily        Past History:   She  has a past medical history of Abdominal hernia, Abdominal pain, Anemia, Anesthesia complication, Anxiety, Arthritis, Atherosclerosis of coronary artery, Atherosclerotic heart disease of native coronary artery without angina pectoris, Back pain, Bad breath, Belching, Bilateral leg edema (3/16/2018), Bloating, Blurred vision, Change in hair, Constipation, Diarrhea, unspecified, Dizziness, Easy bruising, Fatigue, Feeling lonely, Flatulence/gas pain/belching, Food intolerance, Frequent use of laxatives, Hearing  loss, High blood pressure, High cholesterol, History of depression, History of eating disorder, Indigestion, Irregular bowel habits, Itch of skin, Leg swelling, Loss of appetite, Nausea, Neuropathy, Night sweats, Osteoarthrosis, unspecified whether generalized or localized, unspecified site, Osteoporosis, Other and unspecified hyperlipidemia, Pain in joints, Peripheral neuropathy, Problems with swallowing, Sleep disturbance, Stented coronary artery, Stress, Type II or unspecified type diabetes mellitus without mention of complication, not stated as uncontrolled, Uncomfortable fullness after meals, Venous insufficiency of both lower extremities (3/16/2018), Visual impairment, Vomiting, and Weight loss.   Her family history includes Diabetes in her brother and sister; Heart Attack in her father; Hypertension in her father.   She  reports that she has never smoked. She has never used smokeless tobacco. She reports that she does not drink alcohol and does not use drugs.     She is allergic to codeine and vicodin [hydrocodone-acetaminophen].     Current Outpatient Medications on File Prior to Visit   Medication Sig    fluconazole 150 MG Oral Tab Take 1 tablet (150 mg total) by mouth as needed.    cyclobenzaprine 5 MG Oral Tab Take 1 tablet (5 mg total) by mouth nightly as needed for Muscle spasms.    Insulin Glargine-Lixisenatide (SOLIQUA) 100-33 UNT-MCG/ML Subcutaneous Solution Pen-injector Inject 34 Units/day into the skin daily.    FAMOTIDINE 40 MG Oral Tab TAKE 1 TABLET(40 MG) BY MOUTH DAILY    nystatin 100,000 Units/g External Ointment Apply 1 applicator topical twice daily for 4 weeks, then twice daily every other day for 2 weeks, then as needed.    triamcinolone 0.1 % External Ointment Apply 1 applicator topical twice daily for 4 weeks, then twice daily every other day for 2 weeks, then as needed.    estradiol 0.1 MG/GM Vaginal Cream Place 1 g vaginally nightly.    nystatin-triamcinolone 100,000-0.1 Units/g-%  External Ointment Apply 1 Application topically 2 (two) times daily. Apply twice daily for 4 weeks then twice daily every other day for 2 weeks then as needed    metFORMIN  MG Oral Tablet 24 Hr Take 2 tablets (1,000 mg total) by mouth 2 (two) times daily with meals.    montelukast 10 MG Oral Tab Take 1 tablet (10 mg total) by mouth daily as needed.    sertraline 100 MG Oral Tab Take 1 tablet (100 mg total) by mouth daily.    atorvastatin 10 MG Oral Tab Take 1 tablet (10 mg total) by mouth daily.    Glucose Blood (ONETOUCH VERIO) In Vitro Strip TEST THREE TIMES DAILY    Empagliflozin (JARDIANCE) 25 MG Oral Tab Take 1 tablet by mouth daily.    Insulin Pen Needle (BD PEN NEEDLE SHORT U/F) 31G X 8 MM Does not apply Misc 1 each daily.    Blood Glucose Monitoring Suppl (ONETOUCH VERIO IQ SYSTEM) w/Device Does not apply Kit 1 Device by Other route 2 (two) times daily. Use as directed.    HYDROcodone-acetaminophen (NORCO) 5-325 MG Oral Tab Take 1 tablet by mouth every 4 to 6 hours.    FLUTICASONE PROPIONATE 50 MCG/ACT Nasal Suspension SHAKE LIQUID AND USE 2 SPRAYS IN EACH NOSTRIL DAILY    Metoprolol Succinate 25 MG Oral Capsule ER 24 Hour Sprinkle 1 tablet ONCE DAILY (route: oral)    aspirin 81 MG Oral Chew Tab Chew 1 tablet (81 mg total) by mouth daily.    ALPRAZOLAM 0.5 MG Oral Tab TAKE 1 TABLET(0.5 MG) BY MOUTH DAILY AS NEEDED FOR ANXIETY OR SLEEP    Acetaminophen 500 MG Oral Cap Take 2 capsules (1,000 mg total) by mouth daily as needed. (Patient not taking: Reported on 5/31/2024)     No current facility-administered medications on file prior to visit.         BP Readings from Last 3 Encounters:   05/31/24 118/74   05/30/24 122/60   05/10/24 122/78     Wt Readings from Last 3 Encounters:   05/31/24 194 lb (88 kg)   05/30/24 189 lb (85.7 kg)   05/10/24 190 lb (86.2 kg)       LABS:      HGBA1C:    Lab Results   Component Value Date    A1C 9.3 (H) 05/10/2024    A1C 8.6 (H) 06/10/2023    A1C 8.2 (H) 12/02/2022    EAG  220 (H) 05/10/2024       CMP  (most recent labs)   Lab Results   Component Value Date     (H) 05/10/2024    BUN 15 05/10/2024    BUNCREA 27.0 (H) 07/11/2021    CREATSERUM 0.86 05/10/2024    ANIONGAP 5 05/10/2024    EGFRCR 73 05/10/2024    GFR 56 (L) 09/02/2017    GFRNAA 67 07/30/2022    GFRAA 78 07/30/2022    CA 9.1 05/10/2024    OSMOCALC 291 05/10/2024    ALKPHO 142 05/10/2024    AST 14 (L) 05/10/2024    ALT 21 05/10/2024    BILT 0.4 05/10/2024    TP 7.2 05/10/2024    ALB 3.6 05/10/2024    GLOBULIN 3.6 05/10/2024    AGRATIO 1.7 10/16/2017     05/10/2024    K 4.7 05/10/2024     05/10/2024    CO2 27.0 05/10/2024         Lipids  (most recent labs)   Lab Results   Component Value Date/Time    CHOLEST 158 05/10/2024 07:45 AM    TRIG 245 (H) 05/10/2024 07:45 AM    HDL 44 05/10/2024 07:45 AM    LDL 74 05/10/2024 07:45 AM    NONHDLC 114 05/10/2024 07:45 AM         Thyroid  (most recent labs)   Lab Results   Component Value Date/Time    TSH 1.080 05/10/2024 07:45 AM    T4F 1.1 03/14/2019 08:37 AM        Micro Albumen/Creatinine:    Lab Results   Component Value Date    MALBP 1.37 05/10/2024    MALBP 0.72 06/10/2023    MALBP 1.34 07/30/2022    CREUR 50.50 05/10/2024    CREUR 49.60 06/10/2023    CREUR 97.10 07/30/2022    MICROALBCREA 27.1 05/10/2024    MICROALBCREA 14.5 06/10/2023    MICROALBCREA 13.8 07/30/2022          REVIEW OF SYSTEMS:   GENERAL HEALTH: feels well otherwise  SKIN: denies any unusual skin lesions or rashes  EYES: reports vision changes  RESPIRATORY: denies shortness of breath with exertion  CARDIOVASCULAR: denies chest pain on exertion  GI: denies abdominal pain, N/V/D  NEURO: denies headaches and reports numbness and tingling to extremities  ENDO: denies polyuria, polyphagia, polydipsia     EXAM:     Vitals:    05/31/24 1420   BP: 118/74   Pulse: 67   Resp: 20   SpO2: 98%   Weight: 194 lb (88 kg)     Vitals reviewed  Constitutional: Normal appearance, no acute distress  Pulmonary:  Pulmonary effort is normal  Neurologic: Alert and oriented  Psychiatric: Normal mood and affect    ASSESSMENT AND PLAN:   As for her Diabetes, it is needs improvement. Will continue Soliqua as pt has tolerated well and monitor for pancreatitis sx. Discussed pt likely needing prandial insulin at this point. Will start with heaviest meal once daily. Will then start CGM and meet with CDE to review diet. Will also minimize oral medications with addition of prandial insulin.     Medications:   Continue:   Metformin  m tabs twice daily with meals  Jardiance 25 mg daily  Soliqua 34 units daily    Change:  Stop Tradjenta   Stop Glipizide   Start Novolog 5 units before breakfast (Inject 15 minutes before meal)    Recommendations:  Reminded to get annual retinal exam  Check feet daily    DM health maintenance:  Last dilated eye exam: Last Dilated Eye Exam: 23   Exam shows retinopathy? Eye Exam shows Diabetic Retinopathy?: Yes    Last diabetic foot exam: No data recorded  Nephropathy screening: Pt does not have a history of CKD and/or proteinuria.   Pt is not on ace/arb. BP is well controlled  Continue present management.   Pt is on medium intensity statin. Cholesterol is well controlled other than triglycerides.   Continue present management.  Patient is on aspirin therapy. The patient's ASCVD 10 year risk score is 18.6.   Continue present management.  Patient is on GLP-1/GIP class medication.   Continue present management.  Patient is on SGLT2i class medication.   Continue present management.     The patient indicates understanding of these issues and agrees to the plan.  Refills sent at time of office visit.    Diagnoses and all orders for this visit:    Type 2 diabetes mellitus with diabetic neuropathy, with long-term current use of insulin (HCC)  -     ASSAY QUANTITATIVE, GLUCOSE  -     EDUCATION-OP REFERRAL CONTINUOUS GLUCOSE MONITORING 2 HRS       Do the chronicity and potential for complications of  disease pt will need ongoing monitoring.   Return in about 2 months (around 7/31/2024).    Spent 45 min obtaining patient history, evaluating patient, reviewing blood glucose trends, discussing treatment options, lifestyle modifications and completing documentation -this time does not including sensor interpretation time if applicable.   The risks and benefits of my recommendations, as well as other treatment options were discussed with the patient today. Questions were also answered to the best of my knowledge.    Tamanna ESPINOSA

## 2024-05-31 NOTE — PATIENT INSTRUCTIONS
El último valor de A1c fue del 9,3 % realizado el 10/05/2024.         Medicamentos:  Continuar:   Metformina  m tabletas dos veces al día con las comidas  Jardiance 25 mg al día  Soliqua 34 unidades diarias    Cambiar:  Detener Tradjenta   Detener la glipizida   Comience Novolog 5 unidades antes del desayuno.     1. Para determinar cualquier patrón en velasquez nivel de azúcar en george, deberá medir velasquez nivel de azúcar en george 3 veces al día.   2. Programe el inicio de Dexcom o comuníquese con Dexcom para suresh la capacitación en línea antes de la próxima visita.       Regreso en aproximadamente 2 meses (alrededor del 2024).          Seguimos trabajando juntos para mantener tereza niveles de azúcar en la george dentro de los objetivos.    El objetivo principal del tratamiento de la diabetes es evitar que el nivel de azúcar en la george suba demasiado. Medimos tereza tendencias generales de azúcar en la george con mateus prueba de hemoglobina A1C. (también llamado A1C) Para la mayoría de las personas, el objetivo es menos del 7,0 %, jenaro a veces hacemos excepciones según la edad, el historial de griffin y otros factores.  Mantener un A1C por debajo del 7% ayuda a prevenir problemas de griffin relacionados con la diabetes.  Si velasquez A1C sube demasiado, entonces debemos hablar sobre cambiar velasquez tratamiento actual para la diabetes.    Es importante lala todos tereza medicamentos según lo prescrito.  Además, llámeme si tiene algún problema con preguntas sobre medicamentos, efectos secundarios, preguntas sobre dosis o problemas con las tendencias de azúcar en la george ANTES DE CAMBIAR O SUSPENDER CUALQUIER MEDICAMENTO.    Prueba de azúcar en la george:  Recuerde llevar velasquez medidor de glucosa o registro de azúcar en la george a cada bear en el centro de diabetes.  [place] me permite hacer ajustes de forma bishop a velasquez plan de diabetes.  Horarios recomendados para realizar la prueba: antes del desayuno (en ayunas) y luego alternar la  prueba de azúcar en george 2 horas después de las comidas.    Metas de azúcar en la george:  Antes del desayuno:  (preferiblemente menos de 110)  2 horas Después de las comidas: menos de 180 (preferiblemente menos de 150)  Pregunte por niveles persistentes de azúcar en la george por debajo de 75 o por encima de 200.  Los niveles de azúcar en la george por encima de 200 no son aceptables para alcanzar velasquez objetivo de mejorar la diabetes  Esté atento a los niveles bajos de azúcar en la george: (menos de 70)      Síntomas de niveles bajos de azúcar en la george:  Temblores o mareos  Piel fría o húmeda  Estar hambriento  Dolor de tommy  Nerviosismo  Un latido del corazón serenity y rápido  Punto débil  Confusión o irritabilidad  Visión borrosa  Tener pesadillas o despertarse confundido o sudando  Entumecimiento u hormigueo en los labios o la lengua    Plan de acción para el tratamiento de la hipoglucemia  1. Controle velasquez nivel de glucosa en george para asegurarse de que esté bajo. No siempre se pueden seguir los síntomas. En marianne de jossue, trate velasquez nivel bajo de glucosa en george de todos modos.  2. Aletha 15 gramos de carbohidratos (hidratos de carbono). Aquí hay algunas opciones:  4 onzas. jugo de frutas regular  3-4 tabletas de glucosa  6 onzas. refresco normal  7-8 gominolas  3. Vuelva a controlar velasquez glucosa en george después de 10 a 15 minutos. Si la glucemia sigue baja (menos de 70 mg/dl) repetir el tratamiento (paso 2).  4. Si falta más de mateus hora para velasquez próxima comida, tome un refrigerio pequeño.  5. Si no está seguro de cuál es la causa de velasquez nivel bajo de glucosa en george, llame a velasquez proveedor de atención médica.  6. Controla siempre tu nivel de glucosa en egorge antes de conducir    Tamanna Cheng APRN  [phone]  ____________________________________________________________________________________________________________________________________________________    Last A1c value was 9.3% done  5/10/2024.         Medications:  Continue:   Metformin  m tabs twice daily with meals  Jardiance 25 mg daily  Soliqua 34 units daily    Change:  Stop Tradjenta   Stop Glipizide   Start Novolog 5 units before breakfast     In order to determine any pattern in your blood sugar level, you will need to measure your blood sugar 3 times a day.   Please schedule dexcom start or contact Dexcom to view training online before next visit.       Return in about 2 months (around 2024).         We continue to work together to keep your blood sugar levels on target.    The main goal of treating diabetes is to keep your blood sugar from getting too high. We measure your overall blood sugar trends with a hemoglobin A1C test. (also called A1C) For most people, the goal is less than 7.0%, but we sometimes make exceptions based on age, health history, and other factors.  Keeping an A1C below 7% helps prevent diabetes-related health problems.  If your A1C gets too high, then we need to talk about changing your current diabetes treatment.    It is important to take all of your medications as prescribed.  Also, please call me if you have any problems with questions about medications, side effects, questions about dosage, or problems with blood sugar trends BEFORE YOU SWITCH OR STOP ANY MEDICINES.    Blood sugar test:  Remember to bring your glucose meter or blood sugar log to every appointment at the diabetes center.  This allows me to safely make adjustments to your diabetes plan.  Recommended times to perform the test: before breakfast (fasting) and then alternate the blood sugar test 2 hours after meals.    Blood sugar goals:  Before breakfast:  (preferably less than 110)  2 hours After meals: less than 180 (preferably less than 150)  Ask for persistent blood sugar levels below 75 or above 200.  Blood sugar levels above 200 are not acceptable to reach your goal of improving diabetes  Watch for low blood sugar  levels: (less than 70)      Symptoms of low blood sugar levels:  Tremors or dizziness  Clammy or clammy skin  To be hungry  Headache  Nervousness  A strong and fast heartbeat  Soft spot  Confusion or irritability  Blurred vision  Having nightmares or waking up confused or sweating  Numbness or tingling in the lips or tongue    Action plan for the treatment of hypoglycemia  1. Check your blood glucose to make sure it is low. You can't always follow the symptoms. When in doubt, treat your low blood glucose anyway.  2. Take 15 grams of carbohydrates (carbohydrates). Here are some options:  4 oz. regular fruit juice  3-4 glucose tablets  6 oz. regular soda  7-8 jelly beans  3. Check your blood glucose again after 10-15 minutes. If blood glucose is still low (less than 70 mg / dl) repeat the treatment (step 2).  4. If your next meal is more than an hour away, have a small snack.  5. If you are not sure what is causing your low blood glucose, call your healthcare provider.  6. Always check your blood glucose before driving    Thank you,  Tamanna Downs APRN  [phone]

## 2024-06-04 ENCOUNTER — DIABETIC EDUCATION (OUTPATIENT)
Dept: ENDOCRINOLOGY CLINIC | Facility: CLINIC | Age: 69
End: 2024-06-04
Payer: MEDICARE

## 2024-06-04 ENCOUNTER — PATIENT MESSAGE (OUTPATIENT)
Dept: ENDOCRINOLOGY CLINIC | Facility: CLINIC | Age: 69
End: 2024-06-04

## 2024-06-04 DIAGNOSIS — E11.40 TYPE 2 DIABETES MELLITUS WITH DIABETIC NEUROPATHY, WITH LONG-TERM CURRENT USE OF INSULIN (HCC): Primary | ICD-10-CM

## 2024-06-04 DIAGNOSIS — Z79.4 TYPE 2 DIABETES MELLITUS WITH DIABETIC NEUROPATHY, WITH LONG-TERM CURRENT USE OF INSULIN (HCC): Primary | ICD-10-CM

## 2024-06-04 PROCEDURE — 95249 CONT GLUC MNTR PT PROV EQP: CPT | Performed by: DIETITIAN, REGISTERED

## 2024-06-04 NOTE — PROGRESS NOTES
Kaylin Bledsoe   2/9/1955 was seen for Personal Continuous Glucose Monitoring Training/Start:    Date: 6/4/2024  Referring Provider: JESÚS Simons  Start time: 1:00 pm End time: 2:00 pm    Patient is English speaking. Her daughter assisted with translation. Will be getting shipment from [employer] for Dexcom G7 sensors soon. Sample sensor provided today for training.    Sensor Type: Dexcom G7    1. Differences in sensor glucose and blood glucose meter reading.  2. Always verify with a fingerstick if symptoms do not match sensor reading.   2. The sensor calibration process, if applicable  3. How to choose and rotate sensor sites/alternative sites. Injections should be given at least 3 inches away from where sensor is placed.   4. How to handle problems like MRI, CT scans, travel, etc.   5. Phone applications that may assist with using continuous glucose monitor.      Patient was able to set up Dexcom G7 valarie as well as Clarity valarie on their phone. Patient was connected to Dexcom Clarity and is streaming with diabetes clinic.      Patient demonstrated ability to insert and start the sensor successfully.    Patient placed sensor: back of upper left arm    Sensor Settings:  High Alarm: 200     Low Alarm: 75      Current Oral Medication:  Metformin  mg - 2 tabs twice daily  Jardiance 25 mg once daily      Current Insulin:  Soliqua 34 units once daily  Novolog 5 units before breakfast  *Notes has not started yet, pharmacy did not have available, plans to stop by pharmacy today to check on status       Comments:     Written materials were provided for all the content areas covered.  Patient verbalized understanding and has no further questions at this time.    Plan: Patient is to follow up with MD office as instructed.      Lacey Valencia, JOSEN, RYLANN, JENA

## 2024-06-05 RX ORDER — INSULIN ASPART 100 [IU]/ML
5 INJECTION, SOLUTION INTRAVENOUS; SUBCUTANEOUS EVERY MORNING
Qty: 15 ML | Refills: 0 | Status: SHIPPED | OUTPATIENT
Start: 2024-06-05 | End: 2024-06-06

## 2024-06-05 RX ORDER — PEN NEEDLE, DIABETIC 30 GX3/16"
1 NEEDLE, DISPOSABLE MISCELLANEOUS 2 TIMES DAILY
Qty: 100 EACH | Refills: 1 | Status: SHIPPED | OUTPATIENT
Start: 2024-06-05 | End: 2024-06-07

## 2024-06-05 NOTE — TELEPHONE ENCOUNTER
Per last OV 5/31/24 - start novolog 5 units before breakfast    Pended novolog U100 as well as refill of pen needles for pt

## 2024-06-05 NOTE — TELEPHONE ENCOUNTER
From: Kaylin Bledsoe  To: Tamanna Downs  Sent: 6/4/2024 5:10 PM CDT  Subject: Kaylin De La Cruz, I went to the pharmacy to  my new prescription and they said they didn't received any new prescription. Could you please confirm if it was done. Thank you,  Kaylin Bledsoe

## 2024-06-06 RX ORDER — INSULIN ASPART INJECTION 100 [IU]/ML
5 INJECTION, SOLUTION SUBCUTANEOUS
Qty: 15 ML | Refills: 0 | Status: SHIPPED | OUTPATIENT
Start: 2024-06-06 | End: 2024-06-06

## 2024-06-06 RX ORDER — INSULIN ASPART INJECTION 100 [IU]/ML
INJECTION, SOLUTION SUBCUTANEOUS
COMMUNITY

## 2024-06-06 RX ORDER — INSULIN ASPART INJECTION 100 [IU]/ML
5 INJECTION, SOLUTION SUBCUTANEOUS
Qty: 15 ML | Refills: 0 | Status: SHIPPED | OUTPATIENT
Start: 2024-06-06

## 2024-06-06 NOTE — TELEPHONE ENCOUNTER
Incoming fax from [facility] #[id number]     Insurance doesn't cover Novolog Flexpen.    Alternative medication-Fiasp Flextouch.    Pended please add instructions.

## 2024-06-06 NOTE — TELEPHONE ENCOUNTER
Patient's daughter called in to check on insulin.  Fiasp was sent since Novolog not covered.  Contacted pharmacy to make sure they can fill.  [facility] in this area in on backorder.  Contacted daughter, we will try sending to [facility].  If [facility] can't order, okay to offer sample pen for  tomorrow?  Daughter will notify office tomorrow (or [employer] message) if unable to fill Fiasp through [facility].

## 2024-06-07 ENCOUNTER — NURSE ONLY (OUTPATIENT)
Dept: ENDOCRINOLOGY CLINIC | Facility: CLINIC | Age: 69
End: 2024-06-07
Payer: MEDICARE

## 2024-06-07 DIAGNOSIS — E11.49 DIABETES MELLITUS TYPE 2 WITH NEUROLOGICAL MANIFESTATIONS (HCC): Primary | ICD-10-CM

## 2024-06-07 RX ORDER — INSULIN ASPART INJECTION 100 [IU]/ML
5 INJECTION, SOLUTION SUBCUTANEOUS
Qty: 3 ML | Refills: 0 | COMMUNITY
Start: 2024-06-07

## 2024-06-07 RX ORDER — PEN NEEDLE, DIABETIC 30 GX3/16"
1 NEEDLE, DISPOSABLE MISCELLANEOUS 2 TIMES DAILY
Qty: 100 EACH | Refills: 1 | Status: SHIPPED | OUTPATIENT
Start: 2024-06-07

## 2024-06-07 RX ORDER — RAMIPRIL 2.5 MG/1
CAPSULE ORAL
Qty: 90 CAPSULE | Refills: 0 | OUTPATIENT
Start: 2024-06-07

## 2024-06-07 NOTE — TELEPHONE ENCOUNTER
Received fax from [facility] that they need rx for pen needles so pt can use fiasp, sent pen needles to pharmacy

## 2024-06-07 NOTE — TELEPHONE ENCOUNTER
Please review; protocol failed/ has no protocol    Requested Prescriptions   Pending Prescriptions Disp Refills    METFORMIN  MG Oral Tablet 24 Hr [Pharmacy Med Name: METFORMIN ER 500MG 24HR TABS] 360 tablet 0     Sig: TAKE 2 TABLETS(1000 MG) BY MOUTH TWICE DAILY WITH MEALS       Diabetes Medication Protocol Failed - 6/4/2024 10:12 AM        Failed - Last A1C < 7.5 and within past 6 months     Lab Results   Component Value Date    A1C 9.3 (H) 05/10/2024             Passed - In person appointment or virtual visit in the past 6 mos or appointment in next 3 mos     Recent Outpatient Visits              Today Diabetes mellitus type 2 with neurological manifestations ([facility])    [address]    Nurse Only    1 week ago Type 2 diabetes mellitus with diabetic neuropathy, with long-term current use of insulin ([facility])    [address] Tamanna Downs APRN    Office Visit    1 week ago Encounter for annual health examination    [address] Kiel Azevedo MD    Office Visit    4 weeks ago Well woman exam with routine gynecological exam    [address] - OB/GYN Soo Ramesh MD    Office Visit    5 months ago Arthritis of both midfeet    [address] DPM    Office Visit          Future Appointments         Provider Department Appt Notes    In 1 month Soo Ramesh MD [address] - OB/GYN vulvar biopsy    In 2 months Tamanna Downs APRN [address]                     Passed - Microalbumin procedure in past 12 months or taking ACE/ARB        Passed - EGFRCR or GFRNAA > 50     GFR Evaluation  EGFRCR: 73 , resulted on 5/10/2024          Passed - GFR in the past 12 months         Refused Prescriptions  Disp Refills    RAMIPRIL 2.5 MG Oral Cap [Pharmacy Med Name: RAMIPRIL 2.5MG CAPSULES] 90 capsule 0     Sig: TAKE 1 CAPSULE(2.5 MG) BY MOUTH DAILY       Hypertension Medications Protocol Passed - 6/4/2024 10:12 AM        Passed - CMP or BMP in past 12 months        Passed - Last BP reading less than 140/90     BP Readings from Last 1 Encounters:   05/31/24 118/74               Passed - In person appointment or virtual visit in the past 12 mos or appointment in next 3 mos     Recent Outpatient Visits              Today Diabetes mellitus type 2 with neurological manifestations ([facility])    [address]    Nurse Only    1 week ago Type 2 diabetes mellitus with diabetic neuropathy, with long-term current use of insulin ([facility])    [address] Tamanna Downs APRN    Office Visit    1 week ago Encounter for annual health examination    [address] Kiel Azevedo MD    Office Visit    4 weeks ago Well woman exam with routine gynecological exam    [address] - OB/GYN Soo Ramesh MD    Office Visit    5 months ago Arthritis of both midfeet    [address], [place] Lindsay Agrawal DPM    Office Visit          Future Appointments         Provider Department Appt Notes    In 1 month Soo Ramesh MD [address] - OB/GYN vulvar biopsy    In 2 months Tamanna Downs APRN [address]                     Passed - EGFRCR or GFRNAA > 50     GFR Evaluation  EGFRCR: 73 , resulted on 5/10/2024             Recent Outpatient Visits              Today Diabetes mellitus type 2 with neurological manifestations ([facility])    [address]    Nurse Only    1 week ago Type 2 diabetes mellitus with diabetic  neuropathy, with long-term current use of insulin (HCC)    [facility], [address] [place] [address] Tamanna Downs APRN    Office Visit    1 week ago Encounter for annual health examination    [address] Kiel Azevedo MD    Office Visit    4 weeks ago Well woman exam with routine gynecological exam    [facility], [address] [place] [address] - OB/GYN Soo Ramesh MD    Office Visit    5 months ago Arthritis of both midfeet    [facility], [address] [place] [address], Lindsay Wylie DPM    Office Visit          Future Appointments         Provider Department Appt Notes    In 1 month Soo Ramesh MD [facility], [address] [place] [address] - OB/GYN vulvar biopsy    In 2 months Tamanna Downs APRN [address]

## 2024-06-07 NOTE — TELEPHONE ENCOUNTER
Daughter called office- she spoke with [facility]- who ordered the medication and it should arrive after 2pm today, but she was concerned if they did not get it today, then they may not able to get the medication at all since our office would be closing fairly soon afterwards. Ashli mcguire for sample.    Scheduled nurse visit-   Future Appointments   Date Time Provider Department Center   6/7/2024  3:15 PM [facility] DI[facility] NURSE [facility] [address]   7/11/2024  1:00 PM Soo Ramesh MD [facility] OB/GYN P [facility] [address]   8/23/2024  9:15 AM Tamanna Downs, APRN [facility] [address]

## 2024-06-09 RX ORDER — METFORMIN HYDROCHLORIDE 500 MG/1
1000 TABLET, EXTENDED RELEASE ORAL 2 TIMES DAILY WITH MEALS
Qty: 360 TABLET | Refills: 0 | Status: SHIPPED | OUTPATIENT
Start: 2024-06-09

## 2024-06-14 ENCOUNTER — PATIENT MESSAGE (OUTPATIENT)
Dept: INTERNAL MEDICINE CLINIC | Facility: CLINIC | Age: 69
End: 2024-06-14

## 2024-06-17 NOTE — TELEPHONE ENCOUNTER
From: Kaylin Rangelamoros  To: Kiel Azevedo  Sent: 6/14/2024 7:16 AM CDT  Subject: Kaylin Bledsoe still have pain in her head    Good morning, my mom Kaylin Bledsoe is a patient with Dr. Azevedo, last visit she complained about headache and he prescribed her a muscle relaxer for 7 days she took them and she noticed no relief. Dr. Azevedo told us to let him know if the medication work or not before ordering a CT Scan. Please advise.  Thank you,  Jeni Bledsoe

## 2024-06-18 RX ORDER — PREDNISONE 20 MG/1
20 TABLET ORAL DAILY
Qty: 5 TABLET | Refills: 0 | Status: SHIPPED | OUTPATIENT
Start: 2024-06-18 | End: 2024-06-23

## 2024-06-19 NOTE — TELEPHONE ENCOUNTER
I recommend to use prednisone 20 mg daily x 5 days/doses. Take before noon so does not interfere with sleep. If does not improve within 3 doses will consider CT, however, this is unlikely related to the brain itself.

## 2024-07-02 ENCOUNTER — HOSPITAL ENCOUNTER (OUTPATIENT)
Dept: ULTRASOUND IMAGING | Age: 69
Discharge: HOME | End: 2024-07-02
Attending: INTERNAL MEDICINE
Payer: MEDICARE

## 2024-07-02 ENCOUNTER — TELEPHONE (OUTPATIENT)
Dept: ENDOCRINOLOGY CLINIC | Facility: CLINIC | Age: 69
End: 2024-07-02

## 2024-07-02 DIAGNOSIS — I83.893 VARICOSE VEINS OF BOTH LEGS WITH EDEMA: ICD-10-CM

## 2024-07-02 PROCEDURE — 93970 EXTREMITY STUDY: CPT | Performed by: INTERNAL MEDICINE

## 2024-07-02 NOTE — TELEPHONE ENCOUNTER
Call with patient's daughter, provided Ashli's recommendation to increase Novolog to 10 units with breakfast.  They will contact office with 2 readings under 80 mg/dl in the same week, or trends remaining above 200 mg/dl.

## 2024-07-02 NOTE — TELEPHONE ENCOUNTER
Pt daughter called in has been having High BG readings through the day     She started Fiasp 5 units 1x daily with breakfast around 6:30/45 in the AM   Pt BG is high from 8am-12pm daughter states after fiasp     LOV states     Change:  Stop Tradjenta   Stop Glipizide   Start Novolog 5 units before breakfast (Inject 15 minutes before meal)    Continue:   Metformin  m tabs twice daily with meals  Jardiance 25 mg daily  Soliqua 34 units daily    Reviewed with patient daughter and states she is doing this     Pt has not had any change in medication/ nor any steriod injections pulled report for Claudine to review       -----------------------------  [employer] Clarity  -----------------------------  Kaylin Bledsoe  YOB: 1955  Generated at:  1:40 PM CDT  Reporting period: Mon Guevara 3, 2024 -   -----------------------------  Glucose Details  Average glucose: 171 mg/dL  Standard deviation: 61 mg/dL  GMI: 7.4%  -----------------------------  Time in Range  Very High: 10%  High: 28%  In Range: 61%  Low: <1%  Very Low: 0%    Target Range   mg/dL    -----------------------------  CGM Details  Sensor usage: 93%  Days with CGM data:

## 2024-07-02 NOTE — TELEPHONE ENCOUNTER
CGM reviewed. Pt has elevations with breakfast and sometimes with dinner. Amount of elevation likely dependet on what she is eating. Please increase Novolog to 10 units with breakfast.

## 2024-07-11 ENCOUNTER — OFFICE VISIT (OUTPATIENT)
Dept: OBGYN CLINIC | Facility: CLINIC | Age: 69
End: 2024-07-11
Payer: MEDICARE

## 2024-07-11 VITALS
SYSTOLIC BLOOD PRESSURE: 122 MMHG | BODY MASS INDEX: 31 KG/M2 | HEART RATE: 74 BPM | WEIGHT: 186.19 LBS | DIASTOLIC BLOOD PRESSURE: 70 MMHG

## 2024-07-11 DIAGNOSIS — L29.2 VULVAR ITCHING: Primary | ICD-10-CM

## 2024-07-11 PROCEDURE — 88305 TISSUE EXAM BY PATHOLOGIST: CPT | Performed by: OBSTETRICS & GYNECOLOGY

## 2024-07-11 NOTE — PROCEDURES
Vulvar Biopsy Procedure Note    Indications: 69 year old y/o female with vulvar itching.  Of note, the patient reports feeling like a mass possibly obstructing urination.  She also feels tightness in her lower abdomen when she urinates.  However, she is able to urinate completely.  Physical exam noted for lower abdominal midline discomfort with palpation but absent uterus which is consistent with her surgical history.    Pre-procedure diagnosis:   Vulvar itching      Post-procedure diagnosis:  Vulvar itching      Procedure:  Vulvar Biopsy     Procedure Details:   A discussion was held with patient including diagnosis, prognosis and management. Recommendation made for colposcopy with possible biopsies. Risks, benefits and alteratives were discussed with the patient. The patient was agreed to proceed with procedure. She provided written and verbal consent. The patient was positioned supine and in stir-ups.    The vulvar was thoroughly examined and patient identified the most symptomatic site. The vulva was noted for pruritus along bilateral labia majora  towards the perineum near the perirectal. The identified biopsy site was then prepped with iodine. The site was infiltrated with 3 ml of Lidocaine. After confirmation of adequate anesthesia,  a punch biopsy was then performed at the indicated site. Please refer to image below.     A biopsy was then collected at each indicated lesion site.         All tissue were placed into the designated specimen containers and collected to be sent to pathology.     The biopsy site was treated with silver nitrate. Good hemostasis noted. The patient was advised to keep the biopsy site clean and dry. She may use soap and water over site including sitz baths as needed. Patient instructed to place small amount of Vaseline if irritation with clothing noted. Patient advised to return as needed.      Impression: vulvar lesions pending final pathology     Disposition: Stable. RTC as needed.   Recommend pelvic ultrasound the office.  Precautions provided.  Continue with estrogen cream twice weekly.  Continue with use of nystatin and triamcinolone combination as needed twice daily x 7 days and she will contact the office if symptoms are not improving.    Soo Ramesh MD   [facility] - OBGYN      Discussed with patient that there will not be further notification of normal or benign results other than receiving results on mychart. A Winkhart message or telephone call will be placed by the physician and/or office staff if results are abnormal.       Note to patient and family   The 21st Century Cures Act makes medical notes available to patients in the interest of transparency.  However, please be advised that this is a medical document.  It is intended as [id number] communication.  It is written and medical language may contain abbreviations or verbiage that are technical and unfamiliar.  It may appear blunt or direct.  Medical documents are intended to carry relevant information, facts as evident, and the clinical opinion of the practitioner.

## 2024-07-19 RX ORDER — INSULIN GLARGINE AND LIXISENATIDE 100; 33 U/ML; UG/ML
INJECTION, SOLUTION SUBCUTANEOUS
Qty: 9 ML | Refills: 3 | Status: SHIPPED | OUTPATIENT
Start: 2024-07-19

## 2024-07-19 NOTE — TELEPHONE ENCOUNTER
Please review; protocol failed/ has no protocol    Requested Prescriptions   Pending Prescriptions Disp Refills    SOLIQUA 100-33 UNT-MCG/ML Subcutaneous Solution Pen-injector [Pharmacy Med Name: SOLIQUA 100/33MCG PEN INJ 3ML] 9 mL 1     Sig: ADMINISTER 34 UNITS UNDER THE SKIN EVERY DAY       Diabetes Medication Protocol Failed - 7/16/2024 12:29 PM        Failed - Last A1C < 7.5 and within past 6 months     Lab Results   Component Value Date    A1C 9.3 (H) 05/10/2024             Passed - In person appointment or virtual visit in the past 6 mos or appointment in next 3 mos     Recent Outpatient Visits              1 week ago Vulvar itching    [address] - OB/GYN Soo Ramesh MD    Office Visit    1 month ago Diabetes mellitus type 2 with neurological manifestations ([facility])    [address]    Nurse Only    1 month ago Type 2 diabetes mellitus with diabetic neuropathy, with long-term current use of insulin ([facility])    [address] Tamanna Downs APRN    Office Visit    1 month ago Encounter for annual health examination    [address] Kiel Azevedo MD    Office Visit    2 months ago Well woman exam with routine gynecological exam    [address] - OB/GYN Soo Ramesh MD    Office Visit          Future Appointments         Provider Department Appt Notes    In 2 weeks [facility] OB US PLFD [address] - OB/GYN gyne u/s h20    In 1 month Acacia Sales APRN [facility]     In 2 months Brandon Scott MD [facility] 6/13/24 recall colon with Dr. Scott on 10/4/24 @ 1:30pm. rylan                    Passed - Microalbumin procedure in past 12 months or taking ACE/ARB        Passed - EGFRCR or GFRNAA > 50     GFR  Evaluation  EGFRCR: 73 , resulted on 5/10/2024          Passed - GFR in the past 12 months           Recent Outpatient Visits              1 week ago Vulvar itching    [facility], [address] [place] [address] - OB/GYN Soo Ramesh MD    Office Visit    1 month ago Diabetes mellitus type 2 with neurological manifestations ([facility])    [address]    Nurse Only    1 month ago Type 2 diabetes mellitus with diabetic neuropathy, with long-term current use of insulin ([facility])    [address] Tamanna Downs APRN    Office Visit    1 month ago Encounter for annual health examination    [address] Kiel Azevedo MD    Office Visit    2 months ago Well woman exam with routine gynecological exam    [address] - OB/GYN Soo Ramesh MD    Office Visit          Future Appointments         Provider Department Appt Notes    In 2 weeks [facility] OB US PLFD [address] - OB/GYN gyne u/s h20    In 1 month Acacia Sales APRN [facility]     In 2 months Brandon Scott MD [facility] 6/13/24 recall colon with Dr. Scott on 10/4/24 @ 1:30pm. rylan

## 2024-07-30 DIAGNOSIS — E11.40 TYPE 2 DIABETES MELLITUS WITH DIABETIC NEUROPATHY, WITH LONG-TERM CURRENT USE OF INSULIN (HCC): ICD-10-CM

## 2024-07-30 DIAGNOSIS — Z79.4 TYPE 2 DIABETES MELLITUS WITH DIABETIC NEUROPATHY, WITH LONG-TERM CURRENT USE OF INSULIN (HCC): ICD-10-CM

## 2024-07-30 DIAGNOSIS — E11.49 DIABETES MELLITUS TYPE 2 WITH NEUROLOGICAL MANIFESTATIONS (HCC): ICD-10-CM

## 2024-07-30 RX ORDER — PEN NEEDLE, DIABETIC 31 GX5/16"
1 NEEDLE, DISPOSABLE MISCELLANEOUS DAILY
Qty: 100 EACH | Refills: 0 | Status: SHIPPED | OUTPATIENT
Start: 2024-07-30

## 2024-07-30 RX ORDER — PEN NEEDLE, DIABETIC 31 GX5/16"
1 NEEDLE, DISPOSABLE MISCELLANEOUS DAILY
Qty: 100 EACH | Refills: 0 | OUTPATIENT
Start: 2024-07-30

## 2024-07-30 NOTE — TELEPHONE ENCOUNTER
Requested Prescriptions     Pending Prescriptions Disp Refills    Insulin Pen Needle (PEN NEEDLES) 32G X 4 MM Does not apply Misc 100 each 1     Si each  in the morning and 1 each before bedtime.    Insulin Aspart, w/Niacinamide, (FIASP FLEXTOUCH) 100 UNIT/ML Subcutaneous Solution Pen-injector 3 mL 0     Sig: Inject 5 Units into the skin every morning before breakfast.     Your appointments       Date & Time Appointment Department ([place])    Aug 05, 2024 4:15 PM CDT Ultrasound Gyne with [facility] OB US PLFD [address] - OB/GYN ([address])    Please drink 32 oz of water, finish one hour before exam, do not use washroom.  Please arrive 15 minutes prior to appointment time.          Aug 23, 2024 9:30 AM CDT Exam - New Patient with Acacia Sales APRN [facility] ([facility])        Oct 04, 2024 1:30 PM CDT Colonoscopy with Brandon Scott MD [facility] ([facility])    Please arrive 60 minutes prior to your scheduled procedure time.               [address] - OB/GYN  [address]  [address] [address] [place] [address] [address]  [facility] [id number] [phone] [facility]  [address] [facility], [address]  [facility] [id number] [phone] [facility]  [facility]  [address] [facility] [address] [phone]          Last A1c value was 9.3% done 5/10/2024.    Refill pen needles already sent, 24  LOV 24

## 2024-07-31 RX ORDER — PEN NEEDLE, DIABETIC 30 GX3/16"
1 NEEDLE, DISPOSABLE MISCELLANEOUS 2 TIMES DAILY
Qty: 100 EACH | Refills: 1 | OUTPATIENT
Start: 2024-07-31

## 2024-07-31 RX ORDER — INSULIN ASPART INJECTION 100 [IU]/ML
5 INJECTION, SOLUTION SUBCUTANEOUS
Qty: 3 ML | Refills: 0 | OUTPATIENT
Start: 2024-07-31

## 2024-08-05 ENCOUNTER — ULTRASOUND ENCOUNTER (OUTPATIENT)
Dept: OBGYN CLINIC | Facility: CLINIC | Age: 69
End: 2024-08-05
Payer: MEDICARE

## 2024-08-05 DIAGNOSIS — R10.30 LOWER ABDOMINAL PAIN: Primary | ICD-10-CM

## 2024-08-05 PROCEDURE — 76856 US EXAM PELVIC COMPLETE: CPT | Performed by: OBSTETRICS & GYNECOLOGY

## 2024-08-05 PROCEDURE — 76830 TRANSVAGINAL US NON-OB: CPT | Performed by: OBSTETRICS & GYNECOLOGY

## 2024-08-16 RX ORDER — ATORVASTATIN CALCIUM 10 MG/1
10 TABLET, FILM COATED ORAL DAILY
Qty: 90 TABLET | Refills: 3 | Status: SHIPPED | OUTPATIENT
Start: 2024-08-16

## 2024-08-16 NOTE — TELEPHONE ENCOUNTER
REFILL PASSED PER [facility] PROTOCOLS    Requested Prescriptions   Pending Prescriptions Disp Refills    atorvastatin 10 MG Oral Tab 90 tablet 0     Sig: Take 1 tablet (10 mg total) by mouth daily.       Cholesterol Medication Protocol Passed - 8/12/2024  3:25 PM        Passed - ALT < 80     Lab Results   Component Value Date    ALT 21 05/10/2024             Passed - ALT resulted within past year        Passed - Lipid panel within past 12 months     Lab Results   Component Value Date    CHOLEST 158 05/10/2024    TRIG 245 (H) 05/10/2024    HDL 44 05/10/2024    LDL 74 05/10/2024    VLDL 38 (H) 05/10/2024    TCHDLRATIO 3.5 10/16/2017    NONHDLC 114 05/10/2024             Passed - In person appointment or virtual visit in the past 12 mos or appointment in next 3 mos     Recent Outpatient Visits              1 month ago Vulvar itching    [facility], [address] [place] [address] - OB/GYN Soo Ramesh MD    Office Visit    2 months ago Diabetes mellitus type 2 with neurological manifestations ([facility])    [address]    Nurse Only    2 months ago Type 2 diabetes mellitus with diabetic neuropathy, with long-term current use of insulin ([facility])    [address] Tamanna Downs APRN    Office Visit    2 months ago Encounter for annual health examination    [address] Kiel Azevedo MD    Office Visit    3 months ago Well woman exam with routine gynecological exam    [address] - OB/GYN Soo Ramesh MD    Office Visit          Future Appointments         Provider Department Appt Notes    In 1 week Acacia Sales APRN [facility] 5/31/24 Jany Downs PTType 2 diabetes mellitus with diabetic neuropathy, with long-term current use of insulin    In 1 month Brandon Scott MD [facility] 6/13/24 recall colon with Dr. Scott on 10/4/24 @ 1:30pm. rylan                         Future Appointments         Provider Department Appt Notes    In 1 week Acacia Sales APRN [facility] 5/31/24 Jany Jonesles PTType 2 diabetes mellitus with diabetic neuropathy, with long-term current use of insulin    In 1 month Brandon Scott MD [facility] 6/13/24 recall colon with Dr. Scott on 10/4/24 @ 1:30pm. rylan          Recent Outpatient Visits              1 month ago Vulvar itching    [facility], [address] [place] [address] - OB/GYN Soo Ramesh MD    Office Visit    2 months ago Diabetes mellitus type 2 with neurological manifestations (HCC)    [address]    Nurse Only    2 months ago Type 2 diabetes mellitus with diabetic neuropathy, with long-term current use of insulin (HCC)    [address] Tamanna Downs APRN    Office Visit    2 months ago Encounter for annual health examination    [address] Kiel Azevedo MD    Office Visit    3 months ago Well woman exam with routine gynecological exam    [facility], [address] [place] [address] - OB/Soo Agarwal MD    Office Visit

## 2024-08-22 ENCOUNTER — HOSPITAL ENCOUNTER (OUTPATIENT)
Age: 69
Discharge: HOME | End: 2024-08-22
Payer: MEDICARE

## 2024-08-22 ENCOUNTER — APPOINTMENT (OUTPATIENT)
Dept: GENERAL RADIOLOGY | Age: 69
End: 2024-08-22
Attending: NURSE PRACTITIONER
Payer: MEDICARE

## 2024-08-22 ENCOUNTER — APPOINTMENT (OUTPATIENT)
Dept: ULTRASOUND IMAGING | Age: 69
End: 2024-08-22
Attending: NURSE PRACTITIONER
Payer: MEDICARE

## 2024-08-22 ENCOUNTER — TELEPHONE (OUTPATIENT)
Dept: ORTHOPEDICS CLINIC | Facility: CLINIC | Age: 69
End: 2024-08-22

## 2024-08-22 VITALS
BODY MASS INDEX: 31 KG/M2 | HEART RATE: 66 BPM | TEMPERATURE: 97 F | RESPIRATION RATE: 18 BRPM | OXYGEN SATURATION: 95 % | SYSTOLIC BLOOD PRESSURE: 139 MMHG | WEIGHT: 184.5 LBS | DIASTOLIC BLOOD PRESSURE: 54 MMHG

## 2024-08-22 DIAGNOSIS — M25.50 ARTHRALGIA, UNSPECIFIED JOINT: ICD-10-CM

## 2024-08-22 DIAGNOSIS — M79.604 BILATERAL LEG PAIN: Primary | ICD-10-CM

## 2024-08-22 DIAGNOSIS — M79.605 BILATERAL LEG PAIN: Primary | ICD-10-CM

## 2024-08-22 DIAGNOSIS — S99.922A INJURY OF LEFT FOOT, INITIAL ENCOUNTER: ICD-10-CM

## 2024-08-22 PROCEDURE — 99215 OFFICE O/P EST HI 40 MIN: CPT

## 2024-08-22 PROCEDURE — 73610 X-RAY EXAM OF ANKLE: CPT | Performed by: NURSE PRACTITIONER

## 2024-08-22 PROCEDURE — 93970 EXTREMITY STUDY: CPT | Performed by: NURSE PRACTITIONER

## 2024-08-22 PROCEDURE — 73630 X-RAY EXAM OF FOOT: CPT | Performed by: NURSE PRACTITIONER

## 2024-08-22 RX ORDER — NAPROXEN 500 MG/1
500 TABLET ORAL 2 TIMES DAILY PRN
Qty: 14 TABLET | Refills: 0 | Status: SHIPPED | OUTPATIENT
Start: 2024-08-22 | End: 2024-08-29

## 2024-08-22 RX ORDER — CYCLOBENZAPRINE HCL 10 MG
10 TABLET ORAL 3 TIMES DAILY PRN
Qty: 20 TABLET | Refills: 0 | Status: SHIPPED | OUTPATIENT
Start: 2024-08-22 | End: 2024-08-29

## 2024-08-22 NOTE — TELEPHONE ENCOUNTER
Patient's daughter called on her mom's behalf, patient has broken her left pinky toe.  Please advise when she can be seen.

## 2024-08-22 NOTE — DISCHARGE INSTRUCTIONS
Wear post op shoe and follow up with podiatry.  Take medications as directed.  Close follow up with primary care doctor.

## 2024-08-22 NOTE — ED PROVIDER NOTES
Patient Seen in: [facility]      History     Chief Complaint   Patient presents with    Leg Pain     Stated Complaint: Left leg pain    Subjective:   HPI  69-year-old with arthritis, anxiety, back pain, hypertension, hyperlipidemia, and diabetes presents complaining of bilateral leg pain as well as pain in multiple joints.  A week ago her granddaughter fell on her foot while her foot was in flexion bending her foot down.  Since then she has increased foot and ankle pain.  She is ambulatory without assistance.  She denies any chest pain or shortness of breath.  She has bilateral calf pain.    Objective:   Past Medical History:    Abdominal hernia    Abdominal pain    Anemia    Anesthesia complication    slow to arouse    Anxiety    Arthritis    Atherosclerosis of coronary artery    Atherosclerotic heart disease of native coronary artery without angina pectoris    Back pain    Bad breath    Belching    Bilateral leg edema    Bloating    Blurred vision    Change in hair    Constipation    Diarrhea, unspecified    Dizziness    Easy bruising    Fatigue    Feeling lonely    Flatulence/gas pain/belching    Food intolerance    Frequent use of laxatives    Hearing loss    High blood pressure    High cholesterol    History of depression    History of eating disorder    Indigestion    Irregular bowel habits    Itch of skin    Leg swelling    Loss of appetite    Nausea    Neuropathy    maria eugenia feet    Night sweats    Osteoarthrosis, unspecified whether generalized or localized, unspecified site    Osteoporosis    Other and unspecified hyperlipidemia    Pain in joints    Peripheral neuropathy    Problems with swallowing    Sleep disturbance    Stented coronary artery    Stress    Type II or unspecified type diabetes mellitus without mention of complication, not stated as uncontrolled    Uncomfortable fullness after meals    Venous insufficiency of both lower extremities    Visual impairment    glasses    Vomiting     Weight loss              Past Surgical History:   Procedure Laterality Date    Angioplasty (coronary)  2002    coronary stent placed by Dr. Daniels      ,    Cataract Bilateral     Cath drug eluting stent      Colonoscopy      Endovenous laser vein addon Left     Left GSV    Endovenous laser vein addon Right     right GSV    Hip replacement surgery      Right hip    Knee replacement surgery Left 2020    Other surgical history  2004. 2006     Unspecified ankle surgery, Left ankle  and right ankle       Other surgical history      left femur    Total hip replacement      rt hip    Vaginal hysterectomy N/A 2017    Procedure: VAGINAL HYSTERECTOMY;  Surgeon: Soo Ramesh MD;  Location:  MAIN OR                Social History     Socioeconomic History    Marital status:    Tobacco Use    Smoking status: Never    Smokeless tobacco: Never   Vaping Use    Vaping status: Never Used   Substance and Sexual Activity    Alcohol use: No    Drug use: No    Sexual activity: Not Currently     Birth control/protection: Hysterectomy   Other Topics Concern    Caffeine Concern Yes     Comment: 1 cup daily     Exercise Yes    Seat Belt Yes              Review of Systems   All other systems reviewed and are negative.      Positive for stated Chief Complaint: Leg Pain    Other systems are as noted in HPI.  Constitutional and vital signs reviewed.      All other systems reviewed and negative except as noted above.    Physical Exam     ED Triage Vitals [24 0929]   /54   Pulse 66   Resp 18   Temp 97.4 °F (36.3 °C)   Temp src Temporal   SpO2 95 %   O2 Device None (Room air)       Current Vitals:   Vital Signs  BP: 139/54  Pulse: 66  Resp: 18  Temp: 97.4 °F (36.3 °C)  Temp src: Temporal    Oxygen Therapy  SpO2: 95 %  O2 Device: None (Room air)            Physical Exam  Vitals and nursing note reviewed.   Constitutional:       General: She is not in acute distress.      Appearance: She is well-developed. She is not ill-appearing or toxic-appearing.   Cardiovascular:      Rate and Rhythm: Normal rate and regular rhythm.      Heart sounds: Normal heart sounds.   Pulmonary:      Effort: Pulmonary effort is normal.      Breath sounds: Normal breath sounds.   Musculoskeletal:      Comments: Bilateral calf tenderness.  Compartments are soft.  Pulses intact.  Tenderness throughout all of her joints with no significant swelling or warmth.   Skin:     General: Skin is warm and dry.   Neurological:      Mental Status: She is alert and oriented to person, place, and time.           ED Course   Labs Reviewed - No data to display          XR FOOT, COMPLETE (MIN 3 VIEWS), LEFT (CPT=73630)    Result Date: 8/22/2024  PROCEDURE:  XR FOOT, COMPLETE (MIN 3 VIEWS), LEFT (CPT=73630)  TECHNIQUE:  AP, oblique, and lateral views were obtained.  COMPARISON:  [facility], XR, XR ANKLE (MIN 3 VIEWS), LEFT (CPT=73610), 8/22/2024, 10:58 AM.  INDICATIONS:  Left leg pain  PATIENT STATED HISTORY: (As transcribed by Technologist)  Patient states her granddaughter stepped on her foot as she was sitting with it flexed.  She has dorsal foot pain that radiates to her ankle.    FINDINGS:  There is a chronic appearing deformity to the distal left 5th metatarsal.  Correlation with physical exam is recommended to exclude point tenderness in this region.  There postoperative changes seen across the left 1st MTP joint.  Postoperative changes are seen at the distal left 3rd metatarsal head.  Moderate to severe degenerative changes are seen throughout the midfoot.  No acute displaced osseous fracture is evident.  Prominent plantar calcaneal spur.  Osteopenia.  Focus of mineralization at the medial margin of the left 5th metatarsal head likely represents the sequelae of remote injury.             CONCLUSION:  See above.   LOCATION:  [place]   Dictated by (CST): Stromberg, LeRoy, MD on 8/22/2024 at 11:22 AM     Finalized by  (CST): Stromberg, LeRoy, MD on 8/22/2024 at 11:25 AM       XR ANKLE (MIN 3 VIEWS), LEFT (CPT=73610)    Result Date: 8/22/2024  PROCEDURE:  XR ANKLE (MIN 3 VIEWS), LEFT (CPT=73610)  TECHNIQUE:  Three views were obtained.  COMPARISON:  [facility], XR, XR ANKLE (MIN 3 VIEWS), LEFT (CPT=73610), 6/04/2021, 3:00 PM.  INDICATIONS:  Left leg pain  PATIENT STATED HISTORY: (As transcribed by Technologist)  Patient states her granddaughter stepped on her foot as she was sitting with it flexed.  She has dorsal foot pain that radiates to her ankle.    FINDINGS:  Osteopenia.  No acute displaced osseous fracture is evident.  Foci of ossification at the plantar margin of the calcaneus likely represent the sequelae of remote injury.  Mildly prominent plantar and dorsal calcaneal spurs.  Mild to moderate degenerative changes at the hindfoot/midfoot.            CONCLUSION:  No acute displaced osseous fracture is evident.   LOCATION:  [facility]   Dictated by (CST): Stromberg, LeRoy, MD on 8/22/2024 at 11:19 AM     Finalized by (CST): Stromberg, LeRoy, MD on 8/22/2024 at 11:20 AM       US VENOUS DOPPLER LEG BILAT - DIAG IMG (CPT=93970)    Result Date: 8/22/2024  PROCEDURE:  US VENOUS DOPPLER LEG BILAT - DIAG IMG (CPT=93970)  COMPARISON:  [facility], , US VENOUS INSUFFICIENCY (REFLUX) BILAT LOWER EXT SH(CPT=93970), 7/02/2024, 2:31 PM.  INDICATIONS:  Left leg pain  TECHNIQUE:  Real time, grey scale, and duplex ultrasound was used to evaluate the lower extremity venous system. B-mode two-dimensional images of the vascular structures, Doppler spectral analysis, and color flow.  Doppler imaging were performed.  The following veins were imaged bilaterally:  Common, deep, and superficial femoral, popliteal, sapheno-femoral junction, and posterior tibial veins.  PATIENT STATED HISTORY: (As transcribed by Technologist)  Patient states she has bilateral legs pain. for about a week.    FINDINGS:  SAPHENOFEMORAL JUNCTION:  No reflux.  THROMBI:  None visible. COMPRESSION:  Normal compressibility, phasicity, and augmentation. OTHER:  Negative.            CONCLUSION:  No DVT.   LOCATION:  [facility]   Dictated by (CST): Stromberg, LeRoy, MD on 8/22/2024 at 11:02 AM     Finalized by (CST): Stromberg, LeRoy, MD on 8/22/2024 at 11:03 AM       Pelvic US Complete [facility] Only [[id number]]    pt had hysterectomy unable to vis ovaries no free fluid trns abd and vaginal Reviewed and electronically signed by: Soo Ramesh MD, 08/07/24, 12:10 PM           [facility]     Medical Decision Making  69-year-old with arthritis, anxiety, back pain, hypertension, hyperlipidemia, and diabetes presents complaining of bilateral leg pain as well as pain in multiple joints.  A week ago her granddaughter fell on her foot while her foot was in flexion bending her foot down.  Since then she has increased foot and ankle pain.  She is ambulatory without assistance.  She denies any chest pain or shortness of breath.  She has bilateral calf pain.    Pertinent Labs & Imaging studies reviewed. (See chart for details).  Patient coming in with joint pains, leg injury.   Differential diagnosis includes but not limited to joint pain, leg pain, foot injury, arthralgia, foot fracture, ankle fracture, DVT  Labs reviewed none. Radiology ultrasound negative for DVT.  Ankle x-ray normal.  Foot x-ray There is a chronic appearing deformity to the distal left 5th metatarsal..  Will treat for  arthralgia, bilateral leg pain, left foot injury.  Will discharge on Naproxen, Flexeril and ortho follow up and pmd follow up for arthritis. Patient/Parent is comfortable with this plan.    Overall Pt looks good. Non-toxic, well-hydrated and in no respiratory distress. Vital signs are reassuring. Exam is reassuring. I do not believe pt requires and additional diagnostic studies or intervention. I believe pt can be discharged home to continue evaluation as an outpatient. Follow-up provider given. Discharge  instructions given and reviewed. Return for any problems. All understand and agree with the plan.        Problems Addressed:  Arthralgia, unspecified joint: acute illness or injury  Bilateral leg pain: acute illness or injury  Injury of left foot, initial encounter: acute illness or injury    Amount and/or Complexity of Data Reviewed  Radiology: ordered and independent interpretation performed.     Details: Left foot and ankle x-ray ordered and independently interpreted by myself is no fracture        Disposition and Plan     Clinical Impression:  1. Bilateral leg pain    2. Arthralgia, unspecified joint    3. Injury of left foot, initial encounter         Disposition:  Discharge  8/22/2024 11:32 am    Follow-up:  Kiel Azevedo MD  [address] [place] [id number] [phone]    Call       Lindsay Agrawal DPM  [address] [place] [id number]  [phone]    Call             Medications Prescribed:  Discharge Medication List as of 8/22/2024 12:05 PM        START taking these medications    Details   naproxen 500 MG Oral Tab Take 1 tablet (500 mg total) by mouth 2 (two) times daily as needed., Normal, Disp-14 tablet, R-0      !! cyclobenzaprine 10 MG Oral Tab Take 1 tablet (10 mg total) by mouth 3 (three) times daily as needed for Muscle spasms., Normal, Disp-20 tablet, R-0       !! - Potential duplicate medications found. Please discuss with provider.

## 2024-08-22 NOTE — ED INITIAL ASSESSMENT (HPI)
One week ago, she was sitting with her foot flexed and a 12 year cgild backed into her stepping on her foot. Since then she has had left foot, ankle knee and hip pain. Has previous femur fx on left . Able to ambulate . Also c.o right leg pain from walking differently

## 2024-08-23 ENCOUNTER — OFFICE VISIT (OUTPATIENT)
Facility: CLINIC | Age: 69
End: 2024-08-23
Payer: MEDICARE

## 2024-08-23 ENCOUNTER — LAB ENCOUNTER (OUTPATIENT)
Dept: LAB | Age: 69
End: 2024-08-23
Attending: NURSE PRACTITIONER
Payer: MEDICARE

## 2024-08-23 VITALS
HEART RATE: 74 BPM | DIASTOLIC BLOOD PRESSURE: 76 MMHG | OXYGEN SATURATION: 96 % | SYSTOLIC BLOOD PRESSURE: 128 MMHG | WEIGHT: 184 LBS | BODY MASS INDEX: 30.66 KG/M2 | HEIGHT: 65 IN

## 2024-08-23 DIAGNOSIS — K86.2 PANCREATIC CYST (HCC): ICD-10-CM

## 2024-08-23 DIAGNOSIS — E11.40 TYPE 2 DIABETES MELLITUS WITH DIABETIC NEUROPATHY, WITH LONG-TERM CURRENT USE OF INSULIN (HCC): ICD-10-CM

## 2024-08-23 DIAGNOSIS — E78.2 MIXED HYPERLIPIDEMIA: ICD-10-CM

## 2024-08-23 DIAGNOSIS — E78.2 MIXED HYPERLIPIDEMIA: Primary | ICD-10-CM

## 2024-08-23 DIAGNOSIS — Z79.4 TYPE 2 DIABETES MELLITUS WITH DIABETIC NEUROPATHY, WITH LONG-TERM CURRENT USE OF INSULIN (HCC): ICD-10-CM

## 2024-08-23 DIAGNOSIS — Z79.4 TYPE 2 DIABETES MELLITUS WITH DIABETIC NEUROPATHY, WITH LONG-TERM CURRENT USE OF INSULIN (HCC): Primary | ICD-10-CM

## 2024-08-23 DIAGNOSIS — I10 ESSENTIAL HYPERTENSION: ICD-10-CM

## 2024-08-23 DIAGNOSIS — E11.40 TYPE 2 DIABETES MELLITUS WITH DIABETIC NEUROPATHY, WITH LONG-TERM CURRENT USE OF INSULIN (HCC): Primary | ICD-10-CM

## 2024-08-23 LAB
HEMOGLOBIN A1C: 7.9 % (ref 4.3–5.6)
LDLC SERPL DIRECT ASSAY-MCNC: 83 MG/DL (ref ?–100)

## 2024-08-23 PROCEDURE — 36415 COLL VENOUS BLD VENIPUNCTURE: CPT

## 2024-08-23 PROCEDURE — 86341 ISLET CELL ANTIBODY: CPT | Performed by: NURSE PRACTITIONER

## 2024-08-23 PROCEDURE — 86341 ISLET CELL ANTIBODY: CPT

## 2024-08-23 PROCEDURE — 99215 OFFICE O/P EST HI 40 MIN: CPT | Performed by: NURSE PRACTITIONER

## 2024-08-23 PROCEDURE — 84681 ASSAY OF C-PEPTIDE: CPT

## 2024-08-23 PROCEDURE — 95251 CONT GLUC MNTR ANALYSIS I&R: CPT | Performed by: NURSE PRACTITIONER

## 2024-08-23 PROCEDURE — 83721 ASSAY OF BLOOD LIPOPROTEIN: CPT

## 2024-08-23 PROCEDURE — 83036 HEMOGLOBIN GLYCOSYLATED A1C: CPT | Performed by: NURSE PRACTITIONER

## 2024-08-23 RX ORDER — BLOOD-GLUCOSE SENSOR
1 EACH MISCELLANEOUS
Qty: 9 EACH | Refills: 1 | Status: SHIPPED | OUTPATIENT
Start: 2024-08-23

## 2024-08-23 NOTE — PROGRESS NOTES
[facility]    Name: Kaylin Bledsoe    Date: 8/23/2024    Kaylin Bledsoe is a 69 year old female who presents for evaluation of T2DM management.     Chief complaint: New Patient (NP here to establish care for DM2, per pt no current concerns or sx./Pt has Dexcom G7 CGM/Last A1c value was 9.3% done 5/10/2024. )       Subjective:   Initial HPI consult - 8/23/2024  DM hx:  -Diagnosed with diabetes TYPE 2 in 1992  -Family history- yes, multiple brothers, and sisters and daughter      08/23/24:previous pt of Tamanna Downs NP from [facility] Here w/ her daughter, Jeni who helps w/ [employer] translation.   DM meds at first office visit:  Soliqua 34 units ( in the last 2 month)  Fiasp 10 units once  Jardiance 25 mg   MF 1000 mg BID    episodes of hypoglycemia: yes, lowest 61    -Re: potential DM medication contraindication (if positive, checkbox selected):  [x] History of pancreatitis  [] Personal/fam hx of medullary thyroid cancer/MEN2  [] History of recent/frequent UTI/yeast infxn- denies   [] Previous amputation related to diabetes- denies    -Presence of associated DM complications (if positive, checkbox selected):  [x] Macrovascular complications (CAD/CVA/PAD)  + stent, seeing cardiology Dr. Walker , from [facility]   [x] Neuropathy  [] Retinopathy- denies   [] Nephropathy- denies   [x] HTN  [x] Hyperlipidemia  [] Stroke/TIA- denies   [] Gastroparesis- denies   + had hx of DKA in [id number]    -Lifestyle:  - Modifying factors: eats bfast at 7 am and snack at 11 am w/ fruit or sand which  and lunch and dinner at 3 pm       Continuous Glucose Monitoring Interpretation  Kaylin Bledsoe has undergone continuous glucose monitoring with the Dexcom G7 CGM with phone (connected to clinic profile).  The blood glucose tracings were evaluated for two weeks prior to office visit. Blood glucose tracings demonstrated areas of hyperglycemia post-meal, particularly post-lunch and post dinner . There were no  areas of hypoglycemia notedduring the weeks of evaluation.    TIR 51%, GMI 7.8% w. <1% low      Previously trialed/failed DM meds:   Lantus, Victoza - pancreatitis, Januvia, tradjenta and glipizide.         ##Also has hx of Pancreatic cyst found in MRI last 2020  Pt's brother had a hx of pancreatic cancer. Pt has abd pain w/ decreased urinary output per Ct review, but currently has left side abd pain.  Noted some hypoglycemia in CGM  < 1% in the last 2 weeks   Noted pancreatic cyst in MRI of abd  in 2020 . CT abd pelvis w/ contrast only  in 3/2/23 pancreatic cyst not well seen, adrenal glands has no mass or enlargement   Per daughter pt has colonoscopy schedule on Oct 4, 2024 w/ Dr. Scott.         History/Other:    Allergies, PMH, SocHx and FHx reviewed and updated as appropriate in Epic on    Continuous Glucose Sensor (DEXCOM G7 SENSOR) Does not apply Misc 1 each Every 10 days. 9 each 1    naproxen 500 MG Oral Tab Take 1 tablet (500 mg total) by mouth 2 (two) times daily as needed. 14 tablet 0    cyclobenzaprine 10 MG Oral Tab Take 1 tablet (10 mg total) by mouth 3 (three) times daily as needed for Muscle spasms. 20 tablet 0    atorvastatin 10 MG Oral Tab Take 1 tablet (10 mg total) by mouth daily. 90 tablet 3    Insulin Pen Needle (BD PEN NEEDLE SHORT U/F) 31G X 8 MM Does not apply Misc 1 each daily. 100 each 0    Insulin Glargine-Lixisenatide (SOLIQUA) 100-33 UNT-MCG/ML Subcutaneous Solution Pen-injector ADMINISTER 34 UNITS UNDER THE SKIN EVERY DAY 9 mL 3    PEG 3350-KCl-Na Bicarb-NaCl 420 g Oral Recon Soln Take as directed by physician. 4000 mL 0    metFORMIN  MG Oral Tablet 24 Hr Take 2 tablets (1,000 mg total) by mouth 2 (two) times daily with meals. 360 tablet 0    Insulin Pen Needle (PEN NEEDLES) 32G X 4 MM Does not apply Misc 1 each  in the morning and 1 each before bedtime. 100 each 1    Insulin Aspart, w/Niacinamide, (FIASP FLEXTOUCH) 100 UNIT/ML Subcutaneous Solution Pen-injector Inject 5 Units  into the skin every morning before breakfast. 3 mL 0    Insulin Aspart, w/Niacinamide, (FIASP FLEXTOUCH) 100 UNIT/ML Subcutaneous Solution Pen-injector Inject into the skin.      Insulin Aspart, w/Niacinamide, (FIASP FLEXTOUCH) 100 UNIT/ML Subcutaneous Solution Pen-injector Inject 5 Units into the skin every morning before breakfast. 15 mL 0    fluconazole 150 MG Oral Tab Take 1 tablet (150 mg total) by mouth as needed.      cyclobenzaprine 5 MG Oral Tab Take 1 tablet (5 mg total) by mouth nightly as needed for Muscle spasms. 10 tablet 0    FAMOTIDINE 40 MG Oral Tab TAKE 1 TABLET(40 MG) BY MOUTH DAILY 90 tablet 3    nystatin 100,000 Units/g External Ointment Apply 1 applicator topical twice daily for 4 weeks, then twice daily every other day for 2 weeks, then as needed. 15 g 0    triamcinolone 0.1 % External Ointment Apply 1 applicator topical twice daily for 4 weeks, then twice daily every other day for 2 weeks, then as needed. 15 g 0    estradiol 0.1 MG/GM Vaginal Cream Place 1 g vaginally nightly. 42.5 g 2    nystatin-triamcinolone 100,000-0.1 Units/g-% External Ointment Apply 1 Application topically 2 (two) times daily. Apply twice daily for 4 weeks then twice daily every other day for 2 weeks then as needed 60 g 1    montelukast 10 MG Oral Tab Take 1 tablet (10 mg total) by mouth daily as needed. 90 tablet 3    sertraline 100 MG Oral Tab Take 1 tablet (100 mg total) by mouth daily. 90 tablet 3    Glucose Blood (ONETOUCH VERIO) In Vitro Strip TEST THREE TIMES DAILY 300 strip 6    Empagliflozin (JARDIANCE) 25 MG Oral Tab Take 1 tablet by mouth daily. 90 tablet 1    HYDROcodone-acetaminophen (NORCO) 5-325 MG Oral Tab Take 1 tablet by mouth every 4 to 6 hours. 30 tablet 0    FLUTICASONE PROPIONATE 50 MCG/ACT Nasal Suspension SHAKE LIQUID AND USE 2 SPRAYS IN EACH NOSTRIL DAILY 48 g 0    Metoprolol Succinate 25 MG Oral Capsule ER 24 Hour Sprinkle 1 tablet ONCE DAILY (route: oral)      aspirin 81 MG Oral Chew Tab Chew  1 tablet (81 mg total) by mouth daily.      ALPRAZOLAM 0.5 MG Oral Tab TAKE 1 TABLET(0.5 MG) BY MOUTH DAILY AS NEEDED FOR ANXIETY OR SLEEP 30 tablet 0    Acetaminophen 500 MG Oral Cap Take 2 capsules (1,000 mg total) by mouth daily as needed.       Allergies   Allergen Reactions    Codeine NAUSEA ONLY    Vicodin [Hydrocodone-Acetaminophen] NAUSEA ONLY     TABS     Current Outpatient Medications   Medication Sig Dispense Refill    Continuous Glucose Sensor (DEXCOM G7 SENSOR) Does not apply Misc 1 each Every 10 days. 9 each 1    naproxen 500 MG Oral Tab Take 1 tablet (500 mg total) by mouth 2 (two) times daily as needed. 14 tablet 0    cyclobenzaprine 10 MG Oral Tab Take 1 tablet (10 mg total) by mouth 3 (three) times daily as needed for Muscle spasms. 20 tablet 0    atorvastatin 10 MG Oral Tab Take 1 tablet (10 mg total) by mouth daily. 90 tablet 3    Insulin Pen Needle (BD PEN NEEDLE SHORT U/F) 31G X 8 MM Does not apply Misc 1 each daily. 100 each 0    Insulin Glargine-Lixisenatide (SOLIQUA) 100-33 UNT-MCG/ML Subcutaneous Solution Pen-injector ADMINISTER 34 UNITS UNDER THE SKIN EVERY DAY 9 mL 3    PEG 3350-KCl-Na Bicarb-NaCl 420 g Oral Recon Soln Take as directed by physician. 4000 mL 0    metFORMIN  MG Oral Tablet 24 Hr Take 2 tablets (1,000 mg total) by mouth 2 (two) times daily with meals. 360 tablet 0    Insulin Pen Needle (PEN NEEDLES) 32G X 4 MM Does not apply Misc 1 each  in the morning and 1 each before bedtime. 100 each 1    Insulin Aspart, w/Niacinamide, (FIASP FLEXTOUCH) 100 UNIT/ML Subcutaneous Solution Pen-injector Inject 5 Units into the skin every morning before breakfast. 3 mL 0    Insulin Aspart, w/Niacinamide, (FIASP FLEXTOUCH) 100 UNIT/ML Subcutaneous Solution Pen-injector Inject into the skin.      Insulin Aspart, w/Niacinamide, (FIASP FLEXTOUCH) 100 UNIT/ML Subcutaneous Solution Pen-injector Inject 5 Units into the skin every morning before breakfast. 15 mL 0    fluconazole 150 MG Oral Tab  Take 1 tablet (150 mg total) by mouth as needed.      cyclobenzaprine 5 MG Oral Tab Take 1 tablet (5 mg total) by mouth nightly as needed for Muscle spasms. 10 tablet 0    FAMOTIDINE 40 MG Oral Tab TAKE 1 TABLET(40 MG) BY MOUTH DAILY 90 tablet 3    nystatin 100,000 Units/g External Ointment Apply 1 applicator topical twice daily for 4 weeks, then twice daily every other day for 2 weeks, then as needed. 15 g 0    triamcinolone 0.1 % External Ointment Apply 1 applicator topical twice daily for 4 weeks, then twice daily every other day for 2 weeks, then as needed. 15 g 0    estradiol 0.1 MG/GM Vaginal Cream Place 1 g vaginally nightly. 42.5 g 2    nystatin-triamcinolone 100,000-0.1 Units/g-% External Ointment Apply 1 Application topically 2 (two) times daily. Apply twice daily for 4 weeks then twice daily every other day for 2 weeks then as needed 60 g 1    montelukast 10 MG Oral Tab Take 1 tablet (10 mg total) by mouth daily as needed. 90 tablet 3    sertraline 100 MG Oral Tab Take 1 tablet (100 mg total) by mouth daily. 90 tablet 3    Glucose Blood (ONETOUCH VERIO) In Vitro Strip TEST THREE TIMES DAILY 300 strip 6    Empagliflozin (JARDIANCE) 25 MG Oral Tab Take 1 tablet by mouth daily. 90 tablet 1    HYDROcodone-acetaminophen (NORCO) 5-325 MG Oral Tab Take 1 tablet by mouth every 4 to 6 hours. 30 tablet 0    FLUTICASONE PROPIONATE 50 MCG/ACT Nasal Suspension SHAKE LIQUID AND USE 2 SPRAYS IN EACH NOSTRIL DAILY 48 g 0    Metoprolol Succinate 25 MG Oral Capsule ER 24 Hour Sprinkle 1 tablet ONCE DAILY (route: oral)      aspirin 81 MG Oral Chew Tab Chew 1 tablet (81 mg total) by mouth daily.      ALPRAZOLAM 0.5 MG Oral Tab TAKE 1 TABLET(0.5 MG) BY MOUTH DAILY AS NEEDED FOR ANXIETY OR SLEEP 30 tablet 0    Acetaminophen 500 MG Oral Cap Take 2 capsules (1,000 mg total) by mouth daily as needed.       Past Medical History:    Abdominal hernia    Abdominal pain    Anemia    Anesthesia complication    slow to arouse    Anxiety     Arthritis    Atherosclerosis of coronary artery    Atherosclerotic heart disease of native coronary artery without angina pectoris    Back pain    Bad breath    Belching    Bilateral leg edema    Bloating    Blurred vision    Change in hair    Constipation    Diarrhea, unspecified    Dizziness    Easy bruising    Fatigue    Feeling lonely    Flatulence/gas pain/belching    Food intolerance    Frequent use of laxatives    Hearing loss    High blood pressure    High cholesterol    History of depression    History of eating disorder    Indigestion    Irregular bowel habits    Itch of skin    Leg swelling    Loss of appetite    Nausea    Neuropathy    maria eugenia feet    Night sweats    Osteoarthrosis, unspecified whether generalized or localized, unspecified site    Osteoporosis    Other and unspecified hyperlipidemia    Pain in joints    Peripheral neuropathy    Problems with swallowing    Sleep disturbance    Stented coronary artery    Stress    Type II or unspecified type diabetes mellitus without mention of complication, not stated as uncontrolled    Uncomfortable fullness after meals    Venous insufficiency of both lower extremities    Visual impairment    glasses    Vomiting    Weight loss     Family History   Problem Relation Age of Onset    Heart Attack Father         AMI    Hypertension Father     Diabetes Sister     Pancreatic Cancer Sister     Diabetes Brother     Diabetes Daughter      Social history: Reviewed.      ROS/Exam    REVIEW OF SYSTEMS: Ten point review of systems has been performed and is otherwise negative and/or non-contributory, except as described above.     VITALS  Vitals:    08/23/24 0915   BP: 128/76   Pulse: 74   SpO2: 96%   Weight: 184 lb (83.5 kg)   Height: 5' 5\" (1.651 m)       Wt Readings from Last 6 Encounters:   08/23/24 184 lb (83.5 kg)   08/22/24 184 lb 8.4 oz (83.7 kg)   07/11/24 186 lb 3.2 oz (84.5 kg)   05/31/24 194 lb (88 kg)   05/30/24 189 lb (85.7 kg)   05/10/24 190 lb (86.2 kg)        PHYSICAL EXAM  CONSTITUTIONAL:  awake, alert, cooperative, no apparent distress, and appears stated age Vss stable   PSYCH: normal affect  LUNGS: breathing comfortably  CARDIOVASCULAR:  regular rate   NECK:  no palpable thyroid nodules     Labs/Imaging:     Lab Results   Component Value Date    CHOLEST 158 05/10/2024    CHOLEST 175 06/10/2023    TRIG 245 (H) 05/10/2024    TRIG 234 (H) 06/10/2023    HDL 44 05/10/2024    HDL 45 06/10/2023    LDL 74 05/10/2024    LDL 91 06/10/2023     Lab Results   Component Value Date    MICROALBCREA 27.1 05/10/2024    MICROALBCREA 14.5 06/10/2023      Lab Results   Component Value Date    CREATSERUM 0.86 05/10/2024    CREATSERUM 0.96 06/10/2023    EGFRCR 73 05/10/2024    EGFRCR 64 06/10/2023     Lab Results   Component Value Date    AST 14 (L) 05/10/2024    AST 18 06/10/2023    ALT 21 05/10/2024    ALT 22 06/10/2023       Lab Results   Component Value Date    TSH 1.080 05/10/2024    TSH 2.010 12/31/2020    T4F 1.1 03/14/2019         Overall glucose control:  Lab Results   Component Value Date    A1C 7.9 (A) 08/23/2024    A1C 9.3 (H) 05/10/2024    A1C 8.6 (H) 06/10/2023    A1C 8.2 (H) 12/02/2022    A1C 8.0 (H) 07/30/2022         Narrative   PROCEDURE:  CT ABDOMEN+PELVIS (CONTRAST ONLY) (CPT=74177)     LOCATION:                                           COMPARISON:  [facility], MR, MRI ABDOMEN&MRCP W/3D (W+W0)(CPT=74183/58296), 8/25/2020, 11:34 AM.     INDICATIONS:  R10.9 Left sided abdominal pain R34 Decreased urine output R30.0 Dysuria R10.9 Acute left flank pain     TECHNIQUE:  CT scanning was performed from the dome of the diaphragm to the pubic symphysis with non-ionic intravenous contrast material. Post contrast coronal MPR imaging was performed.  Dose reduction techniques were used. Dose information is  transmitted to the ACR (American College of Radiology) NRDR (National Radiology Data Registry) which includes the Dose Index Registry.     PATIENT STATED HISTORY:(As  transcribed by Technologist)  Patient complain of pain left upper quadrant radiating laterally with urinary urgency, also microscopic hematuria, hx of UTI (2)weeks ago.      CONTRAST USED:  100cc of Isovue 370     FINDINGS:    LIVER:  No enlargement, atrophy, abnormal density, or significant focal lesion.    BILIARY:  The common bile duct is prominent size measuring up to 11 mm which is stable from prior MRI.  The gallbladder is present.  PANCREAS:  Prior MRI had demonstrated questionable small cystic foci in the region of the pancreatic tail.  These are not well up seen on today's exam.  SPLEEN:  No enlargement or focal lesion.    KIDNEYS:  No evidence of hydronephrosis or renal stones.  There are probable areas of focal cortical parenchymal loss present.  ADRENALS:  No mass or enlargement.    AORTA/VASCULAR:  No aneurysm or dissection.    RETROPERITONEUM:  No mass or adenopathy.    BOWEL/MESENTERY:  No dilated loops of small bowel are seen.  Portions of bowel are decompressed, limiting evaluation.  There is a normal-appearing appendix.  No free fluid is seen.  No evidence of obstruction.  ABDOMINAL WALL:  No mass or hernia.    URINARY BLADDER:  No visible focal wall thickening, lesion, or calculus.    PELVIC NODES:  No adenopathy.    PELVIC ORGANS:  No visible mass.  Pelvic organs appropriate for patient age.    BONES:  Streak artifact from a right hip prosthesis somewhat limits evaluation.  Chronic appearing grade 1 anterolisthesis of L4 on L5 is noted.  LUNG BASES:  No visible pulmonary or pleural disease.    OTHER:  Negative.                     Impression   CONCLUSION:  No clear etiology of the patient's symptoms.  No free fluid is seen within the abdomen or pelvis.  There is a normal-appearing appendix.     There is dilatation of the common bile duct measuring up to 11 mm which is similar to prior exams.     A prior MRI exam had demonstrated possible small cystic foci in the region of the pancreatic tail.   These are not well seen on today's exam.        Dictated by (CST): Tevin Lazcano MD on 3/02/2023 at 8:12 PM      Finalized by (CST): Tevin Lazcano MD on 3/02/2023 at 8:17 PM        PROCEDURE:  MRI ABDOMEN&MRCP W/3D (W+W0)(CPT=74183/58604)     COMPARISON:  [facility], [facility] HEPATOBILIARY SCAN WITH PHARMACY  (CPT=78227), 9/11/2015, 8:40 AM.  MR, MRI MRCP W/3D ONLY (CPT=76376/65924), 9/10/2015, 7:12 PM.  [facility], CT, CT ABDOMEN+PELVIS KIDNEYSTONE 2D RNDR(NO IV,NO ORAL)(CPT=74176), 4/21/2020,   1:28 PM.     INDICATIONS:  K86.2 Cyst of pancreas     TECHNIQUE:  Multiplanar single shot fast spin echo, chemical shift imaging, breath hold T2 weighted images and 2-D fiesta sequences were acquired. Fluid sensitive imaging with MRCP reconstruction was also performed including 3D multi-projection imaging  (non independent workstation).  Both projection and source MRCP images are evaluated.  Subsequent post contrast imaging was performed after intravenous administration of paramagnetic contrast agent.     3-D RENDERING:  Three dimensional image processing was completed using a separate workstation under concurrent supervision. Images were archived.     PATIENT STATED HISTORY:(As transcribed by Technologist)  Pancreatic cyst.      CONTRAST USED:  17mL of Dotarem     FINDINGS:    LIVER:  No enlargement, atrophy, abnormal density, or significant focal lesion.    BILIARY:  No filling defect.  Common duct is ectatic measuring up to 1 cm, stable.  PANCREAS:  Within the pancreatic tail are 2 adjacent cystic foci, 1 which measures 8 x 7 vs 8 x 6 mm and 5 x 2 vs 3 x 3 mm without significant change dating back to most recent MRI performed 9/10/2015.  No ductal dilatation or atrophy.    SPLEEN:  No enlargement or focal lesion.    KIDNEYS:  No mass or obstruction.    ADRENALS:  No mass or enlargement.    AORTA/VASCULAR:  No aneurysm or dissection.    RETROPERITONEUM:  No mass or adenopathy.    BOWEL/MESENTERY:  Normal caliber small and large  bowel.  Duodenal diverticulum incidentally noted.    ABDOMINAL WALL:  No mass or hernia.    BONES:  No bony lesion or fracture.    LUNG BASES:  No visible pleural disease.  Lung bases not well assessed with MRI.    OTHER:  No free fluid.                     Impression   CONCLUSION:    1. No significant change, allowing for differences in technique including small cystic foci involving the pancreatic tail when compared to most recent MRI/MRCP performed 9/10/2015.        Dictated by (CST): Priti Frausto MD on 8/25/2020 at 12:53 PM      Finalized by (CST): Priti Frausto MD on 8/25/2020 at 2:10 PM        Supplementary Documentation:   -Surveillance for Diabetes Complications & Risks  Foot exam/neuropathy: No data recorded  Retinopathy screening: No data recordedNo data recorded  Has appt w/ Dr. Berman on Aug 26, previously seen last year Aug,     Assessment & Plan:     ICD-10-CM    1. Type 2 diabetes mellitus with diabetic neuropathy, with long-term current use of insulin (HCC)  E11.40 IA-2 Autoantibodies    Z79.4 JULISA-65 Autoantibody     Zinc Transporter 8 (ZnT8) Antibodies     Islet Cell Cytoplasmic Antibody, IgG     C-Peptide     POC Hgb A1C     Continuous Glucose Sensor (DEXCOM G7 SENSOR) Does not apply Misc     GLUC MNTR CONT REC FROM NTRSTL TISS FLU PHYS I&R      2. Pancreatic cyst (HCC)  K86.2 IA-2 Autoantibodies     JULISA-65 Autoantibody     Zinc Transporter 8 (ZnT8) Antibodies     Islet Cell Cytoplasmic Antibody, IgG     C-Peptide      3. Essential hypertension  I10       4. Mixed hyperlipidemia  E78.2 Direct LDL          Kaylin Bledsoe is a pleasant 69 year old female here for evaluation of:    #Diabetes- PMHx of Type 2 diabetes mellitus diagnosed since 1992 Last A1c value was 7.9% done 8/23/2024.A1c above goal, CGM reviewed TIR 51%, GMI 7.8% w. <1% low still above goal.   Goal <7%. Importance of better glucose control in preventing onset/progression of end-organ damage discussed, as well as goals of therapy and  clinical significance of A1C.    - - Find out from Dr. Scott's office if she still need to get MRI of  abdomen since in CT in 2023, the pancreatic cyst did not show, which was present in the MRI in 2020.   - Get labs now non-fasting and then sometime tomorrow or next week for Fasting for direct LDL   - med changes:  - Continue Soliqua 34 units, Jardiance 25 mg, MF 1000 mg BID   - Start FIASP w/ 6 unit twice a day plus sliding scale  Fiasp Check your blood sugar before you eat. Based on the number, give yourself a dose 15-20 minutes before the meal:  Fixed dose for meal (6 units) 2x a day w/ at least 45 grams of carbs + Correction scale:   <140:  0 units  141-180- 1 units  181-220- 2 units  221-260-3 units  261-300- 4 units  301-340- 5 units  341-380- 6 units  381-420- 7 units  421-460- 8 units  - continue follow up w/ your ophthalmology and have them fax us the note.   - will let you know the result in my chart   - Call if any persistent low blood sugars < 80 or high sugars > 250 mg/dl  - Targeted fasting glucose (at least 8-10 hrs of no food/sweetened beverage intake)< 120 and 2 hrs after lunch or dinner< 180  and prior lunch or dinner <140  -  Follow 15g/15 min rule if you develop any hypoglycemia symptoms w/ glucose <70  but if glucose <55 follow 30g/15 min rule. Once glucose above 80, eat a protein or food w protein.     - continue Dexcom G7 CGM with phone (connected to clinic profile)  - meet with endo DM educator re:in 3 weeks for carb awareness discussion    -See above header \"Supplementary Documentation\" for surveillance for diabetes complications & risks  Discussed targeted glucose levels and symptoms and treatment of hypoglycemia.       #Nephropathy screening  Lab Results   Component Value Date    EGFRCR 73 05/10/2024    MICROALBCREA 27.1 05/10/2024     #Pancreatic Cyst  Pt's brother had a hx of pancreatic cancer.   Noted pancreatic cyst in MRI of abd  in 2020 and in CT abd in 2023 pancreatic cyst not  well seen . Per daughter pt has colonoscopy schedule on Oct 4, 2024 w/ Dr. Scott, GI.   Add on JAXON antibodies r/o and also added c peptide.   Recommend to follow up w/ GI .  Will discuss case w/ Dr. Magaña, endocrinology        #HTN: SBP is to goal <130   BP Readings from Last 1 Encounters:   08/23/24 128/76   BP Meds: Metoprolol Succinate Cs24 - 25 MG     #Hyperlipidemia/Lipids: LDL is not to goal   Lab Results   Component Value Date    LDL 74 05/10/2024    TRIG 245 (H) 05/10/2024   Cholesterol Meds: atorvastatin Tabs - 10 MG  per cardiologist. Will order direct LDL     #BMI 30  - on Soliqua, Balanced diet: carbohydrates, protein, healthy fats and fiber in each meal. Exercise of at least 150 mins each week. Decrease your simple carbohydrates intake such as sweets: cookies, soda, candy, white rice, white pasta, syrups      Total time spent  60 minutes today on obtaining history, reviewing pertinent labs, evaluating patient, providing multiple treatment options, reinforcing diet/exercise and compliance, and completing documentation, of which greater than 50% was spent in face to face discussion with the patient       Case discussed with patient and his daughter who demonstrated understanding and agreement with plan.     Thank you for allowing me to participate in the care of this patient.  Please feel free to contact me with any questions.    JESÚS Wood, [facility]  Endocrinology, Diabetes & Metabolism   08/23/24      Note to patient: The 21 Century Cures Act makes medical notes like these available to patients in the interest of transparency. However, be advised this is a medical document. It is intended as peer to peer communication. It is written in medical language and may contain abbreviations or verbiage that are unfamiliar. It may appear blunt or direct. Medical documents are intended to carry relevant information, facts as evident, and the clinical opinion of the practitioner.

## 2024-08-23 NOTE — PATIENT INSTRUCTIONS
Follow up plan:  With JESÚS Baron: Return in about 6 weeks (around 10/4/2024).  [ x ] In person  only  [ x ] After visit summary   [ x ] Placed labs/imaging.   - meet with Renae (our diabetes educator) in 3 weeks for:   Carb awareness discussion       Discharge Instruction:  - Find out from Dr. Scott's office if she still need to get MRI of  abdomen since in CT in 2023, the pancreatic cyst did not show, which was present in the MRI in 2020.   - Get labs now non-fasting and then sometime tomorrow or next week for Fasting for direct LDL   - ContinueSoliqua 34 units, Jardiance 25 mg, MF 1000 mg BID   - Start FIASP w/ 6 unit twice a day plus sliding scale  Fiasp Check your blood sugar before you eat. Based on the number, give yourself a dose 15-20 minutes before the meal:  Fixed dose for meal (6 units) 2x a day w/ at least 45 grams of carbs + Correction scale:   <140:  0 units  141-180- 1 units  181-220- 2 units  221-260-3 units  261-300- 4 units  301-340- 5 units  341-380- 6 units  381-420- 7 units  421-460- 8 units  - continue follow up w/ your ophthalmology and have them fax us the note.   - will let you know the result in my chart   - Call if any persistent low blood sugars < 80 or high sugars > 250 mg/dl  - Targeted fasting glucose (at least 8-10 hrs of no food/sweetened beverage intake)< 120 and 2 hrs after lunch or dinner< 180  and prior lunch or dinner <140  -  Follow 15g/15 min rule if you develop any hypoglycemia symptoms w/ glucose <70  but if glucose <55 follow 30g/15 min rule. Once glucose above 80, eat a protein or food w protein.       Office phone number: [phone]; phones are open Monday-Friday 8:30-4:30.   Thank you for visiting our office. We look forward to working with you to reach your health goals. As a reminder, if you need refills, please request early so there is enough time to process the request. We ask that you provide at least 5 days' notice before a refill is due, so there is time  to send a request to pharmacy, process the refill, and ensure there are no other problems with obtaining the medication (backorders, prior authorization paperwork, etc). Routine refills will not be addressed on weekends, so please submit these requests during the week.    Blood sugar targets:  Before breakfast: , 2 hours after meals: <180, prior lunch or dinner < 140  Time in Range goal is higher than 80% if using a continuous glucose monitor (Dexcom or Isi).  Time in Range can be found within the Dexcom G7 valarie, on Clarity if using Dexcom G6, or on LibreView if using Isi.    HOW TO TREAT LOW BLOOD SUGAR (Hypoglycemia)  Low blood sugar= Less than 70, or if you start to have symptoms (below)  Symptoms: Shaking or trembling, fast heart rate, extreme hunger, sweating, confusion/difficulty concentrating, dizziness.    How to treat a low blood sugar if you are able to eat/drink: The Rule of 15/15  If you are using continuous glucose monitor that says you are low, but you do not have any symptoms, verify on fingerstick that your blood sugar is actually low before treating.   Eat 15 grams of carbs (see examples below)  Check your blood sugar after 15 minutes. If it’s still below your target range, have another serving.   Repeat these steps until it’s in your target range. Once it’s in range, if you're nervous about your sugar going low again, have a protein source (ie, a spoonful of peanut butter).   If you have a CGM you want to look for how your arrow has changed. If you arrow is pointed up or sideways after 15 min, give your CGM more time OR check with a finger stick. Try not to eat more food until at least 15 min after the first BG check - otherwise you risk having a rebound high.  If you are experiencing symptoms and you are unable to check your blood glucose for any reason, treat the hypoglycemia.  If someone has a low blood sugar and is unconscious: Don’t hesitate to call 911. If someone is unconscious and  glucagon is not available or someone does not know how to use it, call 911 immediately.    To treat a low, I recommend you carry with you easy, pre-portioned treatment for low blood sugars that are 15G of carbs:   - Children sized squeeze pouch applesauce (high fiber + carbs help prevent too high of a spike)  - Small children's sized juicebox- 15g carb --> 4oz juice box  - Glucose tablets from [facility], you can find them near diabetes supplies --> Note, you will need to eat 3-4 tablets to get to 15g of carbs  - Children sized fruit snack pack- look for one with 15 grams of total carbohydrate  - Choice of how to treat your low is important. Complex carbs, or foods that contain fats along with carbs (like chocolate) can slow the absorption of glucose and should NOT be used to treat an emergency low       Tratamiento para la hipoglucemia (nivel bajo de azúcar en la george)  Paso a paso:    © 9567-1282 [employer]. Todos los derechos reservados. Esta información no pretende sustituir la atención médica profesional. Sólo velasquez médico puede diagnosticar y tratar un problema de griffin.       Programa para el cuidado de los pies en personas con diabetes  Usted depende constantemente de los pies para moverse de un lado a otro. Sin embargo, si tiene diabetes, los pies necesitan un cuidado especial. Incluso un problema pequeño en un pie puede agravarse mucho. Por esta razón, no desatienda los pies. Trabaje en colaboración con velasquez equipo de atención médica para la diabetes. Ellos pueden ayudarlo a proteger los pies y mantenerlos sanos.   Examen de los pies  Mateus evaluación le ayuda a velasquez proveedor de atención médica a determinar el estado de tereza pies. La evaluación incluye mateus revisión de tereza antecedentes de diabetes y de velasquez griffin general. También puede incluir un examen de los pies, radiografías y otras pruebas. Estos exámenes y pruebas ayudan a detectar la existencia de problemas debajo de la piel que usted no  puede suresh ni sentir.   Historia clínica   Le harán preguntas sobre velasquez griffin en general y sobre los problemas de los pies que haya tenido. También se analizarán tereza antecedentes de diabetes; por ejemplo, si velasquez nivel de azúcar en la george ha cambiado con el tiempo. También incluirá preguntas sobre las sensaciones de dolor, hormigueo, pinchazos o entumecimiento. Velasquez proveedor de atención médica además deberá saber si usted tiene presión arterial paige o alguna enfermedad renal. También si fuma. Informe al proveedor si tuvo infecciones anteriores en los pies. Informe al proveedor todos los medicamentos (incluidos los de venta lorena), las vitaminas, los suplementos o los medicamentos a base de hierbas que hasmukh.   Examen de los pies    Jolie examen determina el estado de diversas partes del pie. En primer lugar, le revisarán la piel y las uñas en busca de signos de infección. Se comprobará la circulación de la george tomando el pulso en ambos pies. Es posible que le sean pruebas para estudiar el estado de los nervios de los pies. En estas pruebas, se usa un alambre pequeño (filamento) para determinar el nivel de sensibilidad de los pies. Si tiene la piel del pie reseca, podría ser un indicio de daño de los nervios que controlan la humectación de la piel. Las infecciones micóticas en las uñas del pie pueden provocar infecciones bacterianas más graves. En algunos casos, le pedirán que camine mateus distancia corta. [place] ayudará a determinar si tiene problemas en los huesos, las articulaciones o los músculos.   Pruebas de diagnóstico  Si es necesario, velasquez proveedor de atención médica recomendará que se escobar ciertas pruebas para obtener más información sobre los pies. Entre ellas, se incluyen las siguientes:   Ecografías Doppler. Se mide la circulación de la george en los pies y en la parte inferior de las piernas.  Radiografías. Pueden revelar problemas de los huesos o de las articulaciones.  Otras pruebas de diagnóstico por  imágenes. Pueden incluir mateus resonancia magnética (IRM), mateus gammagrafía ósea o mateus tomografía computarizada (CT). Estas pruebas pueden ayudar a detectar infecciones en los huesos.  Otras pruebas. Se pueden incluir exámenes vasculares. Estos estudian la circulación sanguínea en los pies y las piernas. [place] se realiza comparando las presiones arteriales en el brazo y el tobillo. También podrían hacerle estudios de los nervios para determinar la sensibilidad de los pies.  Elaboración de un programa para el cuidado de los pies  Según los resultados de la evaluación, velasquez proveedor de atención médica creará un programa de cuidado de los pies para usted. El programa puede ser tan simple howard iniciar mateus rutina diaria de cuidado personal. También deberá cambiar el tipo de calzado que usa. Además, puede incluir el tratamiento de problemas menores de los pies, howard un callo o mateus ampolla. En algunos casos, será necesario realizar mateus cirugía para tratar mateus infección. También problemas mecánicos, howard los dedos en garra y los dedos en martillo.   Cómo prevenir problemas  Cuando tiene diabetes, es preferible prevenir los problemas que tener que tratarlos más adelante. Por esta razón, visite a velasquez equipo de atención médica para hacerse chequeos y cuidar de tereza pies regularmente. Velasquez equipo de atención médica también puede darle más información sobre cómo cuidarse los pies en velasquez casa. Por ejemplo, es posible que le pidan que evite caminar descalzo, incluso dentro de velasquez casa. O lakisha vez le indiquen que necesita usar calzado especial para proteger los pies.   Hágase chequeos regulares  Los problemas de los pies pueden presentarse rápidamente. Por lo tanto, es importante que vaya a todas las visitas programadas con velasquez equipo de atención médica. Jose Angel las visitas al médico, quítese los zapatos y las medias tan pronto howard entre al consultorio. Pida a velasquez proveedor de atención médica que le examine los pies para detectar posibles  problemas. [place] facilitará la detección y el tratamiento de pequeños problemas de la piel antes de que empeoren. Los controles regulares también pueden ayudarlo a controlar la circulación de la george y el nivel de sensibilidad de los pies. Es posible que le duelan los pies o que presente pérdida de sensación en los pies (neuropatía). En soto marianne, necesitará controles con mayor frecuencia.   Aprenda a cuidarse  Cuanto más sepa sobre la diabetes y los pies, más fácil le será prevenir problemas. Velasquez equipo de atención médica puede enseñarle cómo revisarse los pies todos los días. [place] le ayudará a detectar signos de advertencia. También pueden darle otros consejos sobre el cuidado de los pies. Antes de las visitas al consultorio, anote todas las preguntas que tenga sobre el cuidado de los pies. Jose Angel las visitas, escobar todas las preguntas que tenga.   © 3575-5325 [employer]. Todos los derechos reservados. Esta información no pretende sustituir la atención médica profesional. Sólo velasquez médico puede diagnosticar y tratar un problema de griffin.

## 2024-08-23 NOTE — TELEPHONE ENCOUNTER
Patient's daughter called and sched  Future Appointments   Date Time Provider Department Center   9/3/2024  9:00 AM Lindsay Agrawal DPM [facility]   9/13/2024 12:00 PM [facility] DIABETIC EDUCATOR ENDO [facility]   10/4/2024  1:30 PM Brandon Scott MD [facility] SUB GI   10/11/2024  3:15 PM Acacia Sales APRN [facility]

## 2024-08-24 LAB — C-PEPTIDE: 4.1 NG/ML

## 2024-08-26 ENCOUNTER — TELEPHONE (OUTPATIENT)
Facility: CLINIC | Age: 69
End: 2024-08-26

## 2024-08-26 LAB — PANCREATIC ISLET CELLS: NEGATIVE

## 2024-08-26 RX ORDER — METFORMIN HCL 500 MG
1000 TABLET, EXTENDED RELEASE 24 HR ORAL 2 TIMES DAILY WITH MEALS
Qty: 360 TABLET | Refills: 3 | Status: SHIPPED | OUTPATIENT
Start: 2024-08-26

## 2024-08-26 NOTE — TELEPHONE ENCOUNTER
Spoke with [facility]'s [facility] department (Leticia MEZA).  She stated that they will fax over request for LOV notes, notes must be signed by provider.    When faxing back make it attention to [facility]'s Medicare Department at [phone]    Will await fax.

## 2024-08-26 NOTE — TELEPHONE ENCOUNTER
8/26/24-Received via fax from [facility] a PA for Dexcom G7 Sensor.  Placed in PA in basket.

## 2024-08-26 NOTE — TELEPHONE ENCOUNTER
Please review. Protocol Failed; No Protocol    Requested Prescriptions   Pending Prescriptions Disp Refills    METFORMIN  MG Oral Tablet 24 Hr [Pharmacy Med Name: METFORMIN ER 500MG 24HR TABS] 360 tablet 0     Sig: TAKE 2 TABLETS(1000 MG) BY MOUTH TWICE DAILY WITH MEALS       Diabetes Medication Protocol Failed - 8/24/2024  5:12 AM        Failed - Last A1C < 7.5 and within past 6 months     Lab Results   Component Value Date    A1C 7.9 (A) 08/23/2024             Passed - In person appointment or virtual visit in the past 6 mos or appointment in next 3 mos     Recent Outpatient Visits              3 days ago Type 2 diabetes mellitus with diabetic neuropathy, with long-term current use of insulin ([facility])    [facility] Acacia Sales APRN    Office Visit    1 month ago Vulvar itching    [address] - OB/GYN Soo Ramesh MD    Office Visit    2 months ago Diabetes mellitus type 2 with neurological manifestations ([facility])    [address]    Nurse Only    2 months ago Type 2 diabetes mellitus with diabetic neuropathy, with long-term current use of insulin ([facility])    [address] Tamanna Downs APRN    Office Visit    2 months ago Encounter for annual health examination    [address] Kiel Azevedo MD    Office Visit          Future Appointments         Provider Department Appt Notes    In 1 week Lindsay Agrawal DPM [address] LEFT PINKY FX    In 2 weeks [facility] DIABETIC EDUCATOR ENDO [facility] Carb awareness discussion    In 1 month Brandon Scott MD [facility] 6/13/24 recall colon with Dr. Scott on 10/4/24 @ 1:30pm. rylan    In 1 month Acacia Sales APRN [facility]  [facility] 6 week f/u-Type 2 diabetes mellitus with diabetic neuropathy, with long-term current use of insulin  LOV 8/23/24                    Passed - Microalbumin procedure in past 12 months or taking ACE/ARB        Passed - EGFRCR or GFRNAA > 50     GFR Evaluation  EGFRCR: 73 , resulted on 5/10/2024          Passed - GFR in the past 12 months               Future Appointments         Provider Department Appt Notes    In 1 week Lindsay Agrawal DPM [address] LEFT PINKY FX    In 2 weeks [facility] DIABETIC EDUCATOR ENDO [facility] Carb awareness discussion    In 1 month Brandon Scott MD [facility] 6/13/24 recall colon with Dr. Scott on 10/4/24 @ 1:30pm. rylan    In 1 month Acacia Sales APRN [facility] 6 week f/u-Type 2 diabetes mellitus with diabetic neuropathy, with long-term current use of insulin  LOV 8/23/24          Recent Outpatient Visits              3 days ago Type 2 diabetes mellitus with diabetic neuropathy, with long-term current use of insulin ([facility])    [facility] Acacia Sales APRN    Office Visit    1 month ago Vulvar itching    [address] - OB/GYN Soo Ramesh MD    Office Visit    2 months ago Diabetes mellitus type 2 with neurological manifestations ([facility])    [address]    Nurse Only    2 months ago Type 2 diabetes mellitus with diabetic neuropathy, with long-term current use of insulin ([facility])    [address] Tamanna Downs APRN    Office Visit    2 months ago Encounter for annual health examination    [address] Kiel Azevedo MD    Office Visit

## 2024-08-26 NOTE — TELEPHONE ENCOUNTER
PA generated in [facility]    KEY: [id number]    Questions answered and sent to plan- If [employer] has not replied to your request within 24 hours for urgent requests or 72 hours for standard requests, please reach out to the plan using the phone number located on the back on the member's insurance card.    Will await determination.

## 2024-08-27 LAB — GAD-65: <5 U/ML

## 2024-08-29 NOTE — PROGRESS NOTES
Abstracted pts diabetic eye exam. Yes to diabetic retinopathy. Barcode attached. Placed in A.Ds bin for review.

## 2024-09-03 ENCOUNTER — OFFICE VISIT (OUTPATIENT)
Dept: ORTHOPEDICS CLINIC | Facility: CLINIC | Age: 69
End: 2024-09-03
Payer: MEDICARE

## 2024-09-03 VITALS — BODY MASS INDEX: 30.66 KG/M2 | WEIGHT: 184 LBS | HEIGHT: 65 IN

## 2024-09-03 DIAGNOSIS — E11.49 DIABETES MELLITUS TYPE 2 WITH NEUROLOGICAL MANIFESTATIONS (HCC): ICD-10-CM

## 2024-09-03 DIAGNOSIS — M19.071 ARTHRITIS OF BOTH MIDFEET: ICD-10-CM

## 2024-09-03 DIAGNOSIS — M85.89 OSTEOPENIA OF MULTIPLE SITES: ICD-10-CM

## 2024-09-03 DIAGNOSIS — M19.072 ARTHRITIS OF BOTH MIDFEET: ICD-10-CM

## 2024-09-03 DIAGNOSIS — S92.355A CLOSED NONDISPLACED FRACTURE OF FIFTH METATARSAL BONE OF LEFT FOOT, INITIAL ENCOUNTER: Primary | ICD-10-CM

## 2024-09-03 PROCEDURE — 99214 OFFICE O/P EST MOD 30 MIN: CPT | Performed by: PODIATRIST

## 2024-09-03 NOTE — PROGRESS NOTES
[facility] Progress Note    Subjective:   Kaylin and daughter are here with a new injury  Toe feels out of place but fusion site no pain left foot    Injury - 2 weeks ago now - graandaugther hit foot and it bent back  She has been in her post op shoe        Objective:     Mild tenderness along the fifth ray specifically the base.  She also has tenderness at the fourth MP joint and just proximal to the neck of the fourth metatarsal.  Mild swelling.    Mild loss of sensation especially to light touch of the toes      Imaging: significant osteopenic bone  Questionable fracture base of the fifth metatarsal 3 views left foot          Assessment/Plan:     Diagnoses and all orders for this visit:    Closed nondisplaced fracture of fifth metatarsal bone of left foot, initial encounter  -     DME - EXTERNAL     Osteopenia of multiple sites  -     DME - EXTERNAL     Diabetes mellitus type 2 with neurological manifestations (HCC)    Arthritis of both midfeet    We looked at the x-rays together and likely she has 5th metatarsal fracture.  She is painful in this region of questionable fracture    Discussed walking and WB exercise for osteoporosis      Xray in 5/6 weeks  We fit her for a low-profile boot that is a little more comfortable for protection      Lindsay Agrawal, [facility]  [facility]  [address], [address] [place] [id number]   [address] [place] [id number]   [address] [place] [address]  [email or web address]  [email or web address]  t: [phone]   f: [phone]            Dragon speech recognition software was used to prepare this note. If a word or phrase is confusing, it is likely do to a failure of recognition. Please contact me with any questions or clarifications.

## 2024-09-05 ENCOUNTER — MED REC SCAN ONLY (OUTPATIENT)
Facility: CLINIC | Age: 69
End: 2024-09-05

## 2024-09-06 LAB — IA-2 AUTOABS: <7.5 U/ML

## 2024-09-07 LAB — ZNT8 ABS: <15 U/ML

## 2024-09-13 ENCOUNTER — NURSE ONLY (OUTPATIENT)
Facility: CLINIC | Age: 69
End: 2024-09-13
Payer: MEDICARE

## 2024-09-13 DIAGNOSIS — Z79.4 TYPE 2 DIABETES MELLITUS WITH DIABETIC NEUROPATHY, WITH LONG-TERM CURRENT USE OF INSULIN (HCC): Primary | ICD-10-CM

## 2024-09-13 DIAGNOSIS — E11.40 TYPE 2 DIABETES MELLITUS WITH DIABETIC NEUROPATHY, WITH LONG-TERM CURRENT USE OF INSULIN (HCC): Primary | ICD-10-CM

## 2024-09-13 PROCEDURE — 99211 OFF/OP EST MAY X REQ PHY/QHP: CPT | Performed by: NURSE PRACTITIONER

## 2024-09-13 NOTE — PROGRESS NOTES
T2DM Nutrition and Lifestyle Counseling    Kaylin Bledsoe  2/9/1955     Referred by: SKINNY Baron    Language line offered to patient for British language interpretation, patient declined.  Patient daughter attended visit and provided translation        Medical Background      Past Medical History:    Abdominal hernia    Abdominal pain    Anemia    Anesthesia complication    slow to arouse    Anxiety    Arthritis    Atherosclerosis of coronary artery    Atherosclerotic heart disease of native coronary artery without angina pectoris    Back pain    Bad breath    Belching    Bilateral leg edema    Bloating    Blurred vision    Change in hair    Constipation    Diarrhea, unspecified    Dizziness    Easy bruising    Fatigue    Feeling lonely    Flatulence/gas pain/belching    Food intolerance    Frequent use of laxatives    Hearing loss    High blood pressure    High cholesterol    History of depression    History of eating disorder    Indigestion    Irregular bowel habits    Itch of skin    Leg swelling    Loss of appetite    Nausea    Neuropathy    maria eugenia feet    Night sweats    Osteoarthrosis, unspecified whether generalized or localized, unspecified site    Osteoporosis    Other and unspecified hyperlipidemia    Pain in joints    Peripheral neuropathy    Problems with swallowing    Sleep disturbance    Stented coronary artery    Stress    Type II or unspecified type diabetes mellitus without mention of complication, not stated as uncontrolled    Uncomfortable fullness after meals    Venous insufficiency of both lower extremities    Visual impairment    glasses    Vomiting    Weight loss      Lab Results   Component Value Date    A1C 7.9 (A) 08/23/2024    A1C 9.3 (H) 05/10/2024    A1C 8.6 (H) 06/10/2023    A1C 8.2 (H) 12/02/2022    A1C 8.0 (H) 07/30/2022         Patient has T2DM, seen today regarding nutrition and     Medication Review      DM Meds: Fiasp FlexTouch Sopn - 100 UNIT/ML, 100 UNIT/ML, 100 UNIT/ML;  Jardiance Tabs - 25 MG; metFORMIN ER Tb24 - 500 MG; Soliqua Sopn - 100-33 UNT-MCG/MLMeds : Kept the same       Patient States Currently Taking:   - Soliqua 34 units daily  - Jardiance 25 mg daily  - Metformin 1000 mg twice daily   - Fiasp 6 u twice daily + ISF 1:40>140    Blood Glucose Review      Dexcom G7 Using phone and connected to clinic; report downloaded and added to provider folder as necessary        Patient Interview      Nutrition Intake:    Time Food Notes   7 A Coffee w/ SF creamer, sweet bread / whole grain bagel w/ butter or SF jelly sometimes an activia yogurt    10:30 A  Fruit, popcorn, nuts     1:30 A Protein + 3 corn tortillas and vegetables  Largest meal   5 P Same as 10:30 A snack      Typical beverages: mostly water, sometimes tea (no sugar), 1 zero sugar 7 up a day   Eating Out? : No    Sleep Patterns:  Get 8 hours mots nights, wakes up to use the restroom once or twice, but wakes in the AM feeling well rested    Stressors:  - Family in [place]  - Lives with daughter / granddaughter, don't always agree     Physical Activity:  - trying to walk more!    Education     Nutrition Topics Discussed   - Discussed basic meal planning guidelines for diabetes regular mealtime   - Defined the term macronutrient and helped patient to Identify foods that are carbohydrates, protein, non-starchy vegetables, and fats    - Reinforced the importance of carbohydrate consistency in each meal, and importance of not skipping meals   - Recommended carbohydrate targets of 45-60 grams at meals and 15-30 grams at snacks   - Educated on label reading emphasizing most important areas of focus    - Educated on portion management; including use of measuring cups, plate visualization or food scale   - Provided suggestions for lower carb, higher protein / fiber snacks   - Discussed how to handle special occasions, dining out, and eating on the go   - Educated on emotional eating and being mindful at meals  -  Provided examples  of culturally significant and typically eaten foods / food combinations for best real life application of education   - Emphasized the need for small, sustainable changes and working on SMART goals to forge long term behavior change   - Provided instructions on how to use helpful websites or nutrition tracking apps    - If applicable, taught to use carbohydrate to insulin ratio and insulin sensitivity factor     Lifestyle Topics Discussed  - Reinforced the importance of stress management and mental health support  - Discussed how to create SMART goals to help achieve milestones in diabetes care and management over time  - Recommended healthful mental tools to increase motivation for care  - Educated on the importance of intentional movement related to insulin resistance / absorption in the body, and discussed actionable ways to increase movement specific to the patient's interests  - Provided resources to connect patient with the larger diabetes community to build a greater support network         Goals and Recommendations     - Focus on reducing carb intake and increasing protein with breakfast to get the day started with lower blood glucose levels    --> try egg whites w/ 1/2 bagel and fruit  or 1/2 c dry oatmeal w/ fruit   - Take a walk after breakfast to help with blood glucose levels     Future Appointments   Date Time Provider Department Center   9/13/2024 12:00 PM [facility] DIABETIC EDUCATOR ENDO [facility]   10/4/2024  1:30 PM Brandon Scott MD [facility] SUB GI   10/8/2024  3:30 PM Lindsay Agrawal DPJEFFREY [facility] OR[facility]O [facility]   10/11/2024  3:15 PM Acacia Sales APRN [facility]          9/13/2024  Renae Maddox RN, MSN, BC-ADM, [facility]  Diabetes Care &        A total of 60 minutes was spent with the patient including chart review, discussion and education / advisement pertinent to patient and provider specified concerns as documented above.     Note to patient: The 21  Century Cures Act makes medical notes like these available to patients in the interest of transparency. However, be advised this is a medical document. It is intended as peer to peer communication. It is written in medical language and may contain abbreviations or verbiage that are unfamiliar. It may appear blunt or direct. Medical documents are intended to carry relevant information, facts as evident, and the clinical opinion of the practitioner.

## 2024-09-24 RX ORDER — FAMOTIDINE 40 MG/1
40 TABLET, FILM COATED ORAL DAILY
Qty: 90 TABLET | Refills: 3 | Status: SHIPPED | OUTPATIENT
Start: 2024-09-24

## 2024-09-24 RX ORDER — EMPAGLIFLOZIN 25 MG/1
1 TABLET, FILM COATED ORAL DAILY
Qty: 90 TABLET | Refills: 3 | Status: SHIPPED | OUTPATIENT
Start: 2024-09-24

## 2024-09-24 NOTE — TELEPHONE ENCOUNTER
Please review; protocol failed/ has no protocol        Please review pended refill request as unable to refill due to high/very high interaction warning copied here:      High  High Dose: famotidine, 40 mg, Oral, DailySingle dose of 40 mg exceeds recommended maximum of 20 mg by 100%  Details  Override reason              Requested Prescriptions   Pending Prescriptions Disp Refills    Empagliflozin (JARDIANCE) 25 MG Oral Tab 90 tablet 1     Sig: Take 1 tablet by mouth daily.       Diabetes Medication Protocol Failed - 9/18/2024  6:14 PM        Failed - Last A1C < 7.5 and within past 6 months     Lab Results   Component Value Date    A1C 7.9 (A) 08/23/2024             Passed - In person appointment or virtual visit in the past 6 mos or appointment in next 3 mos     Recent Outpatient Visits              1 week ago Type 2 diabetes mellitus with diabetic neuropathy, with long-term current use of insulin ([facility])    [facility]    Nurse Only    3 weeks ago Closed nondisplaced fracture of fifth metatarsal bone of left foot, initial encounter    [address] Lindsay Agrawal DPM    Office Visit    1 month ago Type 2 diabetes mellitus with diabetic neuropathy, with long-term current use of insulin ([facility])    [facility] Acacia Sales APRN    Office Visit    2 months ago Vulvar itching    [address] - OB/GYN Soo Ramesh MD    Office Visit    3 months ago Diabetes mellitus type 2 with neurological manifestations ([facility])    [address]    Nurse Only          Future Appointments         Provider Department Appt Notes    In 1 week Brandon Scott MD [facility] 6/13/24 recall colon with Dr. Scott on 10/4/24 @ 1:30pm. rylan    In 2 weeks Lindsay Agrawal DPM [address]  [facility] *LEFT 5TH MET FX - 5 week follow up    In 2 weeks Acacia Sales APRN [facility] 6 week f/u-Type 2 diabetes mellitus with diabetic neuropathy, with long-term current use of insulin  LOV 8/23/24                    Passed - Microalbumin procedure in past 12 months or taking ACE/ARB        Passed - EGFRCR or GFRNAA > 50     GFR Evaluation  EGFRCR: 73 , resulted on 5/10/2024          Passed - GFR in the past 12 months          famotidine 40 MG Oral Tab 90 tablet 3     Sig: Take 1 tablet (40 mg total) by mouth daily.       Gastrointestional Medication Protocol Passed - 9/18/2024  6:14 PM        Passed - In person appointment or virtual visit in the past 12 mos or appointment in next 3 mos     Recent Outpatient Visits              1 week ago Type 2 diabetes mellitus with diabetic neuropathy, with long-term current use of insulin ([facility])    [facility]    Nurse Only    3 weeks ago Closed nondisplaced fracture of fifth metatarsal bone of left foot, initial encounter    [address] Lindsay Agrawal DPM    Office Visit    1 month ago Type 2 diabetes mellitus with diabetic neuropathy, with long-term current use of insulin ([facility])    [facility] Acacia Sales APRN    Office Visit    2 months ago Vulvar itching    [address] - OB/GYN Soo Ramesh MD    Office Visit    3 months ago Diabetes mellitus type 2 with neurological manifestations ([facility])    [address]    Nurse Only          Future Appointments         Provider Department Appt Notes    In 1 week Brandon Scott MD [facility] 6/13/24 recall colon with Dr. Scott on 10/4/24 @ 1:30pm. rylan    In 2 weeks Lindsay Agrawal DPM [address],  [place] *LEFT 5TH MET FX - 5 week follow up    In 2 weeks Acacia Sales APRN [facility] 6 week f/u-Type 2 diabetes mellitus with diabetic neuropathy, with long-term current use of insulin  LOV 8/23/24                       Recent Outpatient Visits              1 week ago Type 2 diabetes mellitus with diabetic neuropathy, with long-term current use of insulin ([facility])    [facility]    Nurse Only    3 weeks ago Closed nondisplaced fracture of fifth metatarsal bone of left foot, initial encounter    [address] Lindsay Agrawal DPM    Office Visit    1 month ago Type 2 diabetes mellitus with diabetic neuropathy, with long-term current use of insulin ([facility])    [facility] Acacia Sales APRN    Office Visit    2 months ago Vulvar itching    [address] - OB/GYN Soo Ramesh MD    Office Visit    3 months ago Diabetes mellitus type 2 with neurological manifestations ([facility])    [address]    Nurse Only          Future Appointments         Provider Department Appt Notes    In 1 week Brandon Scott MD [facility] 6/13/24 recall colon with Dr. Scott on 10/4/24 @ 1:30pm. rylan    In 2 weeks Lindsay Agrawal DPM [address] *LEFT 5TH MET FX - 5 week follow up    In 2 weeks Acacia Sales APRN [facility] 6 week f/u-Type 2 diabetes mellitus with diabetic neuropathy, with long-term current use of insulin  LOV 8/23/24

## 2024-09-24 NOTE — TELEPHONE ENCOUNTER
Refill passed per [facility] protocol.  Requested Prescriptions   Pending Prescriptions Disp Refills    Empagliflozin (JARDIANCE) 25 MG Oral Tab 90 tablet 1     Sig: Take 1 tablet by mouth daily.       Diabetes Medication Protocol Failed - 9/18/2024  6:14 PM        Failed - Last A1C < 7.5 and within past 6 months     Lab Results   Component Value Date    A1C 7.9 (A) 08/23/2024             Passed - In person appointment or virtual visit in the past 6 mos or appointment in next 3 mos     Recent Outpatient Visits              1 week ago Type 2 diabetes mellitus with diabetic neuropathy, with long-term current use of insulin ([facility])    [facility]    Nurse Only    3 weeks ago Closed nondisplaced fracture of fifth metatarsal bone of left foot, initial encounter    [address] Lindsay Agrawal DPM    Office Visit    1 month ago Type 2 diabetes mellitus with diabetic neuropathy, with long-term current use of insulin ([facility])    [facility] Acacia Sales APRN    Office Visit    2 months ago Vulvar itching    [address] - OB/GYN Soo Ramesh MD    Office Visit    3 months ago Diabetes mellitus type 2 with neurological manifestations ([facility])    [address]    Nurse Only          Future Appointments         Provider Department Appt Notes    In 1 week Brandon Scott MD [facility] 6/13/24 recall colon with Dr. Scott on 10/4/24 @ 1:30pm. rylan    In 2 weeks Lindsay Agrawal DPM [address] *LEFT 5TH MET FX - 5 week follow up    In 2 weeks Acacia Sales APRN [facility] 6 week f/u-Type 2 diabetes mellitus with diabetic neuropathy, with long-term current use of insulin  LOV 8/23/24                     Passed - Microalbumin procedure in past 12 months or taking ACE/ARB        Passed - EGFRCR or GFRNAA > 50     GFR Evaluation  EGFRCR: 73 , resulted on 5/10/2024          Passed - GFR in the past 12 months          famotidine 40 MG Oral Tab 90 tablet 3     Sig: Take 1 tablet (40 mg total) by mouth daily.       Gastrointestional Medication Protocol Passed - 9/18/2024  6:14 PM        Passed - In person appointment or virtual visit in the past 12 mos or appointment in next 3 mos     Recent Outpatient Visits              1 week ago Type 2 diabetes mellitus with diabetic neuropathy, with long-term current use of insulin ([facility])    [facility]    Nurse Only    3 weeks ago Closed nondisplaced fracture of fifth metatarsal bone of left foot, initial encounter    [address] Lindsay Agrawal DPM    Office Visit    1 month ago Type 2 diabetes mellitus with diabetic neuropathy, with long-term current use of insulin ([facility])    [facility] Acacia Sales APRN    Office Visit    2 months ago Vulvar itching    [address] - OB/GYN Soo Ramesh MD    Office Visit    3 months ago Diabetes mellitus type 2 with neurological manifestations ([facility])    [address]    Nurse Only          Future Appointments         Provider Department Appt Notes    In 1 week Brandon Scott MD [facility] 6/13/24 recall colon with Dr. Scott on 10/4/24 @ 1:30pm. rylan    In 2 weeks Lindsay Agrawal DPM [address] *LEFT 5TH MET FX - 5 week follow up    In 2 weeks Acacia Sales APRN [facility] 6 week f/u-Type 2 diabetes mellitus with diabetic neuropathy, with long-term current use of insulin  LOV 8/23/24                       Recent  Outpatient Visits              1 week ago Type 2 diabetes mellitus with diabetic neuropathy, with long-term current use of insulin ([facility])    [facility]    Nurse Only    3 weeks ago Closed nondisplaced fracture of fifth metatarsal bone of left foot, initial encounter    [address] Lindsay Agrawal DPM    Office Visit    1 month ago Type 2 diabetes mellitus with diabetic neuropathy, with long-term current use of insulin ([facility])    [facility] Acacia Sales APRN    Office Visit    2 months ago Vulvar itching    [address] - OB/GYN Soo Ramesh MD    Office Visit    3 months ago Diabetes mellitus type 2 with neurological manifestations ([facility])    [address]    Nurse Only          Future Appointments         Provider Department Appt Notes    In 1 week Brandon Scott MD [facility] 6/13/24 recall colon with Dr. Scott on 10/4/24 @ 1:30pm. rylan    In 2 weeks Lindsay Agrawal DPM [address] *LEFT 5TH MET FX - 5 week follow up    In 2 weeks Acacia Sales APRN [facility] 6 week f/u-Type 2 diabetes mellitus with diabetic neuropathy, with long-term current use of insulin  LOV 8/23/24

## 2024-10-03 RX ORDER — INSULIN GLARGINE AND LIXISENATIDE 100; 33 U/ML; UG/ML
INJECTION, SOLUTION SUBCUTANEOUS
Qty: 9 ML | Refills: 3 | Status: SHIPPED | OUTPATIENT
Start: 2024-10-03

## 2024-10-04 PROBLEM — Z86.0100 HX OF COLONIC POLYPS: Status: ACTIVE | Noted: 2024-10-04

## 2024-10-08 ENCOUNTER — OFFICE VISIT (OUTPATIENT)
Dept: ORTHOPEDICS CLINIC | Facility: CLINIC | Age: 69
End: 2024-10-08
Payer: MEDICARE

## 2024-10-08 ENCOUNTER — TELEPHONE (OUTPATIENT)
Dept: ORTHOPEDICS CLINIC | Facility: CLINIC | Age: 69
End: 2024-10-08

## 2024-10-08 ENCOUNTER — HOSPITAL ENCOUNTER (OUTPATIENT)
Dept: GENERAL RADIOLOGY | Age: 69
Discharge: HOME | End: 2024-10-08
Attending: PODIATRIST
Payer: MEDICARE

## 2024-10-08 VITALS — WEIGHT: 184 LBS | BODY MASS INDEX: 30.66 KG/M2 | HEIGHT: 65 IN

## 2024-10-08 DIAGNOSIS — M79.672 LEFT FOOT PAIN: Primary | ICD-10-CM

## 2024-10-08 DIAGNOSIS — S92.355D CLOSED NONDISPLACED FRACTURE OF FIFTH METATARSAL BONE OF LEFT FOOT WITH ROUTINE HEALING, SUBSEQUENT ENCOUNTER: Primary | ICD-10-CM

## 2024-10-08 DIAGNOSIS — M79.672 LEFT FOOT PAIN: ICD-10-CM

## 2024-10-08 PROCEDURE — 73630 X-RAY EXAM OF FOOT: CPT | Performed by: PODIATRIST

## 2024-10-08 PROCEDURE — 99213 OFFICE O/P EST LOW 20 MIN: CPT | Performed by: PODIATRIST

## 2024-10-08 NOTE — TELEPHONE ENCOUNTER
XR ordered and scheduled per Ortho protocol.   Sent patient message via [employer] to inform them and ask them to arrive 15-20 minutes prior to appointment with

## 2024-10-08 NOTE — PROGRESS NOTES
[facility] Progress Note    Subjective:     Patient is here and her daughter is here for translation for follow-up of the fifth metatarsal fracture.  This is of her left foot.  She has been wearing her [employer] shoes recently and is doing well        Objective:     Exam left foot very little discomfort with palpation about the fifth metatarsal base no swelling.          Imaging: X-rays show healing of the fifth metatarsal fracture with osteopenic bone        Assessment/Plan:     Diagnoses and all orders for this visit:    Closed nondisplaced fracture of fifth metatarsal bone of left foot with routine healing, subsequent encounter        She is doing well and at this point she can start walking and doing more activity in her [employer]oka shoes for support.  Gradual increase in activity.  Follow-up is as needed        Lindsay Agrawal, [facility]  [facility]  [address], [address], [place] [id number] [address] [place], [place] [id number] [place] [email or web address]  [email or web address]  t: [phone]   f: [phone]            Dragon speech recognition software was used to prepare this note. If a word or phrase is confusing, it is likely do to a failure of recognition. Please contact me with any questions or clarifications.

## 2024-10-11 ENCOUNTER — OFFICE VISIT (OUTPATIENT)
Facility: CLINIC | Age: 69
End: 2024-10-11
Payer: MEDICARE

## 2024-10-11 VITALS
SYSTOLIC BLOOD PRESSURE: 128 MMHG | WEIGHT: 184 LBS | HEART RATE: 71 BPM | OXYGEN SATURATION: 97 % | BODY MASS INDEX: 30.66 KG/M2 | HEIGHT: 65 IN | DIASTOLIC BLOOD PRESSURE: 74 MMHG

## 2024-10-11 DIAGNOSIS — E78.2 MIXED HYPERLIPIDEMIA: ICD-10-CM

## 2024-10-11 DIAGNOSIS — E11.40 TYPE 2 DIABETES MELLITUS WITH DIABETIC NEUROPATHY, WITH LONG-TERM CURRENT USE OF INSULIN (HCC): Primary | ICD-10-CM

## 2024-10-11 DIAGNOSIS — Z79.4 TYPE 2 DIABETES MELLITUS WITH DIABETIC NEUROPATHY, WITH LONG-TERM CURRENT USE OF INSULIN (HCC): Primary | ICD-10-CM

## 2024-10-11 DIAGNOSIS — I10 ESSENTIAL HYPERTENSION: ICD-10-CM

## 2024-10-11 DIAGNOSIS — K86.2 PANCREATIC CYST (HCC): ICD-10-CM

## 2024-10-11 PROCEDURE — G2211 COMPLEX E/M VISIT ADD ON: HCPCS | Performed by: NURSE PRACTITIONER

## 2024-10-11 PROCEDURE — 95251 CONT GLUC MNTR ANALYSIS I&R: CPT | Performed by: NURSE PRACTITIONER

## 2024-10-11 PROCEDURE — 99215 OFFICE O/P EST HI 40 MIN: CPT | Performed by: NURSE PRACTITIONER

## 2024-10-11 RX ORDER — INSULIN ASPART INJECTION 100 [IU]/ML
INJECTION, SOLUTION SUBCUTANEOUS
Qty: 45 ML | Refills: 1 | Status: SHIPPED | OUTPATIENT
Start: 2024-10-11

## 2024-10-11 NOTE — PATIENT INSTRUCTIONS
Follow up plan:  With JESÚS Baron: Return in about 6 weeks (around 11/25/2024).  [ x ] In person only  [ x ] After visit summary   Discharge Instruction:  - stop soliqua. Start Lantus 37 units daily  - Continue Jardiance 25 mg per day and Metformin 1000 mg twice a day  - Fiasp increase from 6 to 8 units w/ bfast and lunch + sliding scale 1: 40>140   - take  Fiasp 4 units  if glucose is above 140 at dinner + sliding scale 1: 40> 140  - Call the office next week and I will review your dexcom and I will adjust your insulin.   - Call if any persistent low blood sugars < 80 or high sugars > 250 mg/dl   - Targeted fasting glucose (at least 8-10 hrs of no food/sweetened beverage intake)< 130 and 2 hrs after lunch or dinner< 180  and prior lunch or dinner <140  -  Follow 15g/15 min rule if you develop any hypoglycemia symptoms w/ glucose <70  but if glucose <55 follow 30g/15 min rule. Once glucose above 80, eat a protein or food w protein.         Office phone number: [phone]; phones are open Monday-Friday 8:30-4:30.   Thank you for visiting our office. We look forward to working with you to reach your health goals. As a reminder, if you need refills, please request early so there is enough time to process the request. We ask that you provide at least 5 days' notice before a refill is due, so there is time to send a request to pharmacy, process the refill, and ensure there are no other problems with obtaining the medication (backorders, prior authorization paperwork, etc). Routine refills will not be addressed on weekends, so please submit these requests during the week.    Blood sugar targets:  Before breakfast: , 2 hours after meals: <180, prior lunch or dinner < 140  Time in Range goal is higher than 80% if using a continuous glucose monitor (Dexcom or Isi).  Time in Range can be found within the Dexcom G7 valarie, on Clarity if using Dexcom G6, or on LibreView if using Isi.    HOW TO TREAT LOW BLOOD SUGAR  (Hypoglycemia)  Low blood sugar= Less than 70, or if you start to have symptoms (below)  Symptoms: Shaking or trembling, fast heart rate, extreme hunger, sweating, confusion/difficulty concentrating, dizziness.    How to treat a low blood sugar if you are able to eat/drink: The Rule of 15/15  If you are using continuous glucose monitor that says you are low, but you do not have any symptoms, verify on fingerstick that your blood sugar is actually low before treating.   Eat 15 grams of carbs (see examples below)  Check your blood sugar after 15 minutes. If it’s still below your target range, have another serving.   Repeat these steps until it’s in your target range. Once it’s in range, if you're nervous about your sugar going low again, have a protein source (ie, a spoonful of peanut butter).   If you have a CGM you want to look for how your arrow has changed. If you arrow is pointed up or sideways after 15 min, give your CGM more time OR check with a finger stick. Try not to eat more food until at least 15 min after the first BG check - otherwise you risk having a rebound high.  If you are experiencing symptoms and you are unable to check your blood glucose for any reason, treat the hypoglycemia.  If someone has a low blood sugar and is unconscious: Don’t hesitate to call 911. If someone is unconscious and glucagon is not available or someone does not know how to use it, call 911 immediately.    To treat a low, I recommend you carry with you easy, pre-portioned treatment for low blood sugars that are 15G of carbs:   - Children sized squeeze pouch applesauce (high fiber + carbs help prevent too high of a spike)  - Small children's sized juicebox- 15g carb --> 4oz juice box  - Glucose tablets from [employer], you can find them near diabetes supplies --> Note, you will need to eat 3-4 tablets to get to 15g of carbs  - Children sized fruit snack pack- look for one with 15 grams of total carbohydrate  - Choice of how  to treat your low is important. Complex carbs, or foods that contain fats along with carbs (like chocolate) can slow the absorption of glucose and should NOT be used to treat an emergency low

## 2024-10-11 NOTE — PROGRESS NOTES
[facility] Note    Name: Kaylin Bledsoe    Date: 10/11/24    Kaylin Bledsoe is a 69 year old female who presents for evaluation of T2DM management.     Chief complaint: Follow - Up (PT here for routine DM2 f/u, c/o BG being high/Per pt checks BG via Dexcom G7 CGM/Last A1c value was 7.9% done 8/23/2024. /Last Diabetic Eye Exam - 8/26/24( DR noted)/Last Diabetic Foot Exam-7/12/23)       Subjective:   DM hx:  -Diagnosed with diabetes TYPE 2 in 1992, Type 1 ABs all negative and  c peptide detected last 8/23/24   -Family history- yes, multiple brothers, and sisters and daughter     Initial HPI consult - 8/23/2024 08/23/24:previous pt of Tamanna Downs NP from [facility]. Here w/ her daughter, Jeni who helps w/ Korean translation.   DM meds at first office visit:  Soliqua 34 units ( in the last 2 month)  Fiasp 10 units once  Jardiance 25 mg   MF 1000 mg BID    episodes of hypoglycemia: yes, lowest 61  Continuous Glucose Monitoring Interpretation  Kaylin Bledsoe has undergone continuous glucose monitoring with the Dexcom G7 CGM with phone (connected to clinic profile).  The blood glucose tracings were evaluated for two weeks prior to office visit. Blood glucose tracings demonstrated areas of hyperglycemia post-meal, particularly post-lunch and post dinner. There were no areas of hypoglycemia notedduring the weeks of evaluation.    8/10=8/23/24: TIR 51%, GMI 7.8% w. <1% low    -Re: potential DM medication contraindication (if positive, checkbox selected):  [x] History of pancreatitis  [] Personal/fam hx of medullary thyroid cancer/MEN2- denies   [] History of recent/frequent UTI/yeast infxn- denies   [] Previous amputation related to diabetes- denies    -Presence of associated DM complications (if positive, checkbox selected):  [x] Macrovascular complications (CAD/CVA/PAD)  + stent, seeing cardiology Dr. Walker , from [place]   [x] Neuropathy  [] Retinopathy- denies   [] Nephropathy- denies   [x]  HTN  [x] Hyperlipidemia  [] Stroke/TIA- denies   [] Gastroparesis- denies   + had hx of DKA in [id number]    -Lifestyle:  - Modifying factors: eats bfast at 7 am and snack at 11 am w/ fruit or sand which  and lunch and dinner at 3 pm     Previously trialed/failed DM meds:   Lantus, Victoza - pancreatitis, Januvia, tradjenta and glipizide.       ##Also has hx of Pancreatic cyst found in MRI last 2020  Pt's brother had a hx of pancreatic cancer. Pt has abd pain w/ decreased urinary output per Ct review, but currently has left side abd pain.  Noted some hypoglycemia in CGM  < 1% in the last 2 weeks   Noted pancreatic cyst in MRI of abd  in 2020 . CT abd pelvis w/ contrast only  in 3/2/23 pancreatic cyst not well seen, adrenal glands has no mass or enlargement   Per daughter pt has colonoscopy schedule on Oct 4, 2024 w/ Dr. Scott.         INTERIM HX:    -->10/11/24- Pt refuse  and she chose her daughter, Jeni who is present during this visit  to translate ( Syrian-english)  Pt states she had colonoscopy done and she still need to get MRI/CT of abd   Current treatment: Soliqua 34 units, Jardiance 25 mg, MF 1000 mg BID , Fiasp 6- 8u 2x aday ( bfast and dinner)W/ sliding scale 1: 40>140  Testing: Continuous Glucose Monitoring Interpretation  Kaylin Bledsoe  has undergone continuous glucose monitoring with the Dexcom G7 CGM with phone (connected to clinic profile).  The blood glucose tracings were evaluated for two weeks prior to office visit. Blood glucose tracings demonstrated areas of hyperglycemia throughout the entire day, no specific pattern. There were no areas of hypoglycemia notedduring the weeks of evaluation.    Date: 9/28-10/11/24: TIR 31%, GMI 8.7% ,  low 0% , very low 0%, SD 70mg/dl, Sensor usage: 100%    Hypoglycemia: denies  Complication: no hospitalization and no ER visit since last office visit.      History/Other:    Allergies, PMH, SocHx and FHx reviewed and updated as appropriate in  Epic on    Insulin Aspart, w/Niacinamide, (FIASP FLEXTOUCH) 100 UNIT/ML Subcutaneous Solution Pen-injector Inject 8 units + sliding scale w/ bfast and lunch and inject 4 units( if glucose is above 140) + sliding scale w/ dinner ( For insurance purpose only: TDD max: 48 units /day) 45 mL 1    SOLIQUA 100-33 UNT-MCG/ML Subcutaneous Solution Pen-injector ADMINISTER 34 UNITS UNDER THE SKIN EVERY DAY 9 mL 3    Empagliflozin (JARDIANCE) 25 MG Oral Tab Take 1 tablet by mouth daily. 90 tablet 3    famotidine 40 MG Oral Tab Take 1 tablet (40 mg total) by mouth daily. 90 tablet 3    metFORMIN  MG Oral Tablet 24 Hr Take 2 tablets (1,000 mg total) by mouth 2 (two) times daily with meals. 360 tablet 3    atorvastatin 10 MG Oral Tab Take 1 tablet (10 mg total) by mouth daily. 90 tablet 3    Insulin Pen Needle (BD PEN NEEDLE SHORT U/F) 31G X 8 MM Does not apply Misc 1 each daily. 100 each 0    PEG 3350-KCl-Na Bicarb-NaCl 420 g Oral Recon Soln Take as directed by physician. 4000 mL 0    Insulin Pen Needle (PEN NEEDLES) 32G X 4 MM Does not apply Misc 1 each  in the morning and 1 each before bedtime. 100 each 1    fluconazole 150 MG Oral Tab Take 1 tablet (150 mg total) by mouth as needed.      cyclobenzaprine 5 MG Oral Tab Take 1 tablet (5 mg total) by mouth nightly as needed for Muscle spasms. 10 tablet 0    nystatin 100,000 Units/g External Ointment Apply 1 applicator topical twice daily for 4 weeks, then twice daily every other day for 2 weeks, then as needed. 15 g 0    estradiol 0.1 MG/GM Vaginal Cream Place 1 g vaginally nightly. 42.5 g 2    montelukast 10 MG Oral Tab Take 1 tablet (10 mg total) by mouth daily as needed. 90 tablet 3    sertraline 100 MG Oral Tab Take 1 tablet (100 mg total) by mouth daily. 90 tablet 3    Glucose Blood (ONETOUCH VERIO) In Vitro Strip TEST THREE TIMES DAILY 300 strip 6    FLUTICASONE PROPIONATE 50 MCG/ACT Nasal Suspension SHAKE LIQUID AND USE 2 SPRAYS IN EACH NOSTRIL DAILY 48 g 0     Metoprolol Succinate 25 MG Oral Capsule ER 24 Hour Sprinkle 1 tablet ONCE DAILY (route: oral)      aspirin 81 MG Oral Chew Tab Chew 1 tablet (81 mg total) by mouth daily.      ALPRAZOLAM 0.5 MG Oral Tab TAKE 1 TABLET(0.5 MG) BY MOUTH DAILY AS NEEDED FOR ANXIETY OR SLEEP 30 tablet 0    Acetaminophen 500 MG Oral Cap Take 2 capsules (1,000 mg total) by mouth daily as needed.       Allergies   Allergen Reactions    Codeine NAUSEA ONLY    Vicodin [Hydrocodone-Acetaminophen] NAUSEA ONLY     TABS     Current Outpatient Medications   Medication Sig Dispense Refill    Insulin Aspart, w/Niacinamide, (FIASP FLEXTOUCH) 100 UNIT/ML Subcutaneous Solution Pen-injector Inject 8 units + sliding scale w/ bfast and lunch and inject 4 units( if glucose is above 140) + sliding scale w/ dinner ( For insurance purpose only: TDD max: 48 units /day) 45 mL 1    SOLIQUA 100-33 UNT-MCG/ML Subcutaneous Solution Pen-injector ADMINISTER 34 UNITS UNDER THE SKIN EVERY DAY 9 mL 3    Empagliflozin (JARDIANCE) 25 MG Oral Tab Take 1 tablet by mouth daily. 90 tablet 3    famotidine 40 MG Oral Tab Take 1 tablet (40 mg total) by mouth daily. 90 tablet 3    metFORMIN  MG Oral Tablet 24 Hr Take 2 tablets (1,000 mg total) by mouth 2 (two) times daily with meals. 360 tablet 3    atorvastatin 10 MG Oral Tab Take 1 tablet (10 mg total) by mouth daily. 90 tablet 3    Insulin Pen Needle (BD PEN NEEDLE SHORT U/F) 31G X 8 MM Does not apply Misc 1 each daily. 100 each 0    PEG 3350-KCl-Na Bicarb-NaCl 420 g Oral Recon Soln Take as directed by physician. 4000 mL 0    Insulin Pen Needle (PEN NEEDLES) 32G X 4 MM Does not apply Misc 1 each  in the morning and 1 each before bedtime. 100 each 1    fluconazole 150 MG Oral Tab Take 1 tablet (150 mg total) by mouth as needed.      cyclobenzaprine 5 MG Oral Tab Take 1 tablet (5 mg total) by mouth nightly as needed for Muscle spasms. 10 tablet 0    nystatin 100,000 Units/g External Ointment Apply 1 applicator topical  twice daily for 4 weeks, then twice daily every other day for 2 weeks, then as needed. 15 g 0    estradiol 0.1 MG/GM Vaginal Cream Place 1 g vaginally nightly. 42.5 g 2    montelukast 10 MG Oral Tab Take 1 tablet (10 mg total) by mouth daily as needed. 90 tablet 3    sertraline 100 MG Oral Tab Take 1 tablet (100 mg total) by mouth daily. 90 tablet 3    Glucose Blood (ONETOUCH VERIO) In Vitro Strip TEST THREE TIMES DAILY 300 strip 6    FLUTICASONE PROPIONATE 50 MCG/ACT Nasal Suspension SHAKE LIQUID AND USE 2 SPRAYS IN EACH NOSTRIL DAILY 48 g 0    Metoprolol Succinate 25 MG Oral Capsule ER 24 Hour Sprinkle 1 tablet ONCE DAILY (route: oral)      aspirin 81 MG Oral Chew Tab Chew 1 tablet (81 mg total) by mouth daily.      ALPRAZOLAM 0.5 MG Oral Tab TAKE 1 TABLET(0.5 MG) BY MOUTH DAILY AS NEEDED FOR ANXIETY OR SLEEP 30 tablet 0    Acetaminophen 500 MG Oral Cap Take 2 capsules (1,000 mg total) by mouth daily as needed.      insulin glargine (LANTUS SOLOSTAR) 100 UNIT/ML Subcutaneous Solution Pen-injector Inject 37 Units into the skin daily. ( For insurance purposes only: TDD max 50 units per day) 45 mL 0     Past Medical History:    Abdominal hernia    Abdominal pain    Anemia    Anesthesia complication    slow to arouse    Anxiety    Arthritis    Atherosclerosis of coronary artery    Atherosclerotic heart disease of native coronary artery without angina pectoris    Back pain    Bad breath    Belching    Bilateral leg edema    Bloating    Blurred vision    Change in hair    Constipation    Diabetes mellitus (HCC)    Diarrhea, unspecified    Dizziness    Easy bruising    Fatigue    Feeling lonely    Flatulence/gas pain/belching    Food intolerance    Frequent use of laxatives    Hearing loss    High blood pressure    High cholesterol    History of depression    History of eating disorder    Indigestion    Irregular bowel habits    Itch of skin    Leg swelling    Loss of appetite    Nausea    Neuropathy    maria eugenia feet    Night  sweats    Osteoarthrosis, unspecified whether generalized or localized, unspecified site    Osteoporosis    Other and unspecified hyperlipidemia    Pain in joints    Peripheral neuropathy    Problems with swallowing    Sleep disturbance    Stented coronary artery    Stress    Type II or unspecified type diabetes mellitus without mention of complication, not stated as uncontrolled    Uncomfortable fullness after meals    Venous insufficiency of both lower extremities    Visual impairment    glasses    Vomiting    Wears glasses    Weight loss     Family History   Problem Relation Age of Onset    Heart Attack Father         AMI    Hypertension Father     Diabetes Sister     Pancreatic Cancer Sister     Diabetes Brother     Diabetes Daughter      Social history: Reviewed.      ROS/Exam    REVIEW OF SYSTEMS: Ten point review of systems has been performed and is otherwise negative and/or non-contributory, except as described above.     VITALS  Vitals:    10/11/24 1507   BP: 128/74   Pulse: 71   SpO2: 97%   Weight: 184 lb (83.5 kg)   Height: 5' 5\" (1.651 m)         Wt Readings from Last 6 Encounters:   10/11/24 184 lb (83.5 kg)   10/08/24 184 lb (83.5 kg)   09/30/24 185 lb (83.9 kg)   09/03/24 184 lb (83.5 kg)   08/23/24 184 lb (83.5 kg)   08/22/24 184 lb 8.4 oz (83.7 kg)       PHYSICAL EXAM  CONSTITUTIONAL:  awake, alert, cooperative, no apparent distress, and appears stated age Vss stable   PSYCH: normal affect  LUNGS: breathing comfortably  CARDIOVASCULAR:  regular rate   NECK:  no palpable thyroid nodules     Labs/Imaging:     Lab Results   Component Value Date    CHOLEST 158 05/10/2024    CHOLEST 175 06/10/2023    TRIG 245 (H) 05/10/2024    TRIG 234 (H) 06/10/2023    HDL 44 05/10/2024    HDL 45 06/10/2023    LDL 74 05/10/2024    LDL 91 06/10/2023     Lab Results   Component Value Date    MICROALBCREA 27.1 05/10/2024    MICROALBCREA 14.5 06/10/2023      Lab Results   Component Value Date    CREATSERUM 0.86 05/10/2024     CREATSERUM 0.96 06/10/2023    EGFRCR 73 05/10/2024    EGFRCR 64 06/10/2023     Lab Results   Component Value Date    AST 14 (L) 05/10/2024    AST 18 06/10/2023    ALT 21 05/10/2024    ALT 22 06/10/2023       Lab Results   Component Value Date    TSH 1.080 05/10/2024    TSH 2.010 12/31/2020    T4F 1.1 03/14/2019         Overall glucose control:  Lab Results   Component Value Date    A1C 7.9 (A) 08/23/2024    A1C 9.3 (H) 05/10/2024    A1C 8.6 (H) 06/10/2023    A1C 8.2 (H) 12/02/2022    A1C 8.0 (H) 07/30/2022         Narrative   PROCEDURE:  CT ABDOMEN+PELVIS (CONTRAST ONLY) (CPT=74177)     LOCATION:                                           COMPARISON:  [facility], MR, MRI ABDOMEN&MRCP W/3D (W+W0)(CPT=74183/05823), 8/25/2020, 11:34 AM.     INDICATIONS:  R10.9 Left sided abdominal pain R34 Decreased urine output R30.0 Dysuria R10.9 Acute left flank pain     TECHNIQUE:  CT scanning was performed from the dome of the diaphragm to the pubic symphysis with non-ionic intravenous contrast material. Post contrast coronal MPR imaging was performed.  Dose reduction techniques were used. Dose information is  transmitted to the ACR (American College of Radiology) NRDR (National Radiology Data Registry) which includes the Dose Index Registry.     PATIENT STATED HISTORY:(As transcribed by Technologist)  Patient complain of pain left upper quadrant radiating laterally with urinary urgency, also microscopic hematuria, hx of UTI (2)weeks ago.      CONTRAST USED:  100cc of Isovue 370     FINDINGS:    LIVER:  No enlargement, atrophy, abnormal density, or significant focal lesion.    BILIARY:  The common bile duct is prominent size measuring up to 11 mm which is stable from prior MRI.  The gallbladder is present.  PANCREAS:  Prior MRI had demonstrated questionable small cystic foci in the region of the pancreatic tail.  These are not well up seen on today's exam.  SPLEEN:  No enlargement or focal lesion.    KIDNEYS:  No evidence of  hydronephrosis or renal stones.  There are probable areas of focal cortical parenchymal loss present.  ADRENALS:  No mass or enlargement.    AORTA/VASCULAR:  No aneurysm or dissection.    RETROPERITONEUM:  No mass or adenopathy.    BOWEL/MESENTERY:  No dilated loops of small bowel are seen.  Portions of bowel are decompressed, limiting evaluation.  There is a normal-appearing appendix.  No free fluid is seen.  No evidence of obstruction.  ABDOMINAL WALL:  No mass or hernia.    URINARY BLADDER:  No visible focal wall thickening, lesion, or calculus.    PELVIC NODES:  No adenopathy.    PELVIC ORGANS:  No visible mass.  Pelvic organs appropriate for patient age.    BONES:  Streak artifact from a right hip prosthesis somewhat limits evaluation.  Chronic appearing grade 1 anterolisthesis of L4 on L5 is noted.  LUNG BASES:  No visible pulmonary or pleural disease.    OTHER:  Negative.                     Impression   CONCLUSION:  No clear etiology of the patient's symptoms.  No free fluid is seen within the abdomen or pelvis.  There is a normal-appearing appendix.     There is dilatation of the common bile duct measuring up to 11 mm which is similar to prior exams.     A prior MRI exam had demonstrated possible small cystic foci in the region of the pancreatic tail.  These are not well seen on today's exam.        Dictated by (CST): Tevin Lazcano MD on 3/02/2023 at 8:12 PM      Finalized by (CST): Tevin Lazcano MD on 3/02/2023 at 8:17 PM        PROCEDURE:  MRI ABDOMEN&MRCP W/3D (W+W0)(CPT=74183/28206)     COMPARISON:  [facility] HEPATOBILIARY SCAN WITH PHARMACY  (CPT=78227), 9/11/2015, 8:40 AM.  MR, MRI MRCP W/3D ONLY (CPT=76376/51058), 9/10/2015, 7:12 PM.  [facility], CT, CT ABDOMEN+PELVIS KIDNEYSTONE 2D RNDR(NO IV,NO ORAL)(CPT=74176), 4/21/2020,   1:28 PM.     INDICATIONS:  K86.2 Cyst of pancreas     TECHNIQUE:  Multiplanar single shot fast spin echo, chemical shift imaging, breath hold T2 weighted images and 2-D  fiesta sequences were acquired. Fluid sensitive imaging with MRCP reconstruction was also performed including 3D multi-projection imaging  (non independent workstation).  Both projection and source MRCP images are evaluated.  Subsequent post contrast imaging was performed after intravenous administration of paramagnetic contrast agent.     3-D RENDERING:  Three dimensional image processing was completed using a separate workstation under concurrent supervision. Images were archived.     PATIENT STATED HISTORY:(As transcribed by Technologist)  Pancreatic cyst.      CONTRAST USED:  17mL of Dotarem     FINDINGS:    LIVER:  No enlargement, atrophy, abnormal density, or significant focal lesion.    BILIARY:  No filling defect.  Common duct is ectatic measuring up to 1 cm, stable.  PANCREAS:  Within the pancreatic tail are 2 adjacent cystic foci, 1 which measures 8 x 7 vs 8 x 6 mm and 5 x 2 vs 3 x 3 mm without significant change dating back to most recent MRI performed 9/10/2015.  No ductal dilatation or atrophy.    SPLEEN:  No enlargement or focal lesion.    KIDNEYS:  No mass or obstruction.    ADRENALS:  No mass or enlargement.    AORTA/VASCULAR:  No aneurysm or dissection.    RETROPERITONEUM:  No mass or adenopathy.    BOWEL/MESENTERY:  Normal caliber small and large bowel.  Duodenal diverticulum incidentally noted.    ABDOMINAL WALL:  No mass or hernia.    BONES:  No bony lesion or fracture.    LUNG BASES:  No visible pleural disease.  Lung bases not well assessed with MRI.    OTHER:  No free fluid.                     Impression   CONCLUSION:    1. No significant change, allowing for differences in technique including small cystic foci involving the pancreatic tail when compared to most recent MRI/MRCP performed 9/10/2015.        Dictated by (CST): Priti Frausto MD on 8/25/2020 at 12:53 PM      Finalized by (CST): Priti Frausto MD on 8/25/2020 at 2:10 PM        Supplementary Documentation:   -Surveillance for Diabetes  Complications & Risks  Foot exam/neuropathy: No data recorded    Retinopathy screening: Last Dilated Eye Exam: 08/26/24  Eye Exam shows Diabetic Retinopathy?: Yes      Assessment & Plan:     ICD-10-CM    1. Type 2 diabetes mellitus with diabetic neuropathy, with long-term current use of insulin (HCC)  E11.40 Insulin Aspart, w/Niacinamide, (FIASP FLEXTOUCH) 100 UNIT/ML Subcutaneous Solution Pen-injector    Z79.4 GLUC MNTR CONT REC FROM NTRSTL TISS FLU PHYS I&R      2. Pancreatic cyst (HCC)  K86.2       3. Essential hypertension  I10       4. Mixed hyperlipidemia  E78.2 Direct LDL      5. BMI 30.0-30.9,adult  Z68.30             Kaylin Bledsoe is a pleasant 69 year old female here for evaluation of:    #Diabetes w/ neuropathy - PMHx of Type 2 diabetes mellitus diagnosed since 1992. Type 1 ABs all negative and  c peptide detected last 8/23/24 Last A1c value was 7.9% done 8/23/2024.A1c above goal, reviewed dexcom   Date: 9/28-10/11/24: TIR 31%, GMI 8.7% ,  low 0% , very low 0%, SD 70mg/dl, Sensor usage: 100%. GMI trending up.   Goal of A1c< 7%-  Importance of better glucose control in preventing onset/progression of end-organ damage discussed, as well as goals of therapy and clinical significance of A1C.    -  Per daughter pt has CT or MRI of  abdomens scheduled since in CT in 2023, the pancreatic cyst did not show, which was present in the MRI in 2020.   Adding fiasp at lunch to lower A1c. Due to hx of pancreatitis, will stop soliqua and utilize basal insulin- lantus     - med changes:  - I left her a voicemail after the visit was done and also forwarded to endo staff:  -stop soliqua. Start Lantus 37 units daily  - Continue Jardiance 25 mg per day and Metformin 1000 mg twice a day  - Fiasp increase from 6 to 8 units w/ bfast and lunch + sliding scale 1: 40>140   - take  Fiasp 4 units  if glucose is above 140 at dinner + sliding scale 1: 40> 140  - Call the office next week and I will review your dexcom and I will  adjust your insulin.   - Call if any persistent low blood sugars < 80 or high sugars > 250 mg/dl   - Targeted fasting glucose (at least 8-10 hrs of no food/sweetened beverage intake)< 130 and 2 hrs after lunch or dinner< 180  and prior lunch or dinner <140  -  Follow 15g/15 min rule if you develop any hypoglycemia symptoms w/ glucose <70  but if glucose <55 follow 30g/15 min rule. Once glucose above 80, eat a protein or food w protein.   - continue Dexcom G7 CGM with phone (connected to clinic profile)  - meet with endo DM educator re:in 3 weeks for carb awareness discussion    -See above header \"Supplementary Documentation\" for surveillance for diabetes complications & risks  Discussed targeted glucose levels and symptoms and treatment of hypoglycemia.   - no wound reported per pt, will check feet on next visit    #Nephropathy screening  Lab Results   Component Value Date    EGFRCR 73 05/10/2024    MICROALBCREA 27.1 05/10/2024     #Pancreatic Cyst  Pt's brother had a hx of pancreatic cancer.   Noted pancreatic cyst in MRI of abd  in 2020 and in CT abd in 2023 pancreatic cyst not well seen . Per daughter pt had colonoscopy on Oct 4, 2024 w/ Dr. Scott, GI.   Recommend to follow up w/ GI .     #HTN: SBP is to goal <130   BP Readings from Last 1 Encounters:   10/11/24 128/74   BP Meds: Metoprolol Succinate Cs24 - 25 MG     #Hyperlipidemia/Lipids: LDL is not to goal , repeating direct LDL that is fasting. Pt has hx of stent thus tighter control of LDL is needed.   Lab Results   Component Value Date    LDL 74 05/10/2024    TRIG 245 (H) 05/10/2024   Cholesterol Meds: atorvastatin Tabs - 10 MG  per cardiologist. Will order direct LDL     #BMI 30  - on Soliqua, Balanced diet: carbohydrates, protein, healthy fats and fiber in each meal. Exercise of at least 150 mins each week. Decrease your simple carbohydrates intake such as sweets: cookies, soda, candy, white rice, white pasta, syrups      Total time spent  60 minutes  today on obtaining history, reviewing pertinent labs, evaluating patient, providing multiple treatment options, reinforcing diet/exercise and compliance, and completing documentation, of which greater than 50% was spent in face to face discussion with the patient       Case discussed with patient and his daughter who demonstrated understanding and agreement with plan.     Thank you for allowing me to participate in the care of this patient.  Please feel free to contact me with any questions.    JESÚS Wood, [facility]  Endocrinology, Diabetes & Metabolism   10/11/24      Note to patient: The 21 Century Cures Act makes medical notes like these available to patients in the interest of transparency. However, be advised this is a medical document. It is intended as peer to peer communication. It is written in medical language and may contain abbreviations or verbiage that are unfamiliar. It may appear blunt or direct. Medical documents are intended to carry relevant information, facts as evident, and the clinical opinion of the practitioner.

## 2024-10-12 ENCOUNTER — TELEPHONE (OUTPATIENT)
Dept: INTERNAL MEDICINE CLINIC | Facility: CLINIC | Age: 69
End: 2024-10-12

## 2024-10-12 NOTE — TELEPHONE ENCOUNTER
Caryn faxed a diabetic standard written order to be sign and fax to the number [phone]. Placed in A.D bin to be sign.

## 2024-10-13 ENCOUNTER — TELEPHONE (OUTPATIENT)
Facility: CLINIC | Age: 69
End: 2024-10-13

## 2024-10-13 DIAGNOSIS — Z79.4 TYPE 2 DIABETES MELLITUS WITH DIABETIC NEUROPATHY, WITH LONG-TERM CURRENT USE OF INSULIN (HCC): Primary | ICD-10-CM

## 2024-10-13 DIAGNOSIS — E11.40 TYPE 2 DIABETES MELLITUS WITH DIABETIC NEUROPATHY, WITH LONG-TERM CURRENT USE OF INSULIN (HCC): Primary | ICD-10-CM

## 2024-10-13 RX ORDER — INSULIN GLARGINE 100 [IU]/ML
37 INJECTION, SOLUTION SUBCUTANEOUS EVERY 24 HOURS
Qty: 45 ML | Refills: 0 | Status: SHIPPED | OUTPATIENT
Start: 2024-10-13 | End: 2025-01-11

## 2024-10-13 NOTE — TELEPHONE ENCOUNTER
Pls call pt. I tried to call her today but message went to voicemail I left her a voicemail.  She is a Korean speaking patient and her daughter speaks english->>Change of plan in regards of soliqua use:   - stop soliqua ( due to her hx of pancreatitis in the past, soliqua was given originally by JAIR Downs)  -  Start Lantus 37 units daily ( I sent a new script)  --> The rest of what I said last Friday is still the same in terms of the ffg:  - Continue Jardiance 25 mg per day and Metformin 1000 mg twice a day  - Fiasp increase from 6 to 8 units w/ bfast and lunch + sliding scale 1: 40>140   - take  Fiasp 4 units  if glucose is above 140 at dinner + sliding scale 1: 40> 140  - Call the office next week and I will review your dexcom and I will adjust your insulin.

## 2024-10-14 NOTE — TELEPHONE ENCOUNTER
RN phoned patients daughter servando Ngo per [facility]EM[facility] and reviewed note below to verbalized understanding    STOP solique and start daily Lantus--> 37 units daily.     Patient had office visit 10/11 with Acacia Sales.     Her next shipment for [employer] is due for 12/4- please sent recent chart notes to [employer] Fax number: [phone]     Office notes faxed to [employer], awaiting confirmation.

## 2024-10-16 ENCOUNTER — TELEPHONE (OUTPATIENT)
Facility: CLINIC | Age: 69
End: 2024-10-16

## 2024-10-16 DIAGNOSIS — Z79.4 TYPE 2 DIABETES MELLITUS WITH DIABETIC NEUROPATHY, WITH LONG-TERM CURRENT USE OF INSULIN (HCC): Primary | ICD-10-CM

## 2024-10-16 DIAGNOSIS — E11.40 TYPE 2 DIABETES MELLITUS WITH DIABETIC NEUROPATHY, WITH LONG-TERM CURRENT USE OF INSULIN (HCC): Primary | ICD-10-CM

## 2024-10-16 NOTE — TELEPHONE ENCOUNTER
10/16/24-Received via fax from [facility] a PA for Lantus Solostar pen inj 3ml.  Placed in PA in basket.

## 2024-10-17 RX ORDER — INSULIN GLARGINE 100 [IU]/ML
INJECTION, SOLUTION SUBCUTANEOUS
Qty: 45 ML | Refills: 0 | Status: SHIPPED | OUTPATIENT
Start: 2024-10-17

## 2024-10-17 NOTE — TELEPHONE ENCOUNTER
Received via fax from pts pharmacy a request for drug change.  Per request LANTUS SOLOSTAR is not covered by plan and alternative medications include BASAGLAR KWIKPEN or TRESIBA FLEXTOUCH.    Will route to APN for approval.

## 2024-10-21 DIAGNOSIS — E11.40 TYPE 2 DIABETES MELLITUS WITH DIABETIC NEUROPATHY, WITH LONG-TERM CURRENT USE OF INSULIN (HCC): ICD-10-CM

## 2024-10-21 DIAGNOSIS — Z79.4 TYPE 2 DIABETES MELLITUS WITH DIABETIC NEUROPATHY, WITH LONG-TERM CURRENT USE OF INSULIN (HCC): ICD-10-CM

## 2024-10-21 RX ORDER — INSULIN GLARGINE 100 [IU]/ML
INJECTION, SOLUTION SUBCUTANEOUS
Qty: 45 ML | Refills: 0 | OUTPATIENT
Start: 2024-10-21

## 2024-11-16 ENCOUNTER — HOSPITAL ENCOUNTER (OUTPATIENT)
Dept: MRI IMAGING | Facility: HOSPITAL | Age: 69
Discharge: HOME | End: 2024-11-16
Attending: INTERNAL MEDICINE
Payer: MEDICARE

## 2024-11-16 DIAGNOSIS — K86.2 PANCREATIC CYST (HCC): ICD-10-CM

## 2024-11-16 PROCEDURE — A9575 INJ GADOTERATE MEGLUMI 0.1ML: HCPCS | Performed by: INTERNAL MEDICINE

## 2024-11-16 PROCEDURE — 74183 MRI ABD W/O CNTR FLWD CNTR: CPT | Performed by: INTERNAL MEDICINE

## 2024-11-16 RX ORDER — GADOTERATE MEGLUMINE 376.9 MG/ML
20 INJECTION INTRAVENOUS
Status: COMPLETED | OUTPATIENT
Start: 2024-11-16 | End: 2024-11-16

## 2024-11-16 RX ADMIN — GADOTERATE MEGLUMINE 20 ML: 376.9 INJECTION INTRAVENOUS at 16:03:00

## 2024-11-26 RX ORDER — ATORVASTATIN CALCIUM 10 MG/1
10 TABLET, FILM COATED ORAL DAILY
Qty: 90 TABLET | Refills: 3 | OUTPATIENT
Start: 2024-11-26

## 2024-12-03 ENCOUNTER — TELEPHONE (OUTPATIENT)
Dept: INTERNAL MEDICINE CLINIC | Facility: CLINIC | Age: 69
End: 2024-12-03

## 2024-12-06 ENCOUNTER — PATIENT MESSAGE (OUTPATIENT)
Dept: INTERNAL MEDICINE CLINIC | Facility: CLINIC | Age: 69
End: 2024-12-06

## 2024-12-06 RX ORDER — SERTRALINE HYDROCHLORIDE 100 MG/1
100 TABLET, FILM COATED ORAL DAILY
Qty: 30 TABLET | Refills: 0 | Status: SHIPPED | OUTPATIENT
Start: 2024-12-06

## 2024-12-06 RX ORDER — MONTELUKAST SODIUM 10 MG/1
10 TABLET ORAL
Qty: 90 TABLET | Refills: 3 | OUTPATIENT
Start: 2024-12-06

## 2024-12-06 RX ORDER — SERTRALINE HYDROCHLORIDE 100 MG/1
100 TABLET, FILM COATED ORAL DAILY
Qty: 90 TABLET | Refills: 3 | OUTPATIENT
Start: 2024-12-06

## 2024-12-06 RX ORDER — MONTELUKAST SODIUM 10 MG/1
10 TABLET ORAL
Qty: 30 TABLET | Refills: 0 | Status: SHIPPED | OUTPATIENT
Start: 2024-12-06

## 2024-12-06 NOTE — TELEPHONE ENCOUNTER
Please review; protocol failed/ has no protocol      Message sent for patient to make an appointment.     Requested Prescriptions   Pending Prescriptions Disp Refills    SERTRALINE 100 MG Oral Tab [Pharmacy Med Name: SERTRALINE 100MG TABLETS] 90 tablet 3     Sig: TAKE 1 TABLET(100 MG) BY MOUTH DAILY       Psychiatric Non-Scheduled (Anti-Anxiety) Failed - 12/6/2024  2:06 PM        Failed - In person appointment or virtual visit in the past 6 mos or appointment in next 3 mos     Recent Outpatient Visits              1 month ago Type 2 diabetes mellitus with diabetic neuropathy, with long-term current use of insulin ([facility])    [facility] Acacia Sales APRN    Office Visit    1 month ago Closed nondisplaced fracture of fifth metatarsal bone of left foot with routine healing, subsequent encounter    [address]    Office Visit    2 months ago Type 2 diabetes mellitus with diabetic neuropathy, with long-term current use of insulin ([facility])    [facility]    Nurse Only    3 months ago Closed nondisplaced fracture of fifth metatarsal bone of left foot, initial encounter    [facility], [address] [place] [address], [place]    Office Visit    3 months ago Type 2 diabetes mellitus with diabetic neuropathy, with long-term current use of insulin ([facility])    [facility] Acacia Sales APRN    Office Visit          Future Appointments         Provider Department Appt Notes    In 1 week Acacia Sales APRN [facility] 6 weeks f/u-Type 2 diabetes mellitus with diabetic neuropathy, with long-term current use of insulin  LOV 10/11/24                    Passed - Depression Screening completed within the past 12 months          MONTELUKAST 10 MG Oral Tab [Pharmacy Med Name:  MONTELUKAST 10MG TABLETS] 90 tablet 3     Sig: TAKE 1 TABLET(10 MG) BY MOUTH DAILY AS NEEDED       Asthma & COPD Medication Protocol Failed - 12/6/2024  2:06 PM        Failed - ACT Score greater than or equal to 20                Failed - Appointment in past 6 or next 3 months      Recent Outpatient Visits              1 month ago Type 2 diabetes mellitus with diabetic neuropathy, with long-term current use of insulin ([facility]    Office Visit    1 month ago Closed nondisplaced fracture of fifth metatarsal bone of left foot with routine healing, subsequent encounter    [facility], [address] [place] [address] Lindsay Agrawal, DPM    Office Visit    2 months ago Type 2 diabetes mellitus with diabetic neuropathy, with long-term current use of insulin ([facility])    [facility]    Nurse Only    3 months ago Closed nondisplaced fracture of fifth metatarsal bone of left foot, initial encounter    [facility], [address] [place] [address], [place], FREDM    Office Visit    3 months ago Type 2 diabetes mellitus with diabetic neuropathy, with long-term current use of insulin ([facility])    [facility] Acacia Sales APRN    Office Visit          Future Appointments         Provider Department Appt Notes    In 1 week Acacia Sales APRN [facility] 6 weeks f/u-Type 2 diabetes mellitus with diabetic neuropathy, with long-term current use of insulin  LOV 10/11/24                    Failed - ACT recorded in the last 12 months                   Recent Outpatient Visits              1 month ago Type 2 diabetes mellitus with diabetic neuropathy, with long-term current use of insulin ([facility])    [facility] Acacia Sales APRN    Office Visit    1 month ago  Closed nondisplaced fracture of fifth metatarsal bone of left foot with routine healing, subsequent encounter    [facility], [address] [place] [address] Lindsay Agrawal, ALYSSA    Office Visit    2 months ago Type 2 diabetes mellitus with diabetic neuropathy, with long-term current use of insulin ([facility])    [facility]    Nurse Only    3 months ago Closed nondisplaced fracture of fifth metatarsal bone of left foot, initial encounter    [address] ALY[address]A    Office Visit    3 months ago Type 2 diabetes mellitus with diabetic neuropathy, with long-term current use of insulin ([facility])    [facility] Acacia Sales APRN    Office Visit          Future Appointments         Provider Department Appt Notes    In 1 week Acacia Sales APRN [facility] 6 weeks f/u-Type 2 diabetes mellitus with diabetic neuropathy, with long-term current use of insulin  LOV 10/11/24

## 2024-12-09 NOTE — TELEPHONE ENCOUNTER
Future Appointments   Date Time Provider Department Center   12/11/2024  9:00 AM Lacey Canas APRN [facility]   12/13/2024 11:00 AM Acacia Sales APRN [facility]

## 2024-12-10 NOTE — PROGRESS NOTES
Kaylin Bledsoe is a 69 year old female.    Chief Complaint   Patient presents with    Follow - Up     Yb rm 11 follow up        HPI:   here for follow up.  Leaving for [place] this week  will be there until Feb.    Diabetes.  Following with [facility] and previously with Ashli Downs.  Insulin jardiance metformin   last A1c 7.9 in August.  From 9.3.  Has f/u with Endo 12/13.  No ACE/arb but urine micro stable.    HTN  Toprol     Dyslipidemia.  Atorvastatin 10mg.      Anxiety  sertraline 100mg.  Only received 30 days  will print for fill at [facility] if needed to get 90 days before she leaves.    Allergies.  Singulair.  Same  only received 30 days.  Printed for [facility].       CAD with hx MI.  [facility].      [facility].    Dr Agrawal for metatarsal fracture.     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)    Osteopenia    Anxiety and depression    LBBB (left bundle branch block)    Type 2 diabetes mellitus with diabetic neuropathy, with long-term current use of insulin (HCC)    Pancreatic cyst (HCC)    Coronary artery disease involving native coronary artery of native heart without angina pectoris    S/P coronary artery stent placement    Venous insufficiency of both lower extremities    Varicose veins of both legs with edema    Gastroesophageal reflux disease without esophagitis    Benign neoplasm of ascending colon    Benign neoplasm of transverse colon    Left total knee replacement  Global 10/15/2020    S/P ORIF (open reduction internal fixation) fracture    Hammer toe of left foot    Bunion, right    Arthritis, midfoot    Class 1 obesity    Hx of colonic polyps     Current Outpatient Medications   Medication Sig Dispense Refill    metoprolol succinate ER 25 MG Oral Tablet 24 Hr       famotidine 40 MG Oral Tab Take 1 tablet (40 mg total) by mouth daily. 90 tablet 3    montelukast 10 MG Oral Tab Take 1  tablet (10 mg total) by mouth daily as needed. 90 tablet 1    sertraline 100 MG Oral Tab Take 1 tablet (100 mg total) by mouth daily. 90 tablet 1    atorvastatin 10 MG Oral Tab Take 1 tablet (10 mg total) by mouth daily. 90 tablet 3    insulin glargine (BASAGLAR KWIKPEN) 100 UNIT/ML Subcutaneous Solution Pen-injector Inject 37 units every 24 hours. ( For insurance purposes only: TDD max:  50 units/day) 45 mL 0    Insulin Aspart, w/Niacinamide, (FIASP FLEXTOUCH) 100 UNIT/ML Subcutaneous Solution Pen-injector Inject 8 units + sliding scale w/ bfast and lunch and inject 4 units( if glucose is above 140) + sliding scale w/ dinner ( For insurance purpose only: TDD max: 48 units /day) 45 mL 1    Empagliflozin (JARDIANCE) 25 MG Oral Tab Take 1 tablet by mouth daily. 90 tablet 3    metFORMIN  MG Oral Tablet 24 Hr Take 2 tablets (1,000 mg total) by mouth 2 (two) times daily with meals. 360 tablet 3    Insulin Pen Needle (BD PEN NEEDLE SHORT U/F) 31G X 8 MM Does not apply Misc 1 each daily. 100 each 0    PEG 3350-KCl-Na Bicarb-NaCl 420 g Oral Recon Soln Take as directed by physician. 4000 mL 0    Insulin Pen Needle (PEN NEEDLES) 32G X 4 MM Does not apply Misc 1 each  in the morning and 1 each before bedtime. 100 each 1    fluconazole 150 MG Oral Tab Take 1 tablet (150 mg total) by mouth as needed.      nystatin 100,000 Units/g External Ointment Apply 1 applicator topical twice daily for 4 weeks, then twice daily every other day for 2 weeks, then as needed. 15 g 0    estradiol 0.1 MG/GM Vaginal Cream Place 1 g vaginally nightly. 42.5 g 2    Glucose Blood (ONETOUCH VERIO) In Vitro Strip TEST THREE TIMES DAILY 300 strip 6    FLUTICASONE PROPIONATE 50 MCG/ACT Nasal Suspension SHAKE LIQUID AND USE 2 SPRAYS IN EACH NOSTRIL DAILY 48 g 0    Metoprolol Succinate 25 MG Oral Capsule ER 24 Hour Sprinkle 1 tablet ONCE DAILY (route: oral)      aspirin 81 MG Oral Chew Tab Chew 1 tablet (81 mg total) by mouth daily.      ALPRAZOLAM 0.5  MG Oral Tab TAKE 1 TABLET(0.5 MG) BY MOUTH DAILY AS NEEDED FOR ANXIETY OR SLEEP 30 tablet 0    Acetaminophen 500 MG Oral Cap Take 2 capsules (1,000 mg total) by mouth daily as needed.        Past Medical History:    Abdominal hernia    Abdominal pain    Anemia    Anesthesia complication    slow to arouse    Anxiety    Arthritis    Atherosclerosis of coronary artery    Atherosclerotic heart disease of native coronary artery without angina pectoris    Back pain    Bad breath    Belching    Bilateral leg edema    Bloating    Blurred vision    Change in hair    Constipation    Diabetes mellitus (HCC)    Diarrhea, unspecified    Dizziness    Easy bruising    Fatigue    Feeling lonely    Flatulence/gas pain/belching    Food intolerance    Frequent use of laxatives    Hearing loss    High blood pressure    High cholesterol    History of depression    History of eating disorder    Indigestion    Irregular bowel habits    Itch of skin    Leg swelling    Loss of appetite    Nausea    Neuropathy    maria eugenia feet    Night sweats    Osteoarthrosis, unspecified whether generalized or localized, unspecified site    Osteoporosis    Other and unspecified hyperlipidemia    Pain in joints    Peripheral neuropathy    Problems with swallowing    Sleep disturbance    Stented coronary artery    Stress    Type II or unspecified type diabetes mellitus without mention of complication, not stated as uncontrolled    Uncomfortable fullness after meals    Venous insufficiency of both lower extremities    Visual impairment    glasses    Vomiting    Wears glasses    Weight loss      Social History:  Social History     Socioeconomic History    Marital status:    Tobacco Use    Smoking status: Never    Smokeless tobacco: Never   Vaping Use    Vaping status: Never Used   Substance and Sexual Activity    Alcohol use: No    Drug use: No    Sexual activity: Not Currently     Birth control/protection: Hysterectomy   Other Topics Concern    Caffeine  Concern Yes     Comment: 1 cup daily     Exercise Yes    Seat Belt Yes     Family History   Problem Relation Age of Onset    Heart Attack Father         AMI    Hypertension Father     Diabetes Sister     Pancreatic Cancer Sister     Diabetes Brother     Diabetes Daughter         Allergies  Allergies[1]    REVIEW OF SYSTEMS:   GENERAL HEALTH: feels well otherwise  SKIN: itchy rash left shoulder  will use hydrocortisone.   RESPIRATORY: denies shortness of breath with exertion, no cough  CARDIOVASCULAR: denies chest pain on exertion, no palpatations  GI: denies abdominal pain and denies heartburn, no diarrhea or constipation  MUSCULOSKELETAL:  No arthralgias or myalgias  NEURO: denies headaches,     EXAM:   /80   Pulse 72   Temp 98 °F (36.7 °C) (Temporal)   Resp 14   Ht 5' 5\" (1.651 m)   Wt 184 lb (83.5 kg)   LMP 09/01/2017 (LMP Unknown)   SpO2 97%   BMI 30.62 kg/m²   GENERAL: well developed, well nourished,in no apparent distress  LUNGS: normal rate without respiratory distress, lungs clear to auscultation  CARDIO: RRR without murmur  EXTREMITIES: no edema, normal strength and tone  PSYCH: alert and oriented x 3  States done by Acacia but do not see documentation. Bilateral barefoot skin diabetic exam is normal, visualized feet and the appearance is normal.  Bilateral monofilament/sensation of both feet is normal.  Pulsation pedal pulse exam of both lower legs/feet is normal as well.      ASSESSMENT AND PLAN:     Encounter Diagnoses   Name Primary?    Diabetes mellitus type 2 with neurological manifestations (HCC)  improved  has upcoming appt with Acacia before she leaves this weeken.d  Yes    Type 2 diabetes mellitus with diabetic neuropathy, with long-term current use of insulin (HCC)     Coronary artery disease involving native coronary artery of native heart without angina pectoris  stable  needs refill of statin.       Essential hypertension stable   monitor.      Mixed hyperlipidemia     Anxiety and  depression  sertraline 100mg.  Doing well.  Needs 90 days  as above.  Will good Rx if not able to fill at [facility].      S/P coronary artery stent placement    Environmental allergies.  Singulair sent to pharmacy.  Will use Good Rx if not able to fill for 90 days at [facility].      No orders of the defined types were placed in this encounter.      Meds & Refills for this Visit:  Requested Prescriptions     Signed Prescriptions Disp Refills    famotidine 40 MG Oral Tab 90 tablet 3     Sig: Take 1 tablet (40 mg total) by mouth daily.    montelukast 10 MG Oral Tab 90 tablet 1     Sig: Take 1 tablet (10 mg total) by mouth daily as needed.    sertraline 100 MG Oral Tab 90 tablet 1     Sig: Take 1 tablet (100 mg total) by mouth daily.    atorvastatin 10 MG Oral Tab 90 tablet 3     Sig: Take 1 tablet (10 mg total) by mouth daily.       Imaging & Consults:  None    No follow-ups on file.  There are no Patient Instructions on file for this visit.         [1]   Allergies  Allergen Reactions    Codeine NAUSEA ONLY    Vicodin [Hydrocodone-Acetaminophen] NAUSEA ONLY     TABS

## 2024-12-11 ENCOUNTER — TELEPHONE (OUTPATIENT)
Dept: INTERNAL MEDICINE CLINIC | Facility: CLINIC | Age: 69
End: 2024-12-11

## 2024-12-11 ENCOUNTER — OFFICE VISIT (OUTPATIENT)
Dept: INTERNAL MEDICINE CLINIC | Facility: CLINIC | Age: 69
End: 2024-12-11
Payer: MEDICARE

## 2024-12-11 VITALS
HEIGHT: 65 IN | BODY MASS INDEX: 30.66 KG/M2 | RESPIRATION RATE: 14 BRPM | DIASTOLIC BLOOD PRESSURE: 80 MMHG | HEART RATE: 72 BPM | SYSTOLIC BLOOD PRESSURE: 126 MMHG | WEIGHT: 184 LBS | OXYGEN SATURATION: 97 % | TEMPERATURE: 98 F

## 2024-12-11 DIAGNOSIS — E87.5 HYPERKALEMIA: ICD-10-CM

## 2024-12-11 DIAGNOSIS — E78.2 MIXED HYPERLIPIDEMIA: ICD-10-CM

## 2024-12-11 DIAGNOSIS — F32.A ANXIETY AND DEPRESSION: ICD-10-CM

## 2024-12-11 DIAGNOSIS — E11.40 TYPE 2 DIABETES MELLITUS WITH DIABETIC NEUROPATHY, WITH LONG-TERM CURRENT USE OF INSULIN (HCC): ICD-10-CM

## 2024-12-11 DIAGNOSIS — Z00.00 ENCOUNTER FOR ANNUAL HEALTH EXAMINATION: ICD-10-CM

## 2024-12-11 DIAGNOSIS — E11.49 DIABETES MELLITUS TYPE 2 WITH NEUROLOGICAL MANIFESTATIONS (HCC): Primary | ICD-10-CM

## 2024-12-11 DIAGNOSIS — F41.9 ANXIETY AND DEPRESSION: ICD-10-CM

## 2024-12-11 DIAGNOSIS — Z95.5 S/P CORONARY ARTERY STENT PLACEMENT: ICD-10-CM

## 2024-12-11 DIAGNOSIS — I10 ESSENTIAL HYPERTENSION: ICD-10-CM

## 2024-12-11 DIAGNOSIS — I25.10 CORONARY ARTERY DISEASE INVOLVING NATIVE CORONARY ARTERY OF NATIVE HEART WITHOUT ANGINA PECTORIS: ICD-10-CM

## 2024-12-11 DIAGNOSIS — Z79.4 TYPE 2 DIABETES MELLITUS WITH DIABETIC NEUROPATHY, WITH LONG-TERM CURRENT USE OF INSULIN (HCC): ICD-10-CM

## 2024-12-11 PROCEDURE — G2211 COMPLEX E/M VISIT ADD ON: HCPCS | Performed by: NURSE PRACTITIONER

## 2024-12-11 PROCEDURE — 99214 OFFICE O/P EST MOD 30 MIN: CPT | Performed by: NURSE PRACTITIONER

## 2024-12-11 RX ORDER — MONTELUKAST SODIUM 10 MG/1
10 TABLET ORAL
Qty: 90 TABLET | Refills: 0 | Status: SHIPPED | OUTPATIENT
Start: 2024-12-11 | End: 2024-12-11

## 2024-12-11 RX ORDER — FAMOTIDINE 40 MG/1
40 TABLET, FILM COATED ORAL DAILY
Qty: 90 TABLET | Refills: 3 | Status: SHIPPED | OUTPATIENT
Start: 2024-12-11

## 2024-12-11 RX ORDER — ATORVASTATIN CALCIUM 10 MG/1
10 TABLET, FILM COATED ORAL DAILY
Qty: 90 TABLET | Refills: 3 | Status: SHIPPED | OUTPATIENT
Start: 2024-12-11 | End: 2024-12-13

## 2024-12-11 RX ORDER — MONTELUKAST SODIUM 10 MG/1
10 TABLET ORAL
Qty: 90 TABLET | Refills: 1 | Status: SHIPPED | OUTPATIENT
Start: 2024-12-11

## 2024-12-11 RX ORDER — SERTRALINE HYDROCHLORIDE 100 MG/1
100 TABLET, FILM COATED ORAL DAILY
Qty: 90 TABLET | Refills: 1 | Status: SHIPPED | OUTPATIENT
Start: 2024-12-11

## 2024-12-11 RX ORDER — METOPROLOL SUCCINATE 25 MG/1
TABLET, EXTENDED RELEASE ORAL
COMMUNITY
Start: 2024-12-03

## 2024-12-11 RX ORDER — SERTRALINE HYDROCHLORIDE 100 MG/1
100 TABLET, FILM COATED ORAL DAILY
Qty: 90 TABLET | Refills: 0 | Status: SHIPPED | OUTPATIENT
Start: 2024-12-11 | End: 2024-12-11

## 2024-12-11 NOTE — TELEPHONE ENCOUNTER
Future Appointments   Date Time Provider Department Center   5/5/2025  5:00 PM Kiel Azevedo MD [facility]     Orders to edward     Pt informed that labs need to be completed no sooner than 2 weeks prior to the appt. Pt aware to fast-no call back required

## 2024-12-13 ENCOUNTER — OFFICE VISIT (OUTPATIENT)
Facility: CLINIC | Age: 69
End: 2024-12-13
Payer: MEDICARE

## 2024-12-13 VITALS
HEIGHT: 65 IN | OXYGEN SATURATION: 95 % | DIASTOLIC BLOOD PRESSURE: 70 MMHG | SYSTOLIC BLOOD PRESSURE: 122 MMHG | WEIGHT: 184 LBS | BODY MASS INDEX: 30.66 KG/M2 | HEART RATE: 75 BPM

## 2024-12-13 DIAGNOSIS — E78.2 MIXED HYPERLIPIDEMIA: ICD-10-CM

## 2024-12-13 DIAGNOSIS — I10 ESSENTIAL HYPERTENSION: ICD-10-CM

## 2024-12-13 DIAGNOSIS — Z79.4 TYPE 2 DIABETES MELLITUS WITH DIABETIC NEUROPATHY, WITH LONG-TERM CURRENT USE OF INSULIN (HCC): Primary | ICD-10-CM

## 2024-12-13 DIAGNOSIS — K86.2 PANCREATIC CYST (HCC): ICD-10-CM

## 2024-12-13 DIAGNOSIS — E11.40 TYPE 2 DIABETES MELLITUS WITH DIABETIC NEUROPATHY, WITH LONG-TERM CURRENT USE OF INSULIN (HCC): Primary | ICD-10-CM

## 2024-12-13 LAB — HEMOGLOBIN A1C: 7.6 % (ref 4.3–5.6)

## 2024-12-13 PROCEDURE — G2211 COMPLEX E/M VISIT ADD ON: HCPCS | Performed by: NURSE PRACTITIONER

## 2024-12-13 PROCEDURE — 95251 CONT GLUC MNTR ANALYSIS I&R: CPT | Performed by: NURSE PRACTITIONER

## 2024-12-13 PROCEDURE — 83036 HEMOGLOBIN GLYCOSYLATED A1C: CPT | Performed by: NURSE PRACTITIONER

## 2024-12-13 PROCEDURE — 99214 OFFICE O/P EST MOD 30 MIN: CPT | Performed by: NURSE PRACTITIONER

## 2024-12-13 RX ORDER — ATORVASTATIN CALCIUM 20 MG/1
20 TABLET, FILM COATED ORAL NIGHTLY
Qty: 90 TABLET | Refills: 0 | Status: SHIPPED | OUTPATIENT
Start: 2024-12-13 | End: 2025-03-13

## 2024-12-13 RX ORDER — GLUCAGON INJECTION, SOLUTION 1 MG/.2ML
1 INJECTION, SOLUTION SUBCUTANEOUS AS NEEDED
Qty: 0.4 ML | Refills: 1 | Status: SHIPPED | OUTPATIENT
Start: 2024-12-13

## 2024-12-13 RX ORDER — CHOLECALCIFEROL (VITAMIN D3) 25 MCG
1000 TABLET ORAL DAILY
COMMUNITY
End: 2024-12-13

## 2024-12-13 NOTE — PATIENT INSTRUCTIONS
Return Visit:  APN: Return in about 3 months (around 3/13/2025) for diabetes follow up.  [] Video visit  [x] In person only      [x]  Diabetes Education: in 6-8 weeks     Diet/Lifestyle:   [] Carb counting 101   [] Carb Aware (T2DM nutrition counseling)   [] Carb Aware (T2DM nutrition counseling) + BG check in   [] Insulin dose fine tuning (If not yet carb counting, start with carb count 101)  [] DM burnout counseling    Medication/devices:   [] Pump settings check in after changes made today   [] Starting insulin pump ( 90 mins)   [] Pump 101 ( learning about pumps and carb counting)   [x] BG check in   [] Ordered CGM today- Patient will need to call the office to schedule CGM training once they receive their device. Call Office phone number: [phone] to schedule      []  Directions to 1st floor lab    []  Give blood sugar log  []  PAP paperwork for Ozempic/Jardiance (english/Japanese)       Resumen de la visita de meron:  Cambios de medicación:     Cambios de medicación:     a. Insulina: Disminuir Basaglar de 37 a 34 unidades   b. Fiasp    i.  Desayuno : 10 unidades + 1:40>140    ii. Almuerzo/merienda: 4 unidades sin escala móvil    III. Rachele : 4 unidades + 1:40>140   do. Continuar, Jardiance 25 mg por día y Metformina 1000 mg dos veces al día   d. Aumentar atorvastatina de 10 a 20 mg por día y repetir el análisis de george en 3 meses.  - Si usa insulina, asegúrese de tener glucagón en casa para emergencias.      Summary of today's visit:  Medication changes:    Insulin: Decrease Basaglar from 37 to 34 units  Fiasp   Bfast : 10 units + 1:40>140  Lunch/snack : 4 units w/o sliding scale  Dinner : 4 units + 1:40>140  Continue , Jardiance 25 mg per day and Metformin 1000 mg twice a day  Increase Atorvastatin from 10 to 20 mg per day and repeat blood test in 3 months  - If on insulin, please ensure you have glucagon at home for emergencies       Additional comments:   - If labs were ordered today, please complete  prior to your follow up visit   - Please let us know if you require any refills at least 1 week prior to your medication running out   - Please call our office if sugars at home are consistently greater than 250 or less than 70     HOW TO TREAT LOW BLOOD SUGAR (Hypoglycemia)  Low blood sugar= Less than 70, or if you start to have symptoms (below)  Symptoms: Shaking or trembling, fast heart rate, extreme hunger, sweating, confusion/difficulty concentrating, dizziness.    How to treat a low blood sugar if you are able to eat/drink: The Rule of 15/15  If you are using continuous glucose monitor that says you are low, but you do not have any symptoms, verify on fingerstick that your blood sugar is actually low before treating.   Eat 15 grams of carbs (see examples below)  Check your blood sugar after 15 minutes. If it’s still below your target range, have another serving.   Repeat these steps until it’s in your target range. Once it’s in range, if you're nervous about your sugar going low again, have a protein source (ie, a spoonful of peanut butter).   If you have a CGM you want to look for how your arrow has changed. If you arrow is pointed up or sideways after 15 min, give your CGM more time OR check with a finger stick. Try not to eat more food until at least 15 min after the first BG check - otherwise you risk having a rebound high.  If you are experiencing symptoms and you are unable to check your blood glucose for any reason, treat the hypoglycemia.  If someone has a low blood sugar and is unconscious: Don’t hesitate to call 911. If someone is unconscious and glucagon is not available or someone does not know how to use it, call 911 immediately.     To treat a low, I recommend you carry with you easy, pre-portioned treatment for low blood sugars that are 15G of carbs:   - Children sized squeeze pouch applesauce (high fiber + carbs help prevent too high of a spike)  - Small children's sized juicebox- 15g carb -->  4oz juice box  - Glucose tablets from [employer], you can find them near diabetes supplies --> Note, you will need to eat 3-4 tablets to get to 15g of carbs  - Children sized fruit snack pack- look for one with 15 grams of total carbohydrate  - Choice of how to treat your low is important. Complex carbs, or foods that contain fats along with carbs (like chocolate) can slow the absorption of glucose and should NOT be used to treat an emergency low

## 2024-12-13 NOTE — PROGRESS NOTES
[facility] Note    Name: Kaylin Bledsoe    Date: 12/13/24    Kaylin Bledsoe is a 69 year old female who presents for evaluation of T2DM management.     Chief complaint: Follow - Up (Pt presents for DM follow up. Repeat A1C completed in clinic. Pt is leaving for [place] tonight, is supposed to have Dexcom order be delivered to her today. States blood sugars have still been elevated. )       Subjective:   DM hx:  -Diagnosed with diabetes TYPE 2 in 1992, Type 1 ABs all negative and  c peptide detected last 8/23/24   -Family history- yes, multiple brothers, and sisters and daughter     Initial HPI consult - 8/23/2024 08/23/24:previous pt of Tamanna Downs NP from [facility]. Here w/ her daughter, Jeni who helps w/ French translation.   DM meds at first office visit:  Soliqua 34 units ( in the last 2 month)  Fiasp 10 units once  Jardiance 25 mg   MF 1000 mg BID    episodes of hypoglycemia: yes, lowest 61  Continuous Glucose Monitoring Interpretation  Kaylin Bledsoe has undergone continuous glucose monitoring with the Dexcom G7 CGM with phone (connected to clinic profile).  The blood glucose tracings were evaluated for two weeks prior to office visit. Blood glucose tracings demonstrated areas of hyperglycemia post-meal, particularly post-lunch and post dinner. There were no areas of hypoglycemia notedduring the weeks of evaluation.    8/10=8/23/24: TIR 51%, GMI 7.8% w. <1% low    -Re: potential DM medication contraindication (if positive, checkbox selected):  [x] History of pancreatitis  [] Personal/fam hx of medullary thyroid cancer/MEN2- denies   [] History of recent/frequent UTI/yeast infxn- denies   [] Previous amputation related to diabetes- denies    -Presence of associated DM complications (if positive, checkbox selected):  [x] Macrovascular complications (CAD/CVA/PAD)  + stent, seeing cardiology Dr. Walker , from [place]   [x] Neuropathy  [] Retinopathy- denies   [] Nephropathy- denies    [x] HTN  [x] Hyperlipidemia  [] Stroke/TIA- denies   [] Gastroparesis- denies   + had hx of DKA in [id number]    -Lifestyle:  - Modifying factors: eats bfast at 7 am and snack at 11 am w/ fruit or sand which  and lunch and dinner at 3 pm     Previously trialed/failed DM meds:   Lantus, Victoza - pancreatitis, Januvia, tradjenta and glipizide.       ##Also has hx of Pancreatic cyst found in MRI last 2020  Pt's brother had a hx of pancreatic cancer. Pt has abd pain w/ decreased urinary output per Ct review, but currently has left side abd pain.   Noted pancreatic cyst in MRI of abd  in 2020 . CT abd pelvis w/ contrast only  in 3/2/23 pancreatic cyst not well seen, adrenal glands has no mass or enlargement. MRI of abd with and w/o contrast last 11/ 2024  Few small cystic pancreatic lesions of the body and tail segments, dominant component of the pancreatic tail measuring 10 x 9 mm, stable dating back to 2015, favoring a benign process.  No enhancing components, ductal dilation, or parenchymal atrophy.  Diagnostic considerations include intrapancreatic pseudocyst and cystic neoplasm, the majority of which are benign or have low malignant potential.   Followed by Dr. Scott GI       INTERIM HX with SKINNY Roger 10/11/24- Pt refuse  and she chose her daughter, Jeni who is present during this visit  to translate ( [place]-english)  Pt states she had colonoscopy done and she still need to get MRI/CT of abd   Current treatment: Soliqua 34 units, Jardiance 25 mg, MF 1000 mg BID , Fiasp 6- 8u 2x aday ( bfast and dinner)W/ sliding scale 1: 40>140  Testing: Continuous Glucose Monitoring Interpretation  Kaylin Bledsoe  has undergone continuous glucose monitoring with the Dexcom G7 CGM with phone (connected to clinic profile).  The blood glucose tracings were evaluated for two weeks prior to office visit. Blood glucose tracings demonstrated areas of hyperglycemia throughout the entire day, no specific pattern.  There were no areas of hypoglycemia notedduring the weeks of evaluation.    Date: 9/28-10/11/24: TIR 31%, GMI 8.7% ,  low 0% , very low 0%, SD 70mg/dl, Sensor usage: 100%  Hypoglycemia: denies  Complication: no hospitalization and no ER visit since last office visit.        INTERIM HX with Acacia, APRN 12/13/24 : patient here with her daughter   Patient states  bfast 7am, lunch/snack 11 am , biggest meal dinner between 230-3 pm (dinner)  Current treatment: Basglar 37 units daily, Jardiance 25 mg per day and Metformin 1000 mg twice a day, Fiasp between 7-10 units BID  - > Last office visit plan: Fiasp increase 8 units w/ bfast and lunch + sliding scale 1: 40>140   - Fiasp 4 units  if glucose is above 140 at dinner + sliding scale 1: 40> 140  Testing: Continuous Glucose Monitoring Interpretation  Kaylin Bledsoe  has undergone continuous glucose monitoring with the Dexcom G7 CGM with phone (connected to clinic profile).  The blood glucose tracings were evaluated for two weeks prior to office visit. Blood glucose tracings demonstrated areas of hyperglycemia post-meal, particularly post-bfast and post-lunch . There were occasional hypoglycemia, particularly after 12 am during the weeks of evaluation.    Date: 11/22-12/6/24: TIR 47%, GMI 8.1% ,  low 0% , very low 1%, SD 75mg/dl, Sensor usage: 100%    Hypoglycemia: as above  Complication:  no hospitalization and no ER visit since last office visit.     History/Other:    Allergies, PMH, SocHx and FHx reviewed and updated as appropriate in Epic on    CALCIUM-VITAMIN D OR       atorvastatin 20 MG Oral Tab Take 1 tablet (20 mg total) by mouth nightly. 90 tablet 0    Blood Glucose Monitoring Suppl Does not apply Kit Use to test blood glucose 3x/day. 1 kit 0    glucagon (GVOKE HYPOPEN 2-PACK) 1 MG/0.2ML Subcutaneous injection Inject 0.2 mL (1 mg total) into the skin as needed. Only if cannot swallow anything by mouth and glucose is less than 70. Patient trained on Gvoke. No  substitutions 0.4 mL 1    metoprolol succinate ER 25 MG Oral Tablet 24 Hr       famotidine 40 MG Oral Tab Take 1 tablet (40 mg total) by mouth daily. 90 tablet 3    montelukast 10 MG Oral Tab Take 1 tablet (10 mg total) by mouth daily as needed. 90 tablet 1    sertraline 100 MG Oral Tab Take 1 tablet (100 mg total) by mouth daily. 90 tablet 1    insulin glargine (BASAGLAR KWIKPEN) 100 UNIT/ML Subcutaneous Solution Pen-injector Inject 37 units every 24 hours. ( For insurance purposes only: TDD max:  50 units/day) 45 mL 0    Insulin Aspart, w/Niacinamide, (FIASP FLEXTOUCH) 100 UNIT/ML Subcutaneous Solution Pen-injector Inject 8 units + sliding scale w/ bfast and lunch and inject 4 units( if glucose is above 140) + sliding scale w/ dinner ( For insurance purpose only: TDD max: 48 units /day) 45 mL 1    Empagliflozin (JARDIANCE) 25 MG Oral Tab Take 1 tablet by mouth daily. 90 tablet 3    metFORMIN  MG Oral Tablet 24 Hr Take 2 tablets (1,000 mg total) by mouth 2 (two) times daily with meals. 360 tablet 3    Insulin Pen Needle (BD PEN NEEDLE SHORT U/F) 31G X 8 MM Does not apply Misc 1 each daily. 100 each 0    Insulin Pen Needle (PEN NEEDLES) 32G X 4 MM Does not apply Misc 1 each  in the morning and 1 each before bedtime. 100 each 1    nystatin 100,000 Units/g External Ointment Apply 1 applicator topical twice daily for 4 weeks, then twice daily every other day for 2 weeks, then as needed. 15 g 0    estradiol 0.1 MG/GM Vaginal Cream Place 1 g vaginally nightly. 42.5 g 2    Glucose Blood (ONETOUCH VERIO) In Vitro Strip TEST THREE TIMES DAILY 300 strip 6    aspirin 81 MG Oral Chew Tab Chew 1 tablet (81 mg total) by mouth daily.      ALPRAZOLAM 0.5 MG Oral Tab TAKE 1 TABLET(0.5 MG) BY MOUTH DAILY AS NEEDED FOR ANXIETY OR SLEEP 30 tablet 0    Acetaminophen 500 MG Oral Cap Take 2 capsules (1,000 mg total) by mouth daily as needed.       Allergies   Allergen Reactions    Codeine NAUSEA ONLY    Vicodin  [Hydrocodone-Acetaminophen] NAUSEA ONLY     TABS     Current Outpatient Medications   Medication Sig Dispense Refill    CALCIUM-VITAMIN D OR       atorvastatin 20 MG Oral Tab Take 1 tablet (20 mg total) by mouth nightly. 90 tablet 0    Blood Glucose Monitoring Suppl Does not apply Kit Use to test blood glucose 3x/day. 1 kit 0    glucagon (GVOKE HYPOPEN 2-PACK) 1 MG/0.2ML Subcutaneous injection Inject 0.2 mL (1 mg total) into the skin as needed. Only if cannot swallow anything by mouth and glucose is less than 70. Patient trained on Gvoke. No substitutions 0.4 mL 1    metoprolol succinate ER 25 MG Oral Tablet 24 Hr       famotidine 40 MG Oral Tab Take 1 tablet (40 mg total) by mouth daily. 90 tablet 3    montelukast 10 MG Oral Tab Take 1 tablet (10 mg total) by mouth daily as needed. 90 tablet 1    sertraline 100 MG Oral Tab Take 1 tablet (100 mg total) by mouth daily. 90 tablet 1    insulin glargine (BASAGLAR KWIKPEN) 100 UNIT/ML Subcutaneous Solution Pen-injector Inject 37 units every 24 hours. ( For insurance purposes only: TDD max:  50 units/day) 45 mL 0    Insulin Aspart, w/Niacinamide, (FIASP FLEXTOUCH) 100 UNIT/ML Subcutaneous Solution Pen-injector Inject 8 units + sliding scale w/ bfast and lunch and inject 4 units( if glucose is above 140) + sliding scale w/ dinner ( For insurance purpose only: TDD max: 48 units /day) 45 mL 1    Empagliflozin (JARDIANCE) 25 MG Oral Tab Take 1 tablet by mouth daily. 90 tablet 3    metFORMIN  MG Oral Tablet 24 Hr Take 2 tablets (1,000 mg total) by mouth 2 (two) times daily with meals. 360 tablet 3    Insulin Pen Needle (BD PEN NEEDLE SHORT U/F) 31G X 8 MM Does not apply Misc 1 each daily. 100 each 0    Insulin Pen Needle (PEN NEEDLES) 32G X 4 MM Does not apply Misc 1 each  in the morning and 1 each before bedtime. 100 each 1    nystatin 100,000 Units/g External Ointment Apply 1 applicator topical twice daily for 4 weeks, then twice daily every other day for 2 weeks, then  as needed. 15 g 0    estradiol 0.1 MG/GM Vaginal Cream Place 1 g vaginally nightly. 42.5 g 2    Glucose Blood (ONETOUCH VERIO) In Vitro Strip TEST THREE TIMES DAILY 300 strip 6    aspirin 81 MG Oral Chew Tab Chew 1 tablet (81 mg total) by mouth daily.      ALPRAZOLAM 0.5 MG Oral Tab TAKE 1 TABLET(0.5 MG) BY MOUTH DAILY AS NEEDED FOR ANXIETY OR SLEEP 30 tablet 0    Acetaminophen 500 MG Oral Cap Take 2 capsules (1,000 mg total) by mouth daily as needed.       Past Medical History:    Abdominal hernia    Abdominal pain    Anemia    Anesthesia complication    slow to arouse    Anxiety    Arthritis    Atherosclerosis of coronary artery    Atherosclerotic heart disease of native coronary artery without angina pectoris    Back pain    Bad breath    Belching    Bilateral leg edema    Bloating    Blurred vision    Change in hair    Constipation    Diabetes mellitus (HCC)    Diarrhea, unspecified    Dizziness    Easy bruising    Fatigue    Feeling lonely    Flatulence/gas pain/belching    Food intolerance    Frequent use of laxatives    Hearing loss    High blood pressure    High cholesterol    History of depression    History of eating disorder    Indigestion    Irregular bowel habits    Itch of skin    Leg swelling    Loss of appetite    Nausea    Neuropathy    maria eugenia feet    Night sweats    Osteoarthrosis, unspecified whether generalized or localized, unspecified site    Osteoporosis    Other and unspecified hyperlipidemia    Pain in joints    Peripheral neuropathy    Problems with swallowing    Sleep disturbance    Stented coronary artery    Stress    Type II or unspecified type diabetes mellitus without mention of complication, not stated as uncontrolled    Uncomfortable fullness after meals    Venous insufficiency of both lower extremities    Visual impairment    glasses    Vomiting    Wears glasses    Weight loss     Family History   Problem Relation Age of Onset    Heart Attack Father         AMI    Hypertension Father      Diabetes Sister     Pancreatic Cancer Sister     Diabetes Brother     Diabetes Daughter      Social history: Reviewed.      ROS/Exam    REVIEW OF SYSTEMS: Ten point review of systems has been performed and is otherwise negative and/or non-contributory, except as described above.     VITALS  Vitals:    12/13/24 1046   BP: 122/70   BP Location: Right arm   Patient Position: Sitting   Cuff Size: adult   Pulse: 75   SpO2: 95%   Weight: 184 lb (83.5 kg)   Height: 5' 5\" (1.651 m)           Wt Readings from Last 6 Encounters:   12/13/24 184 lb (83.5 kg)   12/11/24 184 lb (83.5 kg)   10/11/24 184 lb (83.5 kg)   10/08/24 184 lb (83.5 kg)   09/30/24 185 lb (83.9 kg)   09/03/24 184 lb (83.5 kg)       PHYSICAL EXAM  CONSTITUTIONAL:  awake, alert, cooperative, no apparent distress, and appears stated age Vss stable   PSYCH: normal affect  LUNGS: breathing comfortably  CARDIOVASCULAR:  regular rate   NECK:  no palpable thyroid nodules     Labs/Imaging:     Lab Results   Component Value Date    CHOLEST 158 05/10/2024    CHOLEST 175 06/10/2023    TRIG 245 (H) 05/10/2024    TRIG 234 (H) 06/10/2023    HDL 44 05/10/2024    HDL 45 06/10/2023    LDL 74 05/10/2024    LDL 91 06/10/2023     Lab Results   Component Value Date    MICROALBCREA 27.1 05/10/2024    MICROALBCREA 14.5 06/10/2023      Lab Results   Component Value Date    CREATSERUM 0.86 05/10/2024    CREATSERUM 0.96 06/10/2023    EGFRCR 73 05/10/2024    EGFRCR 64 06/10/2023     Lab Results   Component Value Date    AST 14 (L) 05/10/2024    AST 18 06/10/2023    ALT 21 05/10/2024    ALT 22 06/10/2023       Lab Results   Component Value Date    TSH 1.080 05/10/2024    TSH 2.010 12/31/2020    T4F 1.1 03/14/2019         Overall glucose control:  Lab Results   Component Value Date    A1C 7.6 (A) 12/13/2024    A1C 7.9 (A) 08/23/2024    A1C 9.3 (H) 05/10/2024    A1C 8.6 (H) 06/10/2023    A1C 8.2 (H) 12/02/2022         Narrative   PROCEDURE:  CT ABDOMEN+PELVIS (CONTRAST ONLY)  (CPT=74177)     LOCATION:                                           COMPARISON:  [facility], MR, MRI ABDOMEN&MRCP W/3D (W+W0)(CPT=74183/77443), 8/25/2020, 11:34 AM.     INDICATIONS:  R10.9 Left sided abdominal pain R34 Decreased urine output R30.0 Dysuria R10.9 Acute left flank pain     TECHNIQUE:  CT scanning was performed from the dome of the diaphragm to the pubic symphysis with non-ionic intravenous contrast material. Post contrast coronal MPR imaging was performed.  Dose reduction techniques were used. Dose information is  transmitted to the ACR (American College of Radiology) NRDR (National Radiology Data Registry) which includes the Dose Index Registry.     PATIENT STATED HISTORY:(As transcribed by Technologist)  Patient complain of pain left upper quadrant radiating laterally with urinary urgency, also microscopic hematuria, hx of UTI (2)weeks ago.      CONTRAST USED:  100cc of Isovue 370     FINDINGS:    LIVER:  No enlargement, atrophy, abnormal density, or significant focal lesion.    BILIARY:  The common bile duct is prominent size measuring up to 11 mm which is stable from prior MRI.  The gallbladder is present.  PANCREAS:  Prior MRI had demonstrated questionable small cystic foci in the region of the pancreatic tail.  These are not well up seen on today's exam.  SPLEEN:  No enlargement or focal lesion.    KIDNEYS:  No evidence of hydronephrosis or renal stones.  There are probable areas of focal cortical parenchymal loss present.  ADRENALS:  No mass or enlargement.    AORTA/VASCULAR:  No aneurysm or dissection.    RETROPERITONEUM:  No mass or adenopathy.    BOWEL/MESENTERY:  No dilated loops of small bowel are seen.  Portions of bowel are decompressed, limiting evaluation.  There is a normal-appearing appendix.  No free fluid is seen.  No evidence of obstruction.  ABDOMINAL WALL:  No mass or hernia.    URINARY BLADDER:  No visible focal wall thickening, lesion, or calculus.    PELVIC NODES:  No  adenopathy.    PELVIC ORGANS:  No visible mass.  Pelvic organs appropriate for patient age.    BONES:  Streak artifact from a right hip prosthesis somewhat limits evaluation.  Chronic appearing grade 1 anterolisthesis of L4 on L5 is noted.  LUNG BASES:  No visible pulmonary or pleural disease.    OTHER:  Negative.                     Impression   CONCLUSION:  No clear etiology of the patient's symptoms.  No free fluid is seen within the abdomen or pelvis.  There is a normal-appearing appendix.     There is dilatation of the common bile duct measuring up to 11 mm which is similar to prior exams.     A prior MRI exam had demonstrated possible small cystic foci in the region of the pancreatic tail.  These are not well seen on today's exam.        Dictated by (CST): Tevin Lazcano MD on 3/02/2023 at 8:12 PM      Finalized by (CST): Tevin Lazcano MD on 3/02/2023 at 8:17 PM        PROCEDURE:  MRI ABDOMEN&MRCP W/3D (W+W0)(CPT=74183/13629)     COMPARISON:  [facility], [facility] GB HEPATOBILIARY SCAN WITH PHARMACY  (CPT=78227), 9/11/2015, 8:40 AM.  MR, MRI MRCP W/3D ONLY (CPT=76376/61475), 9/10/2015, 7:12 PM.  [facility], CT, CT ABDOMEN+PELVIS KIDNEYSTONE 2D RNDR(NO IV,NO ORAL)(CPT=74176), 4/21/2020,   1:28 PM.     INDICATIONS:  K86.2 Cyst of pancreas     TECHNIQUE:  Multiplanar single shot fast spin echo, chemical shift imaging, breath hold T2 weighted images and 2-D fiesta sequences were acquired. Fluid sensitive imaging with MRCP reconstruction was also performed including 3D multi-projection imaging  (non independent workstation).  Both projection and source MRCP images are evaluated.  Subsequent post contrast imaging was performed after intravenous administration of paramagnetic contrast agent.     3-D RENDERING:  Three dimensional image processing was completed using a separate workstation under concurrent supervision. Images were archived.     PATIENT STATED HISTORY:(As transcribed by Technologist)  Pancreatic cyst.      CONTRAST  USED:  17mL of Dotarem     FINDINGS:    LIVER:  No enlargement, atrophy, abnormal density, or significant focal lesion.    BILIARY:  No filling defect.  Common duct is ectatic measuring up to 1 cm, stable.  PANCREAS:  Within the pancreatic tail are 2 adjacent cystic foci, 1 which measures 8 x 7 vs 8 x 6 mm and 5 x 2 vs 3 x 3 mm without significant change dating back to most recent MRI performed 9/10/2015.  No ductal dilatation or atrophy.    SPLEEN:  No enlargement or focal lesion.    KIDNEYS:  No mass or obstruction.    ADRENALS:  No mass or enlargement.    AORTA/VASCULAR:  No aneurysm or dissection.    RETROPERITONEUM:  No mass or adenopathy.    BOWEL/MESENTERY:  Normal caliber small and large bowel.  Duodenal diverticulum incidentally noted.    ABDOMINAL WALL:  No mass or hernia.    BONES:  No bony lesion or fracture.    LUNG BASES:  No visible pleural disease.  Lung bases not well assessed with MRI.    OTHER:  No free fluid.                     Impression   CONCLUSION:    1. No significant change, allowing for differences in technique including small cystic foci involving the pancreatic tail when compared to most recent MRI/MRCP performed 9/10/2015.        Dictated by (CST): Priti Frausto MD on 8/25/2020 at 12:53 PM      Finalized by (CST): Priti Frausto MD on 8/25/2020 at 2:10 PM        Supplementary Documentation:   -Surveillance for Diabetes Complications & Risks  Foot exam/neuropathy: Last Foot Exam: 12/11/24      Retinopathy screening: Last Dilated Eye Exam: 08/26/24  Eye Exam shows Diabetic Retinopathy?: Yes      Assessment & Plan:     ICD-10-CM    1. Type 2 diabetes mellitus with diabetic neuropathy, with long-term current use of insulin (HCC)  E11.40 POC Hemoglobin A1C    Z79.4 Blood Glucose Monitoring Suppl Does not apply Kit     glucagon (GVOKE HYPOPEN 2-PACK) 1 MG/0.2ML Subcutaneous injection      2. Pancreatic cyst (HCC)  K86.2       3. Essential hypertension  I10       4. Mixed hyperlipidemia  E78.2  atorvastatin 20 MG Oral Tab      5. BMI 30.0-30.9,adult  Z68.30               Kaylin Bledsoe is a pleasant 69 year old female here for evaluation of:    #Diabetes w/ neuropathy - PMHx of Type 2 diabetes mellitus diagnosed since 1992.  -  Type 1 ABs all negative and  c peptide detected last 8/23/24 Last A1c value was 7.6% done 12/13/2024.A1c above goal, but trending down   -  reviewed dexcom Date: 11/22-12/6/24: TIR 47%, GMI 8.1% ,  low 0% , very low 1%, SD 75mg/dl, Sensor usage: 100%, gmi trending up   - Goal of A1c< 7%-  Importance of better glucose control in preventing onset/progression of end-organ damage discussed, as well as goals of therapy and clinical significance of A1C.      - med changes:  -Insulin: Decrease Basaglar from 37 to 34 units  Fiasp   Bfast : 10 units + 1:40>140  Lunch/snack : 4 units w/o sliding scale  Dinner : 4 units + 1:40>140  Continue , Jardiance 25 mg per day and Metformin 1000 mg twice a day  - If on insulin, please ensure you have glucagon at home for emergencies   - continue Dexcom G7 CGM with phone (connected to clinic profile)  - patient is on insulin, and has suboptimal glycemic control including wide glycemic swings, thus would benefit from CGM  - meet with endo DM educator re:BG check in 6-8 weeks    -See above header \"Supplementary Documentation\" for surveillance for diabetes complications & risks  Discussed targeted glucose levels and symptoms and treatment of hypoglycemia.   - no open wound at this time     #Pancreatic Cyst  Pt's brother had a hx of pancreatic cancer.   - MRI of abd with + w/o contrast last 11/16/24: PANCREAS:  Few small cystic foci of the pancreatic body and tail segments, dominant component of the pancreatic tail measuring 10 x 9 mm (series 4, image 19), stable in size compared to 08/25/2020.  No enhancing components, ductal dilation, or parenchymal atrophy. To continue follow-up with GI and will continue to adjust the insulin.   Noted pancreatic cyst in  MRI of abd  in 2020 and in CT abd in 2023 pancreatic cyst not well seen .    #Nephropathy screening  Lab Results   Component Value Date    EGFRCR 73 05/10/2024    MICROALBCREA 27.1 05/10/2024        #HTN: SBP is to goal <130   BP Readings from Last 1 Encounters:   12/13/24 122/70   BP Meds: metoprolol succinate ER Tb24 - 25 MG     #Hyperlipidemia/Lipids: LDL is not to goal , Increase Atorvastatin from 10 to 20 mg per day and repeat blood test in 3 months. Pt has hx of stent thus tighter control of LDL is needed.  Lab Results   Component Value Date    LDL 74 05/10/2024    TRIG 245 (H) 05/10/2024   Cholesterol Meds: atorvastatin Tabs - 20 MG  per cardiologist. Will order direct LDL     #BMI 30  - Balanced diet: carbohydrates, protein, healthy fats and fiber in each meal. Exercise of at least 150 mins each week. Decrease your simple carbohydrates intake such as sweets: cookies, soda, candy, white rice, white pasta, syrups      Total time spent  35 minutes today on obtaining history, reviewing pertinent labs, evaluating patient, providing multiple treatment options, reinforcing diet/exercise and compliance, and completing documentation, of which greater than 50% was spent in face to face discussion with the patient       Case discussed with patient and his daughter who demonstrated understanding and agreement with plan.     Thank you for allowing me to participate in the care of this patient.  Please feel free to contact me with any questions.    JESÚS Wood, [facility]  Endocrinology, Diabetes & Metabolism   12/13/24      Note to patient: The 21 Century Cures Act makes medical notes like these available to patients in the interest of transparency. However, be advised this is a medical document. It is intended as peer to peer communication. It is written in medical language and may contain abbreviations or verbiage that are unfamiliar. It may appear blunt or direct. Medical documents are intended to carry  relevant information, facts as evident, and the clinical opinion of the practitioner.

## 2024-12-17 ENCOUNTER — TELEPHONE (OUTPATIENT)
Facility: CLINIC | Age: 69
End: 2024-12-17

## 2024-12-17 NOTE — TELEPHONE ENCOUNTER
Received via fax from [employer]'s a request for a Diabetic Standard Written Order.  Form prefilled out and placed in [facility]'s in basket for review and signature.

## 2025-01-06 ENCOUNTER — TELEPHONE (OUTPATIENT)
Facility: CLINIC | Age: 70
End: 2025-01-06

## 2025-01-06 NOTE — TELEPHONE ENCOUNTER
Received via fax from [facility]'s a request for a Diabetic Standard Written Order.  Phoned [employer] department at [phone] due to we have previously faxed form.  Rep stated that they never received the fax and if we can fax to an alternative fax number [phone] and to make it attention to [employer].    Form prefilled out and placed in APN's in basket for review and signature.

## 2025-01-08 NOTE — TELEPHONE ENCOUNTER
Diabetic Standard Written Order faxed to [facility] [employer] [facility]. Fax # [phone].    Awaiting fax confirmation.

## 2025-01-09 NOTE — TELEPHONE ENCOUNTER
Fax confirmation received.     Order and fax confirmation placed in purple, RN folder.    Closing encounter.

## 2025-02-15 DIAGNOSIS — N95.2 VAGINAL ATROPHY: ICD-10-CM

## 2025-02-17 RX ORDER — ESTRADIOL 0.1 MG/G
1 CREAM VAGINAL NIGHTLY
Qty: 42.5 G | Refills: 1 | Status: SHIPPED | OUTPATIENT
Start: 2025-02-17

## 2025-02-17 NOTE — TELEPHONE ENCOUNTER
Last annual exam: 5/10/2024  Follow-up recommended: RTC in 1 year  Last refill date:   Medication Quantity Refills Start End   estradiol 0.1 MG/GM Vaginal Cream 42.5 g 2 5/10/2024 --   Sig:   Place 1 g vaginally nightly.       Next appt.: not due until 5/2025  Refill sent per protocol.

## 2025-02-20 ENCOUNTER — TELEPHONE (OUTPATIENT)
Facility: CLINIC | Age: 70
End: 2025-02-20

## 2025-02-20 DIAGNOSIS — Z79.4 TYPE 2 DIABETES MELLITUS WITH DIABETIC NEUROPATHY, WITH LONG-TERM CURRENT USE OF INSULIN (HCC): Primary | ICD-10-CM

## 2025-02-20 DIAGNOSIS — E11.40 TYPE 2 DIABETES MELLITUS WITH DIABETIC NEUROPATHY, WITH LONG-TERM CURRENT USE OF INSULIN (HCC): Primary | ICD-10-CM

## 2025-02-20 RX ORDER — PEN NEEDLE, DIABETIC 31 GX5/16"
NEEDLE, DISPOSABLE MISCELLANEOUS
Qty: 400 EACH | Refills: 3 | Status: SHIPPED | OUTPATIENT
Start: 2025-02-20

## 2025-02-20 NOTE — TELEPHONE ENCOUNTER
Received fax from [facility] stating that a temporary 30 day fill was given to the patient for FIASP FLEXTOUCH 100 UNIT/ML.  Fax states that this medication is not on their drug formulary.     Alternative medication include: INSULIN ASPART FLEXPEN    Will forward to APN for review.

## 2025-03-07 ENCOUNTER — MED REC SCAN ONLY (OUTPATIENT)
Facility: CLINIC | Age: 70
End: 2025-03-07

## 2025-03-08 DIAGNOSIS — E78.2 MIXED HYPERLIPIDEMIA: ICD-10-CM

## 2025-03-12 ENCOUNTER — TELEPHONE (OUTPATIENT)
Facility: CLINIC | Age: 70
End: 2025-03-12

## 2025-03-12 RX ORDER — ATORVASTATIN CALCIUM 20 MG/1
20 TABLET, FILM COATED ORAL NIGHTLY
Qty: 90 TABLET | Refills: 1 | Status: SHIPPED | OUTPATIENT
Start: 2025-03-12

## 2025-03-12 NOTE — TELEPHONE ENCOUNTER
Received fax from [employer] requesting most recent visit notes for continued coverage for CGM supplies.    Last office notes from 12/13/24 printed and faxed to [phone].    Fax confirmation received.    Closing TE

## 2025-03-12 NOTE — TELEPHONE ENCOUNTER
Endocrine refill protocol for lipid lowering medications    Protocol Criteria:  PASSED Reason: N/A    If all below requirements are met, send a 90-day supply with 1 refill per provider protocol.    Verify appointment with Endocrinology completed in the last 6 months or scheduled in the next 3 months.  Lipid panel must have been completed in the last 12 months   ALT result below 80  LDL result below 130    Last completed office visit:12/13/2024 Acacia Sales APRN   Next scheduled Follow up:   Future Appointments   Date Time Provider Department Center   4/3/2025  4:00 PM Acacia Sales APRN [facility] [place]   4/12/2025  2:40 PM [facility] [place]   5/5/2025  5:00 PM Kiel Azevedo MD [facility] [address] [facility]      Last Lipid panel date: Cholesterol: 158, done on 5/10/2024.  HDL Cholesterol: 44, done on 5/10/2024.  TriGlycerides 245, done on 5/10/2024.  LDL Cholesterol: 83, done on 5/10/2024.     Last ALT result: Last ALT was 21 done on 5/10/2024.  Last AST was 14 done on 5/10/2024.    Sent to pharmacy per protocol on 3/12/25.

## 2025-04-01 RX ORDER — GLUCOSAMINE HCL/CHONDROITIN SU 500-400 MG
1 CAPSULE ORAL 3 TIMES DAILY
Qty: 300 EACH | Refills: 3 | Status: SHIPPED | OUTPATIENT
Start: 2025-04-01

## 2025-04-01 RX ORDER — LANCETS 33 GAUGE
1 EACH MISCELLANEOUS 3 TIMES DAILY
Qty: 300 EACH | Refills: 3 | Status: SHIPPED | OUTPATIENT
Start: 2025-04-01

## 2025-04-01 RX ORDER — LANCING DEVICE/LANCETS
1 KIT MISCELLANEOUS 3 TIMES DAILY
Qty: 1 EACH | Refills: 1 | Status: SHIPPED | OUTPATIENT
Start: 2025-04-01

## 2025-04-01 RX ORDER — BLOOD SUGAR DIAGNOSTIC
1 STRIP MISCELLANEOUS 3 TIMES DAILY
Qty: 300 STRIP | Refills: 3 | Status: SHIPPED | OUTPATIENT
Start: 2025-04-01

## 2025-04-01 NOTE — TELEPHONE ENCOUNTER
Please review.  Protocol failed / Has no protocol.    New orders needed for testing supplies. Each pended for TID testing.     Requested Prescriptions   Pending Prescriptions Disp Refills    Glucose Blood (ONETOUCH VERIO) In Vitro Strip [Pharmacy Med Name: ONE TOUCH VERIO TEST ST(NEW)100S] 300 strip 3     Si strip by In Vitro route 3 (three) times daily.       Diabetic Supplies Protocol Failed - 2025  1:24 PM        Failed - Medication is active on med list        Passed - In person appointment or virtual visit in the past 12 mos or appointment in next 3 mos          Lancet Devices (ONETOUCH DELICA PLUS LANCING) Does not apply Misc 1 each 1     Si Lancet by Finger stick route 3 (three) times daily.       Diabetic Supplies Protocol Failed - 2025  1:24 PM        Failed - Medication is active on med list        Passed - In person appointment or virtual visit in the past 12 mos or appointment in next 3 mos          Lancets (ONETOUCH DELICA PLUS [id number]) Does not apply Misc 300 each 3     Si Lancet by Finger stick route 3 (three) times daily.       Diabetic Supplies Protocol Failed - 2025  1:24 PM        Failed - Medication is active on med list        Passed - In person appointment or virtual visit in the past 12 mos or appointment in next 3 mos          Alcohol Swabs 70 % Does not apply Pads 300 each 3     Sig: Apply 1 Pad topically 3 (three) times daily. Use to clean finger before using lancet       There is no refill protocol information for this order

## 2025-04-12 ENCOUNTER — HOSPITAL ENCOUNTER (OUTPATIENT)
Dept: MAMMOGRAPHY | Age: 70
Discharge: HOME | End: 2025-04-12
Attending: OBSTETRICS & GYNECOLOGY
Payer: MEDICARE

## 2025-04-12 DIAGNOSIS — Z12.31 ENCOUNTER FOR SCREENING MAMMOGRAM FOR MALIGNANT NEOPLASM OF BREAST: ICD-10-CM

## 2025-04-12 PROCEDURE — 77067 SCR MAMMO BI INCL CAD: CPT | Performed by: OBSTETRICS & GYNECOLOGY

## 2025-04-12 PROCEDURE — 77063 BREAST TOMOSYNTHESIS BI: CPT | Performed by: OBSTETRICS & GYNECOLOGY

## 2025-04-14 ENCOUNTER — TELEPHONE (OUTPATIENT)
Facility: CLINIC | Age: 70
End: 2025-04-14

## 2025-04-14 NOTE — TELEPHONE ENCOUNTER
Received via fax from [employer]'s a request for a Diabetic Standard Written Order.  Form prefilled out and placed in APN's in basket for review and signature.

## 2025-04-18 ENCOUNTER — OFFICE VISIT (OUTPATIENT)
Facility: CLINIC | Age: 70
End: 2025-04-18
Payer: MEDICARE

## 2025-04-18 VITALS
BODY MASS INDEX: 31.32 KG/M2 | WEIGHT: 188 LBS | DIASTOLIC BLOOD PRESSURE: 70 MMHG | SYSTOLIC BLOOD PRESSURE: 126 MMHG | HEIGHT: 65 IN | HEART RATE: 63 BPM | OXYGEN SATURATION: 97 %

## 2025-04-18 DIAGNOSIS — E66.09 CLASS 1 OBESITY DUE TO EXCESS CALORIES WITH BODY MASS INDEX (BMI) OF 31.0 TO 31.9 IN ADULT, UNSPECIFIED WHETHER SERIOUS COMORBIDITY PRESENT: ICD-10-CM

## 2025-04-18 DIAGNOSIS — E11.59 HYPERTENSION ASSOCIATED WITH TYPE 2 DIABETES MELLITUS (HCC): ICD-10-CM

## 2025-04-18 DIAGNOSIS — E11.69 HYPERLIPIDEMIA ASSOCIATED WITH TYPE 2 DIABETES MELLITUS (HCC): ICD-10-CM

## 2025-04-18 DIAGNOSIS — E11.69 TYPE 2 DIABETES MELLITUS WITH OTHER SPECIFIED COMPLICATION, WITH LONG-TERM CURRENT USE OF INSULIN (HCC): ICD-10-CM

## 2025-04-18 DIAGNOSIS — E78.5 HYPERLIPIDEMIA ASSOCIATED WITH TYPE 2 DIABETES MELLITUS (HCC): ICD-10-CM

## 2025-04-18 DIAGNOSIS — Z79.4 TYPE 2 DIABETES MELLITUS WITH OTHER SPECIFIED COMPLICATION, WITH LONG-TERM CURRENT USE OF INSULIN (HCC): ICD-10-CM

## 2025-04-18 DIAGNOSIS — K86.2 PANCREATIC CYST (HCC): ICD-10-CM

## 2025-04-18 DIAGNOSIS — E11.40 TYPE 2 DIABETES MELLITUS WITH DIABETIC NEUROPATHY, WITH LONG-TERM CURRENT USE OF INSULIN (HCC): Primary | ICD-10-CM

## 2025-04-18 DIAGNOSIS — Z79.4 TYPE 2 DIABETES MELLITUS WITH DIABETIC NEUROPATHY, WITH LONG-TERM CURRENT USE OF INSULIN (HCC): Primary | ICD-10-CM

## 2025-04-18 DIAGNOSIS — I15.2 HYPERTENSION ASSOCIATED WITH TYPE 2 DIABETES MELLITUS (HCC): ICD-10-CM

## 2025-04-18 DIAGNOSIS — E66.811 CLASS 1 OBESITY DUE TO EXCESS CALORIES WITH BODY MASS INDEX (BMI) OF 31.0 TO 31.9 IN ADULT, UNSPECIFIED WHETHER SERIOUS COMORBIDITY PRESENT: ICD-10-CM

## 2025-04-18 LAB — HEMOGLOBIN A1C: 9.1 % (ref 4.3–5.6)

## 2025-04-18 PROCEDURE — 83036 HEMOGLOBIN GLYCOSYLATED A1C: CPT | Performed by: NURSE PRACTITIONER

## 2025-04-18 PROCEDURE — 99215 OFFICE O/P EST HI 40 MIN: CPT | Performed by: NURSE PRACTITIONER

## 2025-04-18 PROCEDURE — G2211 COMPLEX E/M VISIT ADD ON: HCPCS | Performed by: NURSE PRACTITIONER

## 2025-04-18 RX ORDER — INSULIN GLARGINE 100 [IU]/ML
INJECTION, SOLUTION SUBCUTANEOUS
Qty: 45 ML | Refills: 0 | Status: SHIPPED | OUTPATIENT
Start: 2025-04-18 | End: 2025-04-21

## 2025-04-18 NOTE — PROGRESS NOTES
[facility]    Name: Kaylin Bledsoe    Date: 4/18/25    Kaylin Bledsoe is a 70 year old female who presents for evaluation of T2DM management.     Chief complaint: Follow - Up (PT here for routine T2DM f/u, per pt no current concerns or sx./Per pt checks BG via fingerstick, fasting bg today 186/Last A1c value was 7.6% done 12/13/2024.)       Subjective:   DM hx:  -Diagnosed with diabetes TYPE 2 in 1992, Type 1 ABs all negative and  c peptide detected last 8/23/24   -Family history- yes, multiple brothers, and sisters and daughter     Initial HPI consult - 8/23/2024 08/23/24:previous pt of Tamanna Downs NP from [facility]. Here w/ her daughter, Jeni who helps w/ Greek translation.   DM meds at first office visit:  Soliqua 34 units ( in the last 2 month)  Fiasp 10 units once  Jardiance 25 mg   MF 1000 mg BID    episodes of hypoglycemia: yes, lowest 61  Continuous Glucose Monitoring Interpretation  Kaylin Bledsoe has undergone continuous glucose monitoring with the Dexcom G7 CGM with phone (connected to clinic profile).  The blood glucose tracings were evaluated for two weeks prior to office visit. Blood glucose tracings demonstrated areas of hyperglycemia post-meal, particularly post-lunch and post dinner. There were no areas of hypoglycemia notedduring the weeks of evaluation.    8/10=8/23/24: TIR 51%, GMI 7.8% w. <1% low    -Re: potential DM medication contraindication (if positive, checkbox selected):  [x] History of pancreatitis  [] Personal/fam hx of medullary thyroid cancer/MEN2- denies   [] History of recent/frequent UTI/yeast infxn- denies   [] Previous amputation related to diabetes- denies    -Presence of associated DM complications (if positive, checkbox selected):  [x] Macrovascular complications (CAD/CVA/PAD)  + stent, seeing cardiology Dr. Walker , from [place]   [x] Neuropathy  [] Retinopathy- denies   [] Nephropathy- denies   [x] HTN  [x] Hyperlipidemia  [] Stroke/TIA-  denies   [] Gastroparesis- denies   + had hx of DKA in [id number]    -Lifestyle:  - Modifying factors: eats bfast at 7 am and snack at 11 am w/ fruit or sand which  and lunch and dinner at 3 pm     Previously trialed/failed DM meds:   Lantus, Victoza - pancreatitis, Januvia, tradjenta and glipizide.       ##Also has hx of Pancreatic cyst found in MRI last 2020  Pt's brother had a hx of pancreatic cancer. Pt has abd pain w/ decreased urinary output per Ct review, but currently has left side abd pain.   Noted pancreatic cyst in MRI of abd  in 2020 . CT abd pelvis w/ contrast only  in 3/2/23 pancreatic cyst not well seen, adrenal glands has no mass or enlargement. MRI of abd with and w/o contrast last 11/ 2024  Few small cystic pancreatic lesions of the body and tail segments, dominant component of the pancreatic tail measuring 10 x 9 mm, stable dating back to 2015, favoring a benign process.  No enhancing components, ductal dilation, or parenchymal atrophy.  Diagnostic considerations include intrapancreatic pseudocyst and cystic neoplasm, the majority of which are benign or have low malignant potential.   Followed by Dr. Scott GI       INTERIM HX with SKINNY Roger 10/11/24- Pt refuse  and she chose her daughter, Jeni who is present during this visit  to translate ( Indonesian-english)  Pt states she had colonoscopy done and she still need to get MRI/CT of abd   Current treatment: Soliqua 34 units, Jardiance 25 mg, MF 1000 mg BID , Fiasp 6- 8u 2x aday ( bfast and dinner)W/ sliding scale 1: 40>140  Testing: Continuous Glucose Monitoring Interpretation  Kaylin Bledsoe  has undergone continuous glucose monitoring with the Dexcom G7 CGM with phone (connected to clinic profile).  The blood glucose tracings were evaluated for two weeks prior to office visit. Blood glucose tracings demonstrated areas of hyperglycemia throughout the entire day, no specific pattern. There were no areas of hypoglycemia notedduring  the weeks of evaluation.    Date: 9/28-10/11/24: TIR 31%, GMI 8.7% ,  low 0% , very low 0%, SD 70mg/dl, Sensor usage: 100%  Hypoglycemia: denies  Complication: no hospitalization and no ER visit since last office visit.        INTERIM HX with Acacia, APRN 12/13/24 : patient here with her daughter   Patient states  bfast 7am, lunch/snack 11 am , biggest meal dinner between 230-3 pm (dinner)  Current treatment: Basglar 37 units daily, Jardiance 25 mg per day and Metformin 1000 mg twice a day, Fiasp between 7-10 units BID  - > Last office visit plan: Fiasp increase 8 units w/ bfast and lunch + sliding scale 1: 40>140   - Fiasp 4 units  if glucose is above 140 at dinner + sliding scale 1: 40> 140  Testing: Continuous Glucose Monitoring Interpretation  Kaylin Bledsoe  has undergone continuous glucose monitoring with the Dexcom G7 CGM with phone (connected to clinic profile).  The blood glucose tracings were evaluated for two weeks prior to office visit. Blood glucose tracings demonstrated areas of hyperglycemia post-meal, particularly post-bfast and post-lunch . There were occasional hypoglycemia, particularly after 12 am during the weeks of evaluation.    Date: 11/22-12/6/24: TIR 47%, GMI 8.1% ,  low 0% , very low 1%, SD 75mg/dl, Sensor usage: 100%  Hypoglycemia: as above  Complication:  no hospitalization and no ER visit since last office visit.     INTERIM HX with Acacia, APRN 04/18/25 :    Here with her daughter who helps with translation. She did not call for refill , last time she used basaglar was last week of March, but had remaining lantus and she used that and last dose of lantus was this past Wed. She is only using Fiasp   She just got back from [place] last March 1, states she run out of basaglar  Saw MD Tyler - GI last 12/2024 and patient states MRI in 3 yrs  Biggest meal dinner   Bfast 7am   Lunch   Current treatment: Basaglar 36 units every morning   Plan Last office visit Basaglar from 37 to 34  units, Fiasp range 10, lunch and dinner 6-8   Bfast : 10 units + 1:40>140  Lunch/snack : 4 units w/o sliding scale  Dinner : 4 units + 1:40>140  Jardiance 25 mg per day and Metformin 1000 mg twice a day  Testing: no CGM since Dec after she went to [place]     Hypoglycemia: denies   Complication:  no hospitalization and no ER visit since last office visit.    History/Other:    Allergies, PMH, SocHx and FHx reviewed and updated as appropriate in Epic on    Insulin Aspart, w/Niacinamide, (FIASP FLEXTOUCH) 100 UNIT/ML Subcutaneous Solution Pen-injector Inject  at Bfast : 10 units + sliding scale  1:40>140; at Lunch/snack : 6 units w/o sliding scale; at Dinner : 10 units + sliding scale 1:40>140 ( For insurance purpose only: TDD max: 48 units /day) 45 mL 1    insulin glargine (BASAGLAR KWIKPEN) 100 UNIT/ML Subcutaneous Solution Pen-injector Inject 38 units every 24 hours. ( For insurance purposes only: TDD max:  50 units/day) 45 mL 0    Glucose Blood (ONETOUCH VERIO) In Vitro Strip 1 strip by In Vitro route 3 (three) times daily. 300 strip 3    Lancet Devices (ONETOUCH DELICA PLUS LANCING) Does not apply Misc 1 Lancet by Finger stick route 3 (three) times daily. 1 each 1    Lancets (ONETOUCH DELICA PLUS [id number]) Does not apply Misc 1 Lancet by Finger stick route 3 (three) times daily. 300 each 3    Alcohol Swabs 70 % Does not apply Pads Apply 1 Pad topically 3 (three) times daily. Use to clean finger before using lancet 300 each 3    atorvastatin 20 MG Oral Tab TAKE 1 TABLET(20 MG) BY MOUTH EVERY NIGHT 90 tablet 1    insulin aspart 100 Units/mL Subcutaneous Solution Pen-injector Inject 3 x a day before meals for Bfast : 10 units + 1:40>140, For Lunch/snack : 4 units w/o sliding scale; For Dinner : 4 units + 1:40>140 TDD max: 32 units per day 30 mL 1    Insulin Pen Needle (BD PEN NEEDLE SHORT U/F) 31G X 8 MM Does not apply Misc Use 4 x a day with insulin subcutaneously 400 each 3    estradiol 0.1 MG/GM Vaginal Cream  PLACE 1 GRAM VAGINALLY EVERY NIGHT 42.5 g 1    CALCIUM-VITAMIN D OR       Blood Glucose Monitoring Suppl Does not apply Kit Use to test blood glucose 3x/day. 1 kit 0    glucagon (GVOKE HYPOPEN 2-PACK) 1 MG/0.2ML Subcutaneous injection Inject 0.2 mL (1 mg total) into the skin as needed. Only if cannot swallow anything by mouth and glucose is less than 70. Patient trained on Gvoke. No substitutions 0.4 mL 1    metoprolol succinate ER 25 MG Oral Tablet 24 Hr       famotidine 40 MG Oral Tab Take 1 tablet (40 mg total) by mouth daily. 90 tablet 3    montelukast 10 MG Oral Tab Take 1 tablet (10 mg total) by mouth daily as needed. 90 tablet 1    sertraline 100 MG Oral Tab Take 1 tablet (100 mg total) by mouth daily. 90 tablet 1    Empagliflozin (JARDIANCE) 25 MG Oral Tab Take 1 tablet by mouth daily. 90 tablet 3    metFORMIN  MG Oral Tablet 24 Hr Take 2 tablets (1,000 mg total) by mouth 2 (two) times daily with meals. 360 tablet 3    Insulin Pen Needle (PEN NEEDLES) 32G X 4 MM Does not apply Misc 1 each  in the morning and 1 each before bedtime. 100 each 1    nystatin 100,000 Units/g External Ointment Apply 1 applicator topical twice daily for 4 weeks, then twice daily every other day for 2 weeks, then as needed. 15 g 0    aspirin 81 MG Oral Chew Tab Chew 1 tablet (81 mg total) by mouth daily.      ALPRAZOLAM 0.5 MG Oral Tab TAKE 1 TABLET(0.5 MG) BY MOUTH DAILY AS NEEDED FOR ANXIETY OR SLEEP 30 tablet 0    Acetaminophen 500 MG Oral Cap Take 2 capsules (1,000 mg total) by mouth daily as needed.       Allergies   Allergen Reactions    Codeine NAUSEA ONLY    Vicodin [Hydrocodone-Acetaminophen] NAUSEA ONLY     TABS     Current Outpatient Medications   Medication Sig Dispense Refill    Insulin Aspart, w/Niacinamide, (FIASP FLEXTOUCH) 100 UNIT/ML Subcutaneous Solution Pen-injector Inject  at Bfast : 10 units + sliding scale  1:40>140; at Lunch/snack : 6 units w/o sliding scale; at Dinner : 10 units + sliding scale 1:40>140  ( For insurance purpose only: TDD max: 48 units /day) 45 mL 1    insulin glargine (BASAGLAR KWIKPEN) 100 UNIT/ML Subcutaneous Solution Pen-injector Inject 38 units every 24 hours. ( For insurance purposes only: TDD max:  50 units/day) 45 mL 0    Glucose Blood (ONETOUCH VERIO) In Vitro Strip 1 strip by In Vitro route 3 (three) times daily. 300 strip 3    Lancet Devices (ONETOUCH DELICA PLUS LANCING) Does not apply Misc 1 Lancet by Finger stick route 3 (three) times daily. 1 each 1    Lancets (ONETOUCH DELICA PLUS [id number]) Does not apply Misc 1 Lancet by Finger stick route 3 (three) times daily. 300 each 3    Alcohol Swabs 70 % Does not apply Pads Apply 1 Pad topically 3 (three) times daily. Use to clean finger before using lancet 300 each 3    atorvastatin 20 MG Oral Tab TAKE 1 TABLET(20 MG) BY MOUTH EVERY NIGHT 90 tablet 1    insulin aspart 100 Units/mL Subcutaneous Solution Pen-injector Inject 3 x a day before meals for Bfast : 10 units + 1:40>140, For Lunch/snack : 4 units w/o sliding scale; For Dinner : 4 units + 1:40>140 TDD max: 32 units per day 30 mL 1    Insulin Pen Needle (BD PEN NEEDLE SHORT U/F) 31G X 8 MM Does not apply Misc Use 4 x a day with insulin subcutaneously 400 each 3    estradiol 0.1 MG/GM Vaginal Cream PLACE 1 GRAM VAGINALLY EVERY NIGHT 42.5 g 1    CALCIUM-VITAMIN D OR       Blood Glucose Monitoring Suppl Does not apply Kit Use to test blood glucose 3x/day. 1 kit 0    glucagon (GVOKE HYPOPEN 2-PACK) 1 MG/0.2ML Subcutaneous injection Inject 0.2 mL (1 mg total) into the skin as needed. Only if cannot swallow anything by mouth and glucose is less than 70. Patient trained on Gvoke. No substitutions 0.4 mL 1    metoprolol succinate ER 25 MG Oral Tablet 24 Hr       famotidine 40 MG Oral Tab Take 1 tablet (40 mg total) by mouth daily. 90 tablet 3    montelukast 10 MG Oral Tab Take 1 tablet (10 mg total) by mouth daily as needed. 90 tablet 1    sertraline 100 MG Oral Tab Take 1 tablet (100 mg total)  by mouth daily. 90 tablet 1    Empagliflozin (JARDIANCE) 25 MG Oral Tab Take 1 tablet by mouth daily. 90 tablet 3    metFORMIN  MG Oral Tablet 24 Hr Take 2 tablets (1,000 mg total) by mouth 2 (two) times daily with meals. 360 tablet 3    Insulin Pen Needle (PEN NEEDLES) 32G X 4 MM Does not apply Misc 1 each  in the morning and 1 each before bedtime. 100 each 1    nystatin 100,000 Units/g External Ointment Apply 1 applicator topical twice daily for 4 weeks, then twice daily every other day for 2 weeks, then as needed. 15 g 0    aspirin 81 MG Oral Chew Tab Chew 1 tablet (81 mg total) by mouth daily.      ALPRAZOLAM 0.5 MG Oral Tab TAKE 1 TABLET(0.5 MG) BY MOUTH DAILY AS NEEDED FOR ANXIETY OR SLEEP 30 tablet 0    Acetaminophen 500 MG Oral Cap Take 2 capsules (1,000 mg total) by mouth daily as needed.       Past Medical History:    Abdominal hernia    Abdominal pain    Anemia    Anesthesia complication    slow to arouse    Anxiety    Arthritis    Atherosclerosis of coronary artery    Atherosclerotic heart disease of native coronary artery without angina pectoris    Back pain    Bad breath    Belching    Bilateral leg edema    Bloating    Blurred vision    Change in hair    Constipation    Diabetes mellitus (HCC)    Diarrhea, unspecified    Dizziness    Easy bruising    Fatigue    Feeling lonely    Flatulence/gas pain/belching    Food intolerance    Frequent use of laxatives    Hearing loss    High blood pressure    High cholesterol    History of depression    History of eating disorder    Indigestion    Irregular bowel habits    Itch of skin    Leg swelling    Loss of appetite    Nausea    Neuropathy    maria eugenia feet    Night sweats    Osteoarthrosis, unspecified whether generalized or localized, unspecified site    Osteoporosis    Other and unspecified hyperlipidemia    Pain in joints    Peripheral neuropathy    Problems with swallowing    Sleep disturbance    Stented coronary artery    Stress    Type II or  unspecified type diabetes mellitus without mention of complication, not stated as uncontrolled    Uncomfortable fullness after meals    Venous insufficiency of both lower extremities    Visual impairment    glasses    Vomiting    Wears glasses    Weight loss     Family History   Problem Relation Age of Onset    Heart Attack Father         AMI    Hypertension Father     Diabetes Sister     Pancreatic Cancer Sister     Diabetes Brother     Diabetes Daughter      Social history: Reviewed.      ROS/Exam    REVIEW OF SYSTEMS: Ten point review of systems has been performed and is otherwise negative and/or non-contributory, except as described above.     VITALS  Vitals:    04/18/25 1512   BP: 126/70   Pulse: 63   SpO2: 97%   Weight: 188 lb (85.3 kg)   Height: 5' 5\" (1.651 m)             Wt Readings from Last 6 Encounters:   04/18/25 188 lb (85.3 kg)   12/13/24 184 lb (83.5 kg)   12/11/24 184 lb (83.5 kg)   10/11/24 184 lb (83.5 kg)   10/08/24 184 lb (83.5 kg)   09/30/24 185 lb (83.9 kg)       PHYSICAL EXAM  CONSTITUTIONAL:  awake, alert, cooperative, no apparent distress, and appears stated age Vss stable   PSYCH: normal affect  LUNGS: breathing comfortably  CARDIOVASCULAR:  regular rate   NECK:  no palpable thyroid nodules     Labs/Imaging:     Lab Results   Component Value Date    CHOLEST 158 05/10/2024    CHOLEST 175 06/10/2023    TRIG 245 (H) 05/10/2024    TRIG 234 (H) 06/10/2023    HDL 44 05/10/2024    HDL 45 06/10/2023    LDL 74 05/10/2024    LDL 91 06/10/2023     Lab Results   Component Value Date    MICROALBCREA 27.1 05/10/2024    MICROALBCREA 14.5 06/10/2023      Lab Results   Component Value Date    CREATSERUM 0.86 05/10/2024    CREATSERUM 0.96 06/10/2023    EGFRCR 73 05/10/2024    EGFRCR 64 06/10/2023     Lab Results   Component Value Date    AST 14 (L) 05/10/2024    AST 18 06/10/2023    ALT 21 05/10/2024    ALT 22 06/10/2023       Lab Results   Component Value Date    TSH 1.080 05/10/2024    TSH 2.010  12/31/2020    T4F 1.1 03/14/2019         Overall glucose control:  Lab Results   Component Value Date    A1C 9.1 (A) 04/18/2025    A1C 7.6 (A) 12/13/2024    A1C 7.9 (A) 08/23/2024    A1C 9.3 (H) 05/10/2024    A1C 8.6 (H) 06/10/2023         Narrative   PROCEDURE:  CT ABDOMEN+PELVIS (CONTRAST ONLY) (CPT=74177)     LOCATION:                                           COMPARISON:  [facility], MR, MRI ABDOMEN&MRCP W/3D (W+W0)(CPT=74183/17135), 8/25/2020, 11:34 AM.     INDICATIONS:  R10.9 Left sided abdominal pain R34 Decreased urine output R30.0 Dysuria R10.9 Acute left flank pain     TECHNIQUE:  CT scanning was performed from the dome of the diaphragm to the pubic symphysis with non-ionic intravenous contrast material. Post contrast coronal MPR imaging was performed.  Dose reduction techniques were used. Dose information is  transmitted to the ACR (American College of Radiology) NRDR (National Radiology Data Registry) which includes the Dose Index Registry.     PATIENT STATED HISTORY:(As transcribed by Technologist)  Patient complain of pain left upper quadrant radiating laterally with urinary urgency, also microscopic hematuria, hx of UTI (2)weeks ago.      CONTRAST USED:  100cc of Isovue 370     FINDINGS:    LIVER:  No enlargement, atrophy, abnormal density, or significant focal lesion.    BILIARY:  The common bile duct is prominent size measuring up to 11 mm which is stable from prior MRI.  The gallbladder is present.  PANCREAS:  Prior MRI had demonstrated questionable small cystic foci in the region of the pancreatic tail.  These are not well up seen on today's exam.  SPLEEN:  No enlargement or focal lesion.    KIDNEYS:  No evidence of hydronephrosis or renal stones.  There are probable areas of focal cortical parenchymal loss present.  ADRENALS:  No mass or enlargement.    AORTA/VASCULAR:  No aneurysm or dissection.    RETROPERITONEUM:  No mass or adenopathy.    BOWEL/MESENTERY:  No dilated loops of small bowel are  seen.  Portions of bowel are decompressed, limiting evaluation.  There is a normal-appearing appendix.  No free fluid is seen.  No evidence of obstruction.  ABDOMINAL WALL:  No mass or hernia.    URINARY BLADDER:  No visible focal wall thickening, lesion, or calculus.    PELVIC NODES:  No adenopathy.    PELVIC ORGANS:  No visible mass.  Pelvic organs appropriate for patient age.    BONES:  Streak artifact from a right hip prosthesis somewhat limits evaluation.  Chronic appearing grade 1 anterolisthesis of L4 on L5 is noted.  LUNG BASES:  No visible pulmonary or pleural disease.    OTHER:  Negative.                     Impression   CONCLUSION:  No clear etiology of the patient's symptoms.  No free fluid is seen within the abdomen or pelvis.  There is a normal-appearing appendix.     There is dilatation of the common bile duct measuring up to 11 mm which is similar to prior exams.     A prior MRI exam had demonstrated possible small cystic foci in the region of the pancreatic tail.  These are not well seen on today's exam.        Dictated by (CST): Tevin Lazcano MD on 3/02/2023 at 8:12 PM      Finalized by (CST): Tevin Lazcano MD on 3/02/2023 at 8:17 PM        PROCEDURE:  MRI ABDOMEN&MRCP W/3D (W+W0)(CPT=74183/74281)     COMPARISON:  [facility] HEPATOBILIARY SCAN WITH PHARMACY  (CPT=78227), 9/11/2015, 8:40 AM.  MR, MRI MRCP W/3D ONLY (CPT=76376/38961), 9/10/2015, 7:12 PM.  [facility], CT, CT ABDOMEN+PELVIS KIDNEYSTONE 2D RNDR(NO IV,NO ORAL)(CPT=74176), 4/21/2020,   1:28 PM.     INDICATIONS:  K86.2 Cyst of pancreas     TECHNIQUE:  Multiplanar single shot fast spin echo, chemical shift imaging, breath hold T2 weighted images and 2-D fiesta sequences were acquired. Fluid sensitive imaging with MRCP reconstruction was also performed including 3D multi-projection imaging  (non independent workstation).  Both projection and source MRCP images are evaluated.  Subsequent post contrast imaging was performed after intravenous  administration of paramagnetic contrast agent.     3-D RENDERING:  Three dimensional image processing was completed using a separate workstation under concurrent supervision. Images were archived.     PATIENT STATED HISTORY:(As transcribed by Technologist)  Pancreatic cyst.      CONTRAST USED:  17mL of Dotarem     FINDINGS:    LIVER:  No enlargement, atrophy, abnormal density, or significant focal lesion.    BILIARY:  No filling defect.  Common duct is ectatic measuring up to 1 cm, stable.  PANCREAS:  Within the pancreatic tail are 2 adjacent cystic foci, 1 which measures 8 x 7 vs 8 x 6 mm and 5 x 2 vs 3 x 3 mm without significant change dating back to most recent MRI performed 9/10/2015.  No ductal dilatation or atrophy.    SPLEEN:  No enlargement or focal lesion.    KIDNEYS:  No mass or obstruction.    ADRENALS:  No mass or enlargement.    AORTA/VASCULAR:  No aneurysm or dissection.    RETROPERITONEUM:  No mass or adenopathy.    BOWEL/MESENTERY:  Normal caliber small and large bowel.  Duodenal diverticulum incidentally noted.    ABDOMINAL WALL:  No mass or hernia.    BONES:  No bony lesion or fracture.    LUNG BASES:  No visible pleural disease.  Lung bases not well assessed with MRI.    OTHER:  No free fluid.                     Impression   CONCLUSION:    1. No significant change, allowing for differences in technique including small cystic foci involving the pancreatic tail when compared to most recent MRI/MRCP performed 9/10/2015.        Dictated by (CST): Priti Frausto MD on 8/25/2020 at 12:53 PM      Finalized by (CST): Priti Frausto MD on 8/25/2020 at 2:10 PM        Supplementary Documentation:   -Surveillance for Diabetes Complications & Risks  Foot exam/neuropathy: Last Foot Exam: 12/11/24      Retinopathy screening: Last Dilated Eye Exam: 08/26/24  Eye Exam shows Diabetic Retinopathy?: Yes      Assessment & Plan:     ICD-10-CM    1. Type 2 diabetes mellitus with diabetic neuropathy, with long-term current use  of insulin ([facility])  E11.40 POC Hemoglobin A1C    Z79.4 Comp Metabolic Panel (14)     insulin glargine (BASAGLAR KWIKPEN) 100 UNIT/ML Subcutaneous Solution Pen-injector     OP REFERRAL INADEQUATE GLYCEMIC CONTROL/CHANGE TREATMENT 3 HRS     Insulin Aspart, w/Niacinamide, (FIASP FLEXTOUCH) 100 UNIT/ML Subcutaneous Solution Pen-injector      2. Pancreatic cyst (HCC)  K86.2       3. Hypertension associated with type 2 diabetes mellitus ([facility])  E11.59     I15.2       4. Hyperlipidemia associated with type 2 diabetes mellitus (HCC)  E11.69     E78.5       5. Type 2 diabetes mellitus with other specified complication, with long-term current use of insulin ([facility])  E11.69     Z79.4       6. Class 1 obesity due to excess calories with body mass index (BMI) of 31.0 to 31.9 in adult, unspecified whether serious comorbidity present  E66.811     E66.09     Z68.31                 Kaylin Bledsoe is a pleasant 70 year old female here for evaluation of:    #Diabetes w/ neuropathy - PMHx of Type 2 diabetes mellitus diagnosed since 1992.  -  Type 1 ABs all negative and  c peptide detected last 8/23/24, Had hx of pancreatitis, thus not a candidate for GLP1 use or DPP4 I.   -  Last A1c value was 9.1% done 4/18/2025.A1c above goal  -  No dexcom reviewed today since when she went to [place] dexcom was not working where she went.   - Goal of A1c< 7%-  Importance of better glucose control in preventing onset/progression of end-organ damage discussed, as well as goals of therapy and clinical significance of A1C.    - Plan: She just got back from [place] last March 1 and she ran out of Basaglar then used her old Lantus however she used the last dose this past Wednesday and she did not call the office for refill.  She states her daughter was also out of town who helps her with her medicine.  In the last 2 days she is only using her Fiasp.  She saw Dr. Tyler ARIAS last December and recommended to her to get her MRI in 3 years as recent MRI was stable.   When we reviewed if she was following the sliding scale, she was not following the sliding scale and giving mostly the bolus insulin.  Reiterated to her adding the sliding scale to her bolus insulin.  Printed this discharge plan given to her and her daughter.  Noted that she was checking her glucose mostly twice a day the second time is usually after eating.  Discussed the importance of checking glucose before eating in order to follow the sliding scale. Ordered CMP.   - med changes:  Insulin: Increase Basaglar from 36 to 38 units every evening   Fiasp or Aspart   Bfast : 10 units + sliding scale  1:40>140  Lunch/snack : 6 units w/o sliding scale  Dinner : 10 units + sliding scale 1:40>140  Sliding scale  <140:  0 units  141-180- 1 units  181-220- 2 units  221-260-3 units  261-300- 4 units  301-340- 5 units  341-380- 6 units  381-420- 7 units  421-460- 8 units  To be given 15 minutes before a meal   Continue Jardiance 25 mg per day and Metformin 1000 mg twice a day  - check glucose before eating   - Schedule eye exam around August   - If we started a new medication today that may not be covered by your insurance, our staff will reach out to you regarding any changes in the plan   - If on insulin, please ensure you have glucagon at home for emergencies   - Targeted glucose levels  - start Dexcom G7 CGM with phone (connected to clinic profile)  - patient is on insulin, and has suboptimal glycemic control including wide glycemic swings, thus would benefit from CGM  - meet with endo DM educator re:BG check in 6-8 weeks, referral placed  - SGLT2i instructions provided re: surgery, times of illness/dehydration   - has gvoke/glucagon at home   -See above header \"Supplementary Documentation\" for surveillance for diabetes complications & risks  Discussed targeted glucose levels and symptoms and treatment of hypoglycemia.   - no open wound at this time     #Pancreatic Cyst  - Pt's brother had a hx of pancreatic cancer.   -  MRI of abd with + w/o contrast last 11/16/24: PANCREAS:  Few small cystic foci of the pancreatic body and tail segments, dominant component of the pancreatic tail measuring 10 x 9 mm (series 4, image 19), stable in size compared to 08/25/2020.  No enhancing components, ductal dilation, or parenchymal atrophy. To continue follow-up with GI and will continue to adjust the insulin.   - Noted pancreatic cyst in MRI of abd  in 2020 and in CT abd in 2023 pancreatic cyst not well seen .  - Plan: followed by GI    #Nephropathy screening:   Lab Results   Component Value Date    EGFRCR 73 05/10/2024    MALBCREACALC 9 10/16/2017    MICROALBCREA 27.1 05/10/2024     Lab Results   Component Value Date    CREAUR 153 10/16/2017    MICROALBUMIN 1.4 10/16/2017    MALBCREACALC 9 10/16/2017       #HTN-Blood pressure control: - SBP is to goal <130 , per PCP  BP Readings from Last 1 Encounters:   04/18/25 126/70   BP Meds: metoprolol succinate ER Tb24 - 25 MG     #Hyperlipidemia  Lipids: LDL is not to goal , we increase Atorvastatin from 10 to 20 mg per day from last visit.  Pt has hx of stent thus tighter control of LDL is needed.  Lab Results   Component Value Date    LDL 74 05/10/2024    TRIG 245 (H) 05/10/2024   Cholesterol Meds: atorvastatin Tabs - 20 MG      #BMI 31/Class 3 obesity due to excess calories/ Type 2 DM with other specified complications   -previous bmi 30, severe obesity is complicating underlying diabetes.Struggling with triggers and emotional eating;  reinforced importance of glycemic control, see back in 12 weeks   - Discussed balanced diet: carbohydrates, protein, healthy fats and fiber in each meal. Exercise of at least 150 mins each week. Decrease your simple carbohydrates intake such as sweets: cookies, soda, candy, white rice, white pasta, syrups  Return in about 6 weeks (around 5/30/2025) for diabetes follow up.    Total time spent  45 minutes today on obtaining history, reviewing pertinent labs, evaluating  patient, providing multiple treatment options, reinforcing diet/exercise and compliance, and completing documentation, of which greater than 50% was spent in face to face discussion with the patient   who demonstrated understanding and agreement with plan.     Thank you for allowing me to participate in the care of this patient.  Please feel free to contact me with any questions.    JESÚS Wood, [facility]  Endocrinology, Diabetes & Metabolism   04/18/25    In reviewing this note, please be advised that Dragon Voice Recognition software used to dictate the note may have made errors in recognizing some of the words or phrases.     Note to patient: The 21 Century Cures Act makes medical notes like these available to patients in the interest of transparency. However, be advised this is a medical document. It is intended as peer to peer communication. It is written in medical language and may contain abbreviations or verbiage that are unfamiliar. It may appear blunt or direct. Medical documents are intended to carry relevant information, facts as evident, and the clinical opinion of the practitioner.

## 2025-04-18 NOTE — PATIENT INSTRUCTIONS
Return Visit:  APN: Return in about 6 weeks (around 5/30/2025) for diabetes follow up.  [] Video visit  [x] In person only    [x]  Please schedule the following appointment with our dietician, Pari Ignacio in 3 weeks  Please go to room 208 to schedule an appointment with her or call her office no at [phone]  in person or video visits:  [x] Inadequate glycemic control/change in treatment (changes in medication, insulin adjustment, further education)  [] Follow up diabetes self- mgmt training ( blood glucose monitoring, medication mgmt/glp1 a follow up)  [] New Onset  DM  [] Medical Nutrition Therapy ( carb counting, meal planning)  [] Insulin or injectable instruction ( insulin teaching)  [] Continuous glucose monitoring ( placement)  [] Insulin Pump   [] Gestational diabetes/diabetes during pregnancy          []  Directions to 1st floor lab    []  Give blood sugar log  []  PAP paperwork for Ozempic/Jardiance (english/English)         Summary of today's visit:  Medication changes:    Insulin: Increase Basaglar from 36 to 38 units every evening   Fiasp or Aspart   Bfast : 10 units + sliding scale  1:40>140  Lunch/snack : 6 units w/o sliding scale  Dinner : 10 units + sliding scale 1:40>140  Sliding scale  <140:  0 units  141-180- 1 units  181-220- 2 units  221-260-3 units  261-300- 4 units  301-340- 5 units  341-380- 6 units  381-420- 7 units  421-460- 8 units  To be given 15 minutes before a meal   Continue Jardiance 25 mg per day and Metformin 1000 mg twice a day  - check glucose before eating   - Schedule eye exam around August   - If we started a new medication today that may not be covered by your insurance, our staff will reach out to you regarding any changes in the plan   - If on insulin, please ensure you have glucagon at home for emergencies   - Targeted glucose levels  - before breakfast or fasting glucose (at least 8-10 hrs of no food/sweetened beverage intake)    -  2 hrs after lunch or dinner<  180   -  before lunch or dinner <140  -  Follow 15g/15 min rule if you develop any hypoglycemia symptoms w/ glucose <70   -  but if glucose <55 follow 30g/15 min rule. Once glucose above 80, eat a protein or food w protein ie cheese, peanut butter, almond butter, cheese, yogurt.     - Let me or my office know at least 3-4 weeks if you have an upcoming procedure or surgery that involves fasting and or sedation as your Jardiance has to be hold for at least 4 days  prior the procedure or surgery and will be resume after. For the meantime, will adjust your other diabetes medicine to maintain your diabetes controlled.   - I prefer having  Hgba1c or GMI (glucose management indicator in your CGM) of at least below 8% before any procedure or surgery.      Medication changes  These Medications Have Changed       Start Taking Instead of    insulin glargine (BASAGLAR KWIKPEN) 100 UNIT/ML Subcutaneous Solution Pen-injector insulin glargine (BASAGLAR KWIKPEN) 100 UNIT/ML Subcutaneous Solution Pen-injector    Dosage:  Inject 38 units every 24 hours. ( For insurance purposes only: TDD max:  50 units/day) Dosage:  Inject 37 units every 24 hours. ( For insurance purposes only: TDD max:  50 units/day)    Insulin Aspart, w/Niacinamide, (FIASP FLEXTOUCH) 100 UNIT/ML Subcutaneous Solution Pen-injector Insulin Aspart, w/Niacinamide, (FIASP FLEXTOUCH) 100 UNIT/ML Subcutaneous Solution Pen-injector    Dosage:  Inject  at Bfast : 10 units + sliding scale  1:40>140; at Lunch/snack : 6 units w/o sliding scale; at Dinner : 10 units + sliding scale 1:40>140 ( For insurance purpose only: TDD max: 48 units /day) Dosage:  Inject 8 units + sliding scale w/ bfast and lunch and inject 4 units( if glucose is above 140) + sliding scale w/ dinner ( For insurance purpose only: TDD max: 48 units /day)    Starting on: 4/20/2025                 Additional comments:   - If labs were ordered today, please complete prior to your follow up visit - fasting  -  Please let us know if you require any refills at least 1 week prior to your medication running out   - Please call our office if sugars at home are consistently greater than 250 or less than 70     HOW TO TREAT LOW BLOOD SUGAR (Hypoglycemia)  Low blood sugar= Less than 70, or if you start to have symptoms (below)  Symptoms: Shaking or trembling, fast heart rate, extreme hunger, sweating, confusion/difficulty concentrating, dizziness.    How to treat a low blood sugar if you are able to eat/drink: The Rule of 15/15  If you are using continuous glucose monitor that says you are low, but you do not have any symptoms, verify on fingerstick that your blood sugar is actually low before treating.   Eat 15 grams of carbs (see examples below)  Check your blood sugar after 15 minutes. If it’s still below your target range, have another serving.   Repeat these steps until it’s in your target range. Once it’s in range, if you're nervous about your sugar going low again, have a protein source (ie, a spoonful of peanut butter).   If you have a CGM you want to look for how your arrow has changed. If you arrow is pointed up or sideways after 15 min, give your CGM more time OR check with a finger stick. Try not to eat more food until at least 15 min after the first BG check - otherwise you risk having a rebound high.  If you are experiencing symptoms and you are unable to check your blood glucose for any reason, treat the hypoglycemia.  If someone has a low blood sugar and is unconscious: Don’t hesitate to call 911. If someone is unconscious and glucagon is not available or someone does not know how to use it, call 911 immediately.     To treat a low glucose <70, I recommend you carry with you easy, pre-portioned treatment for low blood sugars that are 15G of carbs:   - Children sized squeeze pouch applesauce (low fiber)  - Small children's sized juicebox- 15g carb --> 4oz juice box  - Glucose tablets from [employer], you can  find them near diabetes supplies --> Note, you will need to eat 3-4 tablets to get to 15g of carbs  - Children sized fruit snack pack- look for one with 15 grams of total carbohydrate  - Choice of how to treat your low is important. Complex carbs, or foods that contain fats along with carbs (like chocolate) can slow the absorption of glucose and should NOT be used to treat an emergency low

## 2025-04-20 RX ORDER — INSULIN ASPART INJECTION 100 [IU]/ML
INJECTION, SOLUTION SUBCUTANEOUS
Qty: 45 ML | Refills: 1 | Status: SHIPPED | OUTPATIENT
Start: 2025-04-20

## 2025-04-21 ENCOUNTER — TELEPHONE (OUTPATIENT)
Facility: CLINIC | Age: 70
End: 2025-04-21

## 2025-04-21 DIAGNOSIS — E11.40 TYPE 2 DIABETES MELLITUS WITH DIABETIC NEUROPATHY, WITH LONG-TERM CURRENT USE OF INSULIN (HCC): Primary | ICD-10-CM

## 2025-04-21 DIAGNOSIS — Z79.4 TYPE 2 DIABETES MELLITUS WITH DIABETIC NEUROPATHY, WITH LONG-TERM CURRENT USE OF INSULIN (HCC): Primary | ICD-10-CM

## 2025-04-21 RX ORDER — INSULIN GLARGINE-YFGN 100 [IU]/ML
INJECTION, SOLUTION SUBCUTANEOUS
Qty: 55 ML | Refills: 0 | Status: SHIPPED | OUTPATIENT
Start: 2025-04-21

## 2025-04-21 NOTE — TELEPHONE ENCOUNTER
Received Drug Change Request from pharmacy for BASAGLAR KWIKPEN.  Per fax drug alternative is INSULIN GLARGINE-YFGN    Will forward to APN for review.

## 2025-04-22 ENCOUNTER — TELEPHONE (OUTPATIENT)
Facility: CLINIC | Age: 70
End: 2025-04-22

## 2025-04-22 DIAGNOSIS — E11.40 TYPE 2 DIABETES MELLITUS WITH DIABETIC NEUROPATHY, WITH LONG-TERM CURRENT USE OF INSULIN (HCC): ICD-10-CM

## 2025-04-22 DIAGNOSIS — Z79.4 TYPE 2 DIABETES MELLITUS WITH DIABETIC NEUROPATHY, WITH LONG-TERM CURRENT USE OF INSULIN (HCC): ICD-10-CM

## 2025-04-22 NOTE — TELEPHONE ENCOUNTER
Received fax from [facility] pharmacy requesting a PA for FIASP FLEXTOUCH.    Previous Encounter 2/20/25 states that FIASP FLEXTOUCH 100 UNIT/ML is not covered by pts insurance and alternative is INSULIN ASPART FLEXPEN     Will forward to APN

## 2025-05-02 ENCOUNTER — LAB ENCOUNTER (OUTPATIENT)
Dept: LAB | Age: 70
End: 2025-05-02
Attending: INTERNAL MEDICINE
Payer: MEDICARE

## 2025-05-02 DIAGNOSIS — E11.49 DIABETES MELLITUS TYPE 2 WITH NEUROLOGICAL MANIFESTATIONS (HCC): ICD-10-CM

## 2025-05-02 DIAGNOSIS — E87.5 HYPERKALEMIA: ICD-10-CM

## 2025-05-02 DIAGNOSIS — Z00.00 ENCOUNTER FOR ANNUAL HEALTH EXAMINATION: ICD-10-CM

## 2025-05-02 DIAGNOSIS — I10 ESSENTIAL HYPERTENSION: ICD-10-CM

## 2025-05-02 DIAGNOSIS — E78.2 MIXED HYPERLIPIDEMIA: ICD-10-CM

## 2025-05-02 LAB
ALBUMIN SERPL-MCNC: 4.4 G/DL (ref 3.2–4.8)
ALBUMIN/GLOB SERPL: 1.5 {RATIO} (ref 1–2)
ALP LIVER SERPL-CCNC: 140 U/L (ref 55–142)
ALT SERPL-CCNC: 11 U/L (ref 10–49)
ANION GAP SERPL CALC-SCNC: 13 MMOL/L (ref 0–18)
AST SERPL-CCNC: 21 U/L (ref ?–34)
BASOPHILS # BLD AUTO: 0.03 X10(3) UL (ref 0–0.2)
BASOPHILS NFR BLD AUTO: 0.5 %
BILIRUB SERPL-MCNC: 0.4 MG/DL (ref 0.2–1.1)
BUN BLD-MCNC: 9 MG/DL (ref 9–23)
CALCIUM BLD-MCNC: 9.6 MG/DL (ref 8.7–10.6)
CHLORIDE SERPL-SCNC: 104 MMOL/L (ref 98–112)
CHOLEST SERPL-MCNC: 137 MG/DL (ref ?–200)
CO2 SERPL-SCNC: 24 MMOL/L (ref 21–32)
CREAT BLD-MCNC: 1.03 MG/DL (ref 0.55–1.02)
CREAT UR-SCNC: 71.2 MG/DL
EGFRCR SERPLBLD CKD-EPI 2021: 58 ML/MIN/1.73M2 (ref 60–?)
EOSINOPHIL # BLD AUTO: 0.18 X10(3) UL (ref 0–0.7)
EOSINOPHIL NFR BLD AUTO: 3 %
ERYTHROCYTE [DISTWIDTH] IN BLOOD BY AUTOMATED COUNT: 14.6 %
EST. AVERAGE GLUCOSE BLD GHB EST-MCNC: 220 MG/DL (ref 68–126)
FASTING PATIENT LIPID ANSWER: YES
FASTING STATUS PATIENT QL REPORTED: YES
GLOBULIN PLAS-MCNC: 2.9 G/DL (ref 2–3.5)
GLUCOSE BLD-MCNC: 177 MG/DL (ref 70–99)
HBA1C MFR BLD: 9.3 % (ref ?–5.7)
HCT VFR BLD AUTO: 45 % (ref 35–48)
HDLC SERPL-MCNC: 45 MG/DL (ref 40–59)
HGB BLD-MCNC: 14.6 G/DL (ref 12–16)
IMM GRANULOCYTES # BLD AUTO: 0.02 X10(3) UL (ref 0–1)
IMM GRANULOCYTES NFR BLD: 0.3 %
LDLC SERPL CALC-MCNC: 53 MG/DL (ref ?–100)
LYMPHOCYTES # BLD AUTO: 1.34 X10(3) UL (ref 1–4)
LYMPHOCYTES NFR BLD AUTO: 22 %
MCH RBC QN AUTO: 27.8 PG (ref 26–34)
MCHC RBC AUTO-ENTMCNC: 32.4 G/DL (ref 31–37)
MCV RBC AUTO: 85.6 FL (ref 80–100)
MICROALBUMIN UR-MCNC: 0.4 MG/DL
MICROALBUMIN/CREAT 24H UR-RTO: 5.6 UG/MG (ref ?–30)
MONOCYTES # BLD AUTO: 0.54 X10(3) UL (ref 0.1–1)
MONOCYTES NFR BLD AUTO: 8.9 %
NEUTROPHILS # BLD AUTO: 3.98 X10 (3) UL (ref 1.5–7.7)
NEUTROPHILS # BLD AUTO: 3.98 X10(3) UL (ref 1.5–7.7)
NEUTROPHILS NFR BLD AUTO: 65.3 %
NONHDLC SERPL-MCNC: 92 MG/DL (ref ?–130)
OSMOLALITY SERPL CALC.SUM OF ELEC: 295 MOSM/KG (ref 275–295)
PLATELET # BLD AUTO: 172 10(3)UL (ref 150–450)
POTASSIUM SERPL-SCNC: 4.8 MMOL/L (ref 3.5–5.1)
PROT SERPL-MCNC: 7.3 G/DL (ref 5.7–8.2)
RBC # BLD AUTO: 5.26 X10(6)UL (ref 3.8–5.3)
SODIUM SERPL-SCNC: 141 MMOL/L (ref 136–145)
TRIGL SERPL-MCNC: 249 MG/DL (ref 30–149)
TSI SER-ACNC: 1.25 UIU/ML (ref 0.55–4.78)
VLDLC SERPL CALC-MCNC: 36 MG/DL (ref 0–30)
WBC # BLD AUTO: 6.1 X10(3) UL (ref 4–11)

## 2025-05-02 PROCEDURE — 80061 LIPID PANEL: CPT

## 2025-05-02 PROCEDURE — 80053 COMPREHEN METABOLIC PANEL: CPT

## 2025-05-02 PROCEDURE — 82043 UR ALBUMIN QUANTITATIVE: CPT

## 2025-05-02 PROCEDURE — 84443 ASSAY THYROID STIM HORMONE: CPT

## 2025-05-02 PROCEDURE — 85025 COMPLETE CBC W/AUTO DIFF WBC: CPT

## 2025-05-02 PROCEDURE — 36415 COLL VENOUS BLD VENIPUNCTURE: CPT

## 2025-05-02 PROCEDURE — 83036 HEMOGLOBIN GLYCOSYLATED A1C: CPT

## 2025-05-02 PROCEDURE — 82570 ASSAY OF URINE CREATININE: CPT

## 2025-05-05 ENCOUNTER — OFFICE VISIT (OUTPATIENT)
Dept: INTERNAL MEDICINE CLINIC | Facility: CLINIC | Age: 70
End: 2025-05-05
Payer: MEDICARE

## 2025-05-05 VITALS
HEIGHT: 65 IN | BODY MASS INDEX: 31.96 KG/M2 | SYSTOLIC BLOOD PRESSURE: 120 MMHG | RESPIRATION RATE: 16 BRPM | HEART RATE: 67 BPM | DIASTOLIC BLOOD PRESSURE: 58 MMHG | OXYGEN SATURATION: 98 % | WEIGHT: 191.81 LBS | TEMPERATURE: 98 F

## 2025-05-05 DIAGNOSIS — Z95.5 S/P CORONARY ARTERY STENT PLACEMENT: ICD-10-CM

## 2025-05-05 DIAGNOSIS — D12.2 BENIGN NEOPLASM OF ASCENDING COLON: ICD-10-CM

## 2025-05-05 DIAGNOSIS — E11.22 CKD STAGE 3 DUE TO TYPE 2 DIABETES MELLITUS (HCC): ICD-10-CM

## 2025-05-05 DIAGNOSIS — R41.89 SUBJECTIVE MEMORY COMPLAINTS: ICD-10-CM

## 2025-05-05 DIAGNOSIS — Z47.89 ORTHOPEDIC AFTERCARE: ICD-10-CM

## 2025-05-05 DIAGNOSIS — Z00.00 ENCOUNTER FOR ANNUAL HEALTH EXAMINATION: Primary | ICD-10-CM

## 2025-05-05 DIAGNOSIS — I25.10 CORONARY ARTERY DISEASE INVOLVING NATIVE CORONARY ARTERY OF NATIVE HEART WITHOUT ANGINA PECTORIS: ICD-10-CM

## 2025-05-05 DIAGNOSIS — M21.611 BUNION, RIGHT: ICD-10-CM

## 2025-05-05 DIAGNOSIS — Z98.890 S/P ORIF (OPEN REDUCTION INTERNAL FIXATION) FRACTURE: ICD-10-CM

## 2025-05-05 DIAGNOSIS — I44.7 LBBB (LEFT BUNDLE BRANCH BLOCK): ICD-10-CM

## 2025-05-05 DIAGNOSIS — N18.30 CKD STAGE 3 DUE TO TYPE 2 DIABETES MELLITUS (HCC): ICD-10-CM

## 2025-05-05 DIAGNOSIS — M19.071 ARTHRITIS OF RIGHT MIDFOOT: ICD-10-CM

## 2025-05-05 DIAGNOSIS — M85.89 OSTEOPENIA OF MULTIPLE SITES: ICD-10-CM

## 2025-05-05 DIAGNOSIS — Z79.4 TYPE 2 DIABETES MELLITUS WITH DIABETIC NEUROPATHY, WITH LONG-TERM CURRENT USE OF INSULIN (HCC): ICD-10-CM

## 2025-05-05 DIAGNOSIS — E11.40 TYPE 2 DIABETES MELLITUS WITH DIABETIC NEUROPATHY, WITH LONG-TERM CURRENT USE OF INSULIN (HCC): ICD-10-CM

## 2025-05-05 DIAGNOSIS — I10 ESSENTIAL HYPERTENSION: ICD-10-CM

## 2025-05-05 DIAGNOSIS — F32.A ANXIETY AND DEPRESSION: ICD-10-CM

## 2025-05-05 DIAGNOSIS — Z87.81 S/P ORIF (OPEN REDUCTION INTERNAL FIXATION) FRACTURE: ICD-10-CM

## 2025-05-05 DIAGNOSIS — I83.893 VARICOSE VEINS OF BOTH LEGS WITH EDEMA: ICD-10-CM

## 2025-05-05 DIAGNOSIS — I87.2 VENOUS INSUFFICIENCY OF BOTH LOWER EXTREMITIES: ICD-10-CM

## 2025-05-05 DIAGNOSIS — K21.9 GASTROESOPHAGEAL REFLUX DISEASE WITHOUT ESOPHAGITIS: ICD-10-CM

## 2025-05-05 DIAGNOSIS — M20.42 HAMMER TOE OF LEFT FOOT: ICD-10-CM

## 2025-05-05 DIAGNOSIS — K86.2 PANCREATIC CYST (HCC): ICD-10-CM

## 2025-05-05 DIAGNOSIS — R25.1 TREMOR: ICD-10-CM

## 2025-05-05 DIAGNOSIS — E66.811 CLASS 1 OBESITY: ICD-10-CM

## 2025-05-05 DIAGNOSIS — F41.9 ANXIETY AND DEPRESSION: ICD-10-CM

## 2025-05-05 DIAGNOSIS — R26.89 IMBALANCE: ICD-10-CM

## 2025-05-05 DIAGNOSIS — E78.2 MIXED HYPERLIPIDEMIA: ICD-10-CM

## 2025-05-05 DIAGNOSIS — D12.3 BENIGN NEOPLASM OF TRANSVERSE COLON: ICD-10-CM

## 2025-05-05 PROBLEM — Z86.0100 HX OF COLONIC POLYPS: Status: RESOLVED | Noted: 2024-10-04 | Resolved: 2025-05-05

## 2025-05-05 NOTE — PROGRESS NOTES
Subjective:   Kaylin Bledsoe is a 70 year old female who presents for a Medicare Subsequent Annual Wellness visit (Pt already had Initial Annual Wellness) and scheduled follow up of multiple significant but stable problems.   History of Present Illness      Hemoglobin A1c is 9.3%.  She is admittedly rather sedentary.  She now ambulates with the assistance of a single pronged cane generally outside of her home if she is leaving, as she does experience some sensation of imbalance.  No motor weakness.  No falls, however occasionally upon standing she hesitates with her movements.  She has a CGM in place, and with increased blood glucose levels she typically takes more insulin, as per her insulin sliding scale.  Dietary habits have remained stable.  She also reports a tremor, that is worse with intention, however also present while at rest.  Her symptoms she feels correlates with her blood glucose levels; as her sugars are elevated the tremors worse.  She feels her memory has been gradually declining.  She finds difficulty with word finding.    History/Other:   Fall Risk Assessment:   She has been screened for Falls and is High Risk. Fall Prevention information provided to patient in After Visit Summary.    Do you feel unsteady when standing or walking?: (Patient-Rptd) Yes  Do you worry about falling?: (Patient-Rptd) Yes  Have you fallen in the past year?: (Patient-Rptd) No     Cognitive Assessment:   She had a completely normal cognitive assessment - see flowsheet entries     Functional Ability/Status:   Kaylin Bledsoe has some abnormal functions as listed below:  She has Driving difficulties based on screening of functional status. She has Hearing problems based on screening of functional status.She has Walking problems based on screening of functional status. She has problems with Memory based on screening of functional status.       Depression Screening (PHQ):  PHQ-2 SCORE: 0  , done 4/30/2025        5 minutes  spent screening and counseling for depression    Advanced Directives:   She does NOT have a Living Will. [Do you have a living will?: (Patient-Rptd) No]  She does NOT have a Power of  for Health Care. [Do you have a healthcare power of ?: (Patient-Rptd) No]  Discussed Advance Care Planning with patient (and family/surrogate if present). Standard forms made available to patient in After Visit Summary.      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)    Osteopenia    Anxiety and depression    LBBB (left bundle branch block)    Type 2 diabetes mellitus with diabetic neuropathy, with long-term current use of insulin (HCC)    Pancreatic cyst (HCC)    Coronary artery disease involving native coronary artery of native heart without angina pectoris    S/P coronary artery stent placement    Venous insufficiency of both lower extremities    Varicose veins of both legs with edema    Gastroesophageal reflux disease without esophagitis    Benign neoplasm of ascending colon    Benign neoplasm of transverse colon    Left total knee replacement  Global 10/15/2020    S/P ORIF (open reduction internal fixation) fracture    Hammer toe of left foot    Bunion, right    Arthritis, midfoot    Class 1 obesity    Hx of colonic polyps     Allergies:  She is allergic to codeine, hydrocodone, and vicodin [hydrocodone-acetaminophen].    Current Medications:  Outpatient Medications Marked as Taking for the 5/5/25 encounter (Office Visit) with Kiel Azevedo MD   Medication Sig    insulin aspart 100 Units/mL Subcutaneous Solution Pen-injector Inject at Bfast : 10 units + sliding scale 1:40>140; at Lunch/snack : 6 units w/o sliding scale; at Dinner : 10 units + sliding scale 1:40>140 ( For insurance purpose only: TDD max: 48 units /day)    Insulin Glargine-yfgn 100 UNIT/ML Subcutaneous Solution Pen-injector Inject 38  units every 24 hours. ( TDD max: 50 units/day)    Glucose Blood (ONETOUCH VERIO) In Vitro Strip 1 strip by In Vitro route 3 (three) times daily.    Lancet Devices (ONETOUCH DELICA PLUS LANCING) Does not apply Misc 1 Lancet by Finger stick route 3 (three) times daily.    Lancets (ONETOUCH DELICA PLUS [id number]) Does not apply Misc 1 Lancet by Finger stick route 3 (three) times daily.    Alcohol Swabs 70 % Does not apply Pads Apply 1 Pad topically 3 (three) times daily. Use to clean finger before using lancet    atorvastatin 20 MG Oral Tab TAKE 1 TABLET(20 MG) BY MOUTH EVERY NIGHT    Insulin Pen Needle (BD PEN NEEDLE SHORT U/F) 31G X 8 MM Does not apply Misc Use 4 x a day with insulin subcutaneously    estradiol 0.1 MG/GM Vaginal Cream PLACE 1 GRAM VAGINALLY EVERY NIGHT    CALCIUM-VITAMIN D OR     Blood Glucose Monitoring Suppl Does not apply Kit Use to test blood glucose 3x/day.    glucagon (GVOKE HYPOPEN 2-PACK) 1 MG/0.2ML Subcutaneous injection Inject 0.2 mL (1 mg total) into the skin as needed. Only if cannot swallow anything by mouth and glucose is less than 70. Patient trained on Gvoke. No substitutions    metoprolol succinate ER 25 MG Oral Tablet 24 Hr     famotidine 40 MG Oral Tab Take 1 tablet (40 mg total) by mouth daily.    montelukast 10 MG Oral Tab Take 1 tablet (10 mg total) by mouth daily as needed.    sertraline 100 MG Oral Tab Take 1 tablet (100 mg total) by mouth daily.    Empagliflozin (JARDIANCE) 25 MG Oral Tab Take 1 tablet by mouth daily.    metFORMIN  MG Oral Tablet 24 Hr Take 2 tablets (1,000 mg total) by mouth 2 (two) times daily with meals.    Insulin Pen Needle (PEN NEEDLES) 32G X 4 MM Does not apply Misc 1 each  in the morning and 1 each before bedtime.    nystatin 100,000 Units/g External Ointment Apply 1 applicator topical twice daily for 4 weeks, then twice daily every other day for 2 weeks, then as needed.    aspirin 81 MG Oral Chew Tab Chew 1 tablet (81 mg total) by mouth daily.     ALPRAZOLAM 0.5 MG Oral Tab TAKE 1 TABLET(0.5 MG) BY MOUTH DAILY AS NEEDED FOR ANXIETY OR SLEEP    Acetaminophen 500 MG Oral Cap Take 2 capsules (1,000 mg total) by mouth daily as needed.       Medical History:  She  has a past medical history of Abdominal hernia, Abdominal pain, Anemia, Anesthesia complication, Anxiety, Arthritis, Atherosclerosis of coronary artery, Atherosclerotic heart disease of native coronary artery without angina pectoris, Back pain, Bad breath, Belching, Bilateral leg edema (2018), Bloating, Blurred vision, Change in hair, Constipation, Diabetes mellitus (HCC), Diarrhea, unspecified, Dizziness, Easy bruising, Fatigue, Feeling lonely, Flatulence/gas pain/belching, Food intolerance, Frequent use of laxatives, Hearing loss, High blood pressure, High cholesterol, History of depression, History of eating disorder, Indigestion, Irregular bowel habits, Itch of skin, Leg swelling, Loss of appetite, Nausea, Neuropathy, Night sweats, Osteoarthrosis, unspecified whether generalized or localized, unspecified site, Osteoporosis, Other and unspecified hyperlipidemia, Pain in joints, Peripheral neuropathy, Problems with swallowing, Sleep disturbance, Stented coronary artery, Stress, Type II or unspecified type diabetes mellitus without mention of complication, not stated as uncontrolled, Uncomfortable fullness after meals, Venous insufficiency of both lower extremities (2018), Visual impairment, Vomiting, Wears glasses, and Weight loss.  Surgical History:  She  has a past surgical history that includes  (,); cataract (Bilateral); hip replacement surgery (); total hip replacement; endovenous laser vein addon (Left, ); endovenous laser vein addon (Right, ); angioplasty (coronary) (); Vaginal hysterectomy (N/A, 2017); colonoscopy; cath drug eluting stent; knee replacement surgery (Left, 2020); other surgical history (2004.  ); other surgical  history (2021); and total abdom hysterectomy.   Family History:  Her family history includes Diabetes in her brother, daughter, and sister; Heart Attack in her father; Hypertension in her father; Pancreatic Cancer in her sister.  Social History:  She  reports that she has never smoked. She has never used smokeless tobacco. She reports that she does not drink alcohol and does not use drugs.    Tobacco:  She has never smoked tobacco.    CAGE Alcohol Screen:   CAGE screening score of 0 on 4/30/2025, showing low risk of alcohol abuse.      Patient Care Team:  Kiel Azevedo MD as PCP - General (Internal Medicine)  Violeta Sims APN (Nurse Practitioner Women's Health)  Mireya Salamanca, PT as Physical Therapist (Physical Therapy)  Laverne Kingston MD (CARDIOLOGY)  Marlon Daniels MD (Cardiovascular Diseases)  Cassandra Philip, RN, CDE as Registered Nurse (Registered Nurse)  Bola Montoya MD (SURGERY, ORTHOPEDIC)  Flor Mata MD (SURGERY, ORTHOPEDIC)  Radha Corey PAEzraC (Physician Assistant Surgical)  Omar Billingsley, PT as Physical Therapist  Soo Ramesh MD as Obstetrician (OBSTETRICS & GYNECOLOGY)  Quynh Hidalgo, VERA as Physical Therapist  Tamanna Downs APRN (Nurse Practitioner)  Lacey Canas APRN (Nurse Practitioner)    Review of Systems  GENERAL: feels well otherwise  SKIN: denies any unusual skin lesions  EYES: denies blurred vision or double vision  HEENT: denies nasal congestion, sinus pain or ST  LUNGS: denies shortness of breath with exertion  CARDIOVASCULAR: denies chest pain on exertion  GI: denies abdominal pain, denies heartburn  : denies dysuria, vaginal discharge or itching, no complaint of urinary incontinence   MUSCULOSKELETAL: denies back pain  NEURO: denies headaches  PSYCHE: denies depression or anxiety  HEMATOLOGIC: denies hx of anemia  ENDOCRINE: denies thyroid history  ALL/ASTHMA: denies hx of allergy or asthma    Objective:   Physical Exam  General Appearance:  Alert,  cooperative, no distress, appears stated age   Head:  Normocephalic, without obvious abnormality, atraumatic   Eyes:  Bilateral conjunctiva within normal limits   Ears:  Tympanic membrane within normal limits bilaterally   Nose: Deferred   Throat: Deferred   Neck: Supple, symmetrical, trachea midline, no adenopathy;  thyroid: not enlarged, symmetric, no tenderness/mass/nodules; no carotid bruit or JVD   Back:   Symmetric, no curvature, ROM normal, no CVA tenderness   Lungs:   Clear to auscultation bilaterally, respirations unlabored   Heart:  Regular rate and rhythm, S1 and S2 normal, no murmur, rub, or gallop   Abdomen:   Soft, non-tender, bowel sounds active all four quadrants,  no masses, no organomegaly   Pelvic: Deferred   Extremities: No edema   Pulses: 2+ and symmetric   Skin: Skin color, texture, turgor normal, no rashes or lesions   Lymph nodes: Cervical nodes normal   Neurologic: Grossly normal   Bilateral barefoot skin diabetic exam is normal, visualized feet and the appearance is normal.  Bilateral monofilament/sensation of both feet is normal.  Pulsation pedal pulse exam of both lower legs/feet is normal as well.  /58   Pulse 67   Temp 97.8 °F (36.6 °C)   Resp 16   Ht 5' 5\" (1.651 m)   Wt 191 lb 12.8 oz (87 kg)   LMP 09/01/2017 (LMP Unknown)   SpO2 98%   BMI 31.92 kg/m²  Estimated body mass index is 31.92 kg/m² as calculated from the following:    Height as of this encounter: 5' 5\" (1.651 m).    Weight as of this encounter: 191 lb 12.8 oz (87 kg).    Medicare Hearing Assessment:   Hearing Screening    Time taken: 5/5/2025  4:53 PM  Screening Method: Finger Rub  Finger Rub Result: Pass         Visual Acuity:   Right Eye Visual Acuity: Corrected Right Eye Chart Acuity: 20/20   Left Eye Visual Acuity: Corrected Left Eye Chart Acuity: 20/25   Both Eyes Visual Acuity: Corrected Both Eyes Chart Acuity: 20/20   Able To Tolerate Visual Acuity: Yes        Assessment & Plan:   Kaylin Bledsoe is a  70 year old female who presents for a Medicare Assessment.     1. Encounter for annual health examination (Primary)    Assessment & Plan    Outstanding screening and preventive measures:  Shingles immunization: To be obtained from pharmacy    Imbalance, subjective memory complaints, and tremor:  Multifactorial etiology, including deconditioning, hearing loss, and potential central neurological process.  No focal neurological deficits on exam.  In light of hearing loss, I have recommended audiology evaluation.  The tremor was not observed during examination, however reportedly occurs both while at rest and with intention.  In light of all of the above symptoms, I have recommended evaluation by the neurology service.    DM2:  Uncontrolled, with current hemoglobin A1c of 9.3%.  Continue management per endocrinology service.  Complications: Neuropathy and CKD stage III  Up-to-date with ophthalmologic evaluation; scheduled for follow-up  No microalbuminuria: Currently holding ramipril secondary to symptomatic relative or absolute hypotension  LDL at goal: Continue atorvastatin 10 mg daily    Dilated cardiomyopathy:  Stable and asymptomatic  Left ventricular ejection fraction of 45%  Following with cardiology service     Hypertension:  Stable/controlled  Continue current regimen     CAD:  Stable and without angina  Post stent placement  Primarily in LAD without significant obstructive disease  Continue metoprolol succinate 25 mg daily and Jardiance 25 mg daily  Following with cardiology service    Class I obesity:  Dietary and lifestyle management reinforced at length    Vaginal atrophy:  Following with gynecology service for topical therapy     Left bunion and hammertoe and metatarsalgia:  Postsurgical intervention by podiatry service  Mild improvement in pain, however symptoms are still interfering with activities of daily living and recreational activities  Following with podiatry service as needed  Undergoing physical  therapy regularly     LBBB:  Stable and asymptomatic  Following with cardiology service regularly  2D echocardiogram in June     Mixed hyperlipidemia:  Triglyceride level elevated at 249 with LDL at goal at 53  Continue atorvastatin 10 mg daily  Following with the cardiology service    History of colon polyps:  Advised due for follow-up with GI service later this year     Depression and anxiety:  Improved  Continue sertraline 100 mg daily  Continue rare alprazolam use     GERD:  Stable  Continue current management     Pancreatic cyst:  Stable  Following with GI service     Osteopenia:  Continue current calcium and vitamin D supplementation  Weightbearing exercise encouraged  DEXA next year     Venous insufficiency:  Stable  Stasis dermatitis on the left and some associated aching discomfort  Ultrasound ordered; recommendations to follow     History of left femur fracture:  Post ORIF without current complication     Osteoarthritis of left knee:  Post replacement    The patient indicates understanding of these issues and agrees to the plan.  Reinforced healthy diet, lifestyle, and exercise.      Return in 6 months (on 11/5/2025).     Kiel Azevedo MD, 5/5/2025     Supplementary Documentation:   General Health:  In the past six months, have you lost more than 10 pounds without trying?: (Patient-Rptd) 2 - No  Has your appetite been poor?: (Patient-Rptd) No  Type of Diet: (Patient-Rptd) Other  How does the patient maintain a good energy level?: (Patient-Rptd) Other  How would you describe your daily physical activity?: (Patient-Rptd) None  How would you describe your current health state?: (Patient-Rptd) Fair  On a scale of 0 to 10, with 0 being no pain and 10 being severe pain, what is your pain level?: (Patient-Rptd) 6 - (Moderate)  In the past six months, have you experienced urine leakage?: (Patient-Rptd) 0-No  At any time do you feel concerned for the safety/well-being of yourself and/or your children, in your home  or elsewhere?: (Patient-Rptd) No  Have you had any immunizations at another office such as Influenza, Hepatitis B, Tetanus, or Pneumococcal?: (Patient-Rptd) No    Health Maintenance   Topic Date Due    Zoster Vaccines (1 of 2) Never done    COVID-19 Vaccine (8 - 2024-25 season) 09/01/2024    Diabetes Care: Foot Exam (Annual)  01/01/2025    Annual Physical  05/30/2025    Diabetes Care A1C  08/02/2025    Diabetes Care Dilated Eye Exam  08/26/2025    Influenza Vaccine (Season Ended) 10/01/2025    Mammogram  04/12/2026    Diabetes Care: GFR  05/02/2026    Colorectal Cancer Screening  10/04/2031    DEXA Scan  Completed    Annual Depression Screening  Completed    Fall Risk Screening (Annual)  Completed    Diabetes Care: Microalb/Creat Ratio (Annual)  Completed    Pneumococcal Vaccine: 50+ Years  Completed    Meningococcal B Vaccine  Aged Out

## 2025-05-08 DIAGNOSIS — E11.40 TYPE 2 DIABETES MELLITUS WITH DIABETIC NEUROPATHY, WITH LONG-TERM CURRENT USE OF INSULIN (HCC): ICD-10-CM

## 2025-05-08 DIAGNOSIS — Z79.4 TYPE 2 DIABETES MELLITUS WITH DIABETIC NEUROPATHY, WITH LONG-TERM CURRENT USE OF INSULIN (HCC): ICD-10-CM

## 2025-05-08 NOTE — TELEPHONE ENCOUNTER
Received fax from pharmacy requesting refill for insulin aspart 100 Units/mL Subcutaneous Solution Pen-injector.  Spoke to pharmacist and informed them that a prescription was sent on 4/22/2025.  Pharmacist stated that they did not have any prescription on their end.  Will re-order and route to RN pool to be filled.

## 2025-05-09 NOTE — TELEPHONE ENCOUNTER
Spoke with pharmacy tech [facility] regarding insulin aspart pen injector that prescription was sent on 4/22/25 for 90 days and 1 refill. Per [facility] they received prescription.   Declined as inappropriate.

## 2025-05-15 ENCOUNTER — TELEPHONE (OUTPATIENT)
Facility: CLINIC | Age: 70
End: 2025-05-15

## 2025-05-15 NOTE — TELEPHONE ENCOUNTER
Received fax from [employer] requesting visit notes in order to continued supplying patient with CGM supplies.   Chart notes faxed to [employer] at [phone].    Fax confirmation received, closing Encounter.

## 2025-05-30 ENCOUNTER — TELEPHONE (OUTPATIENT)
Dept: ORTHOPEDICS CLINIC | Facility: CLINIC | Age: 70
End: 2025-05-30

## 2025-05-30 DIAGNOSIS — M25.572 LEFT ANKLE PAIN, UNSPECIFIED CHRONICITY: Primary | ICD-10-CM

## 2025-05-30 NOTE — TELEPHONE ENCOUNTER
XR ordered per ortho protocol. XR scheduled and patient was notified via [employer] to let them know that they should arrive 15-20 minutes early, in order for them to complete imaging.

## 2025-05-30 NOTE — TELEPHONE ENCOUNTER
Patient scheduled on [facility] with Dr Agrawal for left ankle/heel pain. Patient Requesting to get xrays done and knows to arrive early. Please advise    Future Appointments   Date Time Provider Department Center   6/5/2025  2:20 PM Lindsay Agrawal DPM [facility]   6/5/2025  4:00 PM Acacia Sales APRN [facility]   12/6/2025 10:00 AM Kiel Azevedo MD [facility]

## 2025-06-05 ENCOUNTER — HOSPITAL ENCOUNTER (OUTPATIENT)
Dept: GENERAL RADIOLOGY | Age: 70
Discharge: HOME | End: 2025-06-05
Attending: PODIATRIST
Payer: MEDICARE

## 2025-06-05 ENCOUNTER — OFFICE VISIT (OUTPATIENT)
Facility: CLINIC | Age: 70
End: 2025-06-05
Payer: MEDICARE

## 2025-06-05 ENCOUNTER — OFFICE VISIT (OUTPATIENT)
Dept: ORTHOPEDICS CLINIC | Facility: CLINIC | Age: 70
End: 2025-06-05
Payer: MEDICARE

## 2025-06-05 VITALS
WEIGHT: 191 LBS | SYSTOLIC BLOOD PRESSURE: 124 MMHG | HEIGHT: 65 IN | OXYGEN SATURATION: 99 % | DIASTOLIC BLOOD PRESSURE: 72 MMHG | BODY MASS INDEX: 31.82 KG/M2 | HEART RATE: 80 BPM

## 2025-06-05 VITALS — BODY MASS INDEX: 31.96 KG/M2 | WEIGHT: 191.81 LBS | HEIGHT: 65 IN

## 2025-06-05 DIAGNOSIS — E11.69 TYPE 2 DIABETES MELLITUS WITH OTHER SPECIFIED COMPLICATION, WITH LONG-TERM CURRENT USE OF INSULIN (HCC): ICD-10-CM

## 2025-06-05 DIAGNOSIS — E11.40 TYPE 2 DIABETES MELLITUS WITH DIABETIC NEUROPATHY, WITH LONG-TERM CURRENT USE OF INSULIN (HCC): ICD-10-CM

## 2025-06-05 DIAGNOSIS — Z79.4 TYPE 2 DIABETES MELLITUS WITH OTHER SPECIFIED COMPLICATION, WITH LONG-TERM CURRENT USE OF INSULIN (HCC): ICD-10-CM

## 2025-06-05 DIAGNOSIS — M76.72 PERONEAL TENDONITIS OF LEFT LOWER EXTREMITY: ICD-10-CM

## 2025-06-05 DIAGNOSIS — M25.572 LEFT ANKLE PAIN, UNSPECIFIED CHRONICITY: ICD-10-CM

## 2025-06-05 DIAGNOSIS — M19.072 ARTHRITIS OF ANKLE, LEFT: Primary | ICD-10-CM

## 2025-06-05 DIAGNOSIS — E11.40 TYPE 2 DIABETES MELLITUS WITH DIABETIC NEUROPATHY, WITH LONG-TERM CURRENT USE OF INSULIN (HCC): Primary | ICD-10-CM

## 2025-06-05 DIAGNOSIS — Z79.4 TYPE 2 DIABETES MELLITUS WITH DIABETIC NEUROPATHY, WITH LONG-TERM CURRENT USE OF INSULIN (HCC): ICD-10-CM

## 2025-06-05 DIAGNOSIS — E78.5 HYPERLIPIDEMIA ASSOCIATED WITH TYPE 2 DIABETES MELLITUS (HCC): ICD-10-CM

## 2025-06-05 DIAGNOSIS — E11.69 HYPERLIPIDEMIA ASSOCIATED WITH TYPE 2 DIABETES MELLITUS (HCC): ICD-10-CM

## 2025-06-05 DIAGNOSIS — Z79.4 TYPE 2 DIABETES MELLITUS WITH DIABETIC NEUROPATHY, WITH LONG-TERM CURRENT USE OF INSULIN (HCC): Primary | ICD-10-CM

## 2025-06-05 LAB — AMB EXT GMI: 8 %

## 2025-06-05 PROCEDURE — 99214 OFFICE O/P EST MOD 30 MIN: CPT | Performed by: NURSE PRACTITIONER

## 2025-06-05 PROCEDURE — 95251 CONT GLUC MNTR ANALYSIS I&R: CPT | Performed by: NURSE PRACTITIONER

## 2025-06-05 PROCEDURE — 99214 OFFICE O/P EST MOD 30 MIN: CPT | Performed by: PODIATRIST

## 2025-06-05 PROCEDURE — 73610 X-RAY EXAM OF ANKLE: CPT | Performed by: PODIATRIST

## 2025-06-05 RX ORDER — MELOXICAM 7.5 MG/1
7.5 TABLET ORAL DAILY
Qty: 14 TABLET | Refills: 0 | Status: SHIPPED | OUTPATIENT
Start: 2025-06-05

## 2025-06-05 NOTE — PATIENT INSTRUCTIONS
Return Visit/ Visita de regreso:  APN: Return in about 2 months (around 8/4/2025) for diabetes follow up.  [] Video visit  [x] In person only      [x]  Por favor, programe la siguiente bear con nuestra dietista, Pari Ignacio, en 2-3 semanas. Por favor, diríjase a la [address] (al lado de nuestra oficina) para programar mateus bear con tricia o llame a velasquez oficina al [phone]. Visitas en persona o por video:  Please schedule the following appointment with our dietician, Pari Ignacio in weeks  Please go to room 208 ( next door ofour office) to schedule an appointment with her or call her office no at [phone] in person or video visits:    [] Inadequate glycemic control/change in treatment (changes in medication, insulin adjustment, further education)  [] Follow up diabetes self- mgmt training ( blood glucose monitoring, medication mgmt/glp1 a follow up)  [] New Onset  DM  [x] Medical Nutrition Therapy ( carb counting, meal planning)  [] Insulin or injectable instruction ( insulin teaching)  [] Continuous glucose monitoring ( placement)  [] Insulin Pump   [] Gestational diabetes/diabetes during pregnancy        Cambios de medicación:     Glargine 38 units every evening   Aspart Increase   Bfast : 10-->12 units + sliding scale  1:40>140  Lunch/snack :4-> 6 units w/o sliding scale  Dinner : 10-->12 units + sliding scale 1:40>140  Sliding scale  <140:  0 units  141-180- 1 units  181-220- 2 units  221-260-3 units  261-300- 4 units  301-340- 5 units  341-380- 6 units  381-420- 7 units  421-460- 8 units  To be given 15 minutes before a meal   Continue Jardiance 25 mg per day  Continue Metformin 1000 mg twice a day  - check glucose before eating   - - check your glucose with fingerstick machine/glucometer if your continous glucose monitor (CGM) tells you that your glucose is below 70 to clarify. Follow the glucometer's number if you need to treat it with 15/15 rule or 30/15 rule    - see diabetes educator for carb counting  lesson and will start insulin pump   # Si usa insulina, asegúrese de tener glucagón en casa para emergencias.    Diabetic Medications              insulin aspart 100 Units/mL Subcutaneous Solution Pen-injector (Taking) Inject at Bfast : 12 units + sliding scale 1:40>140; at Lunch/snack : 6 units w/o sliding scale; at Dinner : 12 units + sliding scale 1:40>140 ( For insurance purpose only: TDD max: 48 units /day)    Insulin Glargine-yfgn 100 UNIT/ML Subcutaneous Solution Pen-injector (Taking) Inject 38 units every 24 hours. Actively titrating per endo instruction ( TDD max: 50 units/day)    glucagon (GVOKE HYPOPEN 2-PACK) 1 MG/0.2ML Subcutaneous injection (Taking As Needed) Inject 0.2 mL (1 mg total) into the skin as needed. Only if cannot swallow anything by mouth and glucose is less than 70. Patient trained on Gvoke. No substitutions    Empagliflozin (JARDIANCE) 25 MG Oral Tab (Taking) Take 1 tablet by mouth daily.    metFORMIN  MG Oral Tablet 24 Hr (Taking) Take 2 tablets (1,000 mg total) by mouth 2 (two) times daily with meals.             #Niveles de glucosa objetivo:  - Glucemia antes del desayuno o en ayunas (al menos 8-10 horas sin consumir alimentos ni bebidas azucaradas):   - 2 horas después del almuerzo o la nina: <180  - Antes del almuerzo o la nina: <140  - Siga la kailee de 15 g/15 min si presenta síntomas de hipoglucemia con glucosa <70  - Si la glucosa <55, siga la kailee de 30 g/15 min. Mateus vez que la glucosa supere los 80, consuma mateus proteína o un alimento con proteína, howard queso, mantequilla de cacahuete, mantequilla de almendras, queso o yogur.      CÓMO TRATAR LA BAJO NIVEL DE AZÚCAR EN LA GEORGE (hipoglucemia)  Bajo nivel de azúcar en la george = Menos de 70, o si comienza a tener síntomas (suresh más abajo)  Síntomas: Temblores, ritmo cardíaco acelerado, hambre extrema, sudoración, confusión/dificultad para concentrarse, mareos.    Cómo tratar mateus baja de azúcar si puede comer/beber:  La kailee del 15/15  1. Si usa un monitor continuo de glucosa que indica que tiene un nivel bajo, jenaro no presenta síntomas, verifique con mateus punción digital que velasquez nivel de azúcar esté realmente bajo antes de tratarlo.  2. Consuma 15 gramos de carbohidratos (bri los ejemplos a continuación).  3. Controle velasquez nivel de azúcar después de 15 minutos. Si aún está por debajo de velasquez rango objetivo, tome otra porción.  4. Repita estos pasos hasta que esté dentro de velasquez rango objetivo. Mateus vez dentro del rango, si le preocupa que velasquez nivel de azúcar vuelva a bajar, consuma mateus shweta de proteínas (por ejemplo, mateus cucharada de mantequilla de cacahu[place]).  5. Si tiene un MCG, observe cómo ha cambiado velasquez alayna. Si la alayna apunta hacia arriba o hacia un lado después de 15 minutos, espere más tiempo para que el MCG revise velasquez glucosa o con mateus punción digital. Intente no comer más hasta al menos 15 minutos después de la primera medición de glucosa; de lo contrario, corre el riesgo de tener un rebote de glucosa.   6. Si presenta síntomas y no puede controlar velasquez nivel de glucosa en george por cualquier motivo, trate la hipoglucemia.  7. Si alguien tiene un nivel bajo de azúcar en george y está inconsciente: No dude en llamar al 911. Si alguien está inconsciente y no tiene glucagón disponible o no sabe cómo usarlo, llame al 911 inmediatamente.      Para tratar mateus hipoglucemia <70, le recomiendo llevar consigo un tratamiento sencillo y preenvasado para hipoglucemias con 15 g de carbohidratos:  - Puré de manzana en sobres para niños (bajo en fibra)  - Jugo en cajita para niños: 15 g de carbohidratos --> Jugo en cajita de 113 g  - Tabletas de glucosa de [facility]; las puede encontrar cerca de suministros para la diabetes --> Nota: necesitará consumir de 3 a 4 tabletas para alcanzar los 15 g de carbohidratos  - Paquete de fruta para niños: busque tino con 15 gramos de carbohidratos totales  - La elección de cómo tratar velasquez  hipoglucemia es importante. Los carbohidratos complejos, o los alimentos que contienen grasas junto con carbohidratos (howard el chocolate), pueden ralentizar la absorción de glucosa y NO deben usarse para tratar mateus hipoglucemia de emergencia.       Pls tell patient to call Forms Dept  in regards to her request  Phone no [phone]   Monday-Friday 8a-4pm   For any concerns for FMLA,disability, WC forms.

## 2025-06-05 NOTE — PROGRESS NOTES
[facility] Progress Note    Subjective:   Pt and daughter here with a new issue, pain of ankle.  Some swelling.  No injury but walking more.  Had orif left ankle in 2000  Hardware removed  Uses hokas, walks a lot  Had fusion first mpj by me and that is doing well for her    Hx of DM        Objective:     Exam left foot and ankle  Well fused first mpj  Ankle discomfort lateral ankle -posterolateral aspect along peroneal tendons.    Tenderness diffuse   Mild swelling          Imaging: left ankle x ray  Ankle mortise clear  Multiple chip/fragments   Osteopenic bone.          Assessment/Plan:     Diagnoses and all orders for this visit:    Arthritis of ankle, left  -     z [facility] MRI ANKLE, LEFT (CPT=73721); Future    Peroneal tendonitis of left lower extremity  -     z [facility] MRI ANKLE, LEFT (CPT=73721); Future    Other orders  -     Meloxicam 7.5 MG Oral Tab; Take 1 tablet (7.5 mg total) by mouth daily.        MRI ankle left  MRI of the ankle is being ordered to help determine other pathology such as internal derangement, bone bruising, ligamentous damage, occult fracture, tendonitis, osteochondral lesions among others.  Follow up after MRI to go over results and formulate a plan.       Ice, ankle sleeve support, good shoes    Depending on findings may do PT, may do steroid injection of ankle    Rx meloxicam  Short course to help w pain and swelling.  7 day course only  Take w food and dc if any side effects    Lindsay Agrawal, DPodiatric Surgery  [facility]  [facility]  [address] [place] [address], [address] [place] [address] [id number] [address] [place], [place] [id number] [place] [email or web address]  [email or web address]  t: [phone]   f: [phone]            Dragon speech recognition software was used to prepare this note. If a word or phrase is confusing, it is likely do to a failure of recognition. Please contact me with any questions or clarifications.     negative...

## 2025-06-05 NOTE — PROGRESS NOTES
[facility] [place]    Name: Kaylin Bledsoe    Date: 06/05/25     Kaylin Bledsoe is a 70 year old female who presents for evaluation of T2DM management.     Chief complaint: Follow - Up (PT here for routine T2DM f/u, per pt she has been getting high bg at times./Last A1c value was 9.3% done 5/2/2025.)       Subjective:   DM hx:  -Diagnosed with diabetes TYPE 2 in 1992,   -Family history- yes, multiple brothers, and sisters and daughter     Initial HPI consult - 8/23/2024 08/23/24:previous pt of Tamanna Downs NP from [facility]. Here w/ her daughter, Jeni who helps w/ Serbian translation.   DM meds at first office visit: Soliqua 34 units ( in the last 2 month), Fiasp 10 units once , Jardiance 25 mg, MF 1000 mg BID    CGM: 8/10-8/23/24: TIR 51%, GMI 7.8% w. <1% low    -Re: potential DM medication contraindication (if positive, checkbox selected):  [x] History of pancreatitis  [] Personal/fam hx of medullary thyroid cancer/MEN2- denies   [] History of recent/frequent UTI/yeast infxn- denies   [] Previous amputation related to diabetes- denies    -Presence of associated DM complications (if positive, checkbox selected):  [x] Macrovascular complications (CAD/CVA/PAD)  + stent, seeing cardiology Dr. Walker , from [facility]   [x] Neuropathy  [] Retinopathy- denies   [] Nephropathy- denies   [x] HTN  [x] Hyperlipidemia  [] Stroke/TIA- denies   [] Gastroparesis- denies   [x]  had hx of DKA in [id number]      - Modifying factors: eats bfast at 7 am and snack at 11 am w/ fruit or sand which  and lunch and dinner at 3 pm     Previously trialed/failed DM meds:   Lantus, Victoza - pancreatitis, Januvia, tradjenta and glipizide.   Soliqua ( glargine with lixisenatide(glp1))- stopped in Oct, 2024 due to hx of pancreatitis      ##Also has hx of Pancreatic cyst found in MRI last 2020  Pt's brother had a hx of pancreatic cancer. Pt has abd pain w/ decreased urinary output per Ct review,   Noted pancreatic cyst in  MRI of abd  in  . CT abd pelvis w/ contrast only  in 3/2/23 pancreatic cyst not well seen, adrenal glands has no mass or enlargement. MRI of abd with and w/o contrast last 2024  Few small cystic pancreatic lesions of the body and tail segments, dominant component of the pancreatic tail measuring 10 x 9 mm, stable dating back to , favoring a benign process.  No enhancing components, ductal dilation, or parenchymal atrophy.  Diagnostic considerations include intrapancreatic pseudocyst and cystic neoplasm, the majority of which are benign or have low malignant potential. --Followed by Dr. Tyler ARIAS     INTERIM HX with Acacia, APN 10/11/24  Current treatment: Soliqua 34 units, Jardiance 25 mg, MF 1000 mg BID , Fiasp 6- 8u 2x aday ( bfast and dinner)W/ sliding scale 1: 40>140  Testin/28-10/11/24: TIR 31%, GMI 8.7% ,  low 0% , very low 0%, SD 70mg/dl, Sensor usage: 100%    INTERIM HX with Acacia, APRN 24 : patient here with her daughter   Patient states  bfast 7am, lunch/snack 11 am , biggest meal dinner between 230-3 pm (dinner)  Current treatment: Basglar 37 units daily, Jardiance 25 mg per day and Metformin 1000 mg twice a day, Fiasp between 7-10 units BID  Testing:Dexcom -24: TIR 47%, GMI 8.1% ,  low 0% , very low 1%, SD 75mg/dl, Sensor usage: 100%    INTERIM HX with Acacia, APRN 25 : Saw MD Tyler - [facility] last 2024 and patient states MRI in 3 yrs; Biggest meal dinner   Current treatment: Basaglar 36 units every morning   Plan Last office visit Basaglar  34 units, Fiasp range 10, lunch and dinner 6-8, Jardiance 25 mg per day and Metformin 1000 mg twice a dayTesting:  in media       INTERIM HX with Acacia, APRN 25 :    Here for follow up ,Concern: has left foot pain, saw ortho   Current treatment:  Diabetic Medications       insulin aspart 100 Units/mL Subcutaneous Solution Pen-injector (Taking) Reported bfast 10-12 units , lunch 4 units ( at 3 pm) and dinner 10-12 units       Insulin Glargine-yfgn 100 UNIT/ML Subcutaneous Solution Pen-injector (Taking) Inject 38 units every 24 hours    glucagon (GVOKE HYPOPEN 2-PACK) 1 MG/0.2ML Subcutaneous injection (Taking As Needed) Inject 0.2 mL (1 mg total) into the skin as needed. Only if cannot swallow anything by mouth and glucose is less than 70. Patient trained on Gvoke. No substitutions    Empagliflozin (JARDIANCE) 25 MG Oral Tab (Taking) Take 1 tablet by mouth daily.    metFORMIN  MG Oral Tablet 24 Hr (Taking) Take 2 tablets (1,000 mg total) by mouth 2 (two) times daily with meals.   Testing: Continuous Glucose Monitoring Interpretation  Kaylin Bledsoe  has undergone continuous glucose monitoring with the Dexcom G7 CGM with phone (connected to clinic profile).  The blood glucose tracings were evaluated for two weeks prior to office visit. Blood glucose tracings demonstrated areas of hyperglycemia mid day. There were occasional hypoglycemia, particularly at night during the weeks of evaluation.    Date: 05/06-06/04/25: TIR 51%, GMI 8% ,  low 1% , very low <1%, SD 79mg/dl, Sensor usage: 92%    Hypoglycemia:      Vision problem: denies   Numbness and tingling to extremities:denies   Wound: denies   Diet/Activities: walking a lot   New complication related to DM:   no hospitalization and no ER visit since last office visit.   Upcoming procedure or surgery: denies     History/Other:    Allergies, PMH, SocHx and FHx reviewed and updated as appropriate in Epic on    atorvastatin 20 MG Oral Tab Take 1 tablet (20 mg total) by mouth nightly. 90 tablet 1    insulin aspart 100 Units/mL Subcutaneous Solution Pen-injector Inject at Bfast : 12 units + sliding scale 1:40>140; at Lunch/snack : 6 units w/o sliding scale; at Dinner : 12 units + sliding scale 1:40>140 ( For insurance purpose only: TDD max: 48 units /day) 45 mL 0    Insulin Glargine-yfgn 100 UNIT/ML Subcutaneous Solution Pen-injector Inject 38 units every 24 hours. Actively titrating per  endo instruction ( TDD max: 50 units/day) 55 mL 1    Meloxicam 7.5 MG Oral Tab Take 1 tablet (7.5 mg total) by mouth daily. 14 tablet 0    Glucose Blood (ONETOUCH VERIO) In Vitro Strip 1 strip by In Vitro route 3 (three) times daily. 300 strip 3    Lancet Devices (ChegongfangUCH DELICA PLUS LANCING) Does not apply Misc 1 Lancet by Finger stick route 3 (three) times daily. 1 each 1    Lancets (ONETOUCH DELICA PLUS [id number]) Does not apply Misc 1 Lancet by Finger stick route 3 (three) times daily. 300 each 3    Alcohol Swabs 70 % Does not apply Pads Apply 1 Pad topically 3 (three) times daily. Use to clean finger before using lancet 300 each 3    Insulin Pen Needle (BD PEN NEEDLE SHORT U/F) 31G X 8 MM Does not apply Misc Use 4 x a day with insulin subcutaneously 400 each 3    estradiol 0.1 MG/GM Vaginal Cream PLACE 1 GRAM VAGINALLY EVERY NIGHT 42.5 g 1    CALCIUM-VITAMIN D OR       Blood Glucose Monitoring Suppl Does not apply Kit Use to test blood glucose 3x/day. 1 kit 0    glucagon (GVOKE HYPOPEN 2-PACK) 1 MG/0.2ML Subcutaneous injection Inject 0.2 mL (1 mg total) into the skin as needed. Only if cannot swallow anything by mouth and glucose is less than 70. Patient trained on Gvoke. No substitutions 0.4 mL 1    metoprolol succinate ER 25 MG Oral Tablet 24 Hr       famotidine 40 MG Oral Tab Take 1 tablet (40 mg total) by mouth daily. 90 tablet 3    montelukast 10 MG Oral Tab Take 1 tablet (10 mg total) by mouth daily as needed. 90 tablet 1    sertraline 100 MG Oral Tab Take 1 tablet (100 mg total) by mouth daily. 90 tablet 1    Empagliflozin (JARDIANCE) 25 MG Oral Tab Take 1 tablet by mouth daily. 90 tablet 3    metFORMIN  MG Oral Tablet 24 Hr Take 2 tablets (1,000 mg total) by mouth 2 (two) times daily with meals. 360 tablet 3    Insulin Pen Needle (PEN NEEDLES) 32G X 4 MM Does not apply Misc 1 each  in the morning and 1 each before bedtime. 100 each 1    nystatin 100,000 Units/g External Ointment Apply 1  applicator topical twice daily for 4 weeks, then twice daily every other day for 2 weeks, then as needed. 15 g 0    aspirin 81 MG Oral Chew Tab Chew 1 tablet (81 mg total) by mouth daily.      ALPRAZOLAM 0.5 MG Oral Tab TAKE 1 TABLET(0.5 MG) BY MOUTH DAILY AS NEEDED FOR ANXIETY OR SLEEP 30 tablet 0    Acetaminophen 500 MG Oral Cap Take 2 capsules (1,000 mg total) by mouth daily as needed.       Allergies   Allergen Reactions    Codeine NAUSEA ONLY    Hydrocodone NAUSEA ONLY    Vicodin [Hydrocodone-Acetaminophen] NAUSEA ONLY     TABS     Current Outpatient Medications   Medication Sig Dispense Refill    atorvastatin 20 MG Oral Tab Take 1 tablet (20 mg total) by mouth nightly. 90 tablet 1    insulin aspart 100 Units/mL Subcutaneous Solution Pen-injector Inject at Bfast : 12 units + sliding scale 1:40>140; at Lunch/snack : 6 units w/o sliding scale; at Dinner : 12 units + sliding scale 1:40>140 ( For insurance purpose only: TDD max: 48 units /day) 45 mL 0    Insulin Glargine-yfgn 100 UNIT/ML Subcutaneous Solution Pen-injector Inject 38 units every 24 hours. Actively titrating per endo instruction ( TDD max: 50 units/day) 55 mL 1    Meloxicam 7.5 MG Oral Tab Take 1 tablet (7.5 mg total) by mouth daily. 14 tablet 0    Glucose Blood (ONETOUCH VERIO) In Vitro Strip 1 strip by In Vitro route 3 (three) times daily. 300 strip 3    Lancet Devices (ONETOUCH DELICA PLUS LANCING) Does not apply Misc 1 Lancet by Finger stick route 3 (three) times daily. 1 each 1    Lancets (ONETOUCH DELICA PLUS [id number]) Does not apply Misc 1 Lancet by Finger stick route 3 (three) times daily. 300 each 3    Alcohol Swabs 70 % Does not apply Pads Apply 1 Pad topically 3 (three) times daily. Use to clean finger before using lancet 300 each 3    Insulin Pen Needle (BD PEN NEEDLE SHORT U/F) 31G X 8 MM Does not apply Misc Use 4 x a day with insulin subcutaneously 400 each 3    estradiol 0.1 MG/GM Vaginal Cream PLACE 1 GRAM VAGINALLY EVERY NIGHT 42.5 g  1    CALCIUM-VITAMIN D OR       Blood Glucose Monitoring Suppl Does not apply Kit Use to test blood glucose 3x/day. 1 kit 0    glucagon (GVOKE HYPOPEN 2-PACK) 1 MG/0.2ML Subcutaneous injection Inject 0.2 mL (1 mg total) into the skin as needed. Only if cannot swallow anything by mouth and glucose is less than 70. Patient trained on Gvoke. No substitutions 0.4 mL 1    metoprolol succinate ER 25 MG Oral Tablet 24 Hr       famotidine 40 MG Oral Tab Take 1 tablet (40 mg total) by mouth daily. 90 tablet 3    montelukast 10 MG Oral Tab Take 1 tablet (10 mg total) by mouth daily as needed. 90 tablet 1    sertraline 100 MG Oral Tab Take 1 tablet (100 mg total) by mouth daily. 90 tablet 1    Empagliflozin (JARDIANCE) 25 MG Oral Tab Take 1 tablet by mouth daily. 90 tablet 3    metFORMIN  MG Oral Tablet 24 Hr Take 2 tablets (1,000 mg total) by mouth 2 (two) times daily with meals. 360 tablet 3    Insulin Pen Needle (PEN NEEDLES) 32G X 4 MM Does not apply Misc 1 each  in the morning and 1 each before bedtime. 100 each 1    nystatin 100,000 Units/g External Ointment Apply 1 applicator topical twice daily for 4 weeks, then twice daily every other day for 2 weeks, then as needed. 15 g 0    aspirin 81 MG Oral Chew Tab Chew 1 tablet (81 mg total) by mouth daily.      ALPRAZOLAM 0.5 MG Oral Tab TAKE 1 TABLET(0.5 MG) BY MOUTH DAILY AS NEEDED FOR ANXIETY OR SLEEP 30 tablet 0    Acetaminophen 500 MG Oral Cap Take 2 capsules (1,000 mg total) by mouth daily as needed.       Past Medical History:    Abdominal hernia    Abdominal pain    Anemia    Anesthesia complication    slow to arouse    Anxiety    Arthritis    Atherosclerosis of coronary artery    Atherosclerotic heart disease of native coronary artery without angina pectoris    Back pain    Bad breath    Belching    Bilateral leg edema    Bloating    Blurred vision    Change in hair    Constipation    Diabetes mellitus (HCC)    Diarrhea, unspecified    Dizziness    Easy bruising     Fatigue    Feeling lonely    Flatulence/gas pain/belching    Food intolerance    Frequent use of laxatives    Hearing loss    High blood pressure    High cholesterol    History of depression    History of eating disorder    Indigestion    Irregular bowel habits    Itch of skin    Leg swelling    Loss of appetite    Nausea    Neuropathy    maria eugenia feet    Night sweats    Osteoarthrosis, unspecified whether generalized or localized, unspecified site    Osteoporosis    Other and unspecified hyperlipidemia    Pain in joints    Peripheral neuropathy    Problems with swallowing    Sleep disturbance    Stented coronary artery    Stress    Type II or unspecified type diabetes mellitus without mention of complication, not stated as uncontrolled    Uncomfortable fullness after meals    Venous insufficiency of both lower extremities    Visual impairment    glasses    Vomiting    Wears glasses    Weight loss     Family History   Problem Relation Age of Onset    Heart Attack Father         AMI    Hypertension Father     Diabetes Sister     Pancreatic Cancer Sister     Diabetes Brother     Diabetes Daughter      Social history: Reviewed.      ROS/Exam    REVIEW OF SYSTEMS: Ten point review of systems has been performed and is otherwise negative and/or non-contributory, except as described above.     VITALS  Vitals:    06/05/25 1534   BP: 124/72   Pulse: 80   SpO2: 99%   Weight: 191 lb (86.6 kg)   Height: 5' 5\" (1.651 m)     Wt Readings from Last 6 Encounters:   06/05/25 191 lb (86.6 kg)   06/05/25 191 lb 12.8 oz (87 kg)   05/05/25 191 lb 12.8 oz (87 kg)   04/18/25 188 lb (85.3 kg)   12/13/24 184 lb (83.5 kg)   12/11/24 184 lb (83.5 kg)       PHYSICAL EXAM  CONSTITUTIONAL:  awake, alert, cooperative, no apparent distress, and appears stated age Vss stable, obese   PSYCH: normal affect  LUNGS: breathing comfortably  CARDIOVASCULAR:  regular rate   NECK:  no palpable thyroid nodules     Labs/Imaging:     Lab Results   Component Value  Date    CHOLEST 137 05/02/2025    CHOLEST 158 05/10/2024    TRIG 249 (H) 05/02/2025    TRIG 245 (H) 05/10/2024    HDL 45 05/02/2025    HDL 44 05/10/2024    LDL 53 05/02/2025    LDL 74 05/10/2024     Lab Results   Component Value Date    MICROALBCREA 5.6 05/02/2025    MICROALBCREA 27.1 05/10/2024      Lab Results   Component Value Date    CREATSERUM 1.03 (H) 05/02/2025    CREATSERUM 0.86 05/10/2024    EGFRCR 58 (L) 05/02/2025    EGFRCR 73 05/10/2024     Lab Results   Component Value Date    AST 21 05/02/2025    AST 14 (L) 05/10/2024    ALT 11 05/02/2025    ALT 21 05/10/2024     Lab Results   Component Value Date    TSH 1.249 05/02/2025    TSH 1.080 05/10/2024    T4F 1.1 03/14/2019     Overall glucose control:  Lab Results   Component Value Date    A1C 9.3 (H) 05/02/2025    A1C 9.1 (A) 04/18/2025    A1C 7.6 (A) 12/13/2024    A1C 7.9 (A) 08/23/2024    A1C 9.3 (H) 05/10/2024     Narrative   PROCEDURE:  CT ABDOMEN+PELVIS (CONTRAST ONLY) (CPT=74177)     LOCATION:                                           COMPARISON:  [facility], MR, MRI ABDOMEN&MRCP W/3D (W+W0)(CPT=74183/26165), 8/25/2020, 11:34 AM.     INDICATIONS:  R10.9 Left sided abdominal pain R34 Decreased urine output R30.0 Dysuria R10.9 Acute left flank pain     TECHNIQUE:  CT scanning was performed from the dome of the diaphragm to the pubic symphysis with non-ionic intravenous contrast material. Post contrast coronal MPR imaging was performed.  Dose reduction techniques were used. Dose information is  transmitted to the ACR (American College of Radiology) NRDR (National Radiology Data Registry) which includes the Dose Index Registry.     PATIENT STATED HISTORY:(As transcribed by Technologist)  Patient complain of pain left upper quadrant radiating laterally with urinary urgency, also microscopic hematuria, hx of UTI (2)weeks ago.      CONTRAST USED:  100cc of Isovue 370     FINDINGS:    LIVER:  No enlargement, atrophy, abnormal density, or significant focal  lesion.    BILIARY:  The common bile duct is prominent size measuring up to 11 mm which is stable from prior MRI.  The gallbladder is present.  PANCREAS:  Prior MRI had demonstrated questionable small cystic foci in the region of the pancreatic tail.  These are not well up seen on today's exam.  SPLEEN:  No enlargement or focal lesion.    KIDNEYS:  No evidence of hydronephrosis or renal stones.  There are probable areas of focal cortical parenchymal loss present.  ADRENALS:  No mass or enlargement.    AORTA/VASCULAR:  No aneurysm or dissection.    RETROPERITONEUM:  No mass or adenopathy.    BOWEL/MESENTERY:  No dilated loops of small bowel are seen.  Portions of bowel are decompressed, limiting evaluation.  There is a normal-appearing appendix.  No free fluid is seen.  No evidence of obstruction.  ABDOMINAL WALL:  No mass or hernia.    URINARY BLADDER:  No visible focal wall thickening, lesion, or calculus.    PELVIC NODES:  No adenopathy.    PELVIC ORGANS:  No visible mass.  Pelvic organs appropriate for patient age.    BONES:  Streak artifact from a right hip prosthesis somewhat limits evaluation.  Chronic appearing grade 1 anterolisthesis of L4 on L5 is noted.  LUNG BASES:  No visible pulmonary or pleural disease.    OTHER:  Negative.     Impression   CONCLUSION:  No clear etiology of the patient's symptoms.  No free fluid is seen within the abdomen or pelvis.  There is a normal-appearing appendix.     There is dilatation of the common bile duct measuring up to 11 mm which is similar to prior exams.     A prior MRI exam had demonstrated possible small cystic foci in the region of the pancreatic tail.  These are not well seen on today's exam.        Dictated by (CST): Tevin Lazcano MD on 3/02/2023 at 8:12 PM      Finalized by (CST): Tevin Lazcano MD on 3/02/2023 at 8:17 PM        PROCEDURE:  MRI ABDOMEN&MRCP W/3D (W+W0)(CPT=74183/30840)     COMPARISON:  [facility] HEPATOBILIARY SCAN WITH PHARMACY  (CPT=78227),  9/11/2015, 8:40 AM.  MR, MRI MRCP W/3D ONLY (CPT=76376/49406), 9/10/2015, 7:12 PM.  [facility], CT, CT ABDOMEN+PELVIS KIDNEYSTONE 2D RNDR(NO IV,NO ORAL)(CPT=74176), 4/21/2020,   1:28 PM.     INDICATIONS:  K86.2 Cyst of pancreas     TECHNIQUE:  Multiplanar single shot fast spin echo, chemical shift imaging, breath hold T2 weighted images and 2-D fiesta sequences were acquired. Fluid sensitive imaging with MRCP reconstruction was also performed including 3D multi-projection imaging  (non independent workstation).  Both projection and source MRCP images are evaluated.  Subsequent post contrast imaging was performed after intravenous administration of paramagnetic contrast agent.     3-D RENDERING:  Three dimensional image processing was completed using a separate workstation under concurrent supervision. Images were archived.     PATIENT STATED HISTORY:(As transcribed by Technologist)  Pancreatic cyst.      CONTRAST USED:  17mL of Dotarem     FINDINGS:    LIVER:  No enlargement, atrophy, abnormal density, or significant focal lesion.    BILIARY:  No filling defect.  Common duct is ectatic measuring up to 1 cm, stable.  PANCREAS:  Within the pancreatic tail are 2 adjacent cystic foci, 1 which measures 8 x 7 vs 8 x 6 mm and 5 x 2 vs 3 x 3 mm without significant change dating back to most recent MRI performed 9/10/2015.  No ductal dilatation or atrophy.    SPLEEN:  No enlargement or focal lesion.    KIDNEYS:  No mass or obstruction.    ADRENALS:  No mass or enlargement.    AORTA/VASCULAR:  No aneurysm or dissection.    RETROPERITONEUM:  No mass or adenopathy.    BOWEL/MESENTERY:  Normal caliber small and large bowel.  Duodenal diverticulum incidentally noted.    ABDOMINAL WALL:  No mass or hernia.    BONES:  No bony lesion or fracture.    LUNG BASES:  No visible pleural disease.  Lung bases not well assessed with MRI.    OTHER:  No free fluid.          Impression   CONCLUSION:    1. No significant change, allowing for  differences in technique including small cystic foci involving the pancreatic tail when compared to most recent MRI/MRCP performed 9/10/2015.        Dictated by (CST): Priti Frausto MD on 8/25/2020 at 12:53 PM      Finalized by (CST): Priti Frausto MD on 8/25/2020 at 2:10 PM        Supplementary Documentation:   -Surveillance for Diabetes Complications & Risks  Foot exam/neuropathy: Last Foot Exam: 12/11/24      Retinopathy screening: Last Dilated Eye Exam: 08/26/24  Eye Exam shows Diabetic Retinopathy?: Yes      Assessment & Plan:     ICD-10-CM    1. Type 2 diabetes mellitus with diabetic neuropathy, with long-term current use of insulin ([facility])  E11.40 GLUCOSE MANAGEMENT INDICATOR    Z79.4 OP REFERRAL TO DIAB NUTRITION MANAGEMENT 3 HRS     GLUC MNTR CONT REC FROM NTRSTL TISS FLU PHYS I&R     insulin aspart 100 Units/mL Subcutaneous Solution Pen-injector     Insulin Glargine-yfgn 100 UNIT/ML Subcutaneous Solution Pen-injector     DISCONTINUED: insulin aspart 100 Units/mL Subcutaneous Solution Pen-injector      2. Type 2 diabetes mellitus with other specified complication, with long-term current use of insulin ([facility])  E11.69     Z79.4       3. Hyperlipidemia associated with type 2 diabetes mellitus ([facility])  E11.69 atorvastatin 20 MG Oral Tab    E78.5         Kaylin Bledsoe is a pleasant 70 year old female here for evaluation of:    #Diabetes w/ neuropathy - PMHx of Type 2 diabetes mellitus diagnosed since 1992.  -  Type 1 ABs all negative and  c peptide detected last 8/23/24  -  Last A1c value was 9.3% done 5/2/2025.  - Discussed Goal of A1c< 7%-  Importance of better glucose control in preventing onset/progression of end-organ damage discussed, as well as goals of therapy and clinical significance of A1C.    - with Multiple complications:+ cardiac stent, neuropathy, past hx of DKA    - Plan: Had hx of pancreatitis, and hx of pancreatic cyst, thus not a candidate for GLP1 use or DPP4 I. A1c last month trending up. Reviewed  her dexcom and GMI 8% in the last 30 days, trending down, as she is now using her insulin consistently and back in using her dexcom. She was in [place] for sometime and got back last March 1, and she ran out of Basaglar couple of months ago and noted that she was not following the sliding scale for her Aspart in the past and was giving her aspart after eating and she was off from dexcom for sometime when she was in [place]. Today,  we discussed starting insulin pump and she has some interest with tandem after providing her the difference between tandem and omnipod. We discussed to see Diabetes educator to start carb counting lesson and after that we will initiate ordering insulin pump. For now adjustment of insulin below. Given the patient's daughter our Forms department phone no as she needs to fill out FMLA form  - BMI 31- gaining some weight,  Discussed balanced diet: carbohydrates, protein, healthy fats and fiber in each meal. Exercise of at least 150 mins each week. Decrease your simple carbohydrates intake such as sweets: cookies, soda, candy, white rice, white pasta, syrups.   - neuropathy- denies wound, might consider rechecking Vit b12 on next visit  - continue Dexcom G7 CGM with phone (connected to clinic profile)  - patient is on insulin, and has suboptimal glycemic control including wide glycemic swings, thus would benefit from CGM  - continue to check BG 3x/day using glucometer or CGM  - meet with endo DM educator re:carb counting- initial teaching  - SGLT2i instructions provided re: surgery, times of illness/dehydration   - has gvoke/glucagon at home   -See above header \"Supplementary Documentation\" for surveillance for diabetes complications & risks  Discussed targeted glucose levels and symptoms and treatment of hypoglycemia.     - med changes:  Glargine 38 units every evening   Aspart Increase   Bfast : 10-->12 units + sliding scale  1:40>140  Lunch/snack :4-> 6 units w/o sliding scale  Dinner :  10-->12 units + sliding scale 1:40>140  Sliding scale  <140:  0 units  141-180- 1 units  181-220- 2 units  221-260-3 units  261-300- 4 units  301-340- 5 units  341-380- 6 units  381-420- 7 units  421-460- 8 units  To be given 15 minutes before a meal   Continue Jardiance 25 mg per day  Continue Metformin 1000 mg twice a day  Discussed below and med changes above  printed in AVS:   - check glucose before eating   - - check your glucose with fingerstick machine/glucometer if your continous glucose monitor (CGM) tells you that your glucose is below 70 to clarify. Follow the glucometer's number if you need to treat it with 15/15 rule or 30/15 rule    - see diabetes educator for carb counting lesson and will start insulin pump     Pancreatic Cyst  - Pt's brother had a hx of pancreatic cancer.   - MRI of abd with + w/o contrast last 11/16/24: PANCREAS:  Few small cystic foci of the pancreatic body and tail segments, dominant component of the pancreatic tail measuring 10 x 9 mm (series 4, image 19), stable in size compared to 08/25/2020.  No enhancing components, ductal dilation, or parenchymal atrophy. To continue follow-up with GI and will continue to adjust the insulin.   - Noted pancreatic cyst in MRI of abd  in 2020 and in CT abd in 2023 pancreatic cyst not well seen .  - She saw Dr. Tyler ARIAS last December,2024 and recommended to her to get her MRI in 3 years as recent MRI was stable  - Plan: followed by [facility]    #CKD 3:   Lab Results   Component Value Date    EGFRCR 58 (L) 05/02/2025    MALBCREACALC 9 10/16/2017    MICROALBCREA 5.6 05/02/2025     Lab Results   Component Value Date    CREAUR 153 10/16/2017    MICROALBUMIN 1.4 10/16/2017    MALBCREACALC 9 10/16/2017   - On Jardiance  Plan: will consider giving AceI or Arbs on next visit and also closely follow GFR as patient is still on Metformin, will consider to refer patient to nephrology     #HTN-Blood pressure control: - SBP is to goal <130 , per PCP  BP Readings  from Last 1 Encounters:   06/05/25 124/72   BP Meds: metoprolol succinate ER Tb24 - 25 MG     #Hyperlipidemia  Lipids: LDL is to goal   Lab Results   Component Value Date    LDL 53 05/02/2025    TRIG 249 (H) 05/02/2025   Cholesterol Meds: atorvastatin Tabs - 20 MG   - Pt has hx of stent thus tighter control of LDL is needed.  -  we increase Atorvastatin from 10 to 20 mg per day last Dec, 2024 since LDL was 74 last 5/2024  - Plan: to continue Atorvastatin 20 mg. Recheck lipid in May 2026    Return in about 2 months (around 8/4/2025) for diabetes follow up.    Thank you for allowing me to participate in the care of this patient.  Please feel free to contact me with any questions.    JESÚS Wood, KATYA-BC  Endocrinology, Diabetes & Metabolism   6/5/25    In reviewing this note, please be advised that Dragon Voice Recognition software used to dictate the note may have made errors in recognizing some of the words or phrases.     Note to patient: The 21 Century Cures Act makes medical notes like these available to patients in the interest of transparency. However, be advised this is a medical document. It is intended as peer to peer communication. It is written in medical language and may contain abbreviations or verbiage that are unfamiliar. It may appear blunt or direct. Medical documents are intended to carry relevant information, facts as evident, and the clinical opinion of the practitioner.

## 2025-06-07 RX ORDER — ATORVASTATIN CALCIUM 20 MG/1
20 TABLET, FILM COATED ORAL NIGHTLY
Qty: 90 TABLET | Refills: 1 | Status: SHIPPED | OUTPATIENT
Start: 2025-06-07

## 2025-06-07 RX ORDER — INSULIN GLARGINE-YFGN 100 [IU]/ML
INJECTION, SOLUTION SUBCUTANEOUS
Qty: 55 ML | Refills: 1 | Status: SHIPPED | OUTPATIENT
Start: 2025-06-07

## 2025-07-08 ENCOUNTER — OFFICE VISIT (OUTPATIENT)
Dept: ORTHOPEDICS CLINIC | Facility: CLINIC | Age: 70
End: 2025-07-08
Payer: MEDICARE

## 2025-07-08 ENCOUNTER — TELEPHONE (OUTPATIENT)
Dept: INTERNAL MEDICINE CLINIC | Facility: CLINIC | Age: 70
End: 2025-07-08

## 2025-07-08 VITALS — HEIGHT: 65 IN | BODY MASS INDEX: 31.82 KG/M2 | WEIGHT: 191 LBS

## 2025-07-08 DIAGNOSIS — S92.002A CLOSED NONDISPLACED FRACTURE OF LEFT CALCANEUS, UNSPECIFIED PORTION OF CALCANEUS, INITIAL ENCOUNTER: Primary | ICD-10-CM

## 2025-07-08 DIAGNOSIS — M25.373 ANKLE INSTABILITY, UNSPECIFIED LATERALITY: ICD-10-CM

## 2025-07-08 DIAGNOSIS — E11.40 TYPE 2 DIABETES MELLITUS WITH DIABETIC NEUROPATHY, WITH LONG-TERM CURRENT USE OF INSULIN (HCC): ICD-10-CM

## 2025-07-08 DIAGNOSIS — M85.89 OSTEOPENIA OF MULTIPLE SITES: ICD-10-CM

## 2025-07-08 DIAGNOSIS — Z79.4 TYPE 2 DIABETES MELLITUS WITH DIABETIC NEUROPATHY, WITH LONG-TERM CURRENT USE OF INSULIN (HCC): ICD-10-CM

## 2025-07-08 DIAGNOSIS — M19.072 ARTHRITIS OF ANKLE, LEFT: ICD-10-CM

## 2025-07-08 PROCEDURE — 99214 OFFICE O/P EST MOD 30 MIN: CPT | Performed by: PODIATRIST

## 2025-07-08 RX ORDER — SERTRALINE HYDROCHLORIDE 100 MG/1
100 TABLET, FILM COATED ORAL DAILY
Qty: 90 TABLET | Refills: 3 | Status: SHIPPED | OUTPATIENT
Start: 2025-07-08

## 2025-07-08 RX ORDER — MONTELUKAST SODIUM 10 MG/1
10 TABLET ORAL
Qty: 90 TABLET | Refills: 3 | Status: SHIPPED | OUTPATIENT
Start: 2025-07-08

## 2025-07-08 NOTE — TELEPHONE ENCOUNTER
Refill Per Protocol     Requested Prescriptions   Pending Prescriptions Disp Refills    SERTRALINE 100 MG Oral Tab [Pharmacy Med Name: SERTRALINE 100MG TABLETS] 90 tablet 1     Sig: TAKE 1 TABLET(100 MG) BY MOUTH DAILY       Psychiatric Non-Scheduled (Anti-Anxiety) Passed - 7/8/2025  3:22 PM        Passed - In person appointment or virtual visit in the past 6 mos or appointment in next 3 mos     Recent Outpatient Visits              Today Closed nondisplaced fracture of left calcaneus, unspecified portion of calcaneus, initial encounter    [address] Lindsay Agrawal DPM    Office Visit    1 month ago Arthritis of ankle, left    [address] Lindsay Agrawal DPM    Office Visit    1 month ago Type 2 diabetes mellitus with diabetic neuropathy, with long-term current use of insulin ([facility])    [facility] Acacia Sales APRN    Office Visit    2 months ago Encounter for annual health examination    [address] Kiel Azevedo MD    Office Visit    2 months ago Type 2 diabetes mellitus with diabetic neuropathy, with long-term current use of insulin ([facility])    [facility] Acacia Sales APRN    Office Visit          Future Appointments         Provider Department Appt Notes    In 1 week Pari Ignacio RD [facility] carb count    In 1 month Acacia Sales APRN [facility] 2m  f/u-Type 2 diabetes mellitus with diabetic neuropathy, with long-term current use of insulin  LOV 6/5/25    In 2 months Lindsay Agrawal DPM [address] left heel fx    In 5 months Kiel Azevedo MD [address] 6 mth follow up                    Passed -  Depression Screening completed within the past 12 months        Passed - Medication is active on med list          MONTELUKAST 10 MG Oral Tab [Pharmacy Med Name: MONTELUKAST 10MG TABLETS] 90 tablet 1     Sig: TAKE 1 TABLET(10 MG) BY MOUTH DAILY AS NEEDED       Asthma & COPD Medication Protocol Failed - 7/8/2025  3:22 PM        Failed - ACT Score greater than or equal to 20                Failed - ACT recorded in the last 12 months                Passed - Appointment in past 6 or next 3 months      Recent Outpatient Visits              Today Closed nondisplaced fracture of left calcaneus, unspecified portion of calcaneus, initial encounter    [address]    Office Visit    1 month ago Arthritis of ankle, left    [address] Lindsay Agrawal DPM    Office Visit    1 month ago Type 2 diabetes mellitus with diabetic neuropathy, with long-term current use of insulin ([facility])    [facility] Acacia Sales APRN    Office Visit    2 months ago Encounter for annual health examination    [address] Kiel Azevedo MD    Office Visit    2 months ago Type 2 diabetes mellitus with diabetic neuropathy, with long-term current use of insulin ([facility])    [facility] Acacia Sales APRN    Office Visit          Future Appointments         Provider Department Appt Notes    In 1 week Pari Ignacio RD [facility] carb count    In 1 month Acacia Sales APRN [facility] 2m  f/u-Type 2 diabetes mellitus with diabetic neuropathy, with long-term current use of insulin  LOV 6/5/25    In 2 months Lindsay Agrawal DPM [address] left heel fx    In 5 months Kiel Azevedo MD  [facility], [address] [place] [address] 6 mth follow up                    Passed - Medication is active on med list

## 2025-07-08 NOTE — PROGRESS NOTES
[facility] Progress Note    Subjective:   Kaylin and daughter are here for follow-up  Today would like to go over  MRI findings  Has been in low profile boot      Hx of instability ankle        Objective:   Exam left lower extremity  Exam tenderness about the lateral aspect of the ankle and heel.  Pain with side-to-side compression and mild swelling.  She also has discomfort across the dorsal midfoot.  And generalized ankle pain with mild swelling.          Imaging:     Mri left ankle    IMPRESSION:   1. Non-displaced fracture (stress versus insufficiency fracture) o the mid calcaneus with associated bone marrow edema.     2. Post-surgical change in the visualized distal fibula/lateral malleolus and in the medial malleolus. Chronic healed fractures of the anterior aspect of the lateral malleolus and the posterior aspect of the tibial plafond. A subcentimeter corticated   ossicle along the anterior-inferior margin of the medial malleolus may reflect a chronic avulsion fragment.     3. Multifocal degenerative changes at the ankle and midfoot articulations as detailed above, most pronounced at the lateral aspect of the naviculocuneiform joint and at the third tarsometatarsal joint.     4. Mild sprains of the anterior talofibular and calcaneofibular ligaments, age-indeterminate, although these may be subacute. Chronic high-grade tear of the anterior tibiofibular ligament. Chronic sprain of the superficial fibers of the deltoid ligament   along the tibial attachment.     5. Low-grade longitudinally oriented linear interstitial tear in the mid to distal Achilles tendon, although the Achilles tendon is otherwise intact and is normal caliber. The peroneal tendons are intact and normal in caliber and signal.     6. Severe plantar fasciitis involving the proximal aspect of the central band of the plantar fascia adjacent to the origin from the calcaneus, although no fluid defect is identified in the plantar fascia.  Associated plantar calcaneal heel spur.       Assessment/Plan:     Diagnoses and all orders for this visit:    Closed nondisplaced fracture of left calcaneus, unspecified portion of calcaneus, initial encounter    Arthritis of ankle, left    Ankle instability, unspecified laterality      We went through the MRI images and findings in detail    Could consider ankle injection at some point.  For now immobilization in the boot to allow continued healing of the calcaneal stress fracture.    PT when heel better  Rx given  Ankle stability and ankle rehab  Will be in boot maybe 1 more month of use of boot as needed  She has balance issues in boot - using cane    Follow-up after physical in about 2-3 months      Lindsay Agrawal, DPodiatric Surgery  FACFAS  [facility]  [address], [address], [place] [address] [address] [place] [address] [email or web address]  [email or web address]  t: [phone]   f: [phone]            Dragon speech recognition software was used to prepare this note. If a word or phrase is confusing, it is likely do to a failure of recognition. Please contact me with any questions or clarifications.

## 2025-07-08 NOTE — TELEPHONE ENCOUNTER
Patient is stating out of medication please advise   Please review pended refill request as unable to refill due to high/very high interaction warning copied here:      High  Drug-Drug: sertraline and MeloxicamToxic effects may be increased with concurrent administration of NSAIDs and Selective Serotonin Reuptake Inhibitors. The risk of upper gastrointestinal bleeding may be increased. Patients taking both drugs concurrently should be educated about the signs and symptoms of GI bleeding.

## 2025-07-08 NOTE — TELEPHONE ENCOUNTER
Kaylin Bledsoe requesting Medication Refill for:    Medication name and dose (          montelukast 10 MG Oral Tab 90 tablet 1 12/11/2024 --    Sig - Route: Take 1 tablet (10 mg total) by mouth daily as needed. - Oral    Sent to pharmacy as: Montelukast Sodium 10 MG Oral Tablet (Singulair)    E-Prescribing Status: Receipt confirmed by pharmacy (12/11/2024  9:17 AM CST)                sertraline 100 MG Oral Tab 90 tablet 1 12/11/2024 --    Sig - Route: Take 1 tablet (100 mg total) by mouth daily. - Oral    Sent to pharmacy as: Sertraline HCl 100 MG Oral Tablet (Zoloft)    E-Prescribing Status: Receipt confirmed by pharmacy (12/11/2024  9:17 AM CST)          Preferred Pharmacy:   [id number] [address] AT [facility] [address]), [phone], [phone]       LOV: @[email or web address]@  Last Refill date: 12/11/2024  Next Scheduled appointment: 12/6/2025

## 2025-07-18 ENCOUNTER — NURSE TRIAGE (OUTPATIENT)
Dept: INTERNAL MEDICINE CLINIC | Facility: CLINIC | Age: 70
End: 2025-07-18

## 2025-07-18 ENCOUNTER — DIABETIC EDUCATION (OUTPATIENT)
Facility: CLINIC | Age: 70
End: 2025-07-18
Payer: MEDICARE

## 2025-07-18 DIAGNOSIS — Z79.4 TYPE 2 DIABETES MELLITUS WITH DIABETIC NEUROPATHY, WITH LONG-TERM CURRENT USE OF INSULIN (HCC): Primary | ICD-10-CM

## 2025-07-18 DIAGNOSIS — E11.40 TYPE 2 DIABETES MELLITUS WITH DIABETIC NEUROPATHY, WITH LONG-TERM CURRENT USE OF INSULIN (HCC): Primary | ICD-10-CM

## 2025-07-18 NOTE — PROGRESS NOTES
Kaylin Bledsoe 2/9/1955 was seen for Initial Individual Pump Start Education:  Date: 7/18/2025 Referring Provider: Acacia Sales      Start time: 3:30pm End time: 4:15pm        Language line offered to patient for Swedish language interpretation, patient declined.  Daughter translated.        Visit Summary:   Patient is considering insulin pump therapy, would like to discuss different options that are available.       Blood glucose monitoring: CGM: Dexcom      Pump Review:  Taught concept of insulin pump therapy.  Reviewed difference between basal and bolus insulin.  Discussed importance of entering grams of carbs into insulin pump.  Reviewed different insulin pump options (with tubing, tubeless, uses automated insulin delivery/integrated with CGM).  Discussed how insulin is filled in reservoir/POD with demo samples and is filled every 2-3 days.  Discussed possible risks associated with insulin pump - DKA, importance of changing infusion set/PODs with a bad site.  Reviewed Rule of 15 and plan for hypoglycemia. Ensure glucagon prescription in on file.  Provided Insulin Pump Start Information handout.         Carb Counting  Importance of carbohydrates in the diet and for overall health , Counting Carbohydrates with labels, Counting Carbohydrates without labels , Assessing serving size / portion management , Importance of accurately carb counting and, if applicable, taking insulin before eating , and  Pump: Entering carbs correctly into pump for accurate dosing           Patient Goals:  1. Practice carb counting, look into using phone apps to check grams of carbs.  2. Practice using Tandem simulator (t:simulator).  3. Check that phone is compatible with t:connect mobile valarie.  4. Download and create accounts for Tandem Tslim: t:connect portal  5. Follow up to schedule pump start appointment once supplies have been received.      Comments: interested in tandem xc pump      Patient verbalized understanding and has no further  questions at this time. Routed to provider for review.      Future Appointments   Date Time Provider Department Center   8/7/2025  4:00 PM Acacia Sales APRN [facility]   9/30/2025  3:40 PM Lindsay Agrawal DPM [facility]   12/6/2025 10:00 AM Kiel Azevedo MD [facility] [facility]            Pari Ignacio, JOSE, , LD, CDCES

## 2025-07-18 NOTE — PROGRESS NOTES
Chief Complaint  Foot Swelling (Bilateral feet swelling) and Fall (Patient had a fall 7-4-25, has cut to left lower leg)    Subjective          Cesario Dueñas presents to [facility]  History of Present Illness  --TOLERATING BLOOD PRESSURE MEDICATION WITHOUT APPARENT SIDE EFFECTS  --BUMPED THE LEFT SHIN LAST WEEK, SUFFERING TWO DEEP ABRASIONS.    --HE HAS NOTED SOME EDEMA OF THE LWERE LEGS WHICH COMES AND GOES FOR THE LAST FEW WEEKS.  NOT TOO BAD TODAY.  HISTORY OF CHRONIC LOW BACK PAIN AND A PRIOR LEFT DVT.  RECENT ROUTINE LABS WERE OVERALL OK.          No Known Allergies     Health Maintenance Due   Topic Date Due    ANNUAL WELLNESS VISIT  07/10/2025        Current Outpatient Medications on File Prior to Visit   Medication Sig    albuterol sulfate  (90 Base) MCG/ACT inhaler Inhale 2 puffs Every 4 (Four) Hours As Needed for Wheezing.    ALPRAZolam (XANAX) 0.5 MG tablet Take 1 tablet by mouth At Night As Needed.    atorvastatin (LIPITOR) 80 MG tablet Take 1 tablet by mouth Daily.    Cholecalciferol (Vitamin D3) 50 MCG (2000 UT) capsule Take 1 capsule by mouth Daily.    colestipol (COLESTID) 1 g tablet Take 3 tablets by mouth Daily.    desvenlafaxine (PRISTIQ) 50 MG 24 hr tablet Take 1 tablet by mouth Daily.    gabapentin (NEURONTIN) 600 MG tablet Take 1 tablet by mouth Daily. (Patient taking differently: Take 1 tablet by mouth 2 (Two) Times a Day.)    levocetirizine (XYZAL) 5 MG tablet Take 1 tablet by mouth Daily.    lisinopril (PRINIVIL,ZESTRIL) 20 MG tablet Take 1 tablet by mouth Daily.    montelukast (SINGULAIR) 10 MG tablet Take 1 tablet by mouth Every Night.    multivitamin with minerals tablet tablet Take 1 tablet by mouth Daily.    primidone (MYSOLINE) 250 MG tablet TAKE ONE HALF TABLET EVERY MORNING AND ONE TABLET AT NIGHT     No current facility-administered medications on file prior to visit.       Immunization History   Administered Date(s) Administered     Informed patient of MD's notes/recommendation; verbally understood and agreed to plan. "COVID-19 (MODERNA) 1st,2nd,3rd Dose Monovalent 05/12/2021, 06/09/2021    COVID-19 (PFIZER) Purple Cap Monovalent 12/10/2021    FLUAD TRI 65YR+ 10/17/2018, 10/21/2019, 10/09/2020    Fluzone (or Fluarix & Flulaval for VFC) >6mos 09/25/2017    Fluzone High-Dose 65+YRS 11/06/2024    Fluzone High-Dose 65+yrs 11/12/2021, 10/21/2022, 10/24/2023    Fluzone Quad >6mos (Multi-dose) 10/09/2020    Hep A, 2 Dose 05/18/2018    Hepatitis A 05/17/2018, 11/23/2018    Pneumococcal Conjugate 13-Valent (PCV13) 10/21/2015    Pneumococcal Polysaccharide (PPSV23) 12/31/2015    Td (TDVAX) 08/03/2012    Tdap 04/07/2024       Review of Systems   Constitutional:  Positive for fatigue. Negative for activity change, appetite change, chills and fever.   HENT:  Negative for congestion, ear pain, rhinorrhea and sore throat.    Respiratory:  Negative for cough and shortness of breath.    Cardiovascular:  Negative for chest pain, palpitations and leg swelling.   Gastrointestinal:  Negative for abdominal pain, constipation, diarrhea, nausea and vomiting.   Musculoskeletal:  Negative for arthralgias and myalgias.   Neurological:  Negative for headache.        Objective     /88 (BP Location: Left arm, Patient Position: Sitting)   Pulse 74   Temp 98.2 °F (36.8 °C) (Oral)   Ht 175.3 cm (69\")   Wt 88.3 kg (194 lb 9.6 oz)   SpO2 99% Comment: on RA  BMI 28.74 kg/m²       Physical Exam  Vitals and nursing note reviewed.   Constitutional:       General: He is not in acute distress.     Appearance: Normal appearance.   Cardiovascular:      Rate and Rhythm: Normal rate and regular rhythm.      Heart sounds: Normal heart sounds. No murmur heard.  Pulmonary:      Effort: Pulmonary effort is normal.      Breath sounds: Normal breath sounds.   Abdominal:      Palpations: Abdomen is soft.      Tenderness: There is no abdominal tenderness.   Musculoskeletal:      Cervical back: Neck supple.      Left lower leg: Edema present.      Comments: ONE TO TWO " PLUS BILATERALLY. LEFT > RIGHT.  NEGATIVE OLIVER'S.  TWO PLUS BP AND PT PULSES.  GOOD SENSATION AND ROM.     Lymphadenopathy:      Cervical: No cervical adenopathy.   Neurological:      General: No focal deficit present.      Mental Status: He is alert.      Cranial Nerves: No cranial nerve deficit.      Coordination: Coordination normal.      Gait: Gait normal.   Psychiatric:         Mood and Affect: Mood normal.         Behavior: Behavior normal.         Result Review :                             Assessment and Plan      Diagnoses and all orders for this visit:    1. Venous insufficiency (Primary)  Assessment & Plan:  PROBABLY RELATED TO AGE, MEDS, CHRONIC LOW BACK ISSUES AND THE PRIOR DVT.  GENERAL ADVICE GIVEN RE:  ELEVATION, SALT / FLLUID INTAKE REDUCTION,, POSSIBLE COMPRESSION HOSE.  MAY NEED FURTHER WORKUP       2. Primary hypertension  Assessment & Plan:  Hypertension is stable and controlled  Continue current treatment regimen.  Blood pressure will be reassessed in 6 months.      3. Abrasion, left lower leg, initial encounter  Comments:  OBSERVE ONLY AS THESE AREAS SEEM TO BE HEALING WITHOUT COMPLICATION                    Follow Up     Return in about 6 weeks (around 8/29/2025).    Patient was given instructions and counseling regarding his condition or for health maintenance advice. Please see specific information pulled into the AVS if appropriate.

## 2025-07-18 NOTE — TELEPHONE ENCOUNTER
Action Requested: Summary for Provider     []  Critical Lab, Recommendations Needed  [] Need Additional Advice  []   FYI    []   Need Orders  [] Need Medications Sent to Pharmacy  []  Other     SUMMARY: Advised to take patient to IC for evaluation, Jeni (patient's daughter) is agreeable to plan. Advised to follow up with us on Monday.     Reason for call: Low Back Pain  Onset: One week   Reason for Disposition   Age > 50 and no history of prior similar back pain    Protocols used: Back Pain-A-OH    C/o lower back pain for one week   Radiates to L abdominal area   Patient think it's her kidneys  Current pain level 610  Pain ranges from mild to moderate   Denies any urinary symptoms  C/o abdominal bloating   Has L heel stress fracture, daughter notes 'wobbling\" while walking   Patient also c/o dizziness   Advised to go to IC for evaluation of back pain and dizziness. Patient's daughter agreeable to take to IC.

## 2025-07-21 ENCOUNTER — TELEPHONE (OUTPATIENT)
Facility: CLINIC | Age: 70
End: 2025-07-21

## 2025-07-21 DIAGNOSIS — K86.2 PANCREATIC CYST (HCC): ICD-10-CM

## 2025-07-21 DIAGNOSIS — E11.69 TYPE 2 DIABETES MELLITUS WITH OTHER SPECIFIED COMPLICATION, WITH LONG-TERM CURRENT USE OF INSULIN (HCC): Primary | ICD-10-CM

## 2025-07-21 DIAGNOSIS — Z79.4 TYPE 2 DIABETES MELLITUS WITH OTHER SPECIFIED COMPLICATION, WITH LONG-TERM CURRENT USE OF INSULIN (HCC): Primary | ICD-10-CM

## 2025-08-13 RX ORDER — ATORVASTATIN CALCIUM 10 MG/1
10 TABLET, FILM COATED ORAL DAILY
Qty: 90 TABLET | Refills: 3 | OUTPATIENT
Start: 2025-08-13

## 2025-08-16 ENCOUNTER — LAB ENCOUNTER (OUTPATIENT)
Dept: LAB | Age: 70
End: 2025-08-16

## 2025-08-16 DIAGNOSIS — E11.69 TYPE 2 DIABETES MELLITUS WITH OTHER SPECIFIED COMPLICATION, WITH LONG-TERM CURRENT USE OF INSULIN (HCC): ICD-10-CM

## 2025-08-16 DIAGNOSIS — Z79.4 TYPE 2 DIABETES MELLITUS WITH OTHER SPECIFIED COMPLICATION, WITH LONG-TERM CURRENT USE OF INSULIN (HCC): ICD-10-CM

## 2025-08-16 LAB
ANION GAP SERPL CALC-SCNC: 12 MMOL/L (ref 0–18)
BUN BLD-MCNC: 15 MG/DL (ref 9–23)
CALCIUM BLD-MCNC: 9.7 MG/DL (ref 8.7–10.6)
CHLORIDE SERPL-SCNC: 102 MMOL/L (ref 98–112)
CO2 SERPL-SCNC: 25 MMOL/L (ref 21–32)
CREAT BLD-MCNC: 0.98 MG/DL (ref 0.55–1.02)
EGFRCR SERPLBLD CKD-EPI 2021: 62 ML/MIN/1.73M2 (ref 60–?)
FASTING STATUS PATIENT QL REPORTED: YES
GLUCOSE BLD-MCNC: 206 MG/DL (ref 70–99)
OSMOLALITY SERPL CALC.SUM OF ELEC: 295 MOSM/KG (ref 275–295)
POTASSIUM SERPL-SCNC: 4.5 MMOL/L (ref 3.5–5.1)
SODIUM SERPL-SCNC: 139 MMOL/L (ref 136–145)

## 2025-08-16 PROCEDURE — 36415 COLL VENOUS BLD VENIPUNCTURE: CPT

## 2025-08-16 PROCEDURE — 84681 ASSAY OF C-PEPTIDE: CPT

## 2025-08-16 PROCEDURE — 80048 BASIC METABOLIC PNL TOTAL CA: CPT

## 2025-08-19 LAB — C-PEPTIDE: 1.9 NG/ML

## 2025-08-25 ENCOUNTER — OFFICE VISIT (OUTPATIENT)
Facility: CLINIC | Age: 70
End: 2025-08-25

## 2025-08-25 ENCOUNTER — TELEPHONE (OUTPATIENT)
Facility: CLINIC | Age: 70
End: 2025-08-25

## 2025-08-25 VITALS
HEART RATE: 75 BPM | DIASTOLIC BLOOD PRESSURE: 70 MMHG | SYSTOLIC BLOOD PRESSURE: 126 MMHG | OXYGEN SATURATION: 96 % | RESPIRATION RATE: 16 BRPM

## 2025-08-25 DIAGNOSIS — Z79.4 TYPE 2 DIABETES MELLITUS WITH HYPERGLYCEMIA, WITH LONG-TERM CURRENT USE OF INSULIN (HCC): Primary | ICD-10-CM

## 2025-08-25 DIAGNOSIS — E11.65 TYPE 2 DIABETES MELLITUS WITH HYPERGLYCEMIA, WITH LONG-TERM CURRENT USE OF INSULIN (HCC): Primary | ICD-10-CM

## 2025-08-25 DIAGNOSIS — E78.2 MIXED HYPERLIPIDEMIA: ICD-10-CM

## 2025-08-25 DIAGNOSIS — I25.10 CORONARY ARTERY DISEASE INVOLVING NATIVE CORONARY ARTERY OF NATIVE HEART WITHOUT ANGINA PECTORIS: ICD-10-CM

## 2025-08-25 LAB — HEMOGLOBIN A1C: 8 % (ref 4.3–5.6)

## 2025-08-25 PROCEDURE — 83036 HEMOGLOBIN GLYCOSYLATED A1C: CPT | Performed by: NURSE PRACTITIONER

## 2025-08-25 PROCEDURE — 99215 OFFICE O/P EST HI 40 MIN: CPT | Performed by: NURSE PRACTITIONER

## 2025-08-25 PROCEDURE — 95251 CONT GLUC MNTR ANALYSIS I&R: CPT | Performed by: NURSE PRACTITIONER

## 2025-08-25 RX ORDER — INSULIN PMP CART,AUT,G6/7,CNTR
1 EACH SUBCUTANEOUS
Qty: 10 EACH | Refills: 3 | Status: SHIPPED | OUTPATIENT
Start: 2025-08-25

## 2025-08-25 RX ORDER — METFORMIN HYDROCHLORIDE 500 MG/1
1000 TABLET, EXTENDED RELEASE ORAL 2 TIMES DAILY WITH MEALS
Qty: 360 TABLET | Refills: 3 | Status: SHIPPED | OUTPATIENT
Start: 2025-08-25

## 2025-08-25 RX ORDER — INSULIN PMP CART,AUT,G6/7,CNTR
1 EACH SUBCUTANEOUS ONCE
Qty: 1 KIT | Refills: 0 | Status: SHIPPED | OUTPATIENT
Start: 2025-08-25 | End: 2025-08-25

## 2025-08-25 RX ORDER — ACYCLOVIR 400 MG/1
1 TABLET ORAL
COMMUNITY
Start: 2025-08-25

## 2025-08-25 RX ORDER — INSULIN GLARGINE-YFGN 100 [IU]/ML
INJECTION, SOLUTION SUBCUTANEOUS
Qty: 45 ML | Refills: 1 | Status: SHIPPED | OUTPATIENT
Start: 2025-08-25

## (undated) DIAGNOSIS — M25.561 PAIN IN BOTH KNEES, UNSPECIFIED CHRONICITY: Primary | ICD-10-CM

## (undated) DIAGNOSIS — M25.562 PAIN IN BOTH KNEES, UNSPECIFIED CHRONICITY: Primary | ICD-10-CM

## (undated) DIAGNOSIS — Z09 FOLLOW-UP EXAMINATION FOLLOWING TREATMENT OF FRACTURE: ICD-10-CM

## (undated) DEVICE — REM POLYHESIVE ADULT PATIENT RETURN ELECTRODE: Brand: VALLEYLAB

## (undated) DEVICE — GLOVE SURG SENSICARE SZ 6-1/2

## (undated) DEVICE — NCB CANCELLOUS SCREW 5.0 32 L=60
Type: IMPLANTABLE DEVICE | Site: LEG | Status: NON-FUNCTIONAL
Brand: NCB®
Removed: 2021-07-07

## (undated) DEVICE — ZIMMER® STERILE DISPOSABLE TOURNIQUET CUFF WITH PLC, DUAL PORT, SINGLE BLADDER, 34 IN. (86 CM)

## (undated) DEVICE — STERILE HOOK LOCK LATEX FREE ELASTIC BANDAGE 6INX5YD: Brand: HOOK LOCK™

## (undated) DEVICE — CAUTERY PENCIL

## (undated) DEVICE — TUBING CYSTO

## (undated) DEVICE — NCB LOCKING CAP
Type: IMPLANTABLE DEVICE | Site: LEG | Status: NON-FUNCTIONAL
Brand: NCB®
Removed: 2021-07-07

## (undated) DEVICE — 2C14 #2 PDO 45 X 45: Brand: 2C14 #2 PDO 45 X 45

## (undated) DEVICE — Device: Brand: DRILL BIT

## (undated) DEVICE — ORTHO CDS-LF: Brand: MEDLINE INDUSTRIES, INC.

## (undated) DEVICE — SUTURE VICRYL 2-0 CT-1

## (undated) DEVICE — NON-ADHERENT STRIPS,OIL EMULSION: Brand: CURITY

## (undated) DEVICE — LIGHT HANDLE

## (undated) DEVICE — SOL  .9 1000ML BTL

## (undated) DEVICE — STANDARD HYPODERMIC NEEDLE,POLYPROPYLENE HUB: Brand: MONOJECT

## (undated) DEVICE — SUTURE VICRYL 0 CT-1

## (undated) DEVICE — WRAP COOLING KNEE W/ICE PILLOW

## (undated) DEVICE — KENDALL SCD EXPRESS SLEEVES, KNEE LENGTH, MEDIUM: Brand: KENDALL SCD

## (undated) DEVICE — STOCK ORTH TUB 72X8IN NLTX

## (undated) DEVICE — #15 STERILE STAINLESS BLADE: Brand: STERILE STAINLESS BLADES

## (undated) DEVICE — SUTURE VICRYL 2-0 FSL

## (undated) DEVICE — SPECIMEN CONTAINER,POSITIVE SEAL INDICATOR, OR PACKAGED: Brand: PRECISION

## (undated) DEVICE — GYN CDS: Brand: MEDLINE INDUSTRIES, INC.

## (undated) DEVICE — Device: Brand: STABLECUT®

## (undated) DEVICE — BOWL CEMENT MIX QUICK-VAC

## (undated) DEVICE — SUTURE VICRYL 1 CT-1

## (undated) DEVICE — STERILE POLYISOPRENE POWDER-FREE SURGICAL GLOVES WITH EMOLLIENT COATING: Brand: PROTEXIS

## (undated) DEVICE — SOL  .9 3000ML

## (undated) DEVICE — GLOVE SURG SENSICARE SZ 7

## (undated) DEVICE — DISPOSABLE TOURNIQUET CUFF SINGLE BLADDER, DUAL PORT AND QUICK CONNECT CONNECTOR: Brand: COLOR CUFF

## (undated) DEVICE — COVER,MAYO STAND,STERILE: Brand: MEDLINE

## (undated) DEVICE — SUPER SPONGES,MEDIUM: Brand: KERLIX

## (undated) DEVICE — Device: Brand: NCB®

## (undated) DEVICE — PLASTC TOOMEY SYRNG DISP

## (undated) DEVICE — PADDING CAST COTTON  6

## (undated) DEVICE — VIOLET BRAIDED (POLYGLACTIN 910), SYNTHETIC ABSORBABLE SUTURE: Brand: COATED VICRYL

## (undated) DEVICE — TOTAL KNEE CDS: Brand: MEDLINE INDUSTRIES, INC.

## (undated) DEVICE — BANDAGE ESMARK 6IN W/O LINER

## (undated) DEVICE — BULB SYRINGE,IRRIGATION WITH PROTECTIVE CAP: Brand: DOVER

## (undated) DEVICE — KNEE IMMOBILIZER: Brand: DEROYAL

## (undated) DEVICE — SUTURE VICRYL 0 CP-1

## (undated) DEVICE — DRAPE,U/SHT,SPLIT,FILM,60X84,STERILE: Brand: MEDLINE

## (undated) DEVICE — DECANTER BAG 9": Brand: MEDLINE INDUSTRIES, INC.

## (undated) DEVICE — SOL  .9 1000ML BAG

## (undated) DEVICE — SUTURE ETHIBOND 2 V-37

## (undated) DEVICE — 450 ML BOTTLE OF 0.05% CHLORHEXIDINE GLUCONATE IN 99.95% STERILE WATER FOR IRRIGATION, USP AND APPLICATOR.: Brand: IRRISEPT ANTIMICROBIAL WOUND LAVAGE

## (undated) DEVICE — ABDOMINAL PAD: Brand: DERMACEA

## (undated) DEVICE — STERILE POLYISOPRENE POWDER-FREE SURGICAL GLOVES: Brand: PROTEXIS

## (undated) DEVICE — #10 STERILE BLADE: Brand: POLYMER COATED BLADES

## (undated) DEVICE — SOL H2O IV

## (undated) DEVICE — UNIVERSAL STERIBUMP® STERILE (5/CASE): Brand: UNIVERSAL STERIBUMP®

## (undated) DEVICE — SCD SLEEVE KNEE HI BLEND

## (undated) DEVICE — NCB SCREW 5.0   L = 55
Type: IMPLANTABLE DEVICE | Site: LEG | Status: NON-FUNCTIONAL
Brand: NCB®
Removed: 2021-07-07

## (undated) DEVICE — KIRSCHNER WIRE, WITH TROCAR TIP, Ø 2.0 M
Type: IMPLANTABLE DEVICE | Site: LEG
Brand: KIRSCHNER WIRE

## (undated) DEVICE — OCCLUSIVE GAUZE STRIP OVERWRAP,3% BISMUTH TRIBROMOPHENATE IN PETROLATUM BLEND: Brand: XEROFORM

## (undated) DEVICE — PIN DRL 3.2MM 2.5MM 75MM STRL

## (undated) DEVICE — CHLORAPREP ORANGE TINT 10.5ML

## (undated) DEVICE — GLOVE SURG TRIUMPH SZ 6-1/2

## (undated) DEVICE — DERMABOND LIQUID ADHESIVE

## (undated) DEVICE — SUTURE ETHIBOND 1 OS-6

## (undated) DEVICE — GLOVE SURG SENSICARE SZ 6

## (undated) DEVICE — Device

## (undated) DEVICE — RETRACTOR LONE STAR STAYS LG

## (undated) DEVICE — CHLORAPREP 26ML APPLICATOR

## (undated) DEVICE — MEDI-VAC NON-CONDUCTIVE SUCTION TUBING: Brand: CARDINAL HEALTH

## (undated) DEVICE — SUTURE FIBERTAPE #2 AR-7237

## (undated) DEVICE — LAPAROTOMY SPONGE - RF AND X-RAY DETECTABLE PRE-WASHED: Brand: SITUATE

## (undated) DEVICE — SUTURE PDS II 0 CT-2

## (undated) NOTE — LETTER
08/09/17        [address] [facility]      Dear Bonita Ribera,    [address] [facility] records indicate that you have outstanding lab work and or testing that was ordered for you and has not yet been completed:          COMP METABOLIC PANEL      L

## (undated) NOTE — LETTER
Yani Oliveira [address]  [address] [facility] [address], [address] [facility]  Authorization for Surgical Operation and Procedure     Date:___________                                                                                                         Time:__________ The following are some, but not all, of the potential risks that can occur: fever and allergic reactions, hemolytic reactions, transmission of diseases such as Hepatitis, AIDS and Cytomegalovirus (CMV) and fluid overload.   In the event that I wish to have behalf). The surgeon or my attending physician will determine when the applicable recovery period ends for purposes of reinstating the DNAR order.   10. Patients having a sterilization procedure: I understand that if the procedure is successful the results agree to:  a. Allow the anesthesiologist (anesthesia doctor) to give me medicine and do additional procedures as necessary.  Some examples are: Starting or using an “IV” to give me medicine, fluids or blood during my procedure, and having a breathing tube p Anesthesia (“spinal”, “epidural”, & “nerve blocks”): I understand that rare but potential complications include headache, bleeding, infection, seizure, irregular heart rhythms, and nerve injury.     I can change my mind about having anesthesia services at

## (undated) NOTE — LETTER
MULU. Notifier: [employer]   RUBIO. Patient Name: Delores Antoine. Identification Number: [id number]      Advance Beneficiary Notice of Noncoverage (ABN)  NOTE:  If Medicare doesn’t pay for D. Pap, Pelvic, & breast exam below, you may have to pay. do not bill Medicare. You may ask to be paid now as I am responsible for payment. I cannot appeal if Medicare is not billed. ? OPTION 3. I don’t want the D. Pap, Pelvic, & breast exam listed above.  I understand with this choice  I am not responsible for p

## (undated) NOTE — LETTER
[address] [facility]  A. Notifier:    JUSTIN Whipple  [id number]    Advance Beneficiary Notice of Noncoverage (ABN)    Note:  If Medicare doesn't pay for D.     below, you may have to pay.   Medicare does not pay for everything, even some care francie Option 3:  I don't want the D.    listed above. I understand with this choice I am not responsible for payment, and I cannot appeal to see if Medicare would pay. H.  Additional Information:     This notice gives our opinion, not an official Medicare de

## (undated) NOTE — LETTER
To Whom It May Concern: This document has been written to confirm that the patient has active diabetes mellitus type 2, as was diagnosed around approximately 1990.  She is compliant with her medical regimen, including Metformin, Jardiance, and Lantus ins

## (undated) NOTE — LETTER
Stone Dodson, :1955    CONSENT FOR PROCEDURE/SEDATION    1. I authorize the performance upon Stnoe Dodson  the following: Punch Biopsy     2.  I authorize Dr. Mayte Bridges MD (and whomever is designated as the doctor’s assistant), to perform Witness: _________________________________________ Date:___________     Physician Signature: _______________________________ Date:___________

## (undated) NOTE — LETTER
MICHELLE Notifier: Thien/[employer]   RUBIO. Patient Name: Kayla Mir. Identification Number: [id number]      Advance Beneficiary Notice of Noncoverage (ABN)  NOTE:  If Medicare doesn’t pay for D.  Pap, Breast and or Pelvic Exam below, you may have to pa ? OPTION 2. I want the D. listed above, but do not bill Medicare. You may ask to be paid now as I am responsible for payment. I cannot appeal if Medicare is not billed. ? OPTION 3. I don’t want the D. listed above.  I understand with this choice  I am no

## (undated) NOTE — LETTER
AUTHORIZATION FOR SURGICAL OPERATION OR OTHER PROCEDURE    1. I hereby [facility] Dr. Lena Reese, [facility] assigned to my case to perform the following operation and/or procedure at the [facility], [facility]:    _______________________________________________________________________________________________    Cortisone injection Left foot  _______________________________________________________________________________________________    2. My physician has explained the nature and purpose of the operation or other procedure, possible alternative methods of treatment, the risks involved, and the possibility of complication to me. I acknowledge that no guarantee has been made as to the result that may be obtained. 3.  I recognize that, during the course of this operation, or other procedure, unforseen conditions may necessitate additional or different procedure than those listed above. I, therefore, further authorize and request that the above named physician, his/her physician assistants or designees perform such procedures as are, in his/her professional opinion, necessary and desirable. 4.  Any tissue or organs removed in the operation or other procedure may be disposed of by and at the discretion of the [facility], [facility] and [facility] AT [facility]. 5.  I understand that in the event of a medical emergency, I will be transported by local paramedics to [facility] or other hospital emergency department. 6.  I certify that I have read and fully understand the above consent to operation and/or other procedure. 7.  I acknowledge that my physician has explained sedation/analgesia administration to me including the risks and benefits. I consent to the administration of sedation/analgesia as may be necessary or desirable in the judgement of my physician.     Witness signature: ___________________________________________________ Date:  ______/______/_____ Time:  ________ A. M.  P.M. Patient Name:  ______________________________________________________  (please print)      Patient signature:  ___________________________________________________             Relationship to Patient:           []  Parent    Responsible person                          []  Spouse  In case of minor or                    [] Other  _____________   Incompetent name:  __________________________________________________                               (please print)      _____________      Responsible person  In case of minor or  Incompetent signature:  _______________________________________________    Statement of Physician  My signature below affirms that prior to the time of the procedure, I have explained to the patient and/or his/her guardian, the risks and benefits involved in the proposed treatment and any reasonable alternative to the proposed treatment. I have also explained the risks and benefits involved in the refusal of the proposed treatment and have answered the patient's questions.                         Date:  ______/______/_______  Provider                      Signature:  __________________________________________________________       Time:  ___________ A.M    P.M.

## (undated) NOTE — LETTER
DECLINATION FORM    [address] [facility] provides interpreters for patients who are deaf, hearing impaired, or have Limited [place] Proficiency in order to ensure thee patients an equal opportunity to benefit from services.  There Patient Name: Channing Lin     : 1955    Page 1 of 1     Printed: @DATE      Medical Record #: [id number]   Revised (03)

## (undated) NOTE — LETTER
MICHELLE Notifier: Thien[employer]   JUSTIN Patient Name: Mable Aguirre Identification Number: [id number]      Advance Beneficiary Notice of Noncoverage (ABN)  NOTE:  If Medicare doesn’t pay for D. Pap, Pelvic & breast exam below, you may have to pay.   Me not bill Medicare. You may ask to be paid now as I am responsible for payment. I cannot appeal if Medicare is not billed. ? OPTION 3. I don’t want the D. Pap, Pelvic & breast exam listed above.  I understand with this choice  I am not responsible for paymen

## (undated) NOTE — IP AVS SNAPSHOT
Patient Demographics     Address  [address] [place] [address] Phone  [phone] (Home) *Preferred*  [phone] [facility]) E-mail Address  [email or web address]      Emergency Contact(s)     Name Relation Home Work [address] [facility] you stay regular). Make sure you are taking Senekot S (Docusate Sodium + Sennosides) or Colace (Docusate Sodium), 1-2  tablets twice daily, while on narcotics. You may decrease to once daily if you develop diarrhea.  You will be  sent home with a prescript is covered with a plastic bag to keep it  dry. Once the original postoperative dressing is removed, you do not need to keep your incision covered.  If you  would like to keep it covered for comfort you may – use a clean new dressing each day, or more frequ week.    Specialty: Internal Medicine  Contact information:  [phone]                  Your medication list      TAKE these medications       Instructions Authorizing Provider Morning Afternoon Evening As N Commonly known as: SINGULAIR      Take 1 tablet (10 mg total) by mouth daily as needed. Kiel Azevedo MD         Naloxone HCl 4 MG/0.1ML Liqd      4 mg by Nasal route as needed.  If patient remains unresponsive, repeat dose in other nostril 2-5 minutes aft tab 10 mg 07/11/21 1520 Given      [id number] OxyCODONE HCl IR (ROXICODONE) immediate release tab 10 mg 07/11/21 2006 Given      [id number] OxyCODONE HCl IR (ROXICODONE) immediate release tab 10 mg 07/12/21 0241 Given      [id number] OxyCODONE HCl IR (Garfield Batty 07/12/2021 1157   Temp  99 °F (37.2 °C) Filed at 07/12/2021 1157   SpO2  100 % Filed at 07/12/2021 1157      Patient's Most Recent Weight       Most Recent Value   Patient Weight  74.8 kg (165 lb)      Lab Results Last 24 Hours    No matching results found Flatulence/gas pain/belching    • Food intolerance    • Frequent use of laxatives    • Hearing loss    • High blood pressure    • High cholesterol    • History of depression    • History of eating disorder    • Indigestion    • Irregular bowel habits    • • Heart Attack Father         AMI   • Hypertension Father    • Diabetes Sister[AR.2]         Allergies:[AR.1]   Codeine                 NAUSEA ONLY  Vicodin [Hydrocodon*    NAUSEA ONLY    Comment:TABS[AR.2]    Medications:[AR.1]  No current facility-admini LIQUID AND USE 2 SPRAYS IN EACH NOSTRIL DAILY, Disp: 48 g, Rfl: 0  Montelukast Sodium 10 MG Oral Tab, Take 1 tablet (10 mg total) by mouth daily as needed. , Disp: 90 tablet, Rfl: 3  Metoprolol Succinate ER 25 MG Oral Tablet 24 Hr, Take 1 tablet (25 mg tota Recent Labs   Lab 07/06/21  1700 07/07/21  0503 07/08/21  0505 07/11/21  0028   WBC 9.2 7.0  --  7.2   HGB 11.9* 10.4* 10.3* 9.5*   MCV 84.7 86.1  --  86.7   .0 172.0  --  273.0       Recent Labs   Lab 07/09/21  0446 07/10/21  0518 07/11/21  0028 Michael Jones MD on 7/11/2021  1:17 AM  AR. 4 - Michael Jones MD on 7/11/2021  1:38 AM                     D/C Summary    No notes of this type exist for this encounter.         Physical Therapy Notes (last 72 hours) (Notes from 7/9/2021  2:10 PM thr Femur fracture, left (HCC)[AZ.2]      Past Medical History[AZ.1]  Past Medical History:   Diagnosis Date   • Abdominal hernia    • Abdominal pain    • Anemia    • Anesthesia complication     slow to arouse   • Anxiety    • Arthritis    • Atherosclerosis of hip   • KNEE REPLACEMENT SURGERY Left 07/17/2020   • OTHER SURGICAL HISTORY  2004. 2006     Unspecified ankle surgery, Left ankle 2004 and right ankle 2006     • TOTAL HIP REPLACEMENT      rt hip   • VAGINAL HYSTERECTOMY N/A 11/16/2017    Procedure: Edouard Nick SHORT FORM - BASIC MOBILITY  How much difficulty does the patient currently have. .. [AZ.1]  -   Turning over in bed (including adjusting bedclothes, sheets and blankets)?: A Little   -   Sitting down on and standing up from a chair with arms (e.g., wheelcha then was cga stand->sit.   Patient was able to scoot sideways to the [facility] while sitting needing max assist still to L LE, patient required max assist to L LE & min assist-cga to trunk & R LE during sit->supine.[AZ.2]      Exercise/Education Provided:[AZ.1] mobility; Patient education;Range of motion;Strengthening;Transfer training  Rehab Potential : Good  Frequency (Obs): 3-5x/week  Number of Visits to Meet Established Goals: Hyacinth Amaya. 2]      CURRENT GOALS    Goal #1 Patient is able to demonstrate supine - sit EO recent admission: \"NWB left LE, may touch down to support the weight of the leg itself only ! ! Highly comminuted and unstable fracture!! Strict avoidance of angular or rotational stress left lower extremity.  Knee immobilizer at all times unless supervised • Neuropathy     maria eugenia feet   • Night sweats    • Osteoarthrosis, unspecified whether generalized or localized, unspecified site    • Osteoporosis    • Other and unspecified hyperlipidemia    • Pain in joints    • Peripheral neuropathy    • Problems with s Short Form  How much help from another person does the patient currently need…[AO.1]  -   Putting on and taking off regular lower body clothing?: A Lot  -   Bathing (including washing, rinsing, drying)?: A Lot  -   Toileting, which includes using toilet, b strength. Requires assist while standing to maintain NWB d/t quickly fatiguing. Patient continues to require mod to max A for self-care and functional mobility at this time. Will benefit from skilled OT intervention to maximize ADL I and safety. [AO.1] Consult to Occupational Therapy  Reason for Therapy: ADL/IADL Dysfunction and Discharge Planning    History related to current admission: Patient is a 77year old female admitted on 7/10/2021 from [facility] with c/o hyperglycemia.   Pt was discharged from  Bloating    • Blurred vision    • Change in hair    • Constipation    • Diarrhea, unspecified    • Dizziness    • Easy bruising    • Fatigue    • Feeling lonely    • Flatulence/gas pain/belching    • Food intolerance    • Frequent use of laxatives    • Hea and Equipment: Standard height toilet  Shower/Tub and Equipment: Walk-in shower  Other Equipment: None    Occupation/Status: Retired  Hand Dominance: Right     Patient Regularly Uses: Glasses    Prior Level of Function:  Patient reports independent in all assistance (Mod of 1, Max of 1; 3rd person to support L LE; see below for details)    Skilled Therapy Provided:   UB dressing: SETUP  LB dressing: DEP  Toileting: DEP  Grooming: SETUP     Functional mobility: DEFERRED  Supine to sit: MAX assist for upper a techniques.  These deficits impact the patient’s ability to participate in lower body dressing/bathing, toileting, toilet transfers, bed mobility, functional mobility, instrumental activities of daily living, rest and sleep, leisure and social participation Nessa Singh on 7/11/2021 11:42 AM  AG. 2 - Heydi Shelby OT on 7/11/2021 12:02 PM               Occupational Therapy Note signed by Heydi Shelby OT at 7/11/2021 12:01 PM  Version 1 of 2    Author: Heydi Shelby OT Service: Rehab Author Type: [facility] periprosthetic fracture     Therapy significant co-morbidities include anxiety, depression, DM, osteoporosis, neuropathy, R IRAJ, L TKA July '20    Problem List  Principal Problem:    Hyperglycemia  Active Problems:    Closed fracture of left femur with rou stent placed by Dr. Norman Serum   •   [id number]   • CATARACT Bilateral    • CATH DRUG ELUTING STENT     • COLONOSCOPY     • ENDOVENOUS LASER VEIN ADDON Left 2009    Left GSV   • ENDOVENOUS LASER VEIN ADDON Right 2009    right GSV   • HIP REPLACEMENT SURG ASSESSMENT  AM-PAC ‘6-Clicks’ Inpatient Daily Activity Short Form  How much help from another person does the patient currently need…  -   Putting on and taking off regular lower body clothing?: Total  -   Bathing (including washing, rinsing, drying)?: MULU L within reach;RN aware of session/findings; All patient questions and concerns addressed; Alarm set    ASSESSMENT     Patient is a 77year old female admitted on 7/10/2021 forremoval external fixator, ORIF L distal femur periprosthetic fracture 7/7/21; hyperg education  Rehab Potential : Good  Frequency (Obs): 3-5x/week  Number of Visits to Meet Established Goals: 3    ADL Goals   Patient will perform upper body bathing:  with setup  Patient will perform lower body bathing:  with max assist  Patient will perfor - PT/OT  - home safety  - incision care  -pain control  - See additional Care Plan goals for specific interventions   Patient/Family Short Term Goal     Interdisciplinary Progressing    Description: Patient's Short Term Goal: \"To have pain better\"    Int

## (undated) NOTE — LETTER
MICHELLE Notifier: [email or web address]   RUBIO. Patient Name: Karen Simmons.  Identification Number: [id number]      Advance Beneficiary Notice of Noncoverage (ABN)  NOTE:  If Medicare doesn’t pay for D pap smear, pelvic and breast exam below, you may have to ? OPTION 2. I want the D. listed above, but do not bill Medicare. You may ask to be paid now as I am responsible for payment. I cannot appeal if Medicare is not billed. ? OPTION 3. I don’t want the D. listed above.  I understand with this choice  I am no

## (undated) NOTE — LETTER
22    [address] [facility]  Pre-Operative Clearance Request    Patient Name:   Floridalma Sousa             :   1955    Surgeon: Dr. Anila Adam             Date of Surgery: 22    Surgical Procedure: LEFT FOOT FUSION FIRST METATARSAL PHALANX, FUSION 2ND AND 3RD TOES PROXIMAL INTERPHALANGEAL JOINT WITH INION ORLANDO    Please Complete: 2-3 WEEKS PRIOR TO SURGERY TO AVOID CANCELLATION OF SURGERY. [x]  History and Physical        [x]  Medical  Clearance                         Testing is ordered by Beth CORBETT per anesthesia guidelines                                  **Please fax test results, H&P, and clearance to [phone] and to         [facility] at [phone]**

## (undated) NOTE — Clinical Note
Hi Dr. Magaña, pt has Pancreatic cyst noted in MRI of abd in 2020,  Pt's brother had a hx of pancreatic cancer. Pt has abd pain w/ decreased urinary output per Ct review, but currently has left side abd pain.  Noted some hypoglycemia in CGM  < 1% in the last 2 weeks   CT abd pelvis w/ contrast only  in 3/2/23 pancreatic cyst not well seen, adrenal glands has no mass or enlargement  Per daughter pt has colonoscopy schedule on Oct 4, 2024 w/ Dr. Scott.  I ordered JAXON AB and c peptide.  Do you think I need to order CT or MRI of abd again this year?

## (undated) NOTE — LETTER
04/13/20        [facility] [address]      Dear [facility],    [address] [facility] records indicate that you have outstanding lab work and or testing that was ordered for you and has not yet been completed:  Please be aware your labs/imagin

## (undated) NOTE — IP AVS SNAPSHOT
[address]            (For Outpatient Use Only) Initial Admit Date: 7/6/2021   Inpt/Obs Admit Date: Inpt: 7/6/21 / Obs: N/A   Discharge Date:    Yadira Carballo:  [de-identified]   MRN: [de-identified]   CSN: [id number]   CEID: [id number] INSURANCE   Payor:  Plan:    Group Number:  Insurance Type:    Subscriber Name:  Subscriber :    Subscriber ID:  Pt Rel to Subscriber:    Hospital Account Financial Class: Medicare    July 10, 2021

## (undated) NOTE — LETTER
03/13/20        [facility] [address]      Dear Katty Bustamante,    [address] [facility] records indicate that you have outstanding lab work and or testing that was ordered for you and has not yet been completed:  Orders Placed This Encounter

## (undated) NOTE — LETTER
11/16/20        [facility] [address]      Dear Kassy Chen,    [address] [facility] records indicate that you have outstanding lab work and or testing that was ordered for you and has not yet been completed:  Orders Placed This Encounter

## (undated) NOTE — MR AVS SNAPSHOT
[address] [facility] [address] Payment  [phone]               Thank you for choosing us for your health care visit with Glendy Morin MD.  We are glad to serve you and happy to provide you with t Co Q-10 300 MG Caps   Take 1 capsule by mouth daily.            Diclofenac Sodium 75 MG Tbec   TAKE 1 TABLET BY MOUTH TWICE DAILY   Commonly known as:  VOLTAREN           Doxycycline Hyclate 100 MG Caps   Take 1 capsule (100 mg total) by mouth 2 (tw ADMINISTER 25 UNITS UNDER THE SKIN EVERY EVENING           MetFORMIN HCl 1000 MG Tabs   TAKE ONE AND ONE-HALF TABLETS BY MOUTH WITH BREAKFAST AND ONE TABLET IN THE AFTERNOON   Commonly known as:  GLUCOPHAGE           Metoprolol Succinate ER 25 MG Tb24   TA

## (undated) NOTE — ED AVS SNAPSHOT
Filipe Diaz   MRN: [id number]    Department:  [facility] Emergency Department in [place]   Date of Visit:  12/21/2019           Disclosure     Insurance plans vary and the physician(s) referred by the ER may not be covered by your plan.  Please conta tell this physician (or your personal doctor if your instructions are to return to your personal doctor) about any new or lasting problems. The primary care or specialist physician will see patients referred from the [facility] Emergency Department.  Stanley Andrade

## (undated) NOTE — LETTER
Kaylin Bledsoe, :1955    CONSENT FOR PROCEDURE/SEDATION    1. I authorize the performance upon Kaylin Bledsoe  the following: Vulvar Biopsy    2. I authorize Dr. Soo Ramesh MD (and whomever is designated as the doctor’s assistant), to perform the above-mentioned procedures.    3. If any unforeseen conditions arise during this procedure calling for additional  procedures, operations, or medications (including anesthesia and blood transfusion), I further request and authorize the doctor to do whatever he/she deems advisable in my interest.    4. I consent to the taking and reproduction of any photographs in the course of this procedure for professional purposes.    5. I consent to the administration of such sedation as may be considered necessary or advisable by the physician responsible for this service, with the exception of ______________________________________________________    6. I have been informed by my doctor of the nature and purpose of this procedure sedation, possible alternative methods of treatment, risk involved and possible complications.    7. If I have a Do Not Resuscitate (DNR) order in place, the physician and I (or the individual authorized to consent on my behalf) will discuss and agree as to whether the Do Not Resuscitate (DNR) order will remain in effect or will be discontinued during the performance of the procedure and the applicable recovery period. If the Do Not Resuscitate (DNR) order is discontinued and is to be reinstated following the procedure/recovery period, the physician will determine when the applicable recovery period ends for purposes of reinstating the Do Not Resuscitate (DNR) order.    Signature of Patient:_______________________________________________    Signature of person authorized to consent for patient:  _______________________________________________________________    Relationship to patient: __SELF__________________________________    Witness:  _________________________________________ Date:__07/11/2024__     Physician Signature: _______________________________ Date:__07/11/2024__

## (undated) NOTE — LETTER
DECLINATION FORM  [address] provides interpreters for patients who are deaf, hearing impaired, or have Limited [place] Proficiency in order to ensure these patients an equal opportunity to benefit from form located on the intranet  *For all other languages, please complete this form with the assistance of a telephone .       confirmation number: ___________________________    Patient Name: Genevieve Baltazar     : 1955    Page 1

## (undated) NOTE — IP AVS SNAPSHOT
Patient Demographics     Address  [address] [place] [address] Phone  [phone] (Home) *Preferred*  [phone] [facility]) E-mail Address  [email or web address]      Emergency Contact(s)     Name Relation Home Work [address] [place] [address] for pain, these are some general guidelines for using and discontinuing them. You may also want to preemptively take your pain medication before physical therapy (at least 30 minutes prior to the session).   If you are concerned you are suffering side effe decrease to once daily if you develop diarrhea. You will be sent home with a prescription. If you have not had normal bowel movements the following over the counter medications can be helpful:  • Add daily Miralax™ or Metamucil™ as directed on bottle.   • of PT visits will depend on your needs and your insurance coverage. • At your first postoperative appointment with your surgeon, you will be given a prescription for outpatient physical therapy if you have not already started outpatient therapy.   • Rules own and do not need to be replaced. • If you have a home care nurse they should:  • Examine the incision during visits and call your surgeon if there are any signs of infection or other cause for concern.   • Change your dressing and may remove staples (wh some patients will be prescribed Lovenox to prevent blood clots after surgery.  If you are prescribed Lovenox, your nurse will teach you how to give this injection to yourself prior to being discharged from the hospital. Continue Lovenox injections daily as not have a follow-up appointment with your surgeon, or you need to change your follow-up appointment date/time, please call today to schedule or change this appointment: [phone].  Our phones are open to schedule appointments from 8:30am to 4:30pm, Mo 1 TABLET(5 MG) BY MOUTH DAILY   Kiel Azevedo MD         Jardiance 25 MG Tabs  Generic drug: Empagliflozin      TAKE 1 TABLET BY MOUTH DAILY   Kiel Azevedo MD         Lanhollieus SoloStar 100 UNIT/ML Sopn  Generic drug: insulin glargine      Inject 30 Units into hours as needed for Pain.                 Where to Get Your Medications      [id number] - [address], [address]), [phone], [phone]  Raffaelecarina Pepper 07/10/21 0833 Given            LEFT UPPER ARM     Order ID Medication Name Action Time Action Reason Comments    [id number] Insulin Aspart Pen (NOVOLOG) 100 UNIT/ML flexpen 1-68 Units 07/09/21 1353 Given      [id number] Insulin Aspart Pen (NOVOLOG) 100 UNIT/ Chloride 107 98 - 112 mmol/L — [facility])   CO2 25.0 21.0 - 32.0 mmol/L — [facility] ([facility])   Anion Gap 6 0 - 18 mmol/L — [facility] ([facility])   BUN 17 7 - 18 mg/dL — [facility])   Creatinine 0.55 0.55 - 1.02 mg/dL — [facility])   BUN/CREA Ratio Service: 2021 10:53 PM Status: Signed    : Samira Jaed MD (Physician)         [facility]  History and Physical     Stone Dodson Patient Status:  Inpatient    1955 MRN [id number]   [facility] 3SW-A Attending Jolly or unspecified type diabetes mellitus without mention of complication, not stated as uncontrolled    • Uncomfortable fullness after meals    • Venous insufficiency of both lower extremities 3/16/2018   • Visual impairment     glasses   • Vomiting    • Herbert TABLET(5 MG) BY MOUTH DAILY, Disp: 90 tablet, Rfl: 0  metFORMIN HCl  MG Oral Tablet 24 Hr, Take 2 tablets (1,000 mg total) by mouth 2 (two) times daily with meals. , Disp: 360 tablet, Rfl: 3  linagliptin (TRADJENTA) 5 mg Oral Tab, Take 1 tablet (5 mg (80.7 kg)   LMP 09/01/2017 (LMP Unknown)   SpO2 96%   BMI 29.62 kg/m²   General: No acute distress. Alert and oriented x 3. HEENT: Normocephalic atraumatic. Moist mucous membranes. EOM-I. PERRLA. Anicteric. Respiratory: Clear to auscultation bilaterally. Depression     Quality:  · DVT Prophylaxis: SCD, last took xarelto on  · CODE status: full  · Dash: no  · If COVID testing is negative, may discontinue isolation: yes      Plan of care discussed with[GS.1] RN, Pt[GS.2]    Drew Pizarro MD  7/6/2021 is reportedly at the knee. Pain is reportedly adequately controlled. She received a femoral nerve block in the ED yesterday. She relates her left total knee being performed by Dr. Judy Cornejo in 2017. [AMNA.1]    Past Medical History:   Diagnosis Date   • Abdomi Left GSV   • ENDOVENOUS LASER VEIN ADDON Right 2009    right GSV   • HIP REPLACEMENT SURGERY  2011    Right hip   • KNEE REPLACEMENT SURGERY Left 07/17/2020   • OTHER SURGICAL HISTORY  2004. 2006     Unspecified ankle surgery, Left ankle 2004 and right ank Hold Lavender Auto Resulted    RAINBOW DRAW LIGHT GREEN    Collection Time: 07/06/21  5:00 PM   Result Value Ref Range    Hold Lt Green Auto Resulted    RAINBOW DRAW GOLD    Collection Time: 07/06/21  5:00 PM   Result Value Ref Range    Hold Gold Auto Resu Specimen: Nares;  Other   Result Value Ref Range    Staph Aureus Screen By PCR Negative Negative    MRSA Screen By PCR Negative Negative   Basic Metabolic Panel (8)    Collection Time: 07/07/21  5:03 AM   Result Value Ref Range    Glucose 200 (H) 70 - 99 mg Multiple views of the left hip femur and tibia demonstrates a spanning external fixator traversing a total knee implant which appears to be well fixated in good position.   There is a comminuted metadiaphyseal fracture of the distal femur with significant d PM  Version 1 of 1    Author: Den Esparza DO Service: Hospitalist Author Type: Physician    Filed: 7/10/2021  1:06 PM Date of Service: 7/10/2021  1:03 PM Status: Signed    : Den Esparza DO (Physician)       [facility] Procedures during hospitalization:   • Removal of external fixator and open reduction internal fixation of the left periprosthetic femoral shaft fracture.     Incidental or significant findings and recommendations (brief descriptions):  • None    Lab/Test times daily.    Refills: 0     Fluticasone Propionate 50 MCG/ACT Susp  Commonly known as: FLONASE      SHAKE LIQUID AND USE 2 SPRAYS IN EACH NOSTRIL DAILY   Quantity: 48 g  Refills: 0     glipiZIDE ER 5 MG Tb24  Commonly known as: GLIPIZIDE XL      TAKE 1 T [phone]  [address] [employer] [address], [address]    Phone: [phone]   · Naloxone HCl 4 MG/0.1ML Liqd     Please  your prescriptions at the location directed by your doctor or nurse    Bring a paper prescription for each of these medica Signed    : Alejandro Kuhn PTA (Physical Therapy Assistant)        PHYSICAL THERAPY TREATMENT NOTE - INPATIENT    Room Number: 355/355-A     Session: 1   Number of Visits to Meet Established Goals: 6    Presenting Problem: s/p removal of L fixture Stress    • Type II or unspecified type diabetes mellitus without mention of complication, not stated as uncontrolled    • Uncomfortable fullness after meals    • Venous insufficiency of both lower extremities 3/16/2018   • Visual impairment     glasses adjusting bedclothes, sheets and blankets)?: A Lot   -   Sitting down on and standing up from a chair with arms (e.g., wheelchair, bedside commode, etc.): Unable   -   Moving from lying on back to sitting on the side of the bed?: A Lot   How much help from impaired strength, difficulty with gait/transfers resulting in downgrade of overall functional mobility. Due to above deficits, Pt will benefit from continued IP PT, so that patient may achieve highest functional independence/return to baseline.        Tra L LE,, ORIF comminuted and displaced periprosthetic femur fx 7/8/21 by Dr. mOid Haines (per ortho, \"highly comminuted and unstable fx\")[MK. 2]  Reason for Therapy: Mobility Dysfunction and Discharge Planning    History related to current admission: per EMR, pt a of eating disorder    • Indigestion    • Irregular bowel habits    • Itch of skin    • Leg swelling    • Loss of appetite    • Nausea    • Neuropathy     maria eugenia feet   • Night sweats    • Osteoarthrosis, unspecified whether generalized or localized, unspecifi device with independently and was I with ADL's.[MK.3]    SUBJECTIVE  \"My pain is more than 10\" regarding L LE    Patient self-stated goal is not stated    OBJECTIVE[MK.1]  Precautions: Knee immobilizer;Bed/chair alarm;  needed; Other (Comment) ( need. Theron Dash [MK.1]   -   Moving to and from a bed to a chair (including a wheelchair)?: Total   -   Need to walk in hospital room?: Total   -   Climbing 3-5 steps with a railing?: Total[MK. 2]       AM-PAC Score:[MK.1]  Raw Score: 8   Approx Degree of Impairment and concerns addressed;SCDs in place; Alarm set; Discussed recommendations with /[MK. 2]    ASSESSMENT   Patient is a[MK.3 77year old[MK.2] female admitted on 7/6/2021 for closed displaced subtrochanteric fx of L femur and is s/p re transfers[MK. 1] EOB to/from Chair/Wheelchair[MK.4] at assistance level:[MK.1] maximum assistance x 2[MK. 4]     Goal #3[MK.1] Pt able to sit at EOB x 5 mins with CGA for static balance and assist of one for support for L LE.[MK.4]     Goal #4    Goal #5 gentle supported AAROM L knee (0-90) by PT (similar to CPM), and NWB L LE, may touch down to support the weight of the leg itself ONLY. Per RN:  No amb at this time, to chair/commode only. Problem List[MK. 1]  Principal Problem:    Closed displaced subt History[MK.1]  Past Surgical History:   Procedure Laterality Date   • ANGIOPLASTY (CORONARY)      coronary stent placed by Dr. Gallo Orf   •   [id number]   • CATARACT Bilateral    • CATH DRUG ELUTING STENT     • COLONOSCOPY     • ENDOVENOUS LASER VE L LE due to surgery and unstable fx. Lower extremity strength:   L LE not tested due to surgery and unstable fx.    R hip flex able to move against gravity  R knee able to move against gravity  R ankle dorsiflex able to move against gravity    BALANCE[MK Pt required 3 person max assist for bed mobility, one person to support and assist L LE, avoiding torsional/angular stress throughout movement/transfer, one person to assist with trunk, and 3rd person for R LE. Pt reports dizziness upon sitting at EOB.   B of impairment may benefit from discharge to [facility]. Based on this evaluation, patient's clinical presentation is[MK. 1] evolving[MK. 3] and overall the evaluation complexity is considered[MK. 1] low[MK.3].   These impairments and comorbidities manifest themselv (Occupational Therapist)       OCCUPATIONAL THERAPY TREATMENT NOTE - INPATIENT     Room Number: 355/355-A  Session: 1[SH.1]   Number of Visits to Meet Established Goals: 5    Presenting Problem: s/p removal external fixator, ORIF L distal femur periprosthe for nursing staff:    Transfers: none, not safe for lift   Toileting location: bed level[SH.1]    OT Discharge Recommendations: Sub-acute rehabilitation  OT Device Recommendations: TBD[SH.2]    SUBJECTIVE  Pt reports she gets full body cramping at baseline questions and concerns addressed;SCDs in place; Alarm set[SH.2]    PLAN[SH.1]  OT Treatment Plan: Balance activities; ADL training;Functional transfer training;UE strengthening/ROM; Endurance training;Patient/Family education;Patient/Family training; Compensat periprosthetic fracture and external fixator, currently[BW.2] s/p removal external fixator, ORIF L distal femur periprosthetic fracture 7/7/21[BW.1]. Per MD orders: \"NWB left LE, may touch down to support the weight of the leg itself only ! ! Highly com hyperlipidemia    • Pain in joints    • Peripheral neuropathy    • Problems with swallowing    • Sleep disturbance    • Stented coronary artery    • Stress    • Type II or unspecified type diabetes mellitus without mention of complication, not stated as un Lower Extremity: Non-Weight Bearing (per ortho,may touch down to support the weight of leg only)    PAIN ASSESSMENT  Rating: Other (Comment) (\"more than 10\")  Location: LLE  Management Techniques: Activity promotion; Body mechanics;Breathing techniques; Re on WB restrictions and need for KI at all times; sitting EOB to L side via 3-person assist (one for LLE, one for RLE/hip with drawsheet, one for trunk) to ensure full protection for LLE; patient initially retropulsive, requiring total assist for trunk cont Subacute rehab is recommended at discharge to include for transfer training within [facility] restrictions.[BW.2]     The patient is functioning below her previous functional level and would benefit from skilled inpatient OT to address the above deficits, abigail encounter. SLP Notes    No notes of this type exist for this encounter.      Immunizations     Name Date      [address] 02/27/21     Covid-19 Pfizer 02/06/21     Depo-Medrol 40mg Inj 02/13/20     INFLUENZA 10/17/20     INFLUENZA 11/02/19     INFLU

## (undated) NOTE — LETTER
DECLINATION FORM    [address] [facility] provides interpreters for patients who are deaf, hearing impaired, or have Limited [place] Proficiency in order to ensure thee patients an equal opportunity to benefit from services.  There Patient Name: Chaz Romero     : 1955    Page 1 of 1     Printed: @DATE      Medical Record #: [id number]   Revised (03)

## (undated) NOTE — MR AVS SNAPSHOT
[facility] [address]  [facility] [address] [phone]               Thank you for choosing us for your health care visit with EMG 20 NURSE.   We are glad to serve you and happy to provide you with this Commonly known as:  VOLTAREN           Doxycycline Hyclate 100 MG Caps   Take 1 capsule (100 mg total) by mouth 2 (two) times daily.    Commonly known as:  VIBRAMYCIN           ergocalciferol 07192 units Caps   Take 1 capsule (50,000 Units total) by mouth o Pantoprazole Sodium 40 MG Tbec   Take 1 tablet by mouth 2 (two) times daily before meals. Take 30-45 minutes before breakfast and before dinner, take on an empty stomach.    Commonly known as:  PROTONIX           ramipril 2.5 MG Caps   TAKE 1 CAPSUL

## (undated) NOTE — MR AVS SNAPSHOT
[address] [facility] [address]  [facility] [address] [phone]               Thank you for choosing us for your health care visit with EMG 20 NURSE.   We are glad to serve you and happy to provide you with this Take 1 capsule (100 mg total) by mouth 2 (two) times daily. Commonly known as:  VIBRAMYCIN           ergocalciferol 51654 units Caps   Take 1 capsule (50,000 Units total) by mouth once a week.    Commonly known as:  DRISDOL/VITAMIN D2           Fenofibrat Take 1 tablet by mouth 2 (two) times daily before meals. Take 30-45 minutes before breakfast and before dinner, take on an empty stomach.    Commonly known as:  PROTONIX           ramipril 2.5 MG Caps   TAKE 1 CAPSULE BY MOUTH EVERY DAY   Commonly known as:

## (undated) NOTE — LETTER
AUTHORIZATION FOR SURGICAL OPERATION OR OTHER PROCEDURE    1. I hereby authorize Dr. Bharat Simms, and [facility] staff assigned to my case to perform the following operation and/or procedure at the [facility], [facility]:    _______________________________________________________________________________________________    Cortisone Injection Left foot  _______________________________________________________________________________________________    2. My physician has explained the nature and purpose of the operation or other procedure, possible alternative methods of treatment, the risks involved, and the possibility of complication to me. I acknowledge that no guarantee has been made as to the result that may be obtained. 3.  I recognize that, during the course of this operation, or other procedure, unforseen conditions may necessitate additional or different procedure than those listed above. I, therefore, further authorize and request that the above named physician, his/her physician assistants or designees perform such procedures as are, in his/her professional opinion, necessary and desirable. 4.  Any tissue or organs removed in the operation or other procedure may be disposed of by and at the discretion of the [facility], [facility] and [facility] AT [facility]. 5.  I understand that in the event of a medical emergency, I will be transported by local paramedics to [facility] or other hospital emergency department. 6.  I certify that I have read and fully understand the above consent to operation and/or other procedure. 7.  I acknowledge that my physician has explained sedation/analgesia administration to me including the risks and benefits. I consent to the administration of sedation/analgesia as may be necessary or desirable in the judgement of my physician.     Witness signature: ___________________________________________________ Date:  ______/______/_____ Time:  ________ A. M.  P.M. Patient Name:  ______________________________________________________  (please print)      Patient signature:  ___________________________________________________             Relationship to Patient:           []  Parent    Responsible person                          []  Spouse  In case of minor or                    [] Other  _____________   Incompetent name:  __________________________________________________                               (please print)      _____________      Responsible person  In case of minor or  Incompetent signature:  _______________________________________________    Statement of Physician  My signature below affirms that prior to the time of the procedure, I have explained to the patient and/or his/her guardian, the risks and benefits involved in the proposed treatment and any reasonable alternative to the proposed treatment. I have also explained the risks and benefits involved in the refusal of the proposed treatment and have answered the patient's questions.                         Date:  ______/______/_______  Provider                      Signature:  __________________________________________________________       Time:  ___________ A.M    P.M.

## (undated) NOTE — Clinical Note
I saw Yessy Rylan today with uterovaginal prolapse and mixed urinary incontinence. She's interested in surgery, if path is ok after [facility] w/ D&C we can plan TVH poss BSO, USVVS, APE repair, poss MUS, cysto together.  I recommended she start clobetasol cream fo

## (undated) NOTE — MR AVS SNAPSHOT
[facility] [address]  [facility] [address] [phone]               Thank you for choosing us for your health care visit with EMG 20 NURSE.   We are glad to serve you and happy to provide you with this Doxycycline Hyclate 100 MG Caps   Take 1 capsule (100 mg total) by mouth 2 (two) times daily.    Commonly known as:  VIBRAMYCIN           ergocalciferol 69364 units Caps   TAKE 1 CAPSULE BY MOUTH 1 TIME A WEEK   Commonly known as:  DRISDOL/VITAMIN D Take 1 tablet by mouth 2 (two) times daily before meals. Take 30-45 minutes before breakfast and before dinner, take on an empty stomach.    Commonly known as:  PROTONIX           ramipril 2.5 MG Caps   TAKE 1 CAPSULE BY MOUTH EVERY DAY   Commonly known as:

## (undated) NOTE — MR AVS SNAPSHOT
[facility] [address]  [address] [place] [place] [facility] Layla Razo  [phone]               Thank you for choosing us for your health care visit with Lotus Galo MD.  We are glad to serve you and happy to provide you with t gabapentin 300 MG Caps   TAKE 2 CAPSULES BY MOUTH EVERY NIGHT   What changed:  See the new instructions.    Commonly known as:  NEURONTIN           * Glucose Blood Strp   TEST TWICE  DAILY   Commonly known as:  TRUETRACK TEST           * Glucose Blo These medications were sent to [facility] [address] [place], [place] - [facility] AT [facility], [phone], [address], [place] [address]     Phone:  [phone]    - Empagliflozin 10 MG Tabs  - Nitrofurantoin Tips for making healthy food choices  -   Enjoy your food, but eat less. Fully enjoy your food when eating. Don’t eat while distracted and slow down. Avoid over sized portions. Don’t eat while when you’re bored.      EAT THESE FOODS MORE OFTEN: EAT T

## (undated) NOTE — IP AVS SNAPSHOT
[address]            (For Outpatient Use Only) Initial Admit Date: 7/10/2021   Inpt/Obs Admit Date: Inpt: N/A / Obs: 07/11/21   Discharge Date:    Minal Colon:  [de-identified]   MRN: [de-identified]   CSN: [id number]   [address]: [id number] Payor:  Plan:    Group Number:  Insurance Type:    Subscriber Name:  Subscriber :    Subscriber ID:  Pt Rel to Subscriber:    Hospital Account Financial Class: Medicare    2021

## (undated) NOTE — LETTER
[address]  [address] [facility] [address], [address] [facility]  Authorization for Surgical Operation and Procedure     Date:___________                                                                                                         Time:__________ The following are some, but not all, of the potential risks that can occur: fever and allergic reactions, hemolytic reactions, transmission of diseases such as Hepatitis, AIDS and Cytomegalovirus (CMV) and fluid overload.   In the event that I wish to have behalf). The surgeon or my attending physician will determine when the applicable recovery period ends for purposes of reinstating the DNAR order.   10. Patients having a sterilization procedure: I understand that if the procedure is successful the results agree to:  a. Allow the anesthesiologist (anesthesia doctor) to give me medicine and do additional procedures as necessary.  Some examples are: Starting or using an “IV” to give me medicine, fluids or blood during my procedure, and having a breathing tube p Anesthesia (“spinal”, “epidural”, & “nerve blocks”): I understand that rare but potential complications include headache, bleeding, infection, seizure, irregular heart rhythms, and nerve injury.     I can change my mind about having anesthesia services at

## (undated) NOTE — LETTER
[facility]  MULU. Notifier:    JUSTIN Cordova  [id number]    Advance Beneficiary Notice of Noncoverage (ABN)    Note:  If Medicare doesn't pay for D. Pap, Breast and or Pelvic Exam   below, you may have to pay.   Medicare does not pay for may ask to be paid now as I am responsible for payment. I cannot appeal if Medicare is not billed. Option 3:  I don't want the D.    listed above.   I understand with this choice I am not responsible for payment, and I cannot appeal to see if Medicare

## (undated) NOTE — LETTER
[address] [facility]  Pre-Operative Clearance Request    Patient Name:   Heladio Escamilla             :   1955    Surgeon: Dr. Vinay Solorio             Date of Surgery: 22   Surgical Procedure:Left Foot Fusion       Please Complete: 2-3 WEEKS PRIOR TO SURGERY TO AVOID CANCELLATION OF SURGERY. [x]  History and Physical        [x]  Medical  Clearance                       Testing is ordered by Adilia CORBETT per anesthesia guidelines                                **Please fax test results, H&P, and clearance to [phone] and to   [facility] at [phone]**

## (undated) NOTE — LETTER
MULU. Notifier: Thien/Cinepapaya   RUBIO. Patient Name: Bird Iverson Identification Number: [id number]      Advance Beneficiary Notice of Noncoverage (ABN)  NOTE:  If Medicare doesn’t pay for D. Pelvic/Breast and Pap Smear below, you may have to pay. may ask to be paid now as I am responsible for payment. I cannot appeal if Medicare is not billed. ? OPTION 3. I don’t want the D. listed above.  I understand with this choice  I am not responsible for payment, and I cannot appeal to see if Medicare would

## (undated) NOTE — IP AVS SNAPSHOT
Patient Demographics     Address  [address] [place] [id number] Phone  [phone] (Home) *Preferred*  [phone] [facility]) E-mail Address  [email or web address]      Emergency Contact(s)     Name Relation Home Work [address] [facility] weeks after surgery. Discuss at follow-up office visit. ? Not allowed while taking narcotic pain medication or muscle relaxants. Sex  ? Usually allowed after four to six weeks – check with surgeon at your office visit. Return to work  ?  Usually ? Usually you will be on a blood thinner for about 2-3 weeks. ? Contact your physician if you have signs of bruising, nose bleeds or blood in your urine. Use electric razors and soft toothbrushes only.   ? Do not take aspirin while taking blood thinners un ? Contact physician if discomfort does not respond to pain medication. Body changes  ? Constipation is common with the use of narcotics. ? Eat fiber rich foods and drink plenty of fluids. ?  Use stool softeners such as Colace or Senakot while on narcot ? JHONNY HOSE – IF ordered by your surgeon, wear these during the day and off at night. Notify your surgeon if you notice any  of the following signs  ? Separation of incision line. ? Increased redness, swelling, or warmth of skin around incision. ? space to open car doors to position yourself properly with walker to get in and out of your car safely; some parking spaces are  practically on top of each other and do not give you enough room.             SPECIAL  INSTRUCTIONS:  View knee discharge educat [facility]  Underlying Content © 2014 Rayna Rosario. Todos los Derechos Reservados. Modificaciones © 2014 7826-37LH  Regreso al Yg Oris  ?  Normalmente se permite después de cuatro a Derechos Reservados. Modificaciones © 2014 2014-07vl  Medicamentos  Anticoagulantes = diluyentes de la george (Xarelto, Eliquis, Lovenox, Coumadin o Aspirina)  ? Píldora o forma de inyección, dependiendo de lo que velasquez médico ordene.   ? SI se le aplica Couma ? Aplique hielo o terapia fría en el sitio quirúrgico megan 20 minutos al menos cuatro veces al  día, especialmente después de la terapia. Asegúrese de que haya mateus norman de marizol delgada  entre la piel y el hielo o la terapia de frío.   ? Cambie de [employer] ? de cirugía invasiva después de velasquez reemplazo de articulaciones. Hable con velasquez médico sobre esto en velasquez  visita postoperatoria al [employer]. ? Consuma mateus dieta balanceada paige en fibra y garth muchos líquidos. ?  Continúe utilizando la espirometría de inc ? Dolor, sensibilidad excesiva, enrojecimiento o hinchazón en la pierna o la pantorrilla (aparte del lugar de la  incisión).   Vaya directamente a la анна de emergencias o llame al 911 si  usted:  ? se queda sin aliento  ? tiene dolor en el pecho  ? escupe naun del automóvil para posicionarse adecuadamente con el caminador y poder entrar y salir del automóvil de  forma bishop; algunos espacios de aparcamiento están prácticamente superpuestos y no le dan suficiente espacio.                    Follow-up Info insulin glargine 100 UNIT/ML Soln  Commonly known as:  LANTUS  Next dose due:  TOMORROW AM      Inject 27 Units into the skin every morning.    Kiel Azevedo MD         Jardiance 25 MG Tabs  Generic drug:  Empagliflozin  Next dose due:  TOMORROW AM      Take HYDROcodone-acetaminophen  MG Tabs           368-368-A - MAR ACTION REPORT  (last 24 hrs)    ** SITE UNKNOWN **     Order ID Medication Name Action Time Action Reason Comments    [id number] 0.9% NaCl infusion 07/19/20 1530 New Bag      [id number] 0.9% [id number] Insulin Aspart Pen (NOVOLOG) 100 UNIT/ML flexpen 1-5 Units 07/20/20 0830 Given      [id number] insulin detemir (LEVEMIR) 100 UNIT/ML flextouch 20 Units 07/20/20 6217 Given              Recent Vital Signs       Most Recent Value   Vitals  130/60 F Procedure                               Abnormality         Status                     ---------                               -----------         ------                     CBC W/ DIFFERENTIAL[[id number]]          Abnormal            Final result the patellofemoral joint. I obtained a weightbearing x-ray which does show bone-on-bone changes posteriorly and narrowing of the medial joint space with osteophyte formation. The patient notes cracking and swelling.   When I initially saw her on 02/13/202 atherosclerosis, back pain, bad breath, belching, bloating, blurred vision, changes in hair, constipation, dizziness, easy bruisability, fatigue, loneliness, food intolerance, frequent use of laxatives, hearing loss, hypercholesterolemia, pain disorder, in understands the procedure. She does wish to proceed. She will be in the hospital 1 to 2 days and go home with home health.     Dictated By Riya Jade M.D.  d: 07/07/2020 09:40:35  t: 07/07/2020 10:10:10  Job [id number]  KFW/    cc: Murtaza Razo pectoris    • Back pain    • Bad breath    • Belching    • Bilateral leg edema 3/16/2018   • Bloating    • Blurred vision    • Change in hair    • Constipation    • Diarrhea, unspecified    • Dizziness    • Easy bruising    • Fatigue    • Feeling lonely Unspecified ankle surgery, Left ankle 2004 and right ankle 2006     • TOTAL HIP REPLACEMENT      rt hip   • VAGINAL HYSTERECTOMY N/A 11/16/2017    Performed by Moon Mayer MD at [facility] MAIN OR   • WRIST GANGLION CYST EXCISION Left 10/22/2014    Performed Misc, Take your blood pressure every morning and any time you feel dizzy, Disp: 1 each, Rfl: 0  Glucose Blood (ONETOUCH VERIO) In Vitro Strip, TEST THREE TIMES DAILY, Disp: 300 strip, Rfl: 6  [DISCONTINUED] RAMIPRIL 2.5 MG Oral Cap, TAKE 1 CAPSULE(2.5 MG) No results for input(s): TROP, CK in the last 168 hours. Imaging: Imaging data reviewed in Epic. ASSESSMENT / PLAN:     3 72years old female status post left total knee arthroplasty doing well postop  2.  Aspirin on hold and on Tylenol as needed Acute postoperative anemia due to expected blood loss      Past Medical History   Past Medical History:   Diagnosis Date   • Abdominal hernia    • Abdominal pain    • Anemia    • Anesthesia complication     slow to arouse   • Anxiety    • Arthritis    • Left GSV   • ENDOVENOUS LASER VEIN ADDON Right 2009    right GSV   • ESOPHAGOGASTRODUODENOSCOPY (EGD) N/A 9/11/2015    Performed by Rae Stanford [facility] at [facility] ENDOSCOPY   • HIP REPLACEMENT SURGERY  2011    Right hip   • HYSTEROSCOPY DILATION AND CURETTAGE -   Climbing 3-5 steps with a railing?: A Little       AM-PAC Score:  Raw Score: 18   Approx Degree of Impairment: 46.58%   Standardized Score (AM-PAC Scale): 43.63   CMS Modifier (G-Code): CK    FUNCTIONAL ABILITY STATUS  Gait Assessment   Gait Assistance PT Discharge Recommendations: Home with home health PT    PLAN  PT Treatment Plan: Bed mobility; Body mechanics; Endurance; Energy conservation;Patient education; Family education;Range of motion;Strengthening;Stair training;Transfer training;Balance training • Atherosclerosis of coronary artery    • Atherosclerotic heart disease of native coronary artery without angina pectoris    • Back pain    • Bad breath    • Belching    • Bilateral leg edema 3/16/2018   • Bloating    • Blurred vision    • Change in hair • HYSTEROSCOPY DILATION AND CURETTAGE N/A 9/25/2017    Performed by Shanna Romberg, MD at [facility] MAIN OR   • OTHER SURGICAL HISTORY  2004. 2006     Unspecified ankle surgery, Left ankle 2004 and right ankle 2006     • TOTAL HIP REPLACEMENT      rt hip   • VAG ADDITIONAL TESTS                                 ACTIVITY TOLERANCE  Pulse: 61  Heart Rate Source: Monitor     BP: 115/90  BP Location: Right arm  BP Method: Automatic  Patient Position: bathroom with OT and then returned to chair. Increased time spent on education and discussion with pt regarding the importance of movement, performing home exercise program which was given to the pt, safety, and fall precautions. Pt in understanding.  Instr PT Treatment Plan: Bed mobility; Body mechanics; Endurance; Energy conservation;Patient education; Family education;Range of motion;Strengthening;Stair training;Transfer training;Balance training  Rehab Potential : Good  Frequency (Obs): Daily  Number of Visit • Anemia    • Anesthesia complication     slow to arouse   • Anxiety    • Arthritis    • Atherosclerosis of coronary artery    • Atherosclerotic heart disease of native coronary artery without angina pectoris    • Back pain    • Bad breath    • Belching • HIP REPLACEMENT SURGERY  2011    Right hip   • HYSTEROSCOPY DILATION AND CURETTAGE N/A 9/25/2017    Performed by Cat Yang MD at [facility] MAIN OR   • OTHER SURGICAL HISTORY  2004. 2006     Unspecified ankle surgery, Left ankle 2004 and right ankle 2006 Patient reported feeling a little lightheaded, BP 85/38. Nurse notified and patient was assisted back to bed . RN and PCT present. Patient End of Session: In bed; With [facility] staff;Needs met;Call light within reach;RN aware of session/findings; All patient que Filed:  7/18/2020 12:56 PM Date of Service:  7/18/2020 12:42 PM Status:  Signed    :  Tiffanie Valle OT (Occupational Therapist)       OCCUPATIONAL THERAPY EVALUATION - INPATIENT     Room Number: 368/368-A  Evaluation Date: 7/18/2020  Type of E • Type II or unspecified type diabetes mellitus without mention of complication, not stated as uncontrolled    • Uncomfortable fullness after meals    • Venous insufficiency of both lower extremities 3/16/2018   • Visual impairment     glasses   • Vomiting L Lower Extremity: Weight Bearing as Tolerated    PAIN ASSESSMENT  Ratin  Location: L knee  Management Techniques:  Activity promotion    COGNITION  Overall Cognitive Status:  WFL - within functional limits    VISION  Current Vision: no visual deficits foot, CGA. CGA to stand and to maintain dynamic balance while pt pulled clothing up to waist.  Independent UB dressing. CGA with RW to ambulate to bathroom. Completed toilet transfer CGA. Hygiene care independent, clothing management CGA.  Sink side hand Specific performance deficits impacting engagement in ADL/IADL LOW  1 - 3 performance deficits    Client Assessment/Performance Deficits MODERATE - Comorbidities and min to mod modifications of tasks    Clinical Decision Making LOW - Analysis of occupation Vitamin B-12 Up To 1000mcg, IM/SC Inj 06/04/18       Future Appointments        Provider Margoth Silveira    8/10/2020 3:45 PM Jonathon Salamanca  [facility]    8/25/2020 10:30 AM Shady Botello MD [facility] CARDIOLOGY AT [facility]

## (undated) NOTE — LETTER
AUTHORIZATION FOR SURGICAL OPERATION OR OTHER PROCEDURE    1. I [facility] K[facility] assigned to my case to perform the following operation and/or procedure at the [facility]:    _______________________________________________________________________________________________    Cortisone Injection Left foot  _______________________________________________________________________________________________    2. My physician has explained the nature and purpose of the operation or other procedure, possible alternative methods of treatment, the risks involved, and the possibility of complication to me. I acknowledge that no guarantee has been made as to the result that may be obtained. 3.  I recognize that, during the course of this operation, or other procedure, unforseen conditions may necessitate additional or different procedure than those listed above. I, therefore, further authorize and request that the above named physician, his/her physician assistants or designees perform such procedures as are, in his/her professional opinion, necessary and desirable. 4.  Any tissue or organs removed in the operation or other procedure may be disposed of by and at the discretion of the [facility] and [facility] AT [facility]. 5.  I understand that in the event of a medical emergency, I will be transported by local paramedics to [facility] or other hospital emergency department. 6.  I certify that I have read and fully understand the above consent to operation and/or other procedure. 7.  I acknowledge that my physician has explained sedation/analgesia administration to me including the risks and benefits. I consent to the administration of sedation/analgesia as may be necessary or desirable in the judgement of my physician.     Witness signature: ___________________________________________________ Date:  ______/______/_____ Time:  ________ A. M.  P.M. Patient Name:  ______________________________________________________  (please print)      Patient signature:  ___________________________________________________             Relationship to Patient:           []  Parent    Responsible person                          []  Spouse  In case of minor or                    [] Other  _____________   Incompetent name:  __________________________________________________                               (please print)      _____________      Responsible person  In case of minor or  Incompetent signature:  _______________________________________________    Statement of Physician  My signature below affirms that prior to the time of the procedure, I have explained to the patient and/or his/her guardian, the risks and benefits involved in the proposed treatment and any reasonable alternative to the proposed treatment. I have also explained the risks and benefits involved in the refusal of the proposed treatment and have answered the patient's questions.                         Date:  ______/______/_______  Provider                      Signature:  __________________________________________________________       Time:  ___________ A.M    P.M.

## (undated) NOTE — LETTER
AUTHORIZATION FOR SURGICAL OPERATION OR OTHER PROCEDURE    1. I hereby [facility] Maria Isabel Burk, [facility] staff assigned to my case to perform the following operation and/or procedure at the [facility]:    _______________________________________________________________________________________________    Cortisone injection Left foot  _______________________________________________________________________________________________    2. My physician has explained the nature and purpose of the operation or other procedure, possible alternative methods of treatment, the risks involved, and the possibility of complication to me. I acknowledge that no guarantee has been made as to the result that may be obtained. 3.  I recognize that, during the course of this operation, or other procedure, unforseen conditions may necessitate additional or different procedure than those listed above. I, therefore, further authorize and request that the above named physician, his/her physician assistants or designees perform such procedures as are, in his/her professional opinion, necessary and desirable. 4.  Any tissue or organs removed in the operation or other procedure may be disposed of by and at the discretion of the [facility], [facility] and [facility] AT [facility]. 5.  I understand that in the event of a medical emergency, I will be transported by local paramedics to [facility] or other hospital emergency department. 6.  I certify that I have read and fully understand the above consent to operation and/or other procedure. 7.  I acknowledge that my physician has explained sedation/analgesia administration to me including the risks and benefits. I consent to the administration of sedation/analgesia as may be necessary or desirable in the judgement of my physician.     Witness signature: ___________________________________________________ Date:  ______/______/_____ Time:  ________ A. M.  P.M. Patient Name:  ______________________________________________________  (please print)      Patient signature:  ___________________________________________________             Relationship to Patient:           []  Parent    Responsible person                          []  Spouse  In case of minor or                    [] Other  _____________   Incompetent name:  __________________________________________________                               (please print)      _____________      Responsible person  In case of minor or  Incompetent signature:  _______________________________________________    Statement of Physician  My signature below affirms that prior to the time of the procedure, I have explained to the patient and/or his/her guardian, the risks and benefits involved in the proposed treatment and any reasonable alternative to the proposed treatment. I have also explained the risks and benefits involved in the refusal of the proposed treatment and have answered the patient's questions.                         Date:  ______/______/_______  Provider                      Signature:  __________________________________________________________       Time:  ___________ A.M    P.M.

## (undated) NOTE — IP AVS SNAPSHOT
[facility] [place]  [address] [place], [address] ~ [phone]                Discharge Summary   1/22/2017    [address]           Admission Information        Provider Department    1/22/2017 Luba Sifuentes MD  5nw-A         T Commonly known as:  FLONASE   What changed:    - when to take this  - reasons to take this        1 spray by Nasal route daily.     Huy Ling                           gabapentin 300 MG Caps   Commonly known as:  NEURONTIN   What changed:  See the new in LANTUS 100 UNIT/ML Soln   Generic drug:  insulin glargine        ADMINISTER 25 UNITS UNDER THE SKIN EVERY EVENING    Joycelyn Fierro                           MetFORMIN HCl 1000 MG Tabs   Commonly known as:  GLUCOPHAGE        TAKE ONE AND ONE-[facility] [address]   [phone]              Immunization History as of 1/24/2017  Never Reviewed    INFLUENZA 10/21/2013      Recent Hematology Lab Results  (Last 3 results in the past 90 days)    WBC RBC Hemoglobin Hematocrit MCV MCH MCHC RDW Michael - If you don’t have insurance, [employer] [facility] has partnered with [facility] to help you get signed up for insurance coverage.   [facility] is a Federal Navigator program that can help with your Affordable Care Act cover Most common side effects: Dizziness or feeling lightheaded (especially with standing), heart rate changes, headaches, nausea/vomiting   What to report to your healthcare team:  Dizziness, nausea, chest pain, weakness, numbness           Cholesterol Lowerin infection of the mouth/tongue   What to report to your healthcare provider: Head or mouth pain, coating on mouth/tongue, shortness of breath, sensation of heart racing, rash, or itching           General Nerve Function Medications     gabapentin (NEURONTIN

## (undated) NOTE — LETTER
[facility]  A. Notifier:    JUSTIN Chacko  [id number]    Advance Beneficiary Notice of Noncoverage (ABN)    Note:  If Medicare doesn't pay for D. Breast,Pelvic and or Pap    below, you may have to pay.   Medicare does not pay for everyt may ask to be paid now as I am responsible for payment. I cannot appeal if Medicare is not billed. Option 3:  I don't want the D.    listed above.   I understand with this choice I am not responsible for payment, and I cannot appeal to see if Medicare

## (undated) NOTE — LETTER
Date: 1/23/2017    Patient Name: Caleb Muhammad          To Whom it may concern: This letter has been written at the patient's request. The above patient is currently hospitalized at [facility] for treatment of a medical condition.             Si

## (undated) NOTE — LETTER
Consent to Procedure/Sedation    Date: __10/9/2017_____    Time: ___8:29 AM ___    1. I authorize the performance upon [facility] the following:  [employer] (UDS)      2.  Jj Garber(and whomever is designated as the [employer] ___________________________    ___________________    Witness: ____________________     Date: ______________    Printed: 10/9/2017   8:29 AM    Patient Name: Arron Sanz        : 1955       Medical Record #: [id number]